# Patient Record
Sex: MALE | Race: WHITE | NOT HISPANIC OR LATINO | Employment: OTHER | URBAN - NONMETROPOLITAN AREA
[De-identification: names, ages, dates, MRNs, and addresses within clinical notes are randomized per-mention and may not be internally consistent; named-entity substitution may affect disease eponyms.]

---

## 2017-01-04 ENCOUNTER — OFFICE VISIT - GICH (OUTPATIENT)
Dept: ONCOLOGY | Facility: OTHER | Age: 72
End: 2017-01-04

## 2017-01-04 ENCOUNTER — HISTORY (OUTPATIENT)
Dept: ONCOLOGY | Facility: OTHER | Age: 72
End: 2017-01-04

## 2017-01-04 DIAGNOSIS — C18.7 MALIGNANT NEOPLASM OF SIGMOID COLON (H): ICD-10-CM

## 2017-02-02 ENCOUNTER — HOSPITAL ENCOUNTER (OUTPATIENT)
Dept: INFUSION THERAPY | Facility: OTHER | Age: 72
End: 2017-02-02
Attending: INTERNAL MEDICINE

## 2017-04-06 ENCOUNTER — AMBULATORY - GICH (OUTPATIENT)
Dept: LAB | Facility: OTHER | Age: 72
End: 2017-04-06

## 2017-04-06 ENCOUNTER — HOSPITAL ENCOUNTER (OUTPATIENT)
Dept: RADIOLOGY | Facility: OTHER | Age: 72
End: 2017-04-06
Attending: INTERNAL MEDICINE

## 2017-04-06 DIAGNOSIS — C18.7 MALIGNANT NEOPLASM OF SIGMOID COLON (H): ICD-10-CM

## 2017-04-06 LAB
A/G RATIO - HISTORICAL: 2 (ref 1–2)
ABSOLUTE BASOPHILS - HISTORICAL: 0.1 THOU/CU MM
ABSOLUTE EOSINOPHILS - HISTORICAL: 0.1 THOU/CU MM
ABSOLUTE LYMPHOCYTES - HISTORICAL: 1.4 THOU/CU MM (ref 0.9–2.9)
ABSOLUTE MONOCYTES - HISTORICAL: 0.8 THOU/CU MM
ABSOLUTE NEUTROPHILS - HISTORICAL: 4.7 THOU/CU MM (ref 1.7–7)
ALBUMIN SERPL-MCNC: 4 G/DL (ref 3.5–5.7)
ALP SERPL-CCNC: 94 IU/L (ref 34–104)
ALT (SGPT) - HISTORICAL: 25 IU/L (ref 7–52)
ANION GAP - HISTORICAL: 9 (ref 5–18)
AST SERPL-CCNC: 27 IU/L (ref 13–39)
BASOPHILS # BLD AUTO: 1 %
BILIRUB SERPL-MCNC: 0.5 MG/DL (ref 0.3–1)
BUN SERPL-MCNC: 15 MG/DL (ref 7–25)
BUN/CREAT RATIO - HISTORICAL: 14
CALCIUM SERPL-MCNC: 9.3 MG/DL (ref 8.6–10.3)
CEA - HISTORICAL: <3 NG/ML
CHLORIDE SERPLBLD-SCNC: 102 MMOL/L (ref 98–107)
CO2 SERPL-SCNC: 26 MMOL/L (ref 21–31)
CREAT SERPL-MCNC: 1.1 MG/DL (ref 0.7–1.3)
EOSINOPHIL NFR BLD AUTO: 1.6 %
ERYTHROCYTE [DISTWIDTH] IN BLOOD BY AUTOMATED COUNT: 12.6 % (ref 11.5–15.5)
GFR IF NOT AFRICAN AMERICAN - HISTORICAL: >60 ML/MIN/1.73M2
GLOBULIN - HISTORICAL: 2 G/DL (ref 2–3.7)
GLUCOSE SERPL-MCNC: 180 MG/DL (ref 70–105)
HCT VFR BLD AUTO: 39.8 % (ref 37–53)
HEMOGLOBIN: 13.2 G/DL (ref 13.5–17.5)
LDH SERPL-CCNC: 171 IU/L (ref 140–271)
LYMPHOCYTES NFR BLD AUTO: 19.6 % (ref 20–44)
MCH RBC QN AUTO: 31.8 PG (ref 26–34)
MCHC RBC AUTO-ENTMCNC: 33.3 G/DL (ref 32–36)
MCV RBC AUTO: 95 FL (ref 80–100)
MONOCYTES NFR BLD AUTO: 11.1 %
NEUTROPHILS NFR BLD AUTO: 66.8 % (ref 42–72)
PLATELET # BLD AUTO: 229 THOU/CU MM (ref 140–440)
PMV BLD: 7 FL (ref 6.5–11)
POTASSIUM SERPL-SCNC: 4.3 MMOL/L (ref 3.5–5.1)
PROT SERPL-MCNC: 6 G/DL (ref 6.4–8.9)
RED BLOOD COUNT - HISTORICAL: 4.17 MIL/CU MM (ref 4.3–5.9)
SODIUM SERPL-SCNC: 137 MMOL/L (ref 133–143)
WHITE BLOOD COUNT - HISTORICAL: 7.1 THOU/CU MM (ref 4.5–11)

## 2017-04-13 ENCOUNTER — OFFICE VISIT - GICH (OUTPATIENT)
Dept: ONCOLOGY | Facility: OTHER | Age: 72
End: 2017-04-13

## 2017-04-13 ENCOUNTER — COMMUNICATION - GICH (OUTPATIENT)
Dept: SURGERY | Facility: OTHER | Age: 72
End: 2017-04-13

## 2017-04-13 ENCOUNTER — HISTORY (OUTPATIENT)
Dept: ONCOLOGY | Facility: OTHER | Age: 72
End: 2017-04-13

## 2017-04-13 DIAGNOSIS — Z85.038 PERSONAL HISTORY OF OTHER MALIGNANT NEOPLASM OF LARGE INTESTINE (CODE): ICD-10-CM

## 2017-04-13 DIAGNOSIS — C18.7 MALIGNANT NEOPLASM OF SIGMOID COLON (H): ICD-10-CM

## 2017-05-02 ENCOUNTER — COMMUNICATION - GICH (OUTPATIENT)
Dept: INTERNAL MEDICINE | Facility: OTHER | Age: 72
End: 2017-05-02

## 2017-05-02 DIAGNOSIS — M10.9 GOUT: ICD-10-CM

## 2017-05-02 DIAGNOSIS — E78.5 HYPERLIPIDEMIA: ICD-10-CM

## 2017-05-02 DIAGNOSIS — I25.10 ATHEROSCLEROTIC HEART DISEASE OF NATIVE CORONARY ARTERY WITHOUT ANGINA PECTORIS: ICD-10-CM

## 2017-05-03 ENCOUNTER — COMMUNICATION - GICH (OUTPATIENT)
Dept: INTERNAL MEDICINE | Facility: OTHER | Age: 72
End: 2017-05-03

## 2017-05-03 DIAGNOSIS — E66.9 OBESITY: ICD-10-CM

## 2017-05-03 DIAGNOSIS — M10.9 GOUT: ICD-10-CM

## 2017-05-03 DIAGNOSIS — E11.9 TYPE 2 DIABETES MELLITUS WITHOUT COMPLICATIONS (H): ICD-10-CM

## 2017-05-03 DIAGNOSIS — D64.9 ANEMIA: ICD-10-CM

## 2017-05-15 ENCOUNTER — HOSPITAL ENCOUNTER (OUTPATIENT)
Dept: SURGERY | Facility: OTHER | Age: 72
Discharge: HOME | End: 2017-05-15
Attending: SURGERY | Admitting: SURGERY

## 2017-05-15 ENCOUNTER — SURGERY (OUTPATIENT)
Dept: SURGERY | Facility: OTHER | Age: 72
End: 2017-05-15

## 2017-05-15 ENCOUNTER — HISTORY (OUTPATIENT)
Dept: SURGERY | Facility: OTHER | Age: 72
End: 2017-05-15

## 2017-05-16 ENCOUNTER — COMMUNICATION - GICH (OUTPATIENT)
Dept: SURGERY | Facility: OTHER | Age: 72
End: 2017-05-16

## 2017-05-26 ENCOUNTER — OFFICE VISIT - GICH (OUTPATIENT)
Dept: INTERNAL MEDICINE | Facility: OTHER | Age: 72
End: 2017-05-26

## 2017-05-26 ENCOUNTER — HISTORY (OUTPATIENT)
Dept: INTERNAL MEDICINE | Facility: OTHER | Age: 72
End: 2017-05-26

## 2017-05-26 DIAGNOSIS — R31.29 OTHER MICROSCOPIC HEMATURIA: ICD-10-CM

## 2017-05-26 DIAGNOSIS — I25.10 ATHEROSCLEROTIC HEART DISEASE OF NATIVE CORONARY ARTERY WITHOUT ANGINA PECTORIS: ICD-10-CM

## 2017-05-26 DIAGNOSIS — K21.00 GASTRO-ESOPHAGEAL REFLUX DISEASE WITH ESOPHAGITIS: ICD-10-CM

## 2017-05-26 DIAGNOSIS — K29.60 OTHER GASTRITIS WITHOUT BLEEDING: ICD-10-CM

## 2017-05-26 DIAGNOSIS — E11.9 TYPE 2 DIABETES MELLITUS WITHOUT COMPLICATIONS (H): ICD-10-CM

## 2017-05-26 DIAGNOSIS — D50.9 IRON DEFICIENCY ANEMIA: ICD-10-CM

## 2017-05-26 DIAGNOSIS — E66.9 OBESITY: ICD-10-CM

## 2017-05-26 DIAGNOSIS — M10.9 GOUT: ICD-10-CM

## 2017-05-26 DIAGNOSIS — E78.2 MIXED HYPERLIPIDEMIA: ICD-10-CM

## 2017-05-26 LAB
ALB RAND URINE - HISTORICAL: <5 MG/L
BILIRUB UR QL: NEGATIVE
CLARITY, URINE: CLEAR CLARITY
COLOR UR: YELLOW COLOR
CREATININE, URINE - HISTORICAL: 1.19 G/L
ESTIMATED AVERAGE GLUCOSE: 171 MG/DL
GLUCOSE URINE: NEGATIVE MG/DL
HEMOGLOBIN A1C MONITORING (POCT) - HISTORICAL: 7.6 % (ref 4–6.2)
KETONES UR QL: NEGATIVE MG/DL
LEUKOCYTE ESTERASE URINE: NEGATIVE
MICROALBUMIN, RAND UR - HISTORICAL: NORMAL MG/G CREAT
NITRITE UR QL STRIP: NEGATIVE
OCCULT BLOOD,URINE - HISTORICAL: NEGATIVE
PH UR: 5.5 [PH]
PROTEIN QUALITATIVE,URINE - HISTORICAL: NEGATIVE MG/DL
PROTEIN QUALITATIVE,URINE - HISTORICAL: NEGATIVE MG/DL
SP GR UR STRIP: >=1.03
UROBILINOGEN,QUALITATIVE - HISTORICAL: NORMAL EU/DL

## 2017-05-26 ASSESSMENT — PATIENT HEALTH QUESTIONNAIRE - PHQ9: SUM OF ALL RESPONSES TO PHQ QUESTIONS 1-9: 0

## 2017-08-01 ENCOUNTER — AMBULATORY - GICH (OUTPATIENT)
Dept: LAB | Facility: OTHER | Age: 72
End: 2017-08-01

## 2017-08-01 ENCOUNTER — HOSPITAL ENCOUNTER (OUTPATIENT)
Dept: RADIOLOGY | Facility: OTHER | Age: 72
End: 2017-08-01
Attending: INTERNAL MEDICINE

## 2017-08-01 DIAGNOSIS — C18.7 MALIGNANT NEOPLASM OF SIGMOID COLON (H): ICD-10-CM

## 2017-08-01 LAB
A/G RATIO - HISTORICAL: 1.5 (ref 1–2)
ABSOLUTE BASOPHILS - HISTORICAL: 0 THOU/CU MM
ABSOLUTE EOSINOPHILS - HISTORICAL: 0.1 THOU/CU MM
ABSOLUTE IMMATURE GRANULOCYTES(METAS,MYELOS,PROS) - HISTORICAL: 0.1 THOU/CU MM
ABSOLUTE LYMPHOCYTES - HISTORICAL: 1.6 THOU/CU MM (ref 0.9–2.9)
ABSOLUTE MONOCYTES - HISTORICAL: 0.8 THOU/CU MM
ABSOLUTE NEUTROPHILS - HISTORICAL: 5.3 THOU/CU MM (ref 1.7–7)
ALBUMIN SERPL-MCNC: 4.3 G/DL (ref 3.5–5.7)
ALP SERPL-CCNC: 91 IU/L (ref 34–104)
ALT (SGPT) - HISTORICAL: 35 IU/L (ref 7–52)
ANION GAP - HISTORICAL: 8 (ref 5–18)
AST SERPL-CCNC: 38 IU/L (ref 13–39)
BASOPHILS # BLD AUTO: 0.1 %
BILIRUB SERPL-MCNC: 0.6 MG/DL (ref 0.3–1)
BUN SERPL-MCNC: 20 MG/DL (ref 7–25)
BUN/CREAT RATIO - HISTORICAL: 20
CALCIUM SERPL-MCNC: 9.6 MG/DL (ref 8.6–10.3)
CEA - HISTORICAL: <3 NG/ML
CHLORIDE SERPLBLD-SCNC: 106 MMOL/L (ref 98–107)
CO2 SERPL-SCNC: 22 MMOL/L (ref 21–31)
CREAT SERPL-MCNC: 1.02 MG/DL (ref 0.7–1.3)
EOSINOPHIL NFR BLD AUTO: 1.5 %
ERYTHROCYTE [DISTWIDTH] IN BLOOD BY AUTOMATED COUNT: 14.6 % (ref 11.5–15.5)
GFR IF NOT AFRICAN AMERICAN - HISTORICAL: >60 ML/MIN/1.73M2
GLOBULIN - HISTORICAL: 2.8 G/DL (ref 2–3.7)
GLUCOSE SERPL-MCNC: 145 MG/DL (ref 70–105)
HCT VFR BLD AUTO: 39.8 % (ref 37–53)
HEMOGLOBIN: 13.4 G/DL (ref 13.5–17.5)
IMMATURE GRANULOCYTES(METAS,MYELOS,PROS) - HISTORICAL: 0.8 %
LDH SERPL-CCNC: 151 IU/L (ref 140–271)
LYMPHOCYTES NFR BLD AUTO: 19.8 % (ref 20–44)
MCH RBC QN AUTO: 30.9 PG (ref 26–34)
MCHC RBC AUTO-ENTMCNC: 33.7 G/DL (ref 32–36)
MCV RBC AUTO: 92 FL (ref 80–100)
MONOCYTES NFR BLD AUTO: 10.7 %
NEUTROPHILS NFR BLD AUTO: 67.1 % (ref 42–72)
PLATELET # BLD AUTO: 221 THOU/CU MM (ref 140–440)
PMV BLD: 9.5 FL (ref 6.5–11)
POTASSIUM SERPL-SCNC: 4.4 MMOL/L (ref 3.5–5.1)
PROT SERPL-MCNC: 7.1 G/DL (ref 6.4–8.9)
RED BLOOD COUNT - HISTORICAL: 4.33 MIL/CU MM (ref 4.3–5.9)
SODIUM SERPL-SCNC: 136 MMOL/L (ref 133–143)
WHITE BLOOD COUNT - HISTORICAL: 7.8 THOU/CU MM (ref 4.5–11)

## 2017-08-17 ENCOUNTER — HISTORY (OUTPATIENT)
Dept: ONCOLOGY | Facility: OTHER | Age: 72
End: 2017-08-17

## 2017-08-17 ENCOUNTER — OFFICE VISIT - GICH (OUTPATIENT)
Dept: ONCOLOGY | Facility: OTHER | Age: 72
End: 2017-08-17

## 2017-08-17 DIAGNOSIS — C18.7 MALIGNANT NEOPLASM OF SIGMOID COLON (H): ICD-10-CM

## 2017-12-18 ENCOUNTER — HOSPITAL ENCOUNTER (OUTPATIENT)
Dept: RADIOLOGY | Facility: OTHER | Age: 72
End: 2017-12-18
Attending: INTERNAL MEDICINE

## 2017-12-18 ENCOUNTER — AMBULATORY - GICH (OUTPATIENT)
Dept: LAB | Facility: OTHER | Age: 72
End: 2017-12-18

## 2017-12-18 DIAGNOSIS — E66.9 OBESITY: ICD-10-CM

## 2017-12-18 DIAGNOSIS — C18.7 MALIGNANT NEOPLASM OF SIGMOID COLON (H): ICD-10-CM

## 2017-12-18 DIAGNOSIS — E11.69 TYPE 2 DIABETES MELLITUS WITH OTHER SPECIFIED COMPLICATION (H): ICD-10-CM

## 2017-12-18 DIAGNOSIS — E78.2 MIXED HYPERLIPIDEMIA: ICD-10-CM

## 2017-12-18 LAB
A/G RATIO - HISTORICAL: 1.4 (ref 1–2)
ABSOLUTE BASOPHILS - HISTORICAL: 0 THOU/CU MM
ABSOLUTE EOSINOPHILS - HISTORICAL: 0.1 THOU/CU MM
ABSOLUTE IMMATURE GRANULOCYTES(METAS,MYELOS,PROS) - HISTORICAL: 0 THOU/CU MM
ABSOLUTE LYMPHOCYTES - HISTORICAL: 1.2 THOU/CU MM (ref 0.9–2.9)
ABSOLUTE MONOCYTES - HISTORICAL: 1 THOU/CU MM
ABSOLUTE NEUTROPHILS - HISTORICAL: 4.1 THOU/CU MM (ref 1.7–7)
ALBUMIN SERPL-MCNC: 4.2 G/DL (ref 3.5–5.7)
ALP SERPL-CCNC: 100 IU/L (ref 34–104)
ALT (SGPT) - HISTORICAL: 61 IU/L (ref 7–52)
ANION GAP - HISTORICAL: 12 (ref 5–18)
AST SERPL-CCNC: 73 IU/L (ref 13–39)
BASOPHILS # BLD AUTO: 0.3 %
BILIRUB SERPL-MCNC: 0.5 MG/DL (ref 0.3–1)
BUN SERPL-MCNC: 16 MG/DL (ref 7–25)
BUN/CREAT RATIO - HISTORICAL: 15
CALCIUM SERPL-MCNC: 9.5 MG/DL (ref 8.6–10.3)
CEA - HISTORICAL: <3 NG/ML
CHLORIDE SERPLBLD-SCNC: 100 MMOL/L (ref 98–107)
CHOL/HDL RATIO - HISTORICAL: 6
CHOLESTEROL TOTAL: 192 MG/DL
CO2 SERPL-SCNC: 25 MMOL/L (ref 21–31)
CREAT SERPL-MCNC: 1.05 MG/DL (ref 0.7–1.3)
EOSINOPHIL NFR BLD AUTO: 2 %
ERYTHROCYTE [DISTWIDTH] IN BLOOD BY AUTOMATED COUNT: 14.6 % (ref 11.5–15.5)
ESTIMATED AVERAGE GLUCOSE: 200 MG/DL
GFR IF NOT AFRICAN AMERICAN - HISTORICAL: >60 ML/MIN/1.73M2
GLOBULIN - HISTORICAL: 2.9 G/DL (ref 2–3.7)
GLUCOSE SERPL-MCNC: 249 MG/DL (ref 70–105)
HCT VFR BLD AUTO: 40.3 % (ref 37–53)
HDLC SERPL-MCNC: 32 MG/DL (ref 23–92)
HEMOGLOBIN A1C MONITORING (POCT) - HISTORICAL: 8.6 % (ref 4–6.2)
HEMOGLOBIN: 13.3 G/DL (ref 13.5–17.5)
IMMATURE GRANULOCYTES(METAS,MYELOS,PROS) - HISTORICAL: 0.2 %
LDH SERPL-CCNC: 177 IU/L (ref 140–271)
LDLC SERPL CALC-MCNC: 106 MG/DL
LYMPHOCYTES NFR BLD AUTO: 17.9 % (ref 20–44)
MCH RBC QN AUTO: 30.2 PG (ref 26–34)
MCHC RBC AUTO-ENTMCNC: 33 G/DL (ref 32–36)
MCV RBC AUTO: 92 FL (ref 80–100)
MONOCYTES NFR BLD AUTO: 15.9 %
NEUTROPHILS NFR BLD AUTO: 63.7 % (ref 42–72)
NON-HDL CHOLESTEROL - HISTORICAL: 160 MG/DL
PLATELET # BLD AUTO: 231 THOU/CU MM (ref 140–440)
PMV BLD: 10.1 FL (ref 6.5–11)
POTASSIUM SERPL-SCNC: 4.9 MMOL/L (ref 3.5–5.1)
PROT SERPL-MCNC: 7.1 G/DL (ref 6.4–8.9)
PROVIDER ORDERDED STATUS - HISTORICAL: ABNORMAL
RED BLOOD COUNT - HISTORICAL: 4.4 MIL/CU MM (ref 4.3–5.9)
SODIUM SERPL-SCNC: 137 MMOL/L (ref 133–143)
TRIGL SERPL-MCNC: 271 MG/DL
WHITE BLOOD COUNT - HISTORICAL: 6.4 THOU/CU MM (ref 4.5–11)

## 2017-12-26 ENCOUNTER — HISTORY (OUTPATIENT)
Dept: ONCOLOGY | Facility: OTHER | Age: 72
End: 2017-12-26

## 2017-12-26 ENCOUNTER — OFFICE VISIT - GICH (OUTPATIENT)
Dept: ONCOLOGY | Facility: OTHER | Age: 72
End: 2017-12-26

## 2017-12-26 DIAGNOSIS — C18.7 MALIGNANT NEOPLASM OF SIGMOID COLON (H): ICD-10-CM

## 2017-12-27 NOTE — PROGRESS NOTES
Patient Information     Patient Name MRN Sex Andrez Tinajero [id number] Male 1945      Progress Notes by Evangelina Sinclair at 2017  8:24 AM     Author:  Evangelina Sinclair Service:  (none) Author Type:  Other Clinical Staff     Filed:  2017  8:24 AM Date of Service:  2017  8:24 AM Status:  Signed     :  Evangelina Sinclair (Other Clinical Staff)            1.  Has the patient had a previous reaction to IV contrast? No    2.  Does the patient have kidney disease? No    3.  Is the patient on dialysis? No    If YES to any of these questions, exam will be reviewed with a Radiologist before administering contrast.

## 2017-12-27 NOTE — PROGRESS NOTES
Patient Information     Patient Name MRN Sex Andrez Tinajero [id number] Male 1945      Progress Notes by Evangelina Sinclair at 2017  8:24 AM     Author:  Evangelina Sinclair Service:  (none) Author Type:  Other Clinical Staff     Filed:  2017  8:24 AM Date of Service:  2017  8:24 AM Status:  Signed     :  Evangelina Sinclair (Other Clinical Staff)            IV Contrast- Discharge Instructions After Your CT Scan      The IV contrast you received today will be filtered from your bloodstream by your kidneys during the next 24 hours and pass from the body in urine.  You will not be aware of this process and your urine will not change in color.  To help this process you should drink at least 4 additional glasses of water or juice today.  This reduces stress on your kidneys.    Most contrast reactions are immediate.  Should you develop symptoms of concern after discharge, contact the department at the number below.  After hours you should contact your personal physician.  If you develop breathing distress or wheezing, call 911.  ]

## 2017-12-27 NOTE — PROGRESS NOTES
Patient Information     Patient Name MRN Andrez Soto [id number] Male 1945      Progress Notes signed by Emmy Stark MD at 2017 12:36 PM      Author:  Emmy Stark MD Service:  (none) Author Type:  Physician     Filed:  2017 12:36 PM Encounter Date:  2017 Status:  Signed     :  Emmy Stark MD (Physician)            -  DATE OF SERVICE:  2017    HEMATOLOGY/ONCOLOGY CLINIC NOTE    Mr. Olivier returns for followup of colon cancer. We had seen the patient in consultation on 2016. At that time, he was a 70-year-old white male with a history of coronary artery disease, type 2 diabetes mellitus, hyperlipidemia, and hypertension who presented to the emergency room on 2016, with complaints of chest pain radiating down his arms, which was primarily worse with exertion. In the emergency department, he was found to have a hemoglobin of 7.1, and he subsequently was admitted. CBC revealed microcytic indices with an MCV of 62. He was noted to be iron deficient. He was transfused 2 units of packed red cells and was ruled out for MI.     Subsequently, he was seen by Dr. Solano, who performed colonoscopy and was noted to have a mass in the sigmoid colon that was consistent with adenocarcinoma. He also had multiple adenomatous polyps.     The patient subsequently was admitted on May 11, 2016, for a sigmoid colectomy. This was performed on May 17, 2016. Pathology revealed in the sigmoid colon a mass consistent with moderately differentiated adenocarcinoma. The tumor size was 2 x 1 x 0.7 cm. The tumor invaded the muscularis propria, 2/8 lymph nodes were positive for metastatic carcinoma. Margins were negative for tumor. The grade of the tumor was ruled as moderately differentiated. The patient was staged pathologic stage T2N1b as part of the postop evaluation.     The patient had a CT abdomen and pelvis on May 12, 2016. This revealed that there were 2  nonspecific low-attenuation lesions in the liver. Metastasis could not be excluded. There was no lymphadenopathy or additional findings to suggest metastases. The patient had a preop CEA, which was less than 3.     When we saw the patient, we wanted to adequately stage the patient by obtaining a PET scan to rule out metastatic disease. PET scan was performed on Lluvia 15, 2016, and was essentially negative for metastatic disease. Lesions seen on prior PET in the liver were not hypermetabolic. We felt that the patient had stage III disease and he would be a candidate for adjuvant chemotherapy.     When we saw the patient on June 28, 2016, the plan was to start FOLFOX x12 cycles. The patient was started, and received a total of 10 cycles.     When we saw him again on November 17, 2016, at that time he did note some cold-induced neuropathic symptoms and a cold sensation when he drinks cold liquids, but otherwise is doing relatively well. We elected to restage him after 12 cycles. He completed 12 cycles of FOLFOX. His last chemotherapy was administered on December 7, 2016. During chemotherapy, he said he became severely ill and developed numbness in his hands and significant cold-induced neuropathy, specifically with swallowing cold liquids. He also did note that he had lost a sense of taste. Since then, his symptoms have been slowly resolving. He still had numbness in his hands and loss of sense of taste. He did have a PET scan done after 12 cycles of chemotherapy, and this came back essentially negative for metastatic disease.     When we saw the patient, the plan was to prescribe vitamin B6 to help with his neuropathy. He said his neurology did improve.     He did have staging studies on April 6, including CT abdomen and pelvis, which was essentially negative. CT of the chest was negative. The patient also underwent a colonoscopy in May 2017, which revealed a tubular adenoma at the hepatic flexure of the colon.      Otherwise, the patient is here now for followup. He says he is doing well. He offers no complaints of shortness of breath, change in bowel habits, bright red blood per rectum, abdominal pain, fevers, night sweats, or weight loss. He had repeat staging studies again, including CT chest, abdomen, and pelvis, which revealed no change from previous scans. There was no evidence of recurrent carcinoma. There were some small pulmonary nodules that were present, but have been stable since June 2016 consistent with a nonmalignant process,     The patient otherwise is again doing well. His neuropathy has significantly improved. He still has some pain when he walks, but does not want to take any medications for this. He continues with vitamin B6.     PHYSICAL EXAMINATION  GENERAL: He is an obese, elderly white male, in no acute distress.   VITAL SIGNS: Blood pressure 112/60. Pulse 76.  Temperature 97.4.   HEENT: Atraumatic, normocephalic. Oropharynx is nonerythematous.   NECK: Supple.   LUNGS: Clear to auscultation and percussion.   HEART: Regular rhythm. S1, S2 normal.   ABDOMEN: Soft. Normoactive bowel sounds. No masses, nontender.   LYMPHATICS: No cervical, supraclavicular, axillary, or inguinal nodes.   EXTREMITIES: No edema.   NEUROLOGIC: Nonfocal.     LABORATORY DATA  Laboratories reveal a CEA of less than 3. CBC is within normal limits.  LDH is 151. BUN 20, creatinine 1.02. LFTs are normal.     IMPRESSION  Stage mildly differentiated adenocarcinoma of the sigmoid colon with 2/8 lymph nodes positive for metastatic disease consistent with pathologic [id number] disease. PET scan was negative for metastatic disease. The patient was started on adjuvant FOLFOX chemotherapy and completed 12 cycles. Course complicated daily by peripheral neuropathy, grade 1, which is improving. PET scan indicates no evidence of metastatic disease. CEA was normal. Recent scans indicated no evidence of metastatic disease including CT chest,  abdomen, and pelvis. Colonoscopy done in May 2017 revealed a tubular adenoma. The patient will be due for a colonoscopy in 3 years.     The plan is to continue surveillance. We will see the patient in 4 months, obtain CBC, CMP, LDH, CEA, and CT chest, abdomen, and pelvis.     40 minutes were spent with the patient, and greater than half the time was spent on counseling and coordination.       MD ALBERTO HURT/kisha   D:  2017 12:25:03  T:  2017 14:18:38  Voice Job ID:  [id number]  Text Job ID:  [id number]  cc:MEGAN LEOS MD, PRIMARY PHYSICIAN  JERE COLE MD         [facility] [place], [place]:  LIEN MCKEON  MR#:  [id number]  :  1945  DATE:  2017  LOCATION:  [facility]  ROOM:    TYPE:  [facility] NOTEPage 1 of 1

## 2017-12-28 NOTE — PROGRESS NOTES
Patient Information     Patient Name MRN Sex Andrez Tinajero [id number] Male 1945      Progress Notes by Evangelina Sinclair at 2017  8:24 AM     Author:  Evangelina Sinclair Service:  (none) Author Type:  Other Clinical Staff     Filed:  2017  8:24 AM Date of Service:  2017  8:24 AM Status:  Signed     :  Evangelina Sinclair (Other Clinical Staff)            Falls Risk Criteria:    Age 65 and older or under age 4        Sensory deficits    Poor vision    Use of ambulatory aides    Impaired judgment    Unable to walk independently    Meets High Risk criteria for falls:  Yes             1.  Do you have dizziness or vertigo?    no                    2.  Do you need help standing or walking?   no                 3.  Have you fallen within the last 6 months?    no           4.  Has the patient been fasting?      yes       If any risks are marked Yes, the following interventions are utilized:    Do not leave patient unattended     Assist patient in the dressing room and bathroom    Have ambulatory aides available throughout procedure    Involve patient s family if available

## 2017-12-30 NOTE — NURSING NOTE
Patient Information     Patient Name MRN Andrez Soto [id number] Male 1945      Nursing Note by Amaris Ray at 2017  9:00 AM     Author:  Amaris Ray Service:  (none) Author Type:  (none)     Filed:  2017 10:07 AM Encounter Date:  2017 Status:  Signed     :  Amaris Ray            Patient presents for a follow up of colon cancer.  No current complaints or side effects.   Amaris Ray CMA (AAMA).....................2017  9:14 AM

## 2017-12-30 NOTE — NURSING NOTE
Patient Information     Patient Name MRN Sex Andrez Tinajero [id number] Male 1945      Nursing Note by Neeta Eddy RN at 2017  9:00 AM     Author:  Neeta Eddy RN Service:  (none) Author Type:  NURS- Registered Nurse     Filed:  2017  9:46 AM Encounter Date:  2017 Status:  Signed     :  Neeta Eddy RN (NURS- Registered Nurse)            Orders entered in 4 months for patient to have CBC, CMP, LDH, CEA and CT of chest with contrast, and CT of abdomen and pelvis entered and to Dr. ROSA Stark for approval.    Neeta Eddy RN ....................  2017   9:44 AM

## 2018-01-02 NOTE — NURSING NOTE
Patient Information     Patient Name MRN Sex Andrez Tinajero [id number] Male 1945      Nursing Note by Angela Quick at 2017 10:00 AM     Author:  Angela Quick Service:  (none) Author Type:  NURS- Registered Nurse     Filed:  2017 10:47 AM Encounter Date:  2017 Status:  Signed     :  Angela Quick (NURS- Registered Nurse)            Labs and CT scans ordered per written order sheet and sent to provider for co-sign. Angela Quick RN 2017  10:47 AM

## 2018-01-02 NOTE — PROGRESS NOTES
Patient Information     Patient Name MRN Andrez Soto [id number] Male 1945      Progress Notes by Emmy Stark MD at 2017 10:00 AM     Author:  Emmy Stark MD Service:  (none) Author Type:  Physician     Filed:  2017  6:24 PM Encounter Date:  2017 Status:  Signed     :  Emmy Stark MD (Physician)            This note has been dictated. The encounter number is [phone].

## 2018-01-02 NOTE — PROGRESS NOTES
Patient Information     Patient Name MRN Andrez Soto [id number] Male 1945      Progress Notes signed by Emmy Stark MD at 2017 11:59 AM      Author:  Emmy Stark MD Service:  (none) Author Type:  Physician     Filed:  2017 11:59 AM Encounter Date:  2017 Status:  Signed     :  Emmy Stark MD (Physician)            -  DATE OF SERVICE:  2017    HEMATOLOGY/ONCOLOGY CLINIC NOTE    Mr. Olivier returns for followup of colon cancer. We had seen the patient in consultation on 2016. At the time he was a 70-year-old white male with a history of coronary artery disease, type 2 diabetes mellitus, hyperlipidemia and hypertension, and presented to the emergency room on 2016, with complaints of chest pain radiating down his arms which was primarily worse with exertion. In the emergency department he was found to have a hemoglobin of 7.1. He subsequently was admitted. CBC revealed microcytic indices with MCV of 62. He was noted to be iron deficient. He was transfused 2 units of packed red cells and was ruled out for MI. Subsequently he was seen by Dr. Solano who performed colonoscopy and was noted to have a mass in the sigmoid colon that was consistent with adenocarcinoma. He also had multiple adenomatous polyps. The patient subsequently was admitted on May 11, 2016, for sigmoid colectomy. This was performed on May 17, 2016. Pathology revealed in the sigmoid colon a mass consistent with moderately differentiated adenocarcinoma. The tumor size was 2 x 1 x 0.7 cm. The tumor invaded the muscularis propria, 2 out of 8 lymph nodes were positive for metastatic carcinoma. Margins were negative for tumor. The grade of the tumor was ruled as moderately differentiated. The patient was staged pathologic stage T2N1b. As part of the postop evaluation, the patient had a CT of the abdomen and pelvis on May 12, 2016. This revealed that there were 2 nonspecific  low-attenuation lesions in the liver. Metastasis could not be excluded. There was no lymphadenopathy or additional findings to suggest metastases. The patient had a preop CEA, which was less than 3. When we saw the patient, we wanted to adequately stage the patient by obtaining a PET scan to rule out metastatic disease. PET scan was performed on Lluvia 15, 2016. It was essentially negative for metastatic disease. Lesions seen on prior PET in the liver were not hypermetabolic. We felt that the patient had stage II disease and would be a candidate for adjuvant chemotherapy. When we saw him on June 23, 2016, the plan was to start FOLFOX x12 cycles. The patient was started, and received a total of 10 cycles. When we saw him last on November 17, 2016. At that time he did note some cold-induced neuropathic symptoms, and a cold sensation when he drinks cold liquids, but otherwise he was doing relatively well. We elected to restage him after 12 cycles. He completed 12 cycles of FOLFOX. His last chemotherapy was administered on December 7, 2016. During that chemotherapy, he said he became severely ill developed numbness in his hands and significant cold-induced neuropathy, especially with swallowing cold liquids. He also did note that he had lost the sense of taste. Since then, his symptoms have been slowly resolving. He still has numbness in his hands and loss of sense of taste. He did have a PET scan done after 12 cycles of chemotherapy and this came back essentially negative for metastatic disease.  The patient otherwise is doing well, except for his neuropathy and loss of taste. He offers no other complaints of abdominal pain, chest pain, fevers, night sweats, weight loss.     PHYSICAL EXAMINATION  GENERAL: He is an elderly white male, in no acute distress.   VITAL SIGNS:  Blood pressure 134/82, pulse 64, respirations 16, temperature 96.3.   HEENT: Atraumatic, normocephalic. Oropharynx nonerythematous.   NECK: Supple.    LUNGS: Clear to auscultation and percussion.   HEART: Regular rhythm. S1, S2 normal.   ABDOMEN: Soft. Normoactive bowel sounds. No masses. Nontender.   LYMPHATICS: No cervical, supraclavicular, axillary, or inguinal nodes.   EXTREMITIES: No edema.   NEUROLOGIC: Nonfocal.     LABORATORY DATA  Laboratories reveal: CEA less than 3.  Glucose 130, BUN 10, creatinine 0.86. LFTs are normal. Alkaline phosphatase was slightly elevated at 109.  CBC: White count 5.1, hemoglobin and hematocrit 12.0 and 36.2, platelet count 130.     IMPRESSION  Stage III moderately differentiated adenocarcinoma of the sigmoid colon with 2 out of 8 lymph nodes positive for metastatic disease, consistent with pathologic [id number] disease. PET scan was negative for metastatic disease. The patient was started on adjuvant FOLFOX chemotherapy. He completed 12 cycles. Course was complicated by peripheral neuropathy, grade 1, which is improving.  PET scan indicates no evidence of disease. CEA is normal. The patient will need a colonoscopy in May. Otherwise, the plan is to prescribe vitamin B6, 50 mg p.o. daily, to help with his peripheral neuropathy. Otherwise, we will see the patient back in 3 months. Obtain CBC, CMP, LDH, CEA, and repeat CT of the abdomen and pelvis.     40 minutes was spent with the patient, greater than half of the time was spent on counseling and coordination of care.        MD ALBERTO HURT/magda   D:  2017 12:57:00  T:  2017 10:41:35  cp 2017   Voice Job ID:  [id number]  Text Job ID:  [id number]  cc:MD MEGAN MUJICA MD, PRIMARY PHYSICIAN         [facility] [place], [place]:  LIEN MCKEON  MR#:  [id number]  :  1945  DATE:  2017  LOCATION:  [facility]  ROOM:    TYPE:  [facility] NOTEPage 1 of 1

## 2018-01-03 NOTE — PROGRESS NOTES
Patient Information     Patient Name MRN Andrez Soto [id number] Male 1945      Progress Notes by Sherin Moreland RN at 2017  8:54 AM     Author:  Sherin Moreland RN Service:  (none) Author Type:  NURS- Registered Nurse     Filed:  2017  8:56 AM Date of Service:  2017  8:54 AM Status:  Signed     :  Sherin Moreland RN (NURS- Registered Nurse)            Patient arrived ambulatory for port flush.  Port accessed with brisk blood return, then flushed with normal saline and heparin then de accessed. Patient discharged ambulatory. Verbalized he is hoping to have port removed. Sherin Moreland RN ....................  2017   8:56 AM

## 2018-01-04 NOTE — PROGRESS NOTES
Patient Information     Patient Name MRN Andrez Soto [id number] Male 1945      Progress Notes by Evangelina Sinclair at 2017  9:26 AM     Author:  Evangelina Sinclair Service:  (none) Author Type:  Other Clinical Staff     Filed:  2017  9:26 AM Date of Service:  2017  9:26 AM Status:  Signed     :  Evangelina Sinclair (Other Clinical Staff)            IV Contrast- Discharge Instructions After Your CT Scan      The IV contrast you received today will be filtered from your bloodstream by your kidneys during the next 24 hours and pass from the body in urine.  You will not be aware of this process and your urine will not change in color.  To help this process you should drink at least 4 additional glasses of water or juice today.  This reduces stress on your kidneys.    Most contrast reactions are immediate.  Should you develop symptoms of concern after discharge, contact the department at the number below.  After hours you should contact your personal physician.  If you develop breathing distress or wheezing, call 911.

## 2018-01-04 NOTE — PROGRESS NOTES
Patient Information     Patient Name MRN Andrez Soto [id number] Male 1945      Progress Notes signed by Emmy Stark MD at 2017  1:57 PM      Author:  Emmy Stark MD Service:  (none) Author Type:  Physician     Filed:  2017  1:57 PM Encounter Date:  2017 Status:  Signed     :  Emmy Stark MD (Physician)            -  DATE OF SERVICE:  2017    HEMATOLOGY / ONCOLOGY CONSULTATION     Mr. Olivier returns for followup of colon cancer. We had seen the patient in consultation on 2016. At that time he was a 70-year-old white male with a history of coronary artery disease, type 2 diabetes mellitus, hyperlipidemia and hypertension, who presented to the emergency room on 2016, with complaints of chest pain radiating down his arms which was primarily worse with exertion. In the emergency department he was found to have a hemoglobin of 7.1. He subsequently was admitted. CBC revealed microcytic indices with MCV of 62. He was noted to be iron deficient. He was transfused 2 units of packed red cells and was ruled out for MI. Subsequently he was seen by Dr. Solano who performed colonoscopy and was noted to have a mass in the sigmoid colon that was consistent with adenocarcinoma. He also had multiple adenomatous polyps. The patient subsequently was admitted on May 11, 2016 for sigmoid colectomy. This was performed on May 17, 2016. Pathology revealed in the sigmoid colon a mass consistent with moderately differentiated adenocarcinoma. The tumor size was 2 x 1 x 0.7 cm. The tumor invaded the muscularis propria, 2 out of 8 lymph nodes were positive for metastatic carcinoma. Margins were negative for tumor. The grade of the tumor was ruled as moderately differentiated. The patient was staged pathologic stage T2N1b. As part of the postop evaluation, the patient had a CT of the abdomen and pelvis on May 12, 2016. This revealed that there were 2  nonspecific low-attenuation lesions in the liver. Metastasis could not be excluded. There was no lymphadenopathy or additional findings to suggest metastases. The patient had a preop CEA, which was less than 3. When we saw the patient, we wanted to adequately stage the patient by obtaining a PET scan to rule out metastatic disease. PET scan was performed on Lluvia 15, 2016. It was essentially negative for metastatic disease. Lesions seen on prior PET in the liver were not hypermetabolic. We felt that the patient had stage II disease and would be a candidate for adjuvant chemotherapy. When we saw him on June 23, 2016, the plan was to start FOLFOX x12 cycles. The patient was started, and received a total of 10 cycles. When we saw him again on November 17, 2016, at that time he did note some cold-induced neuropathic symptoms, and a cold sensation when he drinks cold liquids, but otherwise he was doing relatively well. We elected to restage him after 12 cycles. He completed 12 cycles of FOLFOX. His last chemotherapy was administered on December 7, 2016. During chemotherapy, he said he became severely ill and developed numbness in his hands and significant cold-induced neuropathy, especially with swallowing cold liquids. He also did note that he had lost the sense of taste. Since then, his symptoms have been slowly resolving. He still had numbness in his hands and lost the sense of taste. He did have a PET scan done after 12 cycles of chemotherapy and this came back essentially negative for metastatic disease.  When I saw the patient last, the plan was to prescribe vitamin B6 to help with his neuropathy.  He said his neuropathy is somewhat improving, he just has a cold sensation when he stands on his feet, but otherwise it resolves itself when he sits down.  Otherwise he denies any shortness of breath, change in bowel habits, abdominal pain, fevers, night sweats or weight loss.    He did have staging studies including a CT  of the abdomen and pelvis that was done on April 6th which revealed the lesion in the posterior right hepatic lobe was nonspecific, but stable. There were no new or enlarging liver lesions or lymphadenopathy or evidence of worsening metastatic disease.   CT of the chest was also negative. The patient is scheduled to have a colonoscopy in May with Dr. Solano.      Otherwise he is doing relatively well. No shortness of breath, chest pain. He does state that occasionally he feels dizzy when he stands up. He has not seen Dr. Roberts recently. He is still being managed for his diabetes and continues on Glucophage.     PHYSICAL EXAMINATION  GENERAL: He is an elderly white male, in no acute distress.   VITAL SIGNS:  Blood pressure 102/66, pulse 31, temperature 97.9.   HEENT: Atraumatic, normocephalic. Oropharynx nonerythematous.   NECK: Supple.   LUNGS: Clear to auscultation and percussion.   HEART: Regular rhythm. S1, S2 normal.   ABDOMEN: Soft. Normoactive bowel sounds. No masses. Nontender.   LYMPHATICS: No cervical, supraclavicular, axillary, or inguinal nodes.   EXTREMITIES: No edema.   NEUROLOGIC: Nonfocal.     LABORATORY DATA  Laboratories reveal: CEA less than 3.  LFTs are normal.  Glucose 180.  BUN 15, creatinine 1.1. CBC is within normal limits.  .    IMPRESSION  1. Stage III moderately differentiated adenocarcinoma of the sigmoid colon with 2 out of 8 lymph nodes positive for metastatic disease, consistent with pathologic H1C3uS5 disease. PET scan was negative for metastatic disease. The patient was started on adjuvant FOLFOX chemotherapy. He completed 12 cycles. Course was complicated by peripheral neuropathy, grade 1, which is improving.  PET scan indicates no evidence of metastatic disease. CEA was normal. The patient's scans indicate stable liver lesion.  No evidence of metastatic disease.  The plan is to proceed with a colonoscopy in May with Dr. Solano.     2. Peripheral neuropathy, stable to improved.   I have offered the patient gabapentin, the patient refuses.    3. Probable orthostatic hypotension.  The patient has not seen his primary provider for diabetes management. I suspect this is likely due to autonomic dysfunction secondary to diabetes and possibly oxaliplatin.      Otherwise we will see the patient in 4 months.  Obtain CBC, CMP, LDH, CEA, and repeat CT of the abdomen and pelvis.     40 minutes was spent with the patient, greater than half of the time was spent on counseling and coordination of care.            MD ALBERTO HURT/leti   D:  2017 11:44:52  T:  2017 10:08:50  Voice Job ID:  [id number]  Text Job ID:  [id number]  cc:MEGAN LEOS MD, PRIMARY PHYSICIAN  JERE COLE MD         [facility] [place], [place]:  LIEN MCKEON  MR#:  [id number]  :  1945  DATE:  2017  LOCATION:  [facility]  ROOM:    TYPE:  [facility] NOTEPage 1 of 1

## 2018-01-04 NOTE — TELEPHONE ENCOUNTER
Patient Information     Patient Name MRN Andrez Soto [id number] Male 1945      Telephone Encounter by Chari Watkins RN at 5/3/2017  9:47 AM     Author:  Chari Watkins RN Service:  (none) Author Type:  NURS- Registered Nurse     Filed:  5/3/2017  9:53 AM Encounter Date:  5/3/2017 Status:  Signed     :  Chari Watkins RN (NURS- Registered Nurse)            Biguanides  Office visit in the past 12 months or per provider note.  Last visit with MEGAN LEOS was on: 2016 in [facility]  Next visit with MEGAN LEOS is on: No future appointment listed with this provider  Lab test requirements:  HgbA1c annually or per provider note.  HEMOGLOBIN A1C MONITORING (POCT)    Date Value   2016 6.9 % (H)   2013 9.4 % NGSP (H)   Max refill for 12 months from last office visit or per provider note.  If taking for polycystic ovary disease, may refill for 12 months.  Due for exam.  Limited refill per protocol and letter mailed.  Chari Watkins RN ........   5/3/2017    9:47 AM

## 2018-01-04 NOTE — TELEPHONE ENCOUNTER
Patient Information     Patient Name MRN Andrez Soto [id number] Male 1945      Telephone Encounter by Tanvi Pedro at 2017  2:27 PM     Author:  Tanvi Pedro Service:  (none) Author Type:  (none)     Filed:  2017  2:34 PM Encounter Date:  2017 Status:  Signed     :  Tanvi Pedro            Screening Questions for the Scheduling of Screening Colonoscopies   (If Colonoscopy is diagnostic, Provider should review the chart before scheduling.)  Are you younger than 50 or older than 80?  NO  Do you take aspirin or fish oil?  ASPRIN  YES ASPRIN  (if yes, tell patient to stop 1 week prior to Colonoscopy)  Do you take warfarin (Coumadin), clopidogrel (Plavix), apixaban (Eliquis), dabigatram (Pradaxa), rivaroxaban (Xarelto) or any blood thinner? NO  Do you use oxygen at home?  NO  Do you have kidney disease? NO  Are you on dialysis? NO  Have you had a stroke or heart attack in the last year? NO  Have you had a stent in your heart or any blood vessel in the last year? NO  Have you had a transplant of any organ? NO  Have you had a colonoscopy or upper endoscopy (EGD) before? YES  - COLONOSCOPY          When?  2016  GI   Date of scheduled Colonoscopy. 05/15/2017  Provider DOUGLAS   [facility]      Screening colonoscopy ,  History of colon cancer.   Last colonoscopy 2016.

## 2018-01-04 NOTE — PROGRESS NOTES
Patient Information     Patient Name MRN Sex Andrez Tinajero [id number] Male 1945      Progress Notes by Evangelina Sinclair at 2017  9:26 AM     Author:  Evangelina Sinclair Service:  (none) Author Type:  Other Clinical Staff     Filed:  2017  9:27 AM Date of Service:  2017  9:26 AM Status:  Signed     :  Evangelina Sinclair (Other Clinical Staff)            1.  Is patient currently taking metformin? Yes     If NO: Technologist will give the patient normal post CT instructions.     If YES: Technologist will obtain GFR from Lab.    2.  Is GFR is greater than 60? Yes     If YES: Technologist will give the patient normal post CT instructions.     If NO: Technologist will give the patient METFORMIN post CT instructions.

## 2018-01-04 NOTE — PROGRESS NOTES
Patient Information     Patient Name MRN Andrez Soto [id number] Male 1945      Progress Notes by Emmy Stark MD at 2017 11:00 AM     Author:  Emmy Stark MD Service:  (none) Author Type:  Physician     Filed:  2017 12:33 PM Encounter Date:  2017 Status:  Signed     :  Emmy Stark MD (Physician)            This note has been dictated. The encounter number is [phone].

## 2018-01-04 NOTE — TELEPHONE ENCOUNTER
Patient Information     Patient Name MRN Andrez Soto [id number] Male 1945      Telephone Encounter by Rigo Mustafa RN at 5/3/2017  9:16 AM     Author:  Rigo Mustafa RN Service:  (none) Author Type:  NURS- Registered Nurse     Filed:  5/3/2017  9:18 AM Encounter Date:  5/3/2017 Status:  Signed     :  Rigo Mustafa RN (NURS- Registered Nurse)            GOUT    Office visit in the past 12 months or per provider note.    Last visit with MEGAN LEOS was on: 2016 in [facility]  Next visit with MEGAN LEOS is on: No future appointment listed with this provider  Next visit with Internal Medicine is on: No future appointment listed in this department    Max refill for 12 months from last office visit or per provider note.  Prescription refilled per RN Medication Refill Policy.................... RIGO MUSTAFA RN ....................  5/3/2017   9:17 AM

## 2018-01-04 NOTE — TELEPHONE ENCOUNTER
Patient Information     Patient Name MRN Andrez Soto [id number] Male 1945      Telephone Encounter by Rigo Mustafa RN at 5/3/2017  3:36 PM     Author:  Rigo Mustafa RN Service:  (none) Author Type:  NURS- Registered Nurse     Filed:  5/3/2017  3:37 PM Encounter Date:  5/3/2017 Status:  Signed     :  Rgio Mustafa RN (NURS- Registered Nurse)            Office visit in the past 12 months or per provider note.    Last visit with MEGAN LEOS was on: 2016 in [facility]  Next visit with MEGAN LEOS is on: No future appointment listed with this provider  Next visit with Internal Medicine is on: No future appointment listed in this department    Lab test requirements:  Annual hemoglobin.  HEMOGLOBIN                (g/dL)    Date Value   2017 13.2 (L)       Max refill for 12 months from last office visit or per provider note.  Prescription refilled per RN Medication Refill Policy.................... RIGO MUSTAFA RN ....................  5/3/2017   3:36 PM

## 2018-01-04 NOTE — NURSING NOTE
Patient Information     Patient Name MRN Andrez Soto [id number] Male 1945      Nursing Note by Mathew Portillo at 2017 11:00 AM     Author:  Mathew Portillo Service:  (none) Author Type:  (none)     Filed:  2017 11:09 AM Encounter Date:  2017 Status:  Signed     :  Mathew Portillo            Patient presents today for a follow up with lab & CT scan results.He states that he is doing good except for the pain,numbness and cold feeling in his feet and fingers.  Mathew Portillo LPN ....................  2017   11:00 AM

## 2018-01-04 NOTE — PROGRESS NOTES
Patient Information     Patient Name MRN Sex Andrez Tinajero [id number] Male 1945      Progress Notes by Evangelina Sinclair at 2017  9:26 AM     Author:  Evangelina Sinclair Service:  (none) Author Type:  Other Clinical Staff     Filed:  2017  9:26 AM Date of Service:  2017  9:26 AM Status:  Signed     :  Evangelina Sinclair (Other Clinical Staff)            1.  Has the patient had a previous reaction to IV contrast? No    2.  Does the patient have kidney disease? No    3.  Is the patient on dialysis? No    If YES to any of these questions, exam will be reviewed with a Radiologist before administering contrast.

## 2018-01-04 NOTE — TELEPHONE ENCOUNTER
Patient Information     Patient Name MRN Andrez Soto [id number] Male 1945      Telephone Encounter by Rigo Mustafa RN at 5/3/2017  9:18 AM     Author:  Rigo Mustafa RN Service:  (none) Author Type:  NURS- Registered Nurse     Filed:  5/3/2017  9:21 AM Encounter Date:  5/3/2017 Status:  Signed     :  Rigo Mustafa RN (NURS- Registered Nurse)            Proton Pump Inhibitors    Office visit in the past 12 months or per provider note.    Last visit with MEGAN LEOS was on: 2016 in [facility]  Next visit with MEGAN LEOS is on: No future appointment listed with this provider  Next visit with Internal Medicine is on: No future appointment listed in this department    Max refill for 12 months from last office visit or per provider note.  Prescription refilled per RN Medication Refill Policy.................... RIGO MUSTAFA RN ....................  5/3/2017   9:19 AM

## 2018-01-04 NOTE — PROGRESS NOTES
Patient Information     Patient Name MRN Sex Andrez Tinajero [id number] Male 1945      Progress Notes by Evangelina Sinclair at 2017  9:27 AM     Author:  Evangelina Sinclair Service:  (none) Author Type:  Other Clinical Staff     Filed:  2017  9:27 AM Date of Service:  2017  9:27 AM Status:  Signed     :  Evangelina Sinclair (Other Clinical Staff)            Falls Risk Criteria:    Age 65 and older or under age 4        Sensory deficits    Poor vision    Use of ambulatory aides    Impaired judgment    Unable to walk independently    Meets High Risk criteria for falls:  Yes             1.  Do you have dizziness or vertigo?    no                    2.  Do you need help standing or walking?   no                 3.  Have you fallen within the last 6 months?    no           4.  Has the patient been fasting?      yes       If any risks are marked Yes, the following interventions are utilized:    Do not leave patient unattended     Assist patient in the dressing room and bathroom    Have ambulatory aides available throughout procedure    Involve patient s family if available

## 2018-01-04 NOTE — TELEPHONE ENCOUNTER
Patient Information     Patient Name MRN Andrez Soto [id number] Male 1945      Telephone Encounter by Nellie Whiteside RN at 2017  3:53 PM     Author:  Nellie Whiteside RN Service:  (none) Author Type:  NURS- Registered Nurse     Filed:  2017  3:55 PM Encounter Date:  2017 Status:  Signed     :  Nellie Whiteside RN (NURS- Registered Nurse)            Unable to locate in chart where it may have been stopped. I called pharmacy and they confirmed patient called in for a refill and it was not an auto fill. He has filled #90 tablets on  and again on 2/3/17. I did sent him a letter today that he is due for FU. I tried contacting him by phone with no answer. NELLIE WHITESIDE RN ....................  2017   3:54 PM

## 2018-01-04 NOTE — TELEPHONE ENCOUNTER
Patient Information     Patient Name MRN Andrez Soto [id number] Male 1945      Telephone Encounter by Baljeet Solano MD at 2017  2:10 PM     Author:  Baljeet Solano MD Service:  (none) Author Type:  Physician     Filed:  2017  2:11 PM Encounter Date:  2017 Status:  Signed     :  Baljeet Solano MD (Physician)            Schedule colonoscopy for h/o colon cancer.  I don't think it's diagnostic as there are no complaints.

## 2018-01-04 NOTE — TELEPHONE ENCOUNTER
Patient Information     Patient Name MRN Andrez Soto [id number] Male 1945      Telephone Encounter by Taniv Pedro at 2017  1:57 PM     Author:  Tanvi Pedro Service:  (none) Author Type:  (none)     Filed:  2017  2:00 PM Encounter Date:  2017 Status:  Signed     :  Tanvi Pedro            Patient referred by Dr. Stark for a Diagnostic colonoscopy . Not sure if this is diagnostic because it is a 1 year follow up for colon cancer.  Please advise . Thanks

## 2018-01-04 NOTE — TELEPHONE ENCOUNTER
Patient Information     Patient Name MRN Andrez Soto [id number] Male 1945      Telephone Encounter by Nellie Whiteside RN at 2017  3:15 PM     Author:  Nellie Whiteside RN Service:  (none) Author Type:  NURS- Registered Nurse     Filed:  2017  3:22 PM Encounter Date:  2017 Status:  Signed     :  Nellie Whiteside RN (NURS- Registered Nurse)            This is a Refill request from: [facility]  Name of Medication: clopidogrel (PLAVIX) 75 mg tablet  TAKE 1 TABLET BY MOUTH DAILY  Quantity requested: 90  Last fill date: unsure  Due for refill: unsure stated was not longer taking 16  Last visit with MEGAN ROBERTS was on: 2016 in [facility]  PCP:  Megan Roberts MD  Controlled Substance Agreement:     Diagnosis r/t this medication request: unsure    Medication was discontinued on 16 when patient stated he was no longer taking this. Unsure if patient should be on be on this. Due for annual exam this month will refill zylopirm and zocor and send patient a reminder letter     Unable to complete prescription refill per RN Medication Refill Policy.................... NELLIE WHITESIDE RN ....................  2017   3:15 PM      GOUT    Office visit in the past 12 months or per provider note.    Last visit with MEGAN ROBERTS was on: 2016 in [facility]  Next visit with MEGAN ROBERTS is on: No future appointment listed with this provider  Next visit with Internal Medicine is on: No future appointment listed in this department    Max refill for 12 months from last office visit or per provider note.    Statins    Office visit in the past 12 months.    Last visit with MEGAN ROBERTS was on: 2016 in [facility]  Next visit with MEGAN ROBERTS is on: No future appointment listed with this provider  Next visit with Internal Medicine is on: No future appointment listed in this department    Lab testing requirements:  Lipids annually.   Repeat lipids 6-8 weeks after dosage or drug change.    Last Lipids:  Chol: 163    2016  T    2016  HDL:   35    2016  LDL:  93    2016  LDL DIRECT:  No results found in past 5 years    .    Concommitant use of fibrates and statins-If it is an addition to the medication list, review note and/or discuss with provider.  If already on medication list, refill.    Max refills 12 months from last office visit.      Due for exam.  Limited refill per protocol and letter mailed.  TOY LOMBARDO RN ........   2017    3:20 PM

## 2018-01-04 NOTE — NURSING NOTE
Patient Information     Patient Name MRN Sex Andrez Tinajero [id number] Male 1945      Nursing Note by Angela Quick at 2017 11:00 AM     Author:  Angela Quick Service:  (none) Author Type:  NURS- Registered Nurse     Filed:  2017 11:31 AM Encounter Date:  2017 Status:  Signed     :  nAgela Quick (NURS- Registered Nurse)            Labs, CT scans, and 1 year colonoscopy with Dr. Solano ordered per written order sheet and sent to provider for co-sign. Angela Quick RN 2017  11:29 AM

## 2018-01-05 NOTE — PROGRESS NOTES
Patient Information     Patient Name MRN Sex Andrez Tinajero [id number] Male 1945      Progress Notes by Bk Roberts MD at 2017  9:00 AM     Author:  Bk Roberts MD Service:  (none) Author Type:  Physician     Filed:  2017 12:43 PM Encounter Date:  2017 Status:  Signed     :  Bk Roberts MD (Physician)            Nursing Notes:   Serena Will  2017  9:17 AM  Signed  Patient presents to the clinic for medication management, last eye exam was a couple of years ago.    Serena Will LPN        2017 9:00 AM    Andrez Olivier presents to clinic today for:   Chief Complaint    Patient presents with      Medication Management     HPI: Mr. Olivier is a 71 y.o. male who presents today for evaluation of above.     (E11.9,  E66.9) Diabetes mellitus type 2 in obese (HC)  (primary encounter diagnosis)  (E78.2) Mixed hyperlipidemia  (I25.10) Coronary artery disease involving native coronary artery of native heart without angina pectoris - Hx BRYON to Mid RCA - 2013 and Hx of MI in  (LAD with Severe lesions)  (D50.9) Iron deficiency anemia, unspecified  (K21.0) Reflux esophagitis  (K29.60) Gastritis, erosive  (R31.29) Microscopic hematuria  (M10.9) GOUT     Diabetes, currently controlled.  Hemoglobin A1c today has gone up to 7.6%.  Increase metformin from 500 mg twice a day up to 1000 mg twice a day.    Hyperlipidemia, currently taking simvastatin 40 mg daily.  Has been tolerating well.  Last Lipids:  Chol: 2016 163   173  HDL: 2016 35   LDL: 2016 93    Heartburn -- is doing much better. Taking protonix. Needs refills.  History of significant gastritis and esophagitis.  States his heartburn symptoms are much improved.    Coronary artery disease with history of stent.  Stent was placed 4 years ago.  Patient stopped his Plavix prior to his colonoscopy and has not yet restarted it.  Advised that since it has been 4 years, okay to change  over to just Plavix and stop aspirin.  Hx of Chest pain, denies exertional chest pain at this time.  Needs refills of his nitroglycerin tablets.  Has not used any of them in the past year.    history of uncontrolled diabetes, now controlled.      Anemia, history of.  Complete current prescription oral iron tablets and then discontinue.      Gout, has been stable.  Needs medication refills.    Mr. Roy Body mass index is 37.82 kg/(m^2). This is out of the normal range for a 71 y.o. Normal range for ages 18+ is between 18.5 and 24.9. To lose weight we reviewed risks and benefits of appropriate options such as diet, exercise, and medications. Patient's strategy will be  self-directed nutrition plan and self-directed exercise program   BP Readings from Last 1 Encounters:05/26/17 : 104/66  Mr. Roy blood pressure is within the normal range for adults. Per JNC-8 guidelines normal adult blood pressure is < 120/80, pre-hypertensive is between 120/80 and 139/89, and hypertension is 140/90 or greater.    Functional Capacity: > 4 METS.   Reports that he can climb a flight of stairs without any chest pain/heaviness or shortness of breath.   Patient reports no current symptoms of fevers, chills, nausea/vomiting.   No cough. No shortness of breath.   No change in bowel/bladder habits. No melena, hematochezia. No Hematuria.   No rashes. No palpitations.  No orthopnea/paroxysmal nocturnal dyspnea   No vision or hearing issues.   No significant mood issues   No bruising.     LONNIE:  No flowsheet data found.    PHQ9:  PHQ Depression Screening 4/13/2017 5/26/2017   Date of PHQ exam (doc flow) 4/13/2017 5/26/2017   1. Lack of interest/pleasure 0 - Not at all 0 - Not at all   2. Feeling down/depressed 0 - Not at all 0 - Not at all   PHQ-2 TOTAL SCORE 0 0   3. Trouble sleeping - 0 - Not at all   4. Decreased energy - 0 - Not at all   5. Appetite change - 0 - Not at all   6. Feelings of failure - 0 - Not at all   7. Trouble  concentrating - 0 - Not at all   8. Activity level - 0 - Not at all   9. Hurting yourself - 0 - Not at all   PHQ-9 TOTAL SCORE - 0   PHQ-9 Severity Level - none   Functional Impairment - not difficult at all        I have personally reviewed the past medical history, past surgical history, medications, allergies, family and social history as listed below, on 5/26/2017.    Patient Active Problem List       Diagnosis  Date Noted     Iron deficiency anemia, unspecified  05/26/2017     Reflux esophagitis  05/10/2016     Gastritis, erosive  05/06/2016     H/O adenomatous polyp of colon  05/06/2016     Cancer of sigmoid colon pT2N1b  05/06/2016     Nocturia 2-3 x nightly  05/19/2015     Microscopic hematuria  05/19/2015     History of tobacco abuse  05/19/2015     Diabetes mellitus type 2 in obese (HC)  03/14/2014     CAD  02/01/2013 2/1/2013 BRYON to,mid RCA (angina and abnormal myoview)        HYPERLIPIDEMIA  05/12/2010     GOUT  05/12/2010     OBESITY       Coronary artery disease involving native coronary artery of native heart without angina pectoris - Hx BRYON to Mid RCA - 2/1/2013 and Hx of MI in 1990 (LAD with Severe lesions)            -MI 1990-LAD with severe lesions in 2 small branches        -2/1/2013 BRYON to mid RCA (angina and abnormal myoview)        Past Medical History:     Diagnosis  Date     CAD 2/1/2013    BRYON to,mid RCA (angina and abnormal myoview), ANW      Cancer of sigmoid colon (HC) 5/6/2016     Diabetes mellitus type 2 in obese (HC)      Diverticulosis of sigmoid colon 5/6/2016     Gastritis, erosive 5/6/2016     Gout      H/O adenomatous polyp of colon 5/6/2016     History of tobacco abuse      Hyperlipidemia LDL goal <100      MI (myocardial infarction) (HC) 1989    MI at age 44, s/p angioplasty, [facility]; MI at age 49, s/p stent placement at [facility]      Reflux esophagitis 5/10/2016     Past Surgical History:      Procedure  Laterality Date     APPENDECTOMY      at age of 12       CARDIAC  CATHETERIZATION  2/1/2013    BRYON to,mid RCA (angina and abnormal myoview)       COLECTOMY  5/17/16    Colectomy, Sigmoid       COLONOSCOPY DIAGNOSTIC  5/6/16    F/U 2017; Dr Solano       COLONOSCOPY DIAGNOSTIC  05/15/2017    F/U 2020       CORONARY STENT PLACEMENT  1989    Stenting coronary artery, presumably LAD        ESOPHAGOGASTRODUODENOSCOPY  5/6/16    EGD; Dr Solano       FLEXIBLE SIGMOIDOSCOPY  2000     HX POWER PORT Left 6/28/16    Left subclavian Power Port       Jaw reconstruction       Current Outpatient Prescriptions       Medication  Sig Dispense Refill     allopurinol (ZYLOPRIM) 100 mg tablet Take 1 tablet by mouth 2 times daily. 180 tablet 3     blood sugar diagnostic (ACCU-CHEK SMARTVIEW TEST STRIP) strip Dispense test strips covered by the patient insurance. E11.65 NIDDM type II, uncontrolled - Test 2 times/day 100 Each 6     clopidogrel (PLAVIX) 75 mg tablet Take 1 tablet by mouth once daily. 90 tablet 3     CYANOCOBALAMIN, VITAMIN B-12, (VITAMIN B-12 ORAL) Take 1 tablet by mouth once daily.       glipiZIDE (GLUCOTROL) 10 mg tablet Take 1 tablet by mouth 2 times daily before meals. - for diabetes 180 tablet 3     lancets Quintin Microlet Lancets. E11.65 NIDDM type II, uncontrolled - Test 2 times/day. 100 Each 6     lidocaine-prilocaine (EMLA) 2.5-2.5 % cream Apply to port site one hour prior to access 30 g 5     lisinopril (PRINIVIL; ZESTRIL) 2.5 mg tablet Take 1 tablet by mouth once daily. 90 tablet 3     metFORMIN (GLUCOPHAGE) 1,000 mg tablet Take 1 tablet by mouth 2 times daily with meals. -- Dose Increase 5/26/2017 (cancel Rx for 500 mg tablet) 180 tablet 3     metoprolol tartrate (LOPRESSOR) 50 mg tablet Take 1 tablet by mouth 2 times daily. 180 tablet 3     nitroglycerin (NITROSTAT) 0.4 mg sublingual tablet Place 1 tablet under the tongue every 5 minutes if needed for Chest Pain. 1 Bottle 1     pantoprazole (PROTONIX) 40 mg delayed-release tablet Take 1 tablet by mouth once daily before a meal. 90  "tablet 3     simvastatin (ZOCOR) 40 mg tablet Take 1 tablet by mouth at bedtime. - for cholesterol 90 tablet 3     Allergies      Allergen   Reactions     Aspirin  Other - Describe In Comment Field     ---- Has been 4 years since stent - as of 2017 - Hold Aspirin while on Plavix for now      Invokana [Canagliflozin]  Dizziness     Morphine  Other - Describe In Comment Field     Pt had a bad experience at age 49 and would like to avoid.      Family History       Problem   Relation Age of Onset     Other  Father      PE       Other  Mother      Old Age       Diabetes  Sister      ? Sisters-DM       Arthritis  Brother      ? Brothers- Gout       Family Status     Relation  Status     Father  at age 68    pulmonary embolism      Mother  at age 84    natural causes      Brother Alive    4, one  of alcohol at age 57      Sister Alive    4,      Sister      Brother      Social History     Social History        Marital status:       Spouse name: N/A     Number of children:  2     Years of education:  N/A     Social History Main Topics        Smoking status:  Former Smoker     Packs/day: 1.00     Types: Cigarettes     Quit date: 1985     Smokeless tobacco:  Never Used     Alcohol use  No     Drug use:  No     Sexual activity:  Not on file     Other Topics  Concern     Not on file      Social History Narrative     Retired from [employer]. Retired .    with 2 adult children. Primary caregiver for his wife who has MS.         Pertinent ROS was performed and was negative as noted in HPI above.     EXAM:   Vitals:     17 0901   BP: 104/66   Pulse: 72   Temp: 97.7  F (36.5  C)   TempSrc: Tympanic   Weight: 114.5 kg (252 lb 6 oz)   Height: 1.74 m (5' 8.5\")     BP Readings from Last 3 Encounters:    17 104/66   05/15/17 138/77   17 102/66     Wt Readings from Last 3 Encounters:    17 114.5 kg (252 lb 6 oz)   17 113.8 kg (250 lb 12.8 oz)   17 110.1 kg (242 " "lb 11.2 oz)     Estimated body mass index is 37.82 kg/(m^2) as calculated from the following:    Height as of this encounter: 1.74 m (5' 8.5\").    Weight as of this encounter: 114.5 kg (252 lb 6 oz).     EXAM:  Constitutional: Pleasant, alert, appropriate appearance for age. No acute distress  ENT: Normocephalic, Atraumatic, Thyroid without nodules or tenderness   Nose/Mouth: Oral pharynx without erythema or exudates, Nose is patent bilaterally, no rhinorrhea and Dental hygeine adequate   Eyes:  Extraocular muscles intact, Sclera non-icteric, Conjunctiva without erythema  Lymphatic Exam: Non-palpable nodes in neck, clavicular regions  Pulmonary: Lungs are clear to auscultation bilaterally, without wheezes or crackles  Cardiovascular Exam: regular rate and rhythm, brisk carotid upstroke without bruits, peripheral pulses very brisk, trace pedal edema present, no murmur, click, rub or gallop appreciated  Gastrointestinal Exam: Soft, non-tender, non-distended, positive bowel sounds  Integument: No abnormal rashes, sores, or ulcerations noted  Neurologic Exam: CN 3-12 grossly intact   Musculoskeletal Exam: Moves upper and lower extremities symmetrically, No focal weakness  Gait and station appear grossly normal  Psychiatric Exam: Awake and Alert, Affect and mood appropriate  Speech is fluent, Thought process is normal    INVESTIGATIONS:  Results for orders placed or performed in visit on 05/26/17      HEMOGLOBIN A1C MONITORING (POCT)      Result  Value Ref Range    HEMOGLOBIN A1C MONITORING (POCT) 7.6 (H) 4.0 - 6.2 %    ESTIMATED AVERAGE GLUCOSE  171 mg/dL   URINALYSIS W REFLEX MICROSCOPIC IF POSITIVE      Result  Value Ref Range    COLOR                     Yellow Yellow Color    CLARITY                   Clear Clear Clarity    SPECIFIC GRAVITY,URINE    >=1.030 (A) 1.010, 1.015, 1.020, 1.025                    PH,URINE                  5.5 6.0, 7.0, 8.0, 5.5, 6.5, 7.5, 8.5                    UROBILINOGEN,QUALITATIVE  " Normal Normal EU/dl    PROTEIN, URINE Negative Negative mg/dL    GLUCOSE, URINE Negative Negative mg/dL    KETONES,URINE             Negative Negative mg/dL    BILIRUBIN,URINE           Negative Negative                    OCCULT BLOOD,URINE        Negative Negative                    NITRITE                   Negative Negative                    LEUKOCYTE ESTERASE        Negative Negative                       ASSESSMENT AND PLAN:  Andrez was seen today for medication management.    Diagnoses and all orders for this visit:    Diabetes mellitus type 2 in obese (HC)  -     CBC W PLT NO DIFF; Standing  -     COMP METABOLIC PANEL; Standing  -     HEMOGLOBIN A1C MONITORING (POCT); Standing  -     glipiZIDE (GLUCOTROL) 10 mg tablet; Take 1 tablet by mouth 2 times daily before meals. - for diabetes  -     lisinopril (PRINIVIL; ZESTRIL) 2.5 mg tablet; Take 1 tablet by mouth once daily.  -     Discontinue: metFORMIN (GLUCOPHAGE) 500 mg tablet; Take 1 tablet by mouth 2 times daily with meals.  -     metoprolol tartrate (LOPRESSOR) 50 mg tablet; Take 1 tablet by mouth 2 times daily.  -     Cancel: CBC W PLT NO DIFF  -     Cancel: COMP METABOLIC PANEL  -     HEMOGLOBIN A1C MONITORING (POCT)  -     metFORMIN (GLUCOPHAGE) 1,000 mg tablet; Take 1 tablet by mouth 2 times daily with meals. -- Dose Increase 5/26/2017 (cancel Rx for 500 mg tablet)    Mixed hyperlipidemia  -     LIPID PANEL; Standing  -     simvastatin (ZOCOR) 40 mg tablet; Take 1 tablet by mouth at bedtime. - for cholesterol  -     Cancel: LIPID PANEL    Coronary artery disease involving native coronary artery of native heart without angina pectoris - Hx BRYON to Mid RCA - 2/1/2013 and Hx of MI in 1990 (LAD with Severe lesions)  -     clopidogrel (PLAVIX) 75 mg tablet; Take 1 tablet by mouth once daily.  -     nitroglycerin (NITROSTAT) 0.4 mg sublingual tablet; Place 1 tablet under the tongue every 5 minutes if needed for Chest Pain.    Iron deficiency anemia,  unspecified  -     CBC W PLT NO DIFF; Standing  -     Cancel: CBC W PLT NO DIFF    Reflux esophagitis  -     pantoprazole (PROTONIX) 40 mg delayed-release tablet; Take 1 tablet by mouth once daily before a meal.    Gastritis, erosive  -     pantoprazole (PROTONIX) 40 mg delayed-release tablet; Take 1 tablet by mouth once daily before a meal.    Microscopic hematuria  -     URINALYSIS W REFLEX MICROSCOPIC IF POSITIVE; Future  -     MICROALBUMIN RANDOM URINE; Future  -     URINALYSIS W REFLEX MICROSCOPIC IF POSITIVE  -     MICROALBUMIN RANDOM URINE    GOUT  -     allopurinol (ZYLOPRIM) 100 mg tablet; Take 1 tablet by mouth 2 times daily.    lab results and schedule of future lab studies reviewed with patient, reviewed diet, exercise and weight control, recommended sodium restriction, cardiovascular risk and specific lipid/LDL goals reviewed, specific diabetic recommendations low cholesterol diet, weight control and daily exercise discussed, home glucose monitoring emphasized, foot care discussed and Podiatry visits discussed, annual eye examinations at Ophthalmology discussed, glycohemoglobin and other lab monitoring discussed and long term diabetic complications discussed, use of Plavix to prevent MI and TIA's discussed    -- Expected clinical course discussed   -- Medications and their side effects discussed    The ASCVD Risk score (Piggott DC Jr, et al., 2013) failed to calculate for the following reasons:    The patient has a prior MCI or stroke diagnosis    Valdy is also recommended to eat a heart-healthy diet, do regular aerobic exercises, maintain a desirable body weight, and avoid tobacco products. These recommendations are from the American Heart Association (AHA) which stresses the importance of lifestyle changes to lower cardiovascular disease risk.     Return in about 3 months (around 8/26/2017).    Patient Instructions     Stop Aspirin -- Has been 4 years since stent - as of 5/2017 - Hold Aspirin while on  Plavix for now.    Okay to stop Vitamin B-6.       Continue iron tablets, okay to use every other day until they are gone.    -- Don't refill them anymore.    Continue Vitamin D and B12.     Restart Plavix -- once daily.     Metformin, continue 1 tablet twice daily for now.     -- If hemoglobin A1c is elevated, we will need to increase this.    Immunization History     Administered  Date(s) Administered     Influenza Virus, Unspecified 10/18/1999     Influenza, IIV3 (Age >=3 years) 10/01/2012, 11/04/2013, 11/17/2015     Pneumococcal Poly,23-Valent (Pneumovax) 02/01/2000, 01/29/2013     Td (Age >=7 Years) 02/01/2000          Pneumococcal PCV 13 shot due anytime.   Tetanus shot is due anytime.    -- can get these from the pharmacy.         Pneumococcal Pneumonia vaccines (PCV 13 and PCV 23)     Pneumococcal Conjugate 13 - Valent Vaccine (One time only).     Pneumococcal 23 - Valent Vaccine -- Two doses (One before age 65 and One After)    -- repeat every 5 years in certain patient populations.     PCV 13 should be given prior to PCV 23 -- THEN -- In eight weeks or more, PCV 23 can be given.   If the patient has already received PCV 23, they should not receive PCV 13 for one year.      Return for Diabetes labs and clinic follow-up appointment every 3 to 4 months.  --- (Go for about 91 to 100 days)    Schedule lab only appointment --- A few days AFTER: 08/24/17     Schedule clinic appointment with Dr. Roberts -- Same day as labs, or 1-2 days later.     Insurance companies are now grading you and I on your blood sugar control -- Goal is to get your A1c down to 7.9% or lower and NO Smoking!    -- Medicare and most insurance companies, will only cover Hemoglobin A1c labs to be rechecked every 91+ days.      HEMOGLOBIN A1C MONITORING (POCT)    Date Value   05/11/2016 6.9 % (H)   01/29/2013 9.4 % NGSP (H)        Next follow-up appointment with Dr. Roberts should be scheduled:  -- Approximately a few days AFTER:  08/24/17      Bk Roberts MD

## 2018-01-05 NOTE — OR POSTOP
Patient Information     Patient Name MRN Sex Andrez Tinajero [id number] Male 1945      OR PostOp by Jack Hastings RN at 5/15/2017 10:39 AM     Author:  Jack Hastings RN Service:  (none) Author Type:  NURS- Registered Nurse     Filed:  5/15/2017 10:41 AM Date of Service:  5/15/2017 10:39 AM Status:  Signed     :  Jack Hastings RN (NURS- Registered Nurse)            Discharge Note    Data:  Andrez Olivier has been discharged home at 1038 via ambulatory accompanied by Registered Nurse.      Action:  Written discharge/follow-up instructions were provided to patient. Prescriptions : None.  Belongings sent with patient. Medications from home sent with patient/family: Not Applicable  Equipment none .     Response:  Patient verbalized understanding of discharge instructions, reason for discharge, and necessary follow-up appointments.   Steady on feet at discharge

## 2018-01-05 NOTE — OR SURGEON
Patient Information     Patient Name MRN Sex Andrez Tinajero [id number] Male 1945      OR Surgeon by Baljeet Solano MD at 5/15/2017  9:46 AM     Author:  Baljeet Solano MD Service:  (none) Author Type:  Physician     Filed:  5/15/2017  9:48 AM Date of Service:  5/15/2017  9:46 AM Status:  Signed     :  Baljeet Solano MD (Physician)            PROCEDURE NOTE    DATE OF SERVICE: 5/15/2017    SURGEON: Baljeet Solano MD    PRE-OP DIAGNOSIS:  History of Polyps and History of Colon Cancer    POST-OP DIAGNOSIS:  Same and Polyp at HF    PROCEDURE: Colonoscopy with hot biopsy  ANESTHESIA:  CAT Iglesias CRNA    INDICATION FOR THE PROCEDURE: The patient is a 71 y.o. male with h/o polyps and colon cancer one year ago . The patient has no complaints  . After explaining the risks to include bleeding, perforation, potential inability to reach the cecum, the patient wished to proceed.    PROCEDURE:After adequate sedation, the patient was in the left lateral decubitus position.  Rectal exam was performed.  There was normal tone and no palpable masses  .  The colonoscope was introduced into the rectum and advanced to the cecum with mild difficulty.  The patient's prep was Poor.  The terminal cecum was reached.  The cecum, ascending, transverse, and descending   colon was with small polyp at HF that was hot biopsied and destroyed .  The scope was retroflexed in the rectum.  The rectum was unremarkable  .  The scope was straightened and removed.  The patient tolerated the procedure well.     ESTIMATED BLOOD LOSS: none    COMPLICATIONS:  None    TISSUE REMOVED:  yes    RECOMMEND:  follow up pending pathology or 3 years due to h/o cancer      Baljeet Solano MD FACS

## 2018-01-05 NOTE — H&P
Patient Information     Patient Name MRN Andrez Soto [id number] Male 1945      H&P by Baljeet Solano MD at 2017 11:00 AM     Author:  Baljeet Solano MD Service:  (none) Author Type:  Physician     Filed:  5/10/2017  7:49 AM Encounter Date:  2017 Status:  Signed     :  Baljeet Solano MD (Physician)            This note has been dictated. The encounter number is [id number].      -  DATE OF SERVICE:  2017    HEMATOLOGY / ONCOLOGY CONSULTATION     Mr. Olivier returns for followup of colon cancer. We had seen the patient in consultation on 2016. At that time he was a 70-year-old white male with a history of coronary artery disease, type 2 diabetes mellitus, hyperlipidemia and hypertension, who presented to the emergency room on 2016, with complaints of chest pain radiating down his arms which was primarily worse with exertion. In the emergency department he was found to have a hemoglobin of 7.1. He subsequently was admitted. CBC revealed microcytic indices with MCV of 62. He was noted to be iron deficient. He was transfused 2 units of packed red cells and was ruled out for MI. Subsequently he was seen by Dr. Solano who performed colonoscopy and was noted to have a mass in the sigmoid colon that was consistent with adenocarcinoma. He also had multiple adenomatous polyps. The patient subsequently was admitted on May 11, 2016 for sigmoid colectomy. This was performed on May 17, 2016. Pathology revealed in the sigmoid colon a mass consistent with moderately differentiated adenocarcinoma. The tumor size was 2 x 1 x 0.7 cm. The tumor invaded the muscularis propria, 2 out of 8 lymph nodes were positive for metastatic carcinoma. Margins were negative for tumor. The grade of the tumor was ruled as moderately differentiated. The patient was staged pathologic stage T2N1b. As part of the postop evaluation, the patient had a CT of the abdomen and pelvis on May 12, 2016. This  revealed that there were 2 nonspecific low-attenuation lesions in the liver. Metastasis could not be excluded. There was no lymphadenopathy or additional findings to suggest metastases. The patient had a preop CEA, which was less than 3. When we saw the patient, we wanted to adequately stage the patient by obtaining a PET scan to rule out metastatic disease. PET scan was performed on Lluvia 15, 2016. It was essentially negative for metastatic disease. Lesions seen on prior PET in the liver were not hypermetabolic. We felt that the patient had stage II disease and would be a candidate for adjuvant chemotherapy. When we saw him on June 23, 2016, the plan was to start FOLFOX x12 cycles. The patient was started, and received a total of 10 cycles. When we saw him again on November 17, 2016, at that time he did note some cold-induced neuropathic symptoms, and a cold sensation when he drinks cold liquids, but otherwise he was doing relatively well. We elected to restage him after 12 cycles. He completed 12 cycles of FOLFOX. His last chemotherapy was administered on December 7, 2016. During chemotherapy, he said he became severely ill and developed numbness in his hands and significant cold-induced neuropathy, especially with swallowing cold liquids. He also did note that he had lost the sense of taste. Since then, his symptoms have been slowly resolving. He still had numbness in his hands and lost the sense of taste. He did have a PET scan done after 12 cycles of chemotherapy and this came back essentially negative for metastatic disease.  When I saw the patient last, the plan was to prescribe vitamin B6 to help with his neuropathy.  He said his neuropathy is somewhat improving, he just has a cold sensation when he stands on his feet, but otherwise it resolves itself when he sits down.  Otherwise he denies any shortness of breath, change in bowel habits, abdominal pain, fevers, night sweats or weight loss.    He did have  staging studies including a CT of the abdomen and pelvis that was done on April 6th which revealed the lesion in the posterior right hepatic lobe was nonspecific, but stable. There were no new or enlarging liver lesions or lymphadenopathy or evidence of worsening metastatic disease.   CT of the chest was also negative. The patient is scheduled to have a colonoscopy in May with Dr. Solano.      Otherwise he is doing relatively well. No shortness of breath, chest pain. He does state that occasionally he feels dizzy when he stands up. He has not seen Dr. Roberts recently. He is still being managed for his diabetes and continues on Glucophage.     PHYSICAL EXAMINATION  GENERAL: He is an elderly white male, in no acute distress.   VITAL SIGNS:  Blood pressure 102/66, pulse 31, temperature 97.9.   HEENT: Atraumatic, normocephalic. Oropharynx nonerythematous.   NECK: Supple.   LUNGS: Clear to auscultation and percussion.   HEART: Regular rhythm. S1, S2 normal.   ABDOMEN: Soft. Normoactive bowel sounds. No masses. Nontender.   LYMPHATICS: No cervical, supraclavicular, axillary, or inguinal nodes.   EXTREMITIES: No edema.   NEUROLOGIC: Nonfocal.     LABORATORY DATA  Laboratories reveal: CEA less than 3.  LFTs are normal.  Glucose 180.  BUN 15, creatinine 1.1. CBC is within normal limits.  .    IMPRESSION  1. Stage III moderately differentiated adenocarcinoma of the sigmoid colon with 2 out of 8 lymph nodes positive for metastatic disease, consistent with pathologic [id number] disease. PET scan was negative for metastatic disease. The patient was started on adjuvant FOLFOX chemotherapy. He completed 12 cycles. Course was complicated by peripheral neuropathy, grade 1, which is improving.  PET scan indicates no evidence of metastatic disease. CEA was normal. The patient's scans indicate stable liver lesion.  No evidence of metastatic disease.  The plan is to proceed with a colonoscopy in May with Dr. Solano.     2. Peripheral  neuropathy, stable to improved.  I have offered the patient gabapentin, the patient refuses.    3. Probable orthostatic hypotension.  The patient has not seen his primary provider for diabetes management. I suspect this is likely due to autonomic dysfunction secondary to diabetes and possibly oxaliplatin.      Otherwise we will see the patient in 4 months.  Obtain CBC, CMP, LDH, CEA, and repeat CT of the abdomen and pelvis.     40 minutes was spent with the patient, greater than half of the time was spent on counseling and coordination of care.            MD ALBERTO HURT/leti   D:  2017 11:44:52  T:  2017 10:08:50  Voice Job ID:  [id number]  Text Job ID:  [id number]  cc:MEGAN LEOS MD, PRIMARY PHYSICIAN  JERE COLE MD         [facility] [place], [place]:  LIEN MCKEON  MR#:  [id number]  :  1945  DATE:  2017  LOCATION:  [facility]  ROOM:    TYPE:  [facility] NOTEPage 1 of 1

## 2018-01-05 NOTE — PROGRESS NOTES
Patient Information     Patient Name MRAndrez Lentz [id number] Male 1945      Progress Notes by Baljeet Solano MD at 5/10/2017  7:46 AM     Author:  Baljeet Solano MD Service:  (none) Author Type:  Physician     Filed:  5/10/2017  7:47 AM Date of Service:  5/10/2017  7:46 AM Status:  Signed     :  Baljeet Solano MD (Physician)            Screening Questions for the Scheduling of Screening Colonoscopies   (If Colonoscopy is diagnostic, Provider should review the chart before scheduling.)  Are you younger than 50 or older than 80?  NO  Do you take aspirin or fish oil?  ASPRIN  YES ASPRIN  (if yes, tell patient to stop 1 week prior to Colonoscopy)  Do you take warfarin (Coumadin), clopidogrel (Plavix), apixaban (Eliquis), dabigatram (Pradaxa), rivaroxaban (Xarelto) or any blood thinner? NO  Do you use oxygen at home?  NO  Do you have kidney disease? NO  Are you on dialysis? NO  Have you had a stroke or heart attack in the last year? NO  Have you had a stent in your heart or any blood vessel in the last year? NO  Have you had a transplant of any organ? NO  Have you had a colonoscopy or upper endoscopy (EGD) before? YES  - COLONOSCOPY          When?  2016  [facility]   Date of scheduled Colonoscopy. 05/15/2017  Provider DOUGLAS   [facility]       Screening colonoscopy ,  History of colon cancer.   Last colonoscopy 2016DATE OF SERVICE:  2017     HEMATOLOGY / ONCOLOGY CONSULTATION      Mr. Olivier returns for followup of colon cancer. We had seen the patient in consultation on 2016. At that time he was a 70-year-old white male with a history of coronary artery disease, type 2 diabetes mellitus, hyperlipidemia and hypertension, who presented to the emergency room on 2016, with complaints of chest pain radiating down his arms which was primarily worse with exertion. In the emergency department he was found to have a hemoglobin of 7.1. He subsequently was admitted.  CBC revealed microcytic indices with MCV of 62. He was noted to be iron deficient. He was transfused 2 units of packed red cells and was ruled out for MI. Subsequently he was seen by Dr. Solano who performed colonoscopy and was noted to have a mass in the sigmoid colon that was consistent with adenocarcinoma. He also had multiple adenomatous polyps. The patient subsequently was admitted on May 11, 2016 for sigmoid colectomy. This was performed on May 17, 2016. Pathology revealed in the sigmoid colon a mass consistent with moderately differentiated adenocarcinoma. The tumor size was 2 x 1 x 0.7 cm. The tumor invaded the muscularis propria, 2 out of 8 lymph nodes were positive for metastatic carcinoma. Margins were negative for tumor. The grade of the tumor was ruled as moderately differentiated. The patient was staged pathologic stage T2N1b. As part of the postop evaluation, the patient had a CT of the abdomen and pelvis on May 12, 2016. This revealed that there were 2 nonspecific low-attenuation lesions in the liver. Metastasis could not be excluded. There was no lymphadenopathy or additional findings to suggest metastases. The patient had a preop CEA, which was less than 3. When we saw the patient, we wanted to adequately stage the patient by obtaining a PET scan to rule out metastatic disease. PET scan was performed on Lluvia 15, 2016. It was essentially negative for metastatic disease. Lesions seen on prior PET in the liver were not hypermetabolic. We felt that the patient had stage II disease and would be a candidate for adjuvant chemotherapy. When we saw him on June 23, 2016, the plan was to start FOLFOX x12 cycles. The patient was started, and received a total of 10 cycles. When we saw him again on November 17, 2016, at that time he did note some cold-induced neuropathic symptoms, and a cold sensation when he drinks cold liquids, but otherwise he was doing relatively well. We elected to restage him after 12  cycles. He completed 12 cycles of FOLFOX. His last chemotherapy was administered on December 7, 2016. During chemotherapy, he said he became severely ill and developed numbness in his hands and significant cold-induced neuropathy, especially with swallowing cold liquids. He also did note that he had lost the sense of taste. Since then, his symptoms have been slowly resolving. He still had numbness in his hands and lost the sense of taste. He did have a PET scan done after 12 cycles of chemotherapy and this came back essentially negative for metastatic disease.  When I saw the patient last, the plan was to prescribe vitamin B6 to help with his neuropathy.  He said his neuropathy is somewhat improving, he just has a cold sensation when he stands on his feet, but otherwise it resolves itself when he sits down.  Otherwise he denies any shortness of breath, change in bowel habits, abdominal pain, fevers, night sweats or weight loss.     He did have staging studies including a CT of the abdomen and pelvis that was done on April 6th which revealed the lesion in the posterior right hepatic lobe was nonspecific, but stable. There were no new or enlarging liver lesions or lymphadenopathy or evidence of worsening metastatic disease.   CT of the chest was also negative. The patient is scheduled to have a colonoscopy in May with Dr. Solano.       Otherwise he is doing relatively well. No shortness of breath, chest pain. He does state that occasionally he feels dizzy when he stands up. He has not seen Dr. Roberts recently. He is still being managed for his diabetes and continues on Glucophage.      PHYSICAL EXAMINATION  GENERAL: He is an elderly white male, in no acute distress.   VITAL SIGNS:  Blood pressure 102/66, pulse 31, temperature 97.9.   HEENT: Atraumatic, normocephalic. Oropharynx nonerythematous.   NECK: Supple.   LUNGS: Clear to auscultation and percussion.   HEART: Regular rhythm. S1, S2 normal.   ABDOMEN: Soft.  Normoactive bowel sounds. No masses. Nontender.   LYMPHATICS: No cervical, supraclavicular, axillary, or inguinal nodes.   EXTREMITIES: No edema.   NEUROLOGIC: Nonfocal.      LABORATORY DATA  Laboratories reveal: CEA less than 3.  LFTs are normal.  Glucose 180.  BUN 15, creatinine 1.1. CBC is within normal limits.  .     IMPRESSION  1. Stage III moderately differentiated adenocarcinoma of the sigmoid colon with 2 out of 8 lymph nodes positive for metastatic disease, consistent with pathologic [id number] disease. PET scan was negative for metastatic disease. The patient was started on adjuvant FOLFOX chemotherapy. He completed 12 cycles. Course was complicated by peripheral neuropathy, grade 1, which is improving.  PET scan indicates no evidence of metastatic disease. CEA was normal. The patient's scans indicate stable liver lesion.  No evidence of metastatic disease.  The plan is to proceed with a colonoscopy in May with Dr. Solano.      2. Peripheral neuropathy, stable to improved.  I have offered the patient gabapentin, the patient refuses.     3. Probable orthostatic hypotension.  The patient has not seen his primary provider for diabetes management. I suspect this is likely due to autonomic dysfunction secondary to diabetes and possibly oxaliplatin.       Otherwise we will see the patient in 4 months.  Obtain CBC, CMP, LDH, CEA, and repeat CT of the abdomen and pelvis.      40 minutes was spent with the patient, greater than half of the time was spent on counseling and coordination of care.                IVONNE SEYMOUR MD

## 2018-01-05 NOTE — PATIENT INSTRUCTIONS
Patient Information     Patient Name MRN Andrez Soto [id number] Male 1945      Patient Instructions by Bk Roberts MD at 2017  9:00 AM     Author:  Bk Roberts MD  Service:  (none) Author Type:  Physician     Filed:  2017  9:30 AM  Encounter Date:  2017 Status:  Addendum     :  Bk Roberts MD (Physician)        Related Notes: Original Note by Bk Roberts MD (Physician) filed at 2017  9:29 AM            Stop Aspirin -- Has been 4 years since stent - as of 2017 - Hold Aspirin while on Plavix for now.    Okay to stop Vitamin B-6.       Continue iron tablets, okay to use every other day until they are gone.    -- Don't refill them anymore.    Continue Vitamin D and B12.     Restart Plavix -- once daily.     Metformin, continue 1 tablet twice daily for now.     -- If hemoglobin A1c is elevated, we will need to increase this.    Immunization History     Administered  Date(s) Administered     Influenza Virus, Unspecified 10/18/1999     Influenza, IIV3 (Age >=3 years) 10/01/2012, 2013, 2015     Pneumococcal Poly,23-Valent (Pneumovax) 2000, 2013     Td (Age >=7 Years) 2000          Pneumococcal PCV 13 shot due anytime.   Tetanus shot is due anytime.    -- can get these from the pharmacy.         Pneumococcal Pneumonia vaccines (PCV 13 and PCV 23)     Pneumococcal Conjugate 13 - Valent Vaccine (One time only).     Pneumococcal 23 - Valent Vaccine -- Two doses (One before age 65 and One After)    -- repeat every 5 years in certain patient populations.     PCV 13 should be given prior to PCV 23 -- THEN -- In eight weeks or more, PCV 23 can be given.   If the patient has already received PCV 23, they should not receive PCV 13 for one year.      Return for Diabetes labs and clinic follow-up appointment every 3 to 4 months.  --- (Go for about 91 to 100 days)    Schedule lab only appointment --- A few days AFTER: 17      Schedule clinic appointment with Dr. Roberts -- Same day as labs, or 1-2 days later.     Insurance companies are now grading you and I on your blood sugar control -- Goal is to get your A1c down to 7.9% or lower and NO Smoking!    -- Medicare and most insurance companies, will only cover Hemoglobin A1c labs to be rechecked every 91+ days.      HEMOGLOBIN A1C MONITORING (POCT)    Date Value   05/11/2016 6.9 % (H)   01/29/2013 9.4 % NGSP (H)        Next follow-up appointment with Dr. Roberts should be scheduled:  -- Approximately a few days AFTER: 08/24/17

## 2018-01-05 NOTE — NURSING NOTE
Patient Information     Patient Name MRN Andrez Soto [id number] Male 1945      Nursing Note by Serena Will at 2017  9:00 AM     Author:  Serena Will Service:  (none) Author Type:  (none)     Filed:  2017  9:17 AM Encounter Date:  2017 Status:  Signed     :  Serena Will            Patient presents to the clinic for medication management, last eye exam was a couple of years ago.    Serena Will LPN        2017 9:00 AM

## 2018-01-05 NOTE — H&P
Patient Information     Patient Name MRN Andrez Soto [id number] Male 1945      H&P by Baljeet Solano MD at 5/15/2017  8:42 AM     Author:  Baljeet Solano MD Service:  (none) Author Type:  Physician     Filed:  5/15/2017  8:43 AM Date of Service:  5/15/2017  8:42 AM Status:  Signed     :  Baljeet Solano MD (Physician)            History and Physical    CHIEF COMPLAINT / REASON FOR PROCEDURE:  H/o colon cancer    PERTINENT HISTORY   Patient is a 71 y.o. male who presents today for colonoscopy for h/o colon cancer.   Sigmoid colectomy last year.  Patient has no complaints.    Past Medical History:     Diagnosis  Date     CAD 2013    BRYON to,mid RCA (angina and abnormal myoview), ANW      Cancer of sigmoid colon (HC) 2016     Diabetes mellitus type 2 in obese (HC)      Diverticulosis of sigmoid colon 2016     Gastritis, erosive 2016     Gout      H/O adenomatous polyp of colon 2016     History of tobacco abuse      Hyperlipidemia LDL goal <100      MI (myocardial infarction) (HC)     MI at age 44, s/p angioplasty, [facility]; MI at age 49, s/p stent placement at [facility]      Reflux esophagitis 5/10/2016     Past Surgical History:      Procedure  Laterality Date     APPENDECTOMY      at age of 12       CARDIAC CATHETERIZATION  2013    BRYON to,mid RCA (angina and abnormal myoview)       COLECTOMY  16    Colectomy, Sigmoid       COLONOSCOPY DIAGNOSTIC  16    F/U ; Dr Solano       CORONARY STENT PLACEMENT      Stenting coronary artery, presumably LAD        ESOPHAGOGASTRODUODENOSCOPY  16    EGD; Dr Solano       FLEXIBLE SIGMOIDOSCOPY       HX POWER PORT Left 16    Left subclavian Power Port       Jaw reconstruction         Bleeding tendencies:  No    ALLERGIES/SENSITIVITIES:   Allergies      Allergen   Reactions     Invokana [Canagliflozin]  Dizziness     Morphine  Other - Describe In Comment Field     Pt had a bad experience at age 49 and would like to avoid.          CURRENT MEDICATIONS:    No current facility-administered medications on file prior to encounter.      Current Outpatient Prescriptions on File Prior to Encounter       Medication  Sig Dispense Refill     blood sugar diagnostic (ACCU-CHEK SMARTVIEW TEST STRIP) strip Dispense test strips covered by the patient insurance. E11.65 NIDDM type II, uncontrolled - Test 2 times/day 100 Each 6     CYANOCOBALAMIN, VITAMIN B-12, (VITAMIN B-12 ORAL) Take 1 tablet by mouth once daily.       glipiZIDE (GLUCOTROL) 10 mg tablet TAKE 1 TABLET BY MOUTH TWIC E A DAY BEFORE MEALS 180 tablet 3     lancets [employer] Microlet Lancets. E11.65 NIDDM type II, uncontrolled - Test 2 times/day. 100 Each 6     lidocaine-prilocaine (EMLA) 2.5-2.5 % cream Apply to port site one hour prior to access 30 g 5     lisinopril (PRINIVIL; ZESTRIL) 2.5 mg tablet TAKE 1 TABLET BY MOUTH ONCE DAILY 90 tablet 3     metoprolol tartrate (LOPRESSOR) 50 mg tablet TAKE 1 TABLET BY MOUTH TWICE A  tablet 3     nitroglycerin (NITROSTAT) 0.4 mg sublingual tablet Place 1 tablet under the tongue every 5 minutes if needed for Chest Pain. 30 tablet 3       Physical Exam:   /78  Pulse 83  Temp 97.2  F (36.2  C)  Resp 18  SpO2 94%   EXAM:  Chest/Respiratory Exam: Normal.  Cardiovascular Exam: Normal.        PLAN:  Colonoscopy, Patient understands risks of bleeding, perforation, potential inability to reach cecum, aspiration and wishes to proceed.  MAC needed for age.

## 2018-01-05 NOTE — H&P
Patient Information     Patient Name MRN Andrez Soto [id number] Male 1945      H&P by Baljeet Solano MD at 2017 11:00 AM     Author:  Baljeet Solano MD Service:  (none) Author Type:  Physician     Filed:  5/10/2017  7:50 AM Encounter Date:  2017 Status:  Signed     :  Baljeet Solano MD (Physician)            This note has been dictated. The encounter number is [id number].      -  DATE OF SERVICE:  2017    HEMATOLOGY / ONCOLOGY CONSULTATION     Mr. Olivier returns for followup of colon cancer. We had seen the patient in consultation on 2016. At that time he was a 70-year-old white male with a history of coronary artery disease, type 2 diabetes mellitus, hyperlipidemia and hypertension, who presented to the emergency room on 2016, with complaints of chest pain radiating down his arms which was primarily worse with exertion. In the emergency department he was found to have a hemoglobin of 7.1. He subsequently was admitted. CBC revealed microcytic indices with MCV of 62. He was noted to be iron deficient. He was transfused 2 units of packed red cells and was ruled out for MI. Subsequently he was seen by Dr. Solano who performed colonoscopy and was noted to have a mass in the sigmoid colon that was consistent with adenocarcinoma. He also had multiple adenomatous polyps. The patient subsequently was admitted on May 11, 2016 for sigmoid colectomy. This was performed on May 17, 2016. Pathology revealed in the sigmoid colon a mass consistent with moderately differentiated adenocarcinoma. The tumor size was 2 x 1 x 0.7 cm. The tumor invaded the muscularis propria, 2 out of 8 lymph nodes were positive for metastatic carcinoma. Margins were negative for tumor. The grade of the tumor was ruled as moderately differentiated. The patient was staged pathologic stage T2N1b. As part of the postop evaluation, the patient had a CT of the abdomen and pelvis on May 12, 2016. This  revealed that there were 2 nonspecific low-attenuation lesions in the liver. Metastasis could not be excluded. There was no lymphadenopathy or additional findings to suggest metastases. The patient had a preop CEA, which was less than 3. When we saw the patient, we wanted to adequately stage the patient by obtaining a PET scan to rule out metastatic disease. PET scan was performed on Lluvia 15, 2016. It was essentially negative for metastatic disease. Lesions seen on prior PET in the liver were not hypermetabolic. We felt that the patient had stage II disease and would be a candidate for adjuvant chemotherapy. When we saw him on June 23, 2016, the plan was to start FOLFOX x12 cycles. The patient was started, and received a total of 10 cycles. When we saw him again on November 17, 2016, at that time he did note some cold-induced neuropathic symptoms, and a cold sensation when he drinks cold liquids, but otherwise he was doing relatively well. We elected to restage him after 12 cycles. He completed 12 cycles of FOLFOX. His last chemotherapy was administered on December 7, 2016. During chemotherapy, he said he became severely ill and developed numbness in his hands and significant cold-induced neuropathy, especially with swallowing cold liquids. He also did note that he had lost the sense of taste. Since then, his symptoms have been slowly resolving. He still had numbness in his hands and lost the sense of taste. He did have a PET scan done after 12 cycles of chemotherapy and this came back essentially negative for metastatic disease.  When I saw the patient last, the plan was to prescribe vitamin B6 to help with his neuropathy.  He said his neuropathy is somewhat improving, he just has a cold sensation when he stands on his feet, but otherwise it resolves itself when he sits down.  Otherwise he denies any shortness of breath, change in bowel habits, abdominal pain, fevers, night sweats or weight loss.    He did have  staging studies including a CT of the abdomen and pelvis that was done on April 6th which revealed the lesion in the posterior right hepatic lobe was nonspecific, but stable. There were no new or enlarging liver lesions or lymphadenopathy or evidence of worsening metastatic disease.   CT of the chest was also negative. The patient is scheduled to have a colonoscopy in May with Dr. Solano.      Otherwise he is doing relatively well. No shortness of breath, chest pain. He does state that occasionally he feels dizzy when he stands up. He has not seen Dr. Roberts recently. He is still being managed for his diabetes and continues on Glucophage.     PHYSICAL EXAMINATION  GENERAL: He is an elderly white male, in no acute distress.   VITAL SIGNS:  Blood pressure 102/66, pulse 31, temperature 97.9.   HEENT: Atraumatic, normocephalic. Oropharynx nonerythematous.   NECK: Supple.   LUNGS: Clear to auscultation and percussion.   HEART: Regular rhythm. S1, S2 normal.   ABDOMEN: Soft. Normoactive bowel sounds. No masses. Nontender.   LYMPHATICS: No cervical, supraclavicular, axillary, or inguinal nodes.   EXTREMITIES: No edema.   NEUROLOGIC: Nonfocal.     LABORATORY DATA  Laboratories reveal: CEA less than 3.  LFTs are normal.  Glucose 180.  BUN 15, creatinine 1.1. CBC is within normal limits.  .    IMPRESSION  1. Stage III moderately differentiated adenocarcinoma of the sigmoid colon with 2 out of 8 lymph nodes positive for metastatic disease, consistent with pathologic [id number] disease. PET scan was negative for metastatic disease. The patient was started on adjuvant FOLFOX chemotherapy. He completed 12 cycles. Course was complicated by peripheral neuropathy, grade 1, which is improving.  PET scan indicates no evidence of metastatic disease. CEA was normal. The patient's scans indicate stable liver lesion.  No evidence of metastatic disease.  The plan is to proceed with a colonoscopy in May with Dr. Solano.     2. Peripheral  neuropathy, stable to improved.  I have offered the patient gabapentin, the patient refuses.    3. Probable orthostatic hypotension.  The patient has not seen his primary provider for diabetes management. I suspect this is likely due to autonomic dysfunction secondary to diabetes and possibly oxaliplatin.      Otherwise we will see the patient in 4 months.  Obtain CBC, CMP, LDH, CEA, and repeat CT of the abdomen and pelvis.     40 minutes was spent with the patient, greater than half of the time was spent on counseling and coordination of care.            MD ALBERTO HURT/leti   D:  2017 11:44:52  T:  2017 10:08:50  Voice Job ID:  [id number]  Text Job ID:  [id number]  cc:MEGAN LEOS MD, PRIMARY PHYSICIAN  JERE COLE MD         [facility] [place], [place]:  LIEN MCKEON  MR#:  [id number]  :  1945  DATE:  2017  LOCATION:  [facility]  ROOM:    TYPE:  [facility] NOTEPage 1 of 1

## 2018-01-19 PROBLEM — E66.9 OBESITY: Status: ACTIVE | Noted: 2018-01-19

## 2018-01-19 PROBLEM — D50.9 IRON DEFICIENCY ANEMIA: Status: ACTIVE | Noted: 2017-05-26

## 2018-01-19 PROBLEM — I25.10 CORONARY ARTERY DISEASE INVOLVING NATIVE CORONARY ARTERY OF NATIVE HEART WITHOUT ANGINA PECTORIS: Status: ACTIVE | Noted: 2018-01-19

## 2018-01-19 RX ORDER — METOPROLOL TARTRATE 50 MG
TABLET ORAL
COMMUNITY
Start: 2017-05-26 | End: 2018-05-08

## 2018-01-19 RX ORDER — LISINOPRIL 2.5 MG/1
TABLET ORAL
COMMUNITY
Start: 2017-05-26 | End: 2018-05-08

## 2018-01-19 RX ORDER — CYANOCOBALAMIN (VITAMIN B-12) 1000 MCG
TABLET, EXTENDED RELEASE ORAL
COMMUNITY
End: 2021-08-12

## 2018-01-19 RX ORDER — CLOPIDOGREL BISULFATE 75 MG/1
TABLET ORAL
COMMUNITY
Start: 2017-05-26 | End: 2018-05-08

## 2018-01-19 RX ORDER — SIMVASTATIN 40 MG
TABLET ORAL
COMMUNITY
Start: 2017-05-26 | End: 2018-05-08

## 2018-01-19 RX ORDER — PANTOPRAZOLE SODIUM 40 MG/1
TABLET, DELAYED RELEASE ORAL
COMMUNITY
Start: 2017-05-26 | End: 2018-05-08

## 2018-01-19 RX ORDER — NITROGLYCERIN 0.4 MG/1
TABLET SUBLINGUAL
COMMUNITY
Start: 2017-05-26 | End: 2022-09-16

## 2018-01-19 RX ORDER — ALLOPURINOL 100 MG/1
TABLET ORAL
COMMUNITY
Start: 2017-05-26 | End: 2018-08-17

## 2018-01-19 RX ORDER — GLIPIZIDE 10 MG/1
TABLET ORAL
COMMUNITY
Start: 2017-05-26 | End: 2018-05-08

## 2018-01-22 ENCOUNTER — AMBULATORY - GICH (OUTPATIENT)
Dept: LAB | Facility: OTHER | Age: 73
End: 2018-01-22

## 2018-01-22 DIAGNOSIS — C18.7 MALIGNANT NEOPLASM OF SIGMOID COLON (H): ICD-10-CM

## 2018-01-22 LAB
A/G RATIO - HISTORICAL: 1.9 (ref 1–2)
ALBUMIN SERPL-MCNC: 4.2 G/DL (ref 3.5–5.7)
ALP SERPL-CCNC: 85 IU/L (ref 34–104)
ALT (SGPT) - HISTORICAL: 49 IU/L (ref 7–52)
ANION GAP - HISTORICAL: 13 (ref 5–18)
AST SERPL-CCNC: 51 IU/L (ref 13–39)
BILIRUB SERPL-MCNC: 0.5 MG/DL (ref 0.3–1)
BUN SERPL-MCNC: 15 MG/DL (ref 7–25)
BUN/CREAT RATIO - HISTORICAL: 16
CALCIUM SERPL-MCNC: 9.1 MG/DL (ref 8.6–10.3)
CHLORIDE SERPLBLD-SCNC: 101 MMOL/L (ref 98–107)
CO2 SERPL-SCNC: 24 MMOL/L (ref 21–31)
CREAT SERPL-MCNC: 0.92 MG/DL (ref 0.7–1.3)
GFR IF NOT AFRICAN AMERICAN - HISTORICAL: >60 ML/MIN/1.73M2
GLOBULIN - HISTORICAL: 2.2 G/DL (ref 2–3.7)
GLUCOSE SERPL-MCNC: 205 MG/DL (ref 70–105)
POTASSIUM SERPL-SCNC: 4.3 MMOL/L (ref 3.5–5.1)
PROT SERPL-MCNC: 6.4 G/DL (ref 6.4–8.9)
SODIUM SERPL-SCNC: 138 MMOL/L (ref 133–143)

## 2018-01-27 VITALS
TEMPERATURE: 97.4 F | BODY MASS INDEX: 37.53 KG/M2 | HEIGHT: 69 IN | SYSTOLIC BLOOD PRESSURE: 112 MMHG | OXYGEN SATURATION: 94 % | WEIGHT: 253.4 LBS | HEART RATE: 76 BPM | DIASTOLIC BLOOD PRESSURE: 60 MMHG

## 2018-01-27 VITALS
SYSTOLIC BLOOD PRESSURE: 104 MMHG | TEMPERATURE: 97.7 F | DIASTOLIC BLOOD PRESSURE: 66 MMHG | HEART RATE: 72 BPM | BODY MASS INDEX: 37.38 KG/M2 | HEIGHT: 69 IN | WEIGHT: 252.38 LBS

## 2018-01-27 VITALS
HEIGHT: 69 IN | TEMPERATURE: 97.9 F | SYSTOLIC BLOOD PRESSURE: 102 MMHG | DIASTOLIC BLOOD PRESSURE: 66 MMHG | BODY MASS INDEX: 37.15 KG/M2 | WEIGHT: 250.8 LBS | HEART RATE: 81 BPM

## 2018-01-27 VITALS
HEIGHT: 69 IN | WEIGHT: 242.7 LBS | DIASTOLIC BLOOD PRESSURE: 82 MMHG | RESPIRATION RATE: 16 BRPM | HEART RATE: 64 BPM | BODY MASS INDEX: 35.95 KG/M2 | SYSTOLIC BLOOD PRESSURE: 124 MMHG | TEMPERATURE: 96.3 F

## 2018-01-31 ASSESSMENT — PATIENT HEALTH QUESTIONNAIRE - PHQ9: SUM OF ALL RESPONSES TO PHQ QUESTIONS 1-9: 0

## 2018-02-09 VITALS
HEIGHT: 69 IN | HEART RATE: 78 BPM | TEMPERATURE: 97.3 F | BODY MASS INDEX: 37.47 KG/M2 | WEIGHT: 253 LBS | DIASTOLIC BLOOD PRESSURE: 60 MMHG | SYSTOLIC BLOOD PRESSURE: 106 MMHG

## 2018-02-12 NOTE — PROGRESS NOTES
Patient Information     Patient Name MRN Sex Andrez Tinajero [id number] Male 1945      Progress Notes by Evangelina Sinclair at 2017  7:53 AM     Author:  Evangelina Sinclair Service:  (none) Author Type:  Other Clinical Staff     Filed:  2017  7:53 AM Date of Service:  2017  7:53 AM Status:  Signed     :  Evangelina Sinclair (Other Clinical Staff)            1.  Is patient currently taking metformin? Yes     If NO: Technologist will give the patient normal post CT instructions.     If YES: Technologist will obtain GFR from Lab.    2.  Is GFR is greater than 60? Yes     If YES: Technologist will give the patient normal post CT instructions.     If NO: Technologist will give the patient METFORMIN post CT instructions.

## 2018-02-12 NOTE — NURSING NOTE
Patient Information     Patient Name MRN Andrez Soto [id number] Male 1945      Nursing Note by Angela Quick at 2017  9:00 AM     Author:  Anglea Quick Service:  (none) Author Type:  NURS- Registered Nurse     Filed:  2017  9:16 AM Encounter Date:  2017 Status:  Signed     :  Angela Quick (NURS- Registered Nurse)            Pt here for follow up colon cancer. Angela Quick RN..............2017 9:12 AM

## 2018-02-12 NOTE — PROGRESS NOTES
Patient Information     Patient Name MRN Sex Andrez Tinajero [id number] Male 1945      Progress Notes by Evangelina Sinclair at 2017  7:52 AM     Author:  Evangelina Sinclair Service:  (none) Author Type:  Other Clinical Staff     Filed:  2017  7:52 AM Date of Service:  2017  7:52 AM Status:  Signed     :  Evangelina Sinclair (Other Clinical Staff)            IV Contrast- Discharge Instructions After Your CT Scan      The IV contrast you received today will be filtered from your bloodstream by your kidneys during the next 24 hours and pass from the body in urine.  You will not be aware of this process and your urine will not change in color.  To help this process you should drink at least 4 additional glasses of water or juice today.  This reduces stress on your kidneys.    Most contrast reactions are immediate.  Should you develop symptoms of concern after discharge, contact the department at the number below.  After hours you should contact your personal physician.  If you develop breathing distress or wheezing, call 911.

## 2018-02-12 NOTE — PROGRESS NOTES
Patient Information     Patient Name MRN Sex Andrez Tinajero [id number] Male 1945      Progress Notes by Evangelina Sinclair at 2017  7:52 AM     Author:  Evangelina Sinclair Service:  (none) Author Type:  Other Clinical Staff     Filed:  2017  7:52 AM Date of Service:  2017  7:52 AM Status:  Signed     :  Evangelina Sinclair (Other Clinical Staff)            1.  Has the patient had a previous reaction to IV contrast? No    2.  Does the patient have kidney disease? No    3.  Is the patient on dialysis? No    If YES to any of these questions, exam will be reviewed with a Radiologist before administering contrast.

## 2018-02-12 NOTE — NURSING NOTE
Patient Information     Patient Name MRN Sex Andrez Tinajero [id number] Male 1945      Nursing Note by Angela Quick at 2017  9:00 AM     Author:  Angela Quick Service:  (none) Author Type:  NURS- Registered Nurse     Filed:  2017 11:46 AM Encounter Date:  2017 Status:  Signed     :  Angela Quick (NURS- Registered Nurse)            Patient will come in 1 months for re-check of liver function only. Follow up lab and OV in 4 months. Labs ordered per provider and sent to be co-signed by provider. Angela Quick RN 2017  11:44 AM

## 2018-02-12 NOTE — PROGRESS NOTES
Patient Information     Patient Name MRN Sex Andrez Tinajero [id number] Male 1945      Progress Notes by Radha Adamson NP at 2017  9:00 AM     Author:  Radha Adamson NP Service:  (none) Author Type:  PHYS- Nurse Practitioner     Filed:  2018  8:58 AM Encounter Date:  2017 Status:  Signed     :  Radha Adamson NP (PHYS- Nurse Practitioner)            SUBJECTIVE:    Andrez Olivier is a 72 y.o. male who presents for follow up of colon cancer    HPI  Patient presents to the clinic today for followup of colon cancer. Patient was seen in consultation on 2016. Patient presented to the emergency room on 2016, with complaints of chest pain radiating down his arms, which was primarily worse with exertion. In the emergency department, he was found to have a hemoglobin of 7.1, and he subsequently was admitted. CBC revealed microcytic indices with an MCV of 62. He was noted to be iron deficient. He was transfused 2 units of packed red cells and was ruled out for MI.   He was seen by Dr. Solano, who performed colonoscopy and was noted to have a mass in the sigmoid colon that was consistent with adenocarcinoma. He also had multiple adenomatous polyps.   The patient was admitted on May 11, 2016, for a sigmoid colectomy. This was performed on May 17, 2016. Pathology revealed in the sigmoid colon a mass consistent with moderately differentiated adenocarcinoma. The tumor size was 2 x 1 x 0.7 cm. The tumor invaded the muscularis propria, 2/8 lymph nodes were positive for metastatic carcinoma. Margins were negative for tumor. The grade of the tumor was ruled as moderately differentiated. The patient was staged pathologic stage T2N1b as part of the postop evaluation.   The patient had a CT abdomen and pelvis on May 12, 2016. This revealed that there were 2 nonspecific low-attenuation lesions in the liver. Metastasis could not be excluded. There was no  lymphadenopathy or additional findings to suggest metastases. The patient had a preop CEA, which was less than 3.   PET scan was performed on Lluvia 15, 2016, and was essentially negative for metastatic disease. Lesions seen on prior PET in the liver were not hypermetabolic. We felt that the patient had stage III disease and he would be a candidate for adjuvant chemotherapy.      When patient was seen on June 28, 2016, the plan was to start FOLFOX x12 cycles. The patient was started, and received a total of 10 cycles. When he was seen on November 17, 2016,  he did note some cold-induced neuropathic symptoms. We elected to restage him after 12 cycles. He completed 12 cycles of FOLFOX. His last chemotherapy was administered on December 7, 2016.  He did have a PET scan done after 12 cycles of chemotherapy, and this came back essentially negative for metastatic disease.   He had staging studies on April 6, 2017 including CT abdomen and pelvis, which was essentially negative. CT of the chest was negative. The patient also underwent a colonoscopy in May 2017, which revealed a tubular adenoma at the hepatic flexure of the colon. Scans from August 2017 were stable with no evidence of disease. Patient had scans done on December 18, 2017 which showed no significant change and no evidence or recurrent carcinoma.      The patient is doing well. He continues to have some neuropathy. He does complain of persistent diarrhea which has been present since his surgery. He offers no other complaints    REVIEW OF SYSTEMS:  ROS   Constitutional: denies fever, chills, weight loss  HEENT: denies vision or hearing changes  Respiratory: denies cough or shortness of breath  Cardiovascular: denies chest pain, swelling of the legs  Gastrointestinal: reports ongoing diarrhea which has been present since surgery. Denies abdominal pain  Genitourinary: denies dysuria or hematuria  Musculoskeletal: denies any new bone or joint pain  Neurologic: denies  "headaches. Does have persistent neuropathy in feet        OBJECTIVE:  /60 (Cuff Site: Right Arm, Position: Sitting, Cuff Size: Adult Large)  Pulse 78  Temp 97.3  F (36.3  C) (Tympanic)  Ht 1.74 m (5' 8.5\")  Wt 114.8 kg (253 lb)  BMI 37.9 kg/m2    EXAM:   Physical Exam  GENERAL: 72 year old white male, in no acute distress.   HEENT: Atraumatic, normocephalic. Oropharynx is nonerythematous.   NECK: Supple. Without adenopathy  LUNGS: Clear to auscultation bilaterally. Normal respiratory effort  HEART: Regular rate and rhythm. S1, S2 normal.   ABDOMEN: Soft. Normoactive bowel sounds. No masses, nontender.   LYMPHATICS: No cervical, supraclavicular, axillary, or inguinal nodes.   EXTREMITIES: No edema. Peripheral pulses palpable  NEUROLOGIC: Nonfocal.      LABORATORY DATA  Laboratories reveal a CEA of less than 3. WBC 6.4. Hemoglobin and hematocrit 13.3 and 40.3. Platelets 231,000.  LDH is 177. AST 73 and ALT 61    ASSESSMENT/PLAN:    ICD-10-CM    1. Cancer of sigmoid colon pT2N1b C18.7 COMP METABOLIC PANEL      CBC AND DIFFERENTIAL      CEA      COMP METABOLIC PANEL      LD,TOTAL        Plan: Stage III moderately differentiated adenocarcinoma of the sigmoid colon with 2 out of 8 lymph nodes positive for metastatic disease, consistent with pathologic G5T7aH3 disease.PET scan was negative for metastatic disease. The patient was started on adjuvant FOLFOX chemotherapy and completed 12 cycles. Course complicated daily by peripheral neuropathy, grade 1, which is improving. PET scan indicates no evidence of metastatic disease. CEA was normal. Recent scans indicated no evidence of metastatic disease including CT chest, abdomen, and pelvis. Colonoscopy done in May 2017 revealed a tubular adenoma. The patient will be due for a colonoscopy in 3 years.   The plan is to continue surveillance. We will see the patient in 4 months, obtain CBC, CMP, LDH, CEA, and CT chest, abdomen, and pelvis. Patient will return in 1 month " for repeat lab to monitor slightly elevated LFT's. Will call patient with those results.      30 minutes were spent with the patient with  greater than half the time was spent on counseling patient regarding disease process, interpretation of lab and imaging results,  and coordination.

## 2018-02-12 NOTE — PROGRESS NOTES
Patient Information     Patient Name MRN Sex Andrez Tinajero [id number] Male 1945      Progress Notes by Evangelina Sinclair at 2017  7:53 AM     Author:  Evangelina Sinclair Service:  (none) Author Type:  Other Clinical Staff     Filed:  2017  7:53 AM Date of Service:  2017  7:53 AM Status:  Signed     :  Evangelina Sinclair (Other Clinical Staff)            Falls Risk Criteria:    Age 65 and older or under age 4        Sensory deficits    Poor vision    Use of ambulatory aides    Impaired judgment    Unable to walk independently    Meets High Risk criteria for falls:  Yes             1.  Do you have dizziness or vertigo?    no                    2.  Do you need help standing or walking?   no                 3.  Have you fallen within the last 6 months?    no           4.  Has the patient been fasting?      yes       If any risks are marked Yes, the following interventions are utilized:    Do not leave patient unattended     Assist patient in the dressing room and bathroom    Have ambulatory aides available throughout procedure    Involve patient s family if available

## 2018-02-19 DIAGNOSIS — E11.9 DIABETES MELLITUS, TYPE 2 (H): ICD-10-CM

## 2018-02-19 DIAGNOSIS — E11.9 DIABETES MELLITUS (H): Primary | ICD-10-CM

## 2018-02-19 DIAGNOSIS — D50.9 IRON DEFICIENCY ANEMIA: ICD-10-CM

## 2018-02-19 DIAGNOSIS — E78.2 MIXED HYPERLIPIDEMIA: ICD-10-CM

## 2018-04-06 DIAGNOSIS — C18.7 CANCER OF SIGMOID COLON (H): Primary | ICD-10-CM

## 2018-04-06 NOTE — PROGRESS NOTES
Lab and CT scans ordered per verbal read back order from provider and sent to provider to co-sign.  Angela Quick RN...........4/6/2018 3:44 PM

## 2018-04-10 ENCOUNTER — HOSPITAL ENCOUNTER (OUTPATIENT)
Dept: INFUSION THERAPY | Facility: OTHER | Age: 73
Discharge: HOME | End: 2018-04-10
Attending: NURSE PRACTITIONER | Admitting: NURSE PRACTITIONER
Payer: MEDICARE

## 2018-04-10 DIAGNOSIS — C18.7 CANCER OF SIGMOID COLON (H): ICD-10-CM

## 2018-04-10 LAB
ALBUMIN SERPL-MCNC: 4.2 G/DL (ref 3.5–5.7)
ALP SERPL-CCNC: 89 U/L (ref 34–104)
ALT SERPL W P-5'-P-CCNC: 54 U/L (ref 7–52)
ANION GAP SERPL CALCULATED.3IONS-SCNC: 11 MMOL/L (ref 3–14)
AST SERPL W P-5'-P-CCNC: 67 U/L (ref 13–39)
BASOPHILS # BLD AUTO: 0 10E9/L (ref 0–0.2)
BASOPHILS NFR BLD AUTO: 0.3 %
BILIRUB SERPL-MCNC: 0.6 MG/DL (ref 0.3–1)
BUN SERPL-MCNC: 14 MG/DL (ref 7–25)
CALCIUM SERPL-MCNC: 9.5 MG/DL (ref 8.6–10.3)
CEA SERPL-MCNC: <3 NG/ML
CHLORIDE SERPL-SCNC: 101 MMOL/L (ref 98–107)
CO2 SERPL-SCNC: 25 MMOL/L (ref 21–31)
CREAT SERPL-MCNC: 0.85 MG/DL (ref 0.7–1.3)
DIFFERENTIAL METHOD BLD: ABNORMAL
EOSINOPHIL # BLD AUTO: 0.1 10E9/L (ref 0–0.7)
EOSINOPHIL NFR BLD AUTO: 1.8 %
ERYTHROCYTE [DISTWIDTH] IN BLOOD BY AUTOMATED COUNT: 15.1 % (ref 10–15)
GFR SERPL CREATININE-BSD FRML MDRD: 89 ML/MIN/1.7M2
GLUCOSE SERPL-MCNC: 252 MG/DL (ref 70–105)
HCT VFR BLD AUTO: 39.5 % (ref 40–53)
HGB BLD-MCNC: 12.7 G/DL (ref 13.3–17.7)
IMM GRANULOCYTES # BLD: 0 10E9/L (ref 0–0.4)
IMM GRANULOCYTES NFR BLD: 0.4 %
LDH SERPL L TO P-CCNC: 162 U/L (ref 140–271)
LYMPHOCYTES # BLD AUTO: 1.5 10E9/L (ref 0.8–5.3)
LYMPHOCYTES NFR BLD AUTO: 21.1 %
MCH RBC QN AUTO: 29.3 PG (ref 26.5–33)
MCHC RBC AUTO-ENTMCNC: 32.2 G/DL (ref 31.5–36.5)
MCV RBC AUTO: 91 FL (ref 78–100)
MONOCYTES # BLD AUTO: 0.9 10E9/L (ref 0–1.3)
MONOCYTES NFR BLD AUTO: 12.2 %
NEUTROPHILS # BLD AUTO: 4.6 10E9/L (ref 1.6–8.3)
NEUTROPHILS NFR BLD AUTO: 64.2 %
PLATELET # BLD AUTO: 218 10E9/L (ref 150–450)
POTASSIUM SERPL-SCNC: 4.4 MMOL/L (ref 3.5–5.1)
PROT SERPL-MCNC: 7 G/DL (ref 6.4–8.9)
RBC # BLD AUTO: 4.33 10E12/L (ref 4.4–5.9)
SODIUM SERPL-SCNC: 137 MMOL/L (ref 134–144)
WBC # BLD AUTO: 7.2 10E9/L (ref 4–11)

## 2018-04-10 PROCEDURE — 82378 CARCINOEMBRYONIC ANTIGEN: CPT | Performed by: NURSE PRACTITIONER

## 2018-04-10 PROCEDURE — 85025 COMPLETE CBC W/AUTO DIFF WBC: CPT | Performed by: NURSE PRACTITIONER

## 2018-04-10 PROCEDURE — 80053 COMPREHEN METABOLIC PANEL: CPT | Performed by: NURSE PRACTITIONER

## 2018-04-10 PROCEDURE — 83615 LACTATE (LD) (LDH) ENZYME: CPT | Performed by: NURSE PRACTITIONER

## 2018-04-10 PROCEDURE — 36591 DRAW BLOOD OFF VENOUS DEVICE: CPT

## 2018-04-10 PROCEDURE — 25000128 H RX IP 250 OP 636: Performed by: NURSE PRACTITIONER

## 2018-04-10 RX ORDER — HEPARIN SODIUM (PORCINE) LOCK FLUSH IV SOLN 100 UNIT/ML 100 UNIT/ML
5 SOLUTION INTRAVENOUS
Status: DISCONTINUED | OUTPATIENT
Start: 2018-04-10 | End: 2018-04-11 | Stop reason: HOSPADM

## 2018-04-10 RX ADMIN — Medication 5 ML: at 08:21

## 2018-04-10 NOTE — PROGRESS NOTES
Infusion Nursing Note:  Andrez Olivier presents today for Port flush/Lab draw.    Patient seen by provider today: No   present during visit today: Not Applicable.    Note: N/A.    Intravenous Access:  Labs drawn without difficulty.  Implanted Port.    Post Infusion Assessment:  Blood return noted pre and post lab draw.  Site patent and intact, free from redness, edema or discomfort.  No evidence of extravasations.  Access discontinued per protocol.    Discharge Plan:   Departure Mode: Ambulatory.    Brenda J. Goodell, RN

## 2018-04-17 ENCOUNTER — HOSPITAL ENCOUNTER (OUTPATIENT)
Dept: GENERAL RADIOLOGY | Facility: OTHER | Age: 73
End: 2018-04-17
Attending: NURSE PRACTITIONER
Payer: MEDICARE

## 2018-04-17 ENCOUNTER — HOSPITAL ENCOUNTER (OUTPATIENT)
Dept: CT IMAGING | Facility: OTHER | Age: 73
End: 2018-04-17
Attending: NURSE PRACTITIONER
Payer: MEDICARE

## 2018-04-17 ENCOUNTER — HOSPITAL ENCOUNTER (OUTPATIENT)
Dept: CT IMAGING | Facility: OTHER | Age: 73
Discharge: HOME | End: 2018-04-17
Attending: NURSE PRACTITIONER | Admitting: NURSE PRACTITIONER
Payer: MEDICARE

## 2018-04-17 DIAGNOSIS — C18.7 CANCER OF SIGMOID COLON (H): ICD-10-CM

## 2018-04-17 PROCEDURE — 74177 CT ABD & PELVIS W/CONTRAST: CPT

## 2018-04-17 PROCEDURE — 71260 CT THORAX DX C+: CPT

## 2018-04-17 PROCEDURE — 25000125 ZZHC RX 250: Performed by: NURSE PRACTITIONER

## 2018-04-17 PROCEDURE — 25000128 H RX IP 250 OP 636: Performed by: NURSE PRACTITIONER

## 2018-04-17 RX ORDER — HEPARIN SODIUM (PORCINE) LOCK FLUSH IV SOLN 100 UNIT/ML 100 UNIT/ML
500 SOLUTION INTRAVENOUS
Status: COMPLETED | OUTPATIENT
Start: 2018-04-17 | End: 2018-04-17

## 2018-04-17 RX ADMIN — IOHEXOL 100 ML: 350 INJECTION, SOLUTION INTRAVENOUS at 10:33

## 2018-04-17 RX ADMIN — Medication 500 UNITS: at 10:40

## 2018-04-17 NOTE — PROGRESS NOTES
1.  Is patient currently taking metformin? yes     If NO: Technologist will give the patient normal post CT instructions.     If YES: Technologist will obtain GFR from Lab.    2.  Is GFR is greater than 60? yes     If YES: Technologist will give the patient normal post CT instructions.     If NO: Technologist will give the patient METFORMIN post CT instructions.

## 2018-04-19 ENCOUNTER — ONCOLOGY VISIT (OUTPATIENT)
Dept: ONCOLOGY | Facility: OTHER | Age: 73
End: 2018-04-19
Attending: NURSE PRACTITIONER
Payer: MEDICARE

## 2018-04-19 VITALS
SYSTOLIC BLOOD PRESSURE: 124 MMHG | WEIGHT: 255 LBS | DIASTOLIC BLOOD PRESSURE: 68 MMHG | HEIGHT: 69 IN | HEART RATE: 80 BPM | BODY MASS INDEX: 37.77 KG/M2 | TEMPERATURE: 96.4 F

## 2018-04-19 DIAGNOSIS — C18.7 CANCER OF SIGMOID COLON (H): Primary | ICD-10-CM

## 2018-04-19 DIAGNOSIS — D50.9 IRON DEFICIENCY ANEMIA, UNSPECIFIED IRON DEFICIENCY ANEMIA TYPE: ICD-10-CM

## 2018-04-19 LAB
BASOPHILS # BLD AUTO: 0 10E9/L (ref 0–0.2)
BASOPHILS NFR BLD AUTO: 0.3 %
DIFFERENTIAL METHOD BLD: ABNORMAL
EOSINOPHIL # BLD AUTO: 0.6 10E9/L (ref 0–0.7)
EOSINOPHIL NFR BLD AUTO: 9.6 %
ERYTHROCYTE [DISTWIDTH] IN BLOOD BY AUTOMATED COUNT: 14.7 % (ref 10–15)
FERRITIN SERPL-MCNC: 145 NG/ML (ref 23.9–336.2)
FOLATE SERPL-MCNC: 9.1 NG/ML
HCT VFR BLD AUTO: 38.4 % (ref 40–53)
HGB BLD-MCNC: 12.7 G/DL (ref 13.3–17.7)
IMM GRANULOCYTES # BLD: 0 10E9/L (ref 0–0.4)
IMM GRANULOCYTES NFR BLD: 0.5 %
IRON SATN MFR SERPL: 22 % (ref 20–55)
IRON SERPL-MCNC: 83 UG/DL (ref 50–212)
LYMPHOCYTES # BLD AUTO: 1.2 10E9/L (ref 0.8–5.3)
LYMPHOCYTES NFR BLD AUTO: 18.9 %
MCH RBC QN AUTO: 29.8 PG (ref 26.5–33)
MCHC RBC AUTO-ENTMCNC: 33.1 G/DL (ref 31.5–36.5)
MCV RBC AUTO: 90 FL (ref 78–100)
MONOCYTES # BLD AUTO: 0.8 10E9/L (ref 0–1.3)
MONOCYTES NFR BLD AUTO: 11.9 %
NEUTROPHILS # BLD AUTO: 3.8 10E9/L (ref 1.6–8.3)
NEUTROPHILS NFR BLD AUTO: 58.8 %
PLATELET # BLD AUTO: 207 10E9/L (ref 150–450)
RBC # BLD AUTO: 4.26 10E12/L (ref 4.4–5.9)
TIBC SERPL-MCNC: 372.4 UG/DL (ref 245–400)
UIBC (UNSATURATED): 289.4 MG/DL
VIT B12 SERPL-MCNC: 318 PG/ML (ref 180–914)
WBC # BLD AUTO: 6.5 10E9/L (ref 4–11)

## 2018-04-19 PROCEDURE — 82728 ASSAY OF FERRITIN: CPT | Performed by: NURSE PRACTITIONER

## 2018-04-19 PROCEDURE — 82746 ASSAY OF FOLIC ACID SERUM: CPT | Performed by: NURSE PRACTITIONER

## 2018-04-19 PROCEDURE — 83540 ASSAY OF IRON: CPT | Performed by: NURSE PRACTITIONER

## 2018-04-19 PROCEDURE — 85025 COMPLETE CBC W/AUTO DIFF WBC: CPT | Performed by: NURSE PRACTITIONER

## 2018-04-19 PROCEDURE — 82607 VITAMIN B-12: CPT | Performed by: NURSE PRACTITIONER

## 2018-04-19 PROCEDURE — G0463 HOSPITAL OUTPT CLINIC VISIT: HCPCS | Performed by: NURSE PRACTITIONER

## 2018-04-19 PROCEDURE — 2894A VOIDCORRECT: CPT | Performed by: NURSE PRACTITIONER

## 2018-04-19 PROCEDURE — 36415 COLL VENOUS BLD VENIPUNCTURE: CPT | Performed by: NURSE PRACTITIONER

## 2018-04-19 PROCEDURE — 99214 OFFICE O/P EST MOD 30 MIN: CPT | Performed by: NURSE PRACTITIONER

## 2018-04-19 PROCEDURE — 83550 IRON BINDING TEST: CPT | Performed by: NURSE PRACTITIONER

## 2018-04-19 ASSESSMENT — PAIN SCALES - GENERAL: PAINLEVEL: NO PAIN (0)

## 2018-04-19 NOTE — MR AVS SNAPSHOT
"              After Visit Summary   4/19/2018    Andrez Olivier    MRN: [id number]           Patient Information     Date Of Birth          1945        Visit Information        Provider Department      4/19/2018 8:15 AM Radha Adamson APRN CNP [facility]        Today's Diagnoses     Cancer of sigmoid colon (H)    -  1    Iron deficiency anemia, unspecified iron deficiency anemia type           Follow-ups after your visit        Your next 10 appointments already scheduled     Aug 20, 2018  8:15 AM CDT   Return Visit with SARKIS Benton CNP   [facility] ([facility])    [address]  [place] [address] [phone]              Who to contact     If you have questions or need follow up information about today's clinic visit or your schedule please contact [facility] directly at [phone].  Normal or non-critical lab and imaging results will be communicated to you by Troverhart, letter or phone within 4 business days after the clinic has received the results. If you do not hear from us within 7 days, please contact the clinic through Troverhart or phone. If you have a critical or abnormal lab result, we will notify you by phone as soon as possible.  Submit refill requests through [employer] or call your pharmacy and they will forward the refill request to us. Please allow 3 business days for your refill to be completed.          Additional Information About Your Visit        Troverhart Information     [employer] lets you send messages to your doctor, view your test results, renew your prescriptions, schedule appointments and more. To sign up, go to [email or web address] . Click on \"Log in\" on the left side of the screen, which will take you to the Welcome page. Then click on \"Sign up Now\" on the right side of the page.     You will be asked to enter the access code listed below, as well as some personal " "information. Please follow the directions to create your username and password.     Your access code is: [email or web address]  Expires: 2018  3:10 PM     Your access code will  in 90 days. If you need help or a new code, please call your [place] clinic or [phone].        Care EveryWhere ID     This is your Care EveryWhere ID. This could be used by other organizations to access your [place] medical records  [id number]        Your Vitals Were     Pulse Temperature Height BMI (Body Mass Index)          80 96.4  F (35.8  C) (Tympanic) 1.74 m (5' 8.5\") 38.2 kg/m2         Blood Pressure from Last 3 Encounters:   18 124/68   17 106/60   17 112/60    Weight from Last 3 Encounters:   18 115.7 kg (255 lb)   17 114.8 kg (253 lb)   17 114.9 kg (253 lb 6.4 oz)              We Performed the Following     CBC with platelets differential     Ferritin     Folate     Iron Binding Panel GH     Vitamin B12        Primary Care Provider Office Phone # Fax #    Bk Roberts [phone] [address] [facility] [address]        Equal Access to Services     ENRRIQUE BAÑUELOS AH: Hadii rainer jefferyo Soelsa, waaxda luqadaha, qaybta kaalmada adeegyachen, rachael mazariegos . So [facility] [phone].    ATENCIÓN: Si habla español, tiene a camejo disposición servicios gratuitos de asistencia lingüística. Llame al [phone].    We comply with applicable federal civil rights laws and [place] laws. We do not discriminate on the basis of race, color, national origin, age, disability, sex, sexual orientation, or gender identity.            Thank you!     Thank you for choosing [facility]  for your care. Our goal is always to provide you with excellent care. Hearing back from our patients is one way we can continue to improve our services. Please take a few minutes to complete the written survey that you may receive in the mail after " your visit with us. Thank you!             Your Updated Medication List - Protect others around you: Learn how to safely use, store and throw away your medicines at www.disposemymeds.org.          This list is accurate as of 4/19/18 11:59 PM.  Always use your most recent med list.                   Brand Name Dispense Instructions for use Diagnosis    allopurinol 100 MG tablet    ZYLOPRIM     Take 1 tablet by mouth 2 times daily.        clopidogrel 75 MG tablet    PLAVIX     Take 1 tablet by mouth once daily.        glipiZIDE 10 MG tablet    GLUCOTROL     Take 1 tablet by mouth 2 times daily before meals. - for diabetes        lisinopril 2.5 MG tablet    PRINIVIL/Zestril     Take 1 tablet by mouth once daily.        metFORMIN 1000 MG tablet    GLUCOPHAGE     Take 1 tablet by mouth 2 times daily with meals. -- Dose Increase 5/26/2017 (cancel Rx for 500 mg tablet)        metoprolol tartrate 50 MG tablet    LOPRESSOR     Take 1 tablet by mouth 2 times daily.        nitroGLYcerin 0.4 MG sublingual tablet    NITROSTAT     Place 1 tablet under the tongue every 5 minutes if needed for Chest Pain.        pantoprazole 40 MG EC tablet    PROTONIX     Take 1 tablet by mouth once daily before a meal.        simvastatin 40 MG tablet    ZOCOR     Take 1 tablet by mouth at bedtime. - for cholesterol        Vitamin B-12 CR 1000 MCG Tbcr      Take 1 tablet by mouth once daily.

## 2018-04-19 NOTE — NURSING NOTE
Lab and ct scans ordered per verbal read back order from provider and sent to provider to co-sign.  Angela Quick RN...........4/19/2018 9:05 AM

## 2018-04-23 NOTE — PROGRESS NOTES
Oncology Follow-up Visit:  April 19, 2018  Diagnosis:Colon cancer    History Of Present Illness:  Patient presents to the clinic today for followup of colon cancer. Patient was seen in consultation on June 1, 2016. Patient presented to the emergency room on April 25, 2016, with complaints of chest pain radiating down his arms, which was primarily worse with exertion. In the emergency department, he was found to have a hemoglobin of 7.1, and he subsequently was admitted. CBC revealed microcytic indices with an MCV of 62. He was noted to be iron deficient. He was transfused 2 units of packed red cells and was ruled out for MI.   He was seen by Dr. Solano, who performed colonoscopy and was noted to have a mass in the sigmoid colon that was consistent with adenocarcinoma. He also had multiple adenomatous polyps.   The patient was admitted on May 11, 2016, for a sigmoid colectomy. This was performed on May 17, 2016. Pathology revealed in the sigmoid colon a mass consistent with moderately differentiated adenocarcinoma. The tumor size was 2 x 1 x 0.7 cm. The tumor invaded the muscularis propria, 2/8 lymph nodes were positive for metastatic carcinoma. Margins were negative for tumor. The grade of the tumor was ruled as moderately differentiated. The patient was staged pathologic stage T2N1b as part of the postop evaluation.   The patient had a CT abdomen and pelvis on May 12, 2016. This revealed that there were 2 nonspecific low-attenuation lesions in the liver. Metastasis could not be excluded. There was no lymphadenopathy or additional findings to suggest metastases. The patient had a preop CEA, which was less than 3.   PET scan was performed on Lluvia 15, 2016, and was essentially negative for metastatic disease. Lesions seen on prior PET in the liver were not hypermetabolic. We felt that the patient had stage III disease and he would be a candidate for adjuvant chemotherapy.       When patient was seen on June 28, 2016,  the plan was to start FOLFOX x12 cycles. The patient was started, and received a total of 10 cycles. When he was seen on November 17, 2016,  he did note some cold-induced neuropathic symptoms. We elected to restage him after 12 cycles. He completed 12 cycles of FOLFOX. His last chemotherapy was administered on December 7, 2016.  He did have a PET scan done after 12 cycles of chemotherapy, and this came back essentially negative for metastatic disease.   He had staging studies on April 6, 2017 including CT abdomen and pelvis, which was essentially negative. CT of the chest was negative. The patient also underwent a colonoscopy in May 2017, which revealed a tubular adenoma at the hepatic flexure of the colon. Scans from August 2017 were stable with no evidence of disease. Patient had scans done on December 18, 2017 which showed no significant change and no evidence or recurrent carcinoma.       Patient states he is feeling well. He continues to have some occasional diarrhea which he has had since surgery. He also continues to have numbness and tingling in his fingers and feet since surgery, he does states that this has improved some. His last colonoscopy was in May 2017, he will need his next colonoscopy 2020.  CT chest abdomen and pelvis was done on 4/17/18 and showed no evidence of new or worsening metastatic disease.    Review Of Systems:  Review Of Systems  Eyes/Ears/Nose/Throat: denies any vision or hearing changes, denies dysphagia  Respiratory: No shortness of breath, dyspnea on exertion, cough, or hemoptysis  Cardiovascular: denies chest pain, palpitations, swelling of th legs  Gastrointestinal: denies abdominal pain, constipation. He does report occasional diarrhea which has been present since his surgery  Genitourinary: denies dysuria, hematuria  Musculoskeletal: denies any new bone pain or muscle weakness  Neurologic: reports ongoing numbness and tingling of fingers and feet since chemotherapy. Denies  "headaches, dizziness  Hematologic/Lymphatic/Immunologic: denies fevers, chills. Does report occasional night sweats which he relates to using \"too many covers\"      Nursing Notes:   Angela Quick RN  4/19/2018  8:26 AM  Signed  Patient here for follow up. Angela Quick RN...........4/19/2018 8:25 AM      Angela Quick RN  4/19/2018  9:05 AM  Signed  Lab and ct scans ordered per verbal read back order from provider and sent to provider to co-sign.  Angela Quick RN...........4/19/2018 9:05 AM      Past medical, social, surgical, and family histories reviewed.    Allergies:  Allergies as of 04/19/2018 - Zhang as Reviewed 04/19/2018   Allergen Reaction Noted     Aspirin  05/26/2017     Canagliflozin  05/18/2016     Morphine  05/18/2016       Current Medications:  Current Outpatient Prescriptions   Medication Sig Dispense Refill     allopurinol (ZYLOPRIM) 100 MG tablet Take 1 tablet by mouth 2 times daily.       clopidogrel (PLAVIX) 75 MG tablet Take 1 tablet by mouth once daily.       Cyanocobalamin (VITAMIN B-12 CR) 1000 MCG TBCR Take 1 tablet by mouth once daily.       glipiZIDE (GLUCOTROL) 10 MG tablet Take 1 tablet by mouth 2 times daily before meals. - for diabetes       lisinopril (PRINIVIL/ZESTRIL) 2.5 MG tablet Take 1 tablet by mouth once daily.       metFORMIN (GLUCOPHAGE) 1000 MG tablet Take 1 tablet by mouth 2 times daily with meals. -- Dose Increase 5/26/2017 (cancel Rx for 500 mg tablet)       metoprolol tartrate (LOPRESSOR) 50 MG tablet Take 1 tablet by mouth 2 times daily.       nitroGLYcerin (NITROSTAT) 0.4 MG sublingual tablet Place 1 tablet under the tongue every 5 minutes if needed for Chest Pain.       pantoprazole (PROTONIX) 40 MG EC tablet Take 1 tablet by mouth once daily before a meal.       simvastatin (ZOCOR) 40 MG tablet Take 1 tablet by mouth at bedtime. - for cholesterol          Physical Exam:  /68 (BP Location: Right arm, Patient Position: Sitting, Cuff Size: Adult Large)  " "Pulse 80  Temp 96.4  F (35.8  C) (Tympanic)  Ht 1.74 m (5' 8.5\")  Wt 115.7 kg (255 lb)  BMI 38.2 kg/m2    GENERAL APPEARANCE: 72 year old male,  alert and no distress     HENT: Mouth without ulcers or lesions     NECK: no adenopathy, no asymmetry or masses     LYMPHATICS: No cervical, supraclavicular, axillary  lymphadenopathy     RESP: lungs clear to auscultation. Normal respiratory effort     CARDIOVASCULAR: regular rates and rhythm, normal S1 S2, no S3 or S4 and no murmur.     ABDOMEN:  soft, nontender, no HSM or masses and bowel sounds normal     MUSCULOSKELETAL: extremities normal- no gross deformities noted.  No edema b/l LE.     SKIN: no suspicious lesions or rashes     PSYCHIATRIC: mentation appears normal and affect normal    LABORATORY DATA:  WBC 6.5. Hemoglobin and hematocrit 12.7 and 38.4. Iron studies are within normal limits. CEA <3. LFT's slightly elevated    Laboratory/Imaging Studies  No visits with results within 2 Week(s) from this visit.  Latest known visit with results is:    Ambulatory - [facility] on 01/22/2018   Component Date Value Ref Range Status     Alkaline Phosphatase 01/22/2018 85  34 - 104 IU/L Final     A/G Ratio - Historical 01/22/2018 1.9  1.0 - 2.0 Final     GFR Estimate If Black 01/22/2018 >60  >60 ml/min/1.73m2 Final     Sodium 01/22/2018 138  133 - 143 mmol/L Final     Potassium 01/22/2018 4.3  3.5 - 5.1 mmol/L Final     Bilirubin Total 01/22/2018 0.5  0.3 - 1.0 mg/dL Final     Calcium 01/22/2018 9.1  8.6 - 10.3 mg/dL Final     Carbon Dioxide 01/22/2018 24  21 - 31 mmol/L Final     AST (SGOT) - Historical 01/22/2018 51* 13 - 39 IU/L Final     GFR If Not  - Hist* 01/22/2018 >60  >60 ml/min/1.73m2 Final     BUN/Creatinine Ratio - Historical 01/22/2018 16   Final     Anion Gap - Historical 01/22/2018 13  5 - 18 Final     Urea Nitrogen 01/22/2018 15  7 - 25 mg/dL Final     Glucose 01/22/2018 205* 70 - 105 mg/dL Final     ALT (SGPT) - Historical 01/22/2018 49  7 - 52 " IU/L Final     Globulin - Historical 01/22/2018 2.2  2.0 - 3.7 g/dL Final     Albumin 01/22/2018 4.2  3.5 - 5.7 g/dL Final     Creatinine 01/22/2018 0.92  0.70 - 1.30 mg/dL Final     Chloride 01/22/2018 101  98 - 107 mmol/L Final     Protein Total 01/22/2018 6.4  6.4 - 8.9 g/dL Final        ASSESSMENT/PLAN:  Stage 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 The patient was started on adjuvant FOLFOX chemotherapy and completed 12 cycles. Course complicated daily by peripheral neuropathy, grade 1, which is improving. PET scan indicates no evidence of metastatic disease. CEA was normal. Recent CT of chest abdomen and pelvis from 4/2018 show no evidence of disease.  Colonoscopy done in May 2017 revealed a tubular adenoma. The patient will be due for a colonoscopy in 3 years.   The plan is to continue surveillance. We will see the patient in 4 months, obtain CBC, CMP, LDH, CEA, and CT chest, abdomen, and pelvis.       Thirty minutes spent with patient with greater than 50% of that time spent counseling patient regarding disease process, interpretation of lab and CT results, discussing close surveillance and coordination of care

## 2018-05-08 DIAGNOSIS — M10.9 GOUT: ICD-10-CM

## 2018-05-08 DIAGNOSIS — K21.00 REFLUX ESOPHAGITIS: ICD-10-CM

## 2018-05-08 DIAGNOSIS — I25.10 CORONARY ARTERY DISEASE INVOLVING NATIVE CORONARY ARTERY OF NATIVE HEART WITHOUT ANGINA PECTORIS: ICD-10-CM

## 2018-05-08 DIAGNOSIS — E78.2 MIXED HYPERLIPIDEMIA: ICD-10-CM

## 2018-05-08 DIAGNOSIS — E11.69 DIABETES MELLITUS TYPE 2 IN OBESE: Primary | ICD-10-CM

## 2018-05-08 DIAGNOSIS — E66.9 DIABETES MELLITUS TYPE 2 IN OBESE: Primary | ICD-10-CM

## 2018-05-08 NOTE — LETTER
May 11, 2018        Andrez JESENIA Charline  [address] [facility] [address]          Dear Mr. Olivier,    Your pharmacy has requested a refill of multiple medications which have been filled with a 90-day supply.     According to our records, you are due for annual medication management and labs with Dr. Bk Roberts. Your health is important to us. Please contact our scheduling line at [phone] to set up this appointment at your earliest convenience.     Thank you for choosing [facility] for your health care needs.     Sincerely,        The Refill Nurses  [facility]

## 2018-05-11 RX ORDER — METOPROLOL TARTRATE 50 MG
TABLET ORAL
Qty: 180 TABLET | Refills: 0 | Status: SHIPPED | OUTPATIENT
Start: 2018-05-11 | End: 2018-08-17

## 2018-05-11 RX ORDER — CLOPIDOGREL BISULFATE 75 MG/1
TABLET ORAL
Qty: 90 TABLET | Refills: 0 | Status: SHIPPED | OUTPATIENT
Start: 2018-05-11 | End: 2018-08-17

## 2018-05-11 RX ORDER — LISINOPRIL 2.5 MG/1
TABLET ORAL
Qty: 90 TABLET | Refills: 0 | Status: SHIPPED | OUTPATIENT
Start: 2018-05-11 | End: 2018-08-17

## 2018-05-11 RX ORDER — PANTOPRAZOLE SODIUM 40 MG/1
TABLET, DELAYED RELEASE ORAL
Qty: 90 TABLET | Refills: 0 | Status: SHIPPED | OUTPATIENT
Start: 2018-05-11 | End: 2018-08-20

## 2018-05-11 RX ORDER — GLIPIZIDE 10 MG/1
TABLET ORAL
Qty: 180 TABLET | Refills: 0 | Status: SHIPPED | OUTPATIENT
Start: 2018-05-11 | End: 2018-08-17

## 2018-05-11 RX ORDER — SIMVASTATIN 40 MG
TABLET ORAL
Qty: 90 TABLET | Refills: 0 | Status: SHIPPED | OUTPATIENT
Start: 2018-05-11 | End: 2018-08-17

## 2018-05-11 NOTE — TELEPHONE ENCOUNTER
Prescription approved per [facility] Refill Protocol.  Patient had A1C check on 12-18-17. HGB is improved with adenocarcinoma affecting, followed by oncology. Letter sent to remind that annual medication management is due. LOV was 5-26-17. 90-day refill given. Neeta Eddy RN on 5/11/2018 at 3:01 PM

## 2018-07-23 NOTE — PROGRESS NOTES
Patient Information     Patient Name  Andrez Olivier MRN  [id number] Sex  Male   1945      Letter by Bk Roberts MD at      Author:  Bk Roberts MD Service:  (none) Author Type:  (none)    Filed:   Encounter Date:  5/3/2017 Status:  (Other)           Andrez Olivier  [address] [address] [address]          May 3, 2017    Dear Mr. Olivier:    A 90 day refill of MetFORMIN (GLUCOPHAGE) 500 mg tablet has been called into your pharmacy.    Additional refills require a medication review office visit with Bk Roberts MD.   Please call the clinic at [phone] to schedule your appointment.    If you should require additional refills before your scheduled appointment, please contact your pharmacy and we will refill your medication until that date.      Thank you,    The Refill Nurse  [facility]

## 2018-07-24 NOTE — PROGRESS NOTES
Patient Information     Patient Name  Andrez Olivier MRN  [id number] Sex  Male   1945      Letter by Bk Roberts MD at      Author:  Bk Roberts MD Service:  (none) Author Type:  (none)    Filed:   Encounter Date:  2017 Status:  (Other)           Andrez Olivier  [address] [facility] [address]          2017    Dear Mr. Olivier:    Following are the tests completed during your last clinic visit.  The results of these tests are included below.      Diabetes/hemoglobin A1c level is now uncontrolled.      --> We likely need to adjust your diabetic medications.  Please schedule a clinic follow-up appointment and we can discuss options.      To help with weight loss and improve blood sugar control....    -- Try to avoid Carbohydrates as much as possible -- breads, pasta, baked goods, cakes, oatmeal, cold cereal, potatoes.   These are turned to sugar in one metabolic conversion, cause insulin secretion and increased fat deposition / weight gain.      -- Eat more lean meats, proteins, eggs, nuts.       Results for orders placed or performed in visit on 17      HEMOGLOBIN A1C MONITORING (POCT)      Result  Value Ref Range    HEMOGLOBIN A1C MONITORING (POCT) 8.6 (H) 4.0 - 6.2 %    ESTIMATED AVERAGE GLUCOSE  200 mg/dL   LIPID PANEL      Result  Value Ref Range    CHOLESTEROL,TOTAL 192 <200 mg/dL    TRIGLYCERIDES 271 (H) <150 mg/dL    HDL CHOLESTEROL 32 23 - 92 mg/dL    NON-HDL CHOLESTEROL 160 (H) <145 mg/dl    CHOL/HDL RATIO            6.00 (H) <4.50                    LDL CHOLESTEROL 106 (H) <100 mg/dL    PROVIDER ORDERED STATUS RANDOM    COMP METABOLIC PANEL      Result  Value Ref Range    SODIUM 137 133 - 143 mmol/L    POTASSIUM 4.9 3.5 - 5.1 mmol/L    CHLORIDE 100 98 - 107 mmol/L    CO2,TOTAL 25 21 - 31 mmol/L    ANION GAP 12 5 - 18                    GLUCOSE 249 (H) 70 - 105 mg/dL    CALCIUM 9.5 8.6 - 10.3 mg/dL    BUN 16 7 - 25 mg/dL    CREATININE 1.05 0.70 -  1.30 mg/dL    BUN/CREAT RATIO           15                    GFR if African American >60 >60 ml/min/1.73m2    GFR if not African American >60 >60 ml/min/1.73m2    ALBUMIN 4.2 3.5 - 5.7 g/dL    PROTEIN,TOTAL 7.1 6.4 - 8.9 g/dL    GLOBULIN                  2.9 2.0 - 3.7 g/dL    A/G RATIO 1.4 1.0 - 2.0                    BILIRUBIN,TOTAL 0.5 0.3 - 1.0 mg/dL    ALK PHOSPHATASE 100 34 - 104 IU/L    ALT (SGPT) 61 (H) 7 - 52 IU/L    AST (SGOT) 73 (H) 13 - 39 IU/L   CEA      Result  Value Ref Range    CEA GIH <3.0 <3.0 ng/mL   LD,TOTAL      Result  Value Ref Range    LD,TOTAL 177 140 - 271 IU/L   CBC WITH AUTO DIFFERENTIAL      Result  Value Ref Range    WHITE BLOOD COUNT         6.4 4.5 - 11.0 thou/cu mm    RED BLOOD COUNT           4.40 4.30 - 5.90 mil/cu mm    HEMOGLOBIN                13.3 (L) 13.5 - 17.5 g/dL    HEMATOCRIT                40.3 37.0 - 53.0 %    MCV                       92 80 - 100 fL    MCH                       30.2 26.0 - 34.0 pg    MCHC                      33.0 32.0 - 36.0 g/dL    RDW                       14.6 11.5 - 15.5 %    PLATELET COUNT            231 140 - 440 thou/cu mm    MPV                       10.1 6.5 - 11.0 fL    NEUTROPHILS               63.7 42.0 - 72.0 %    LYMPHOCYTES               17.9 (L) 20.0 - 44.0 %    MONOCYTES                 15.9 (H) <12.0 %    EOSINOPHILS               2.0 <8.0 %    BASOPHILS                 0.3 <3.0 %    IMMATURE GRANULOCYTES(METAS,MYELOS,PROS) 0.2 %    ABSOLUTE NEUTROPHILS      4.1 1.7 - 7.0 thou/cu mm    ABSOLUTE LYMPHOCYTES      1.2 0.9 - 2.9 thou/cu mm    ABSOLUTE MONOCYTES        1.0 (H) <0.9 thou/cu mm    ABSOLUTE EOSINOPHILS      0.1 <0.5 thou/cu mm    ABSOLUTE BASOPHILS        0.0 <0.3 thou/cu mm    ABSOLUTE IMMATURE GRANULOCYTES(METAS,MYELOS,PROS) 0.0 <=0.3 thou/cu mm         If you have any further questions or problems contact my office at  [phone]   --- or send [employer] message --- otherwise schedule an appointment.    Clinic :  [phone]  Appointment line: [phone]     Thank you,    Bk Roberts MD    Internal Medicine  [facility]     Reviewed and electronically signed by provider.

## 2018-07-24 NOTE — PROGRESS NOTES
Patient Information     Patient Name  Andrez Olivier MRN  [id number] Sex  Male   1945      Letter by Bk Roberts MD at      Author:  Bk Roberts MD Service:  (none) Author Type:  (none)    Filed:   Encounter Date:  2017 Status:  (Other)           Andrez Olivier  [address] [facility] [address]          May 2, 2017    Dear Mr. Olivier:    A refill of allopurinol (ZYLOPRIM) 100 mg tablet and      simvastatin (ZOCOR) 40 mg tablet has been called into your pharmacy.    Additional refills require an office visit with Bk Roberts MD for annual medication management.   Please call the clinic at [phone] to schedule your appointment.    If you should require additional refills before your scheduled appointment, please contact your pharmacy and we will refill your medication until that date.      Thank you,    The Refill Nurse  [facility]

## 2018-07-24 NOTE — PROGRESS NOTES
Patient Information     Patient Name  Andrez Olivier MRN  [id number] Sex  Male   1945      Letter by Bk Roberts MD at      Author:  Bk Roberts MD Service:  (none) Author Type:  (none)    Filed:   Encounter Date:  2017 Status:  (Other)           Andrez Olivier  [address] [facility] [address]          May 26, 2017    Dear Mr. Olivier:    Following are the tests completed during your last clinic visit.  The results of these tests are included below.      Urinalysis is normal.    Hemoglobin A1c has gone up and is getting close to being uncontrolled.  Increase metformin up to 1000 mg twice daily or to a 500 mg tablets twice daily until they are gone.    -- New prescription sent to pharmacy.    Schedule -- lab only appointment in 91+ days with Diabetes clinic appointment with Dr. Roberts 1-2 days later.    Results for orders placed or performed in visit on 17      HEMOGLOBIN A1C MONITORING (POCT)      Result  Value Ref Range    HEMOGLOBIN A1C MONITORING (POCT) 7.6 (H) 4.0 - 6.2 %    ESTIMATED AVERAGE GLUCOSE  171 mg/dL   URINALYSIS W REFLEX MICROSCOPIC IF POSITIVE      Result  Value Ref Range    COLOR                     Yellow Yellow Color    CLARITY                   Clear Clear Clarity    SPECIFIC GRAVITY,URINE    >=1.030 (A) 1.010, 1.015, 1.020, 1.025                    PH,URINE                  5.5 6.0, 7.0, 8.0, 5.5, 6.5, 7.5, 8.5                    UROBILINOGEN,QUALITATIVE  Normal Normal EU/dl    PROTEIN, URINE Negative Negative mg/dL    GLUCOSE, URINE Negative Negative mg/dL    KETONES,URINE             Negative Negative mg/dL    BILIRUBIN,URINE           Negative Negative                    OCCULT BLOOD,URINE        Negative Negative                    NITRITE                   Negative Negative                    LEUKOCYTE ESTERASE        Negative Negative                         If you have any further questions or problems contact my office at  [phone]    --- or send [employer] message --- otherwise schedule an appointment.    Clinic : [phone]  Appointment line: [phone]     Thank you,    Bk Roberts MD    Internal Medicine  [facility]     Reviewed and electronically signed by provider.

## 2018-07-24 NOTE — PROGRESS NOTES
Patient Information     Patient Name  Andrez Olivier MRN  [id number] Sex  Male   1945      Letter by Baljeet Solano MD at      Author:  Baljeet Solano MD Service:  (none) Author Type:  (none)    Filed:   Encounter Date:  2017 Status:  (Other)           Andrez Olivier  [address] [facility] [address]          May 16, 2017    Dear Mr. Olivier:    This letter is in regards to your colonoscopy that was done by Baljeet Solano MD on 5/15/17.   The polyp(s) removed from your colon were TUBULAR ADENOMAS.  Adenomatous polyps are  pre-cancerous, yet BENIGN.  The polyp was removed. You have an increases risk for developing other polyps in the future.  For that reason, Dr. Baljeet Solano MD recommends a repeat colonoscopy in 3 years unless you have problems.      Dr. Baljeet Solano MD also recommends a high fiber diet. A fiber supplement such as Metamucil, FiberCon, or Citrucel is recommended. Generic forms of these supplements are fine. These are available over the counter.       If you have any questions regarding this report, please call the Surgery department at [phone].  A copy of this report will be placed in your electronic medical record for your primary care provider's review.    Sincerely,      Rola Julian LPN  General Surgery      Reviewed and electronically signed by provider.

## 2018-08-13 ENCOUNTER — HOSPITAL ENCOUNTER (OUTPATIENT)
Dept: CT IMAGING | Facility: OTHER | Age: 73
End: 2018-08-13
Attending: NURSE PRACTITIONER
Payer: MEDICARE

## 2018-08-13 ENCOUNTER — HOSPITAL ENCOUNTER (OUTPATIENT)
Dept: CT IMAGING | Facility: OTHER | Age: 73
Discharge: HOME | End: 2018-08-13
Attending: NURSE PRACTITIONER | Admitting: NURSE PRACTITIONER
Payer: MEDICARE

## 2018-08-13 DIAGNOSIS — D50.9 IRON DEFICIENCY ANEMIA, UNSPECIFIED IRON DEFICIENCY ANEMIA TYPE: ICD-10-CM

## 2018-08-13 DIAGNOSIS — C18.7 CANCER OF SIGMOID COLON (H): ICD-10-CM

## 2018-08-13 LAB
ALBUMIN SERPL-MCNC: 3.9 G/DL (ref 3.5–5.7)
ALP SERPL-CCNC: 100 U/L (ref 34–104)
ALT SERPL W P-5'-P-CCNC: 69 U/L (ref 7–52)
ANION GAP SERPL CALCULATED.3IONS-SCNC: 10 MMOL/L (ref 3–14)
AST SERPL W P-5'-P-CCNC: 90 U/L (ref 13–39)
BASOPHILS # BLD AUTO: 0 10E9/L (ref 0–0.2)
BASOPHILS NFR BLD AUTO: 0.4 %
BILIRUB SERPL-MCNC: 0.6 MG/DL (ref 0.3–1)
BUN SERPL-MCNC: 18 MG/DL (ref 7–25)
CALCIUM SERPL-MCNC: 9.6 MG/DL (ref 8.6–10.3)
CEA SERPL-MCNC: <3 NG/ML
CHLORIDE SERPL-SCNC: 99 MMOL/L (ref 98–107)
CO2 SERPL-SCNC: 25 MMOL/L (ref 21–31)
CREAT SERPL-MCNC: 0.95 MG/DL (ref 0.7–1.3)
DIFFERENTIAL METHOD BLD: NORMAL
EOSINOPHIL # BLD AUTO: 0.1 10E9/L (ref 0–0.7)
EOSINOPHIL NFR BLD AUTO: 1.6 %
ERYTHROCYTE [DISTWIDTH] IN BLOOD BY AUTOMATED COUNT: 14.6 % (ref 10–15)
FERRITIN SERPL-MCNC: 161 NG/ML (ref 23.9–336.2)
FOLATE SERPL-MCNC: 10.9 NG/ML
GFR SERPL CREATININE-BSD FRML MDRD: 78 ML/MIN/1.7M2
GLUCOSE SERPL-MCNC: 314 MG/DL (ref 70–105)
HCT VFR BLD AUTO: 41.5 % (ref 40–53)
HGB BLD-MCNC: 13.5 G/DL (ref 13.3–17.7)
IMM GRANULOCYTES # BLD: 0 10E9/L (ref 0–0.4)
IMM GRANULOCYTES NFR BLD: 0.4 %
IRON SATN MFR SERPL: 21 % (ref 20–55)
IRON SERPL-MCNC: 89 UG/DL (ref 50–212)
LDH SERPL L TO P-CCNC: 176 U/L (ref 140–271)
LYMPHOCYTES # BLD AUTO: 1.4 10E9/L (ref 0.8–5.3)
LYMPHOCYTES NFR BLD AUTO: 19.9 %
MCH RBC QN AUTO: 29.7 PG (ref 26.5–33)
MCHC RBC AUTO-ENTMCNC: 32.5 G/DL (ref 31.5–36.5)
MCV RBC AUTO: 91 FL (ref 78–100)
MONOCYTES # BLD AUTO: 0.7 10E9/L (ref 0–1.3)
MONOCYTES NFR BLD AUTO: 10.6 %
NEUTROPHILS # BLD AUTO: 4.5 10E9/L (ref 1.6–8.3)
NEUTROPHILS NFR BLD AUTO: 67.1 %
PLATELET # BLD AUTO: 212 10E9/L (ref 150–450)
POTASSIUM SERPL-SCNC: 4.8 MMOL/L (ref 3.5–5.1)
PROT SERPL-MCNC: 7.2 G/DL (ref 6.4–8.9)
RBC # BLD AUTO: 4.54 10E12/L (ref 4.4–5.9)
SODIUM SERPL-SCNC: 134 MMOL/L (ref 134–144)
TIBC SERPL-MCNC: 425.6 UG/DL (ref 245–400)
UIBC (UNSATURATED): 336.6 MG/DL
VIT B12 SERPL-MCNC: 422 PG/ML (ref 180–914)
WBC # BLD AUTO: 6.8 10E9/L (ref 4–11)

## 2018-08-13 PROCEDURE — 80053 COMPREHEN METABOLIC PANEL: CPT | Performed by: NURSE PRACTITIONER

## 2018-08-13 PROCEDURE — 25000125 ZZHC RX 250: Performed by: NURSE PRACTITIONER

## 2018-08-13 PROCEDURE — 82746 ASSAY OF FOLIC ACID SERUM: CPT | Performed by: NURSE PRACTITIONER

## 2018-08-13 PROCEDURE — 83550 IRON BINDING TEST: CPT | Performed by: NURSE PRACTITIONER

## 2018-08-13 PROCEDURE — 74177 CT ABD & PELVIS W/CONTRAST: CPT

## 2018-08-13 PROCEDURE — 82728 ASSAY OF FERRITIN: CPT | Performed by: NURSE PRACTITIONER

## 2018-08-13 PROCEDURE — 36415 COLL VENOUS BLD VENIPUNCTURE: CPT | Performed by: NURSE PRACTITIONER

## 2018-08-13 PROCEDURE — 83540 ASSAY OF IRON: CPT | Performed by: NURSE PRACTITIONER

## 2018-08-13 PROCEDURE — 83615 LACTATE (LD) (LDH) ENZYME: CPT | Performed by: NURSE PRACTITIONER

## 2018-08-13 PROCEDURE — 82378 CARCINOEMBRYONIC ANTIGEN: CPT | Performed by: NURSE PRACTITIONER

## 2018-08-13 PROCEDURE — 82607 VITAMIN B-12: CPT | Performed by: NURSE PRACTITIONER

## 2018-08-13 PROCEDURE — 71260 CT THORAX DX C+: CPT

## 2018-08-13 PROCEDURE — 85025 COMPLETE CBC W/AUTO DIFF WBC: CPT | Performed by: NURSE PRACTITIONER

## 2018-08-13 PROCEDURE — 25000128 H RX IP 250 OP 636

## 2018-08-13 RX ADMIN — IOHEXOL 100 ML: 350 INJECTION, SOLUTION INTRAVENOUS at 09:22

## 2018-08-17 DIAGNOSIS — I25.10 CORONARY ARTERY DISEASE INVOLVING NATIVE CORONARY ARTERY OF NATIVE HEART WITHOUT ANGINA PECTORIS: ICD-10-CM

## 2018-08-17 DIAGNOSIS — E11.69 DIABETES MELLITUS TYPE 2 IN OBESE: ICD-10-CM

## 2018-08-17 DIAGNOSIS — E66.9 DIABETES MELLITUS TYPE 2 IN OBESE: ICD-10-CM

## 2018-08-17 DIAGNOSIS — E78.2 MIXED HYPERLIPIDEMIA: ICD-10-CM

## 2018-08-17 DIAGNOSIS — K21.00 REFLUX ESOPHAGITIS: ICD-10-CM

## 2018-08-17 DIAGNOSIS — M10.9 GOUT: Primary | ICD-10-CM

## 2018-08-20 ENCOUNTER — ONCOLOGY VISIT (OUTPATIENT)
Dept: ONCOLOGY | Facility: OTHER | Age: 73
End: 2018-08-20
Attending: NURSE PRACTITIONER
Payer: COMMERCIAL

## 2018-08-20 VITALS
TEMPERATURE: 98.1 F | WEIGHT: 247.6 LBS | HEIGHT: 69 IN | HEART RATE: 86 BPM | SYSTOLIC BLOOD PRESSURE: 123 MMHG | DIASTOLIC BLOOD PRESSURE: 80 MMHG | BODY MASS INDEX: 36.67 KG/M2

## 2018-08-20 DIAGNOSIS — C18.7 CANCER OF SIGMOID COLON (H): Primary | ICD-10-CM

## 2018-08-20 PROCEDURE — G0463 HOSPITAL OUTPT CLINIC VISIT: HCPCS

## 2018-08-20 PROCEDURE — 99214 OFFICE O/P EST MOD 30 MIN: CPT | Performed by: NURSE PRACTITIONER

## 2018-08-20 RX ORDER — ALLOPURINOL 100 MG/1
TABLET ORAL
Qty: 40 TABLET | Refills: 0 | Status: SHIPPED | OUTPATIENT
Start: 2018-08-20 | End: 2018-09-06

## 2018-08-20 RX ORDER — LISINOPRIL 2.5 MG/1
TABLET ORAL
Qty: 20 TABLET | Refills: 0 | Status: SHIPPED | OUTPATIENT
Start: 2018-08-20 | End: 2018-09-06

## 2018-08-20 RX ORDER — GLIPIZIDE 10 MG/1
TABLET ORAL
Qty: 40 TABLET | Refills: 0 | Status: SHIPPED | OUTPATIENT
Start: 2018-08-20 | End: 2018-09-06

## 2018-08-20 RX ORDER — SIMVASTATIN 40 MG
TABLET ORAL
Qty: 20 TABLET | Refills: 0 | Status: SHIPPED | OUTPATIENT
Start: 2018-08-20 | End: 2018-09-06

## 2018-08-20 RX ORDER — CLOPIDOGREL BISULFATE 75 MG/1
TABLET ORAL
Qty: 20 TABLET | Refills: 0 | Status: SHIPPED | OUTPATIENT
Start: 2018-08-20 | End: 2018-09-06

## 2018-08-20 RX ORDER — PANTOPRAZOLE SODIUM 40 MG/1
TABLET, DELAYED RELEASE ORAL
Qty: 20 TABLET | Refills: 0 | Status: SHIPPED | OUTPATIENT
Start: 2018-08-20 | End: 2018-09-06

## 2018-08-20 RX ORDER — METOPROLOL TARTRATE 50 MG
TABLET ORAL
Qty: 40 TABLET | Refills: 0 | Status: SHIPPED | OUTPATIENT
Start: 2018-08-20 | End: 2018-11-16

## 2018-08-20 ASSESSMENT — PAIN SCALES - GENERAL: PAINLEVEL: NO PAIN (0)

## 2018-08-20 NOTE — MR AVS SNAPSHOT
After Visit Summary   8/20/2018    Andrez Olivier    MRN: [id number]           Patient Information     Date Of Birth          1945        Visit Information        Provider Department      8/20/2018 8:15 AM Radha Adamson APRN CNP [facility]        Today's Diagnoses     Cancer of sigmoid colon (H)    -  1       Follow-ups after your visit        Your next 10 appointments already scheduled     Sep 06, 2018 10:20 AM CDT   Office Visit with Bk Roberts MD   [facility] ([facility])    [address]  [place] [address] [phone]           Bring a current list of meds and any records pertaining to this visit. For Physicals, please bring immunization records and any forms needing to be filled out. Please arrive 10 minutes early to complete paperwork.            Dec 18, 2018  9:00 AM CST   Return Visit with Emmy Stark MD   [facility] ([facility])    [address]  [place] [address]   [phone]              Future tests that were ordered for you today     Open Future Orders        Priority Expected Expires Ordered    CT Chest Abdomen Pelvis w/o & w Contrast Routine 12/10/2018 12/31/2018 8/20/2018    Comprehensive metabolic panel Routine 12/10/2018 12/31/2018 8/20/2018    CBC with platelets differential Routine 12/10/2018 12/31/2018 8/20/2018    Lactate Dehydrogenase Routine 12/10/2018 12/31/2018 8/20/2018    Carcinoembryonic Agn GH Routine 12/10/2018 12/31/2018 8/20/2018    Ferritin Routine 12/10/2018 12/31/2018 8/20/2018    Vitamin B12 Routine 12/10/2018 12/31/2018 8/20/2018    Iron Binding Panel GH Routine 12/10/2018 12/31/2018 8/20/2018            Who to contact     If you have questions or need follow up information about today's clinic visit or your schedule please contact [facility] directly at [phone].  Normal  "or non-critical lab and imaging results will be communicated to you by MyChart, letter or phone within 4 business days after the clinic has received the results. If you do not hear from us within 7 days, please contact the clinic through MyChart or phone. If you have a critical or abnormal lab result, we will notify you by phone as soon as possible.  Submit refill requests through [employer] or call your pharmacy and they will forward the refill request to us. Please allow 3 business days for your refill to be completed.          Additional Information About Your Visit        Care EveryWhere ID     This is your Care EveryWhere ID. This could be used by other organizations to access your [place] medical records  [id number]        Your Vitals Were     Pulse Temperature Height BMI (Body Mass Index)          86 98.1  F (36.7  C) (Oral) 1.74 m (5' 8.5\") 37.1 kg/m2         Blood Pressure from Last 3 Encounters:   08/20/18 123/80   04/19/18 124/68   12/26/17 106/60    Weight from Last 3 Encounters:   08/20/18 112.3 kg (247 lb 9.6 oz)   04/19/18 115.7 kg (255 lb)   12/26/17 114.8 kg (253 lb)               Primary Care Provider Office Phone # Fax #    Bk Roberts [phone] [address] [facility] [address]        Equal Access to Services     [facility] AH: Hadii rainer mccoy hadraffyo Soelsa, waaxda luqadaha, qaybta kaalmada patsy, rachael mazariegos . So [facility] [phone].    ATENCIÓN: Si habla español, tiene a cameoj disposición servicios gratuitos de asistencia lingüística. Llame al [phone].    We comply with applicable federal civil rights laws and [place] laws. We do not discriminate on the basis of race, color, national origin, age, disability, sex, sexual orientation, or gender identity.            Thank you!     Thank you for choosing [facility] \[facility] [place] [facility]\""  for your care. Our goal is always to provide you with excellent care. Hearing back from " our patients is one way we can continue to improve our services. Please take a few minutes to complete the written survey that you may receive in the mail after your visit with us. Thank you!             Your Updated Medication List - Protect others around you: Learn how to safely use, store and throw away your medicines at www.disposemymeds.org.          This list is accurate as of 8/20/18  1:48 PM.  Always use your most recent med list.                   Brand Name Dispense Instructions for use Diagnosis    allopurinol 100 MG tablet    ZYLOPRIM     Take 1 tablet by mouth 2 times daily.        clopidogrel 75 MG tablet    PLAVIX    90 tablet    TAKE 1 TABLET BY MOUTH DAILY    Coronary artery disease involving native coronary artery of native heart without angina pectoris       glipiZIDE 10 MG tablet    GLUCOTROL    180 tablet    TAKE 1 TABLET BY MOUTH TWICE A DAY WITH MEALS FOR DIABETES.    Diabetes mellitus type 2 in obese (H)       lisinopril 2.5 MG tablet    PRINIVIL/Zestril    90 tablet    TAKE 1 TABLET BY MOUTH ONCE DAILY    Coronary artery disease involving native coronary artery of native heart without angina pectoris       metFORMIN 1000 MG tablet    GLUCOPHAGE     Take 1 tablet by mouth 2 times daily with meals. -- Dose Increase 5/26/2017 (cancel Rx for 500 mg tablet)        metoprolol tartrate 50 MG tablet    LOPRESSOR    180 tablet    TAKE 1 TABLET BY MOUTH TWICE A DAY    Coronary artery disease involving native coronary artery of native heart without angina pectoris       nitroGLYcerin 0.4 MG sublingual tablet    NITROSTAT     Place 1 tablet under the tongue every 5 minutes if needed for Chest Pain.        pantoprazole 40 MG EC tablet    PROTONIX    90 tablet    TAKE 1 TABLET BY MOUTH ONCE DAILY BEFORE A MEAL.    Reflux esophagitis       simvastatin 40 MG tablet    ZOCOR    90 tablet    TAKE 1 TABLET BY MOUTH AT BEDTIME FOR CHOLESTEROL.    Mixed hyperlipidemia       Vitamin B-12 CR 1000 MCG Tbcr       Take 1 tablet by mouth once daily.

## 2018-08-20 NOTE — TELEPHONE ENCOUNTER
Patient is overdue for annual medication management and labs. Has upcoming annual scheduled on 9-6-18. 20 days sent. Neeta Eddy RN on 8/20/2018 at 2:10 PM

## 2018-08-20 NOTE — PROGRESS NOTES
Oncology Follow-up Visit:  August 20, 2018  Diagnosis:Colon cancer    History Of Present Illness:  Patient presents to the clinic today for followup of colon cancer. Patient was seen in consultation on June 1, 2016. Patient presented to the emergency room on April 25, 2016, with complaints of chest pain radiating down his arms, which was primarily worse with exertion. In the emergency department, he was found to have a hemoglobin of 7.1, and he subsequently was admitted. CBC revealed microcytic indices with an MCV of 62. He was noted to be iron deficient. He was transfused 2 units of packed red cells and was ruled out for MI.   He was seen by Dr. Solano, who performed colonoscopy and was noted to have a mass in the sigmoid colon that was consistent with adenocarcinoma. He also had multiple adenomatous polyps.   The patient was admitted on May 11, 2016, for a sigmoid colectomy. This was performed on May 17, 2016. Pathology revealed in the sigmoid colon a mass consistent with moderately differentiated adenocarcinoma. The tumor size was 2 x 1 x 0.7 cm. The tumor invaded the muscularis propria, 2/8 lymph nodes were positive for metastatic carcinoma. Margins were negative for tumor. The grade of the tumor was ruled as moderately differentiated. The patient was staged pathologic stage T2N1b as part of the postop evaluation.   The patient had a CT abdomen and pelvis on May 12, 2016. This revealed that there were 2 nonspecific low-attenuation lesions in the liver. Metastasis could not be excluded. There was no lymphadenopathy or additional findings to suggest metastases. The patient had a preop CEA, which was less than 3.   PET scan was performed on Lluvia 15, 2016, and was essentially negative for metastatic disease. Lesions seen on prior PET in the liver were not hypermetabolic. We felt that the patient had stage III disease and he would be a candidate for adjuvant chemotherapy.       When patient was seen on June 28, 2016,  the plan was to start FOLFOX x12 cycles. The patient was started, and received a total of 10 cycles. When he was seen on November 17, 2016,  he did note some cold-induced neuropathic symptoms. We elected to restage him after 12 cycles. He completed 12 cycles of FOLFOX. His last chemotherapy was administered on December 7, 2016.  He did have a PET scan done after 12 cycles of chemotherapy, and this came back essentially negative for metastatic disease.   He had staging studies on April 6, 2017 including CT abdomen and pelvis, which was essentially negative. CT of the chest was negative. The patient also underwent a colonoscopy in May 2017, which revealed a tubular adenoma at the hepatic flexure of the colon. Scans from August 2017 were stable with no evidence of disease. Patient had scans done on December 18, 2017 which showed no significant change and no evidence or recurrent carcinoma.  His last colonoscopy was in May 2017, he will need his next colonoscopy 2020.  CT chest abdomen and pelvis was done on 8/13/18 and showed a stable liver lesion, small stable lung nodules and no evidence of new or worsening metastatic disease. Tumor marker remains normal. Patient is feeling well. He states he is beginning to feel some upper respiratory symptoms starting with nasal congestion and occasional cough. He denies fevers, chills. He continues to have some neuropathy in his feet. Patient I otherwise feeling well and has no new complaints        Review Of Systems:  Review Of Systems  Eyes/Ears/Nose/Throat: denies new vision or hearing changes  Respiratory: Reports some nasal congestion, occasional cough. No shortness of breath, dyspnea on exertion or hemoptysis  Cardiovascular: denies chest pain, palpitaitons  Gastrointestinal: denies abdominal pain, diarrhea, constipation  Genitourinary: denies dysuria or hematuria  Musculoskeletal: denies new bone pain or muscle weakness  Neurologic: reports ongoing neuropathy in feel.  "Denies headaches  Hematologic/Lymphatic/Immunologic: denies fevers, chills, night sweats      Nursing Notes:   Kylah Child RN  8/20/2018  8:25 AM  Signed  Patient is here for follow up for colon cancer.  Kylah Child RN on 8/20/2018 at 8:19 AM      Past medical, social, surgical, and family histories reviewed.    Allergies:  Allergies as of 08/20/2018 - Zhang as Reviewed 08/20/2018   Allergen Reaction Noted     Aspirin  05/26/2017     Canagliflozin  05/18/2016     Morphine  05/18/2016       Current Medications:  Current Outpatient Prescriptions   Medication Sig Dispense Refill     allopurinol (ZYLOPRIM) 100 MG tablet Take 1 tablet by mouth 2 times daily.       clopidogrel (PLAVIX) 75 MG tablet TAKE 1 TABLET BY MOUTH DAILY 90 tablet 0     Cyanocobalamin (VITAMIN B-12 CR) 1000 MCG TBCR Take 1 tablet by mouth once daily.       glipiZIDE (GLUCOTROL) 10 MG tablet TAKE 1 TABLET BY MOUTH TWICE A DAY WITH MEALS FOR DIABETES. 180 tablet 0     lisinopril (PRINIVIL/ZESTRIL) 2.5 MG tablet TAKE 1 TABLET BY MOUTH ONCE DAILY 90 tablet 0     metFORMIN (GLUCOPHAGE) 1000 MG tablet Take 1 tablet by mouth 2 times daily with meals. -- Dose Increase 5/26/2017 (cancel Rx for 500 mg tablet)       metoprolol tartrate (LOPRESSOR) 50 MG tablet TAKE 1 TABLET BY MOUTH TWICE A  tablet 0     nitroGLYcerin (NITROSTAT) 0.4 MG sublingual tablet Place 1 tablet under the tongue every 5 minutes if needed for Chest Pain.       pantoprazole (PROTONIX) 40 MG EC tablet TAKE 1 TABLET BY MOUTH ONCE DAILY BEFORE A MEAL. 90 tablet 0     simvastatin (ZOCOR) 40 MG tablet TAKE 1 TABLET BY MOUTH AT BEDTIME FOR CHOLESTEROL. 90 tablet 0        Physical Exam:  /80  Pulse 86  Temp 98.1  F (36.7  C) (Oral)  Ht 1.74 m (5' 8.5\")  Wt 112.3 kg (247 lb 9.6 oz)  BMI 37.1 kg/m2    GENERAL APPEARANCE: 72 year old male, alert and no distress     HENT: Mouth without ulcers or lesions     NECK: no adenopathy, no asymmetry or masses     " LYMPHATICS: No cervical, supraclavicular, axillary lymphadenopathy     RESP: lungs clear to auscultation - no rales, rhonchi or wheezes     CARDIOVASCULAR: regular rates and rhythm, normal S1 S2,  no murmur.     ABDOMEN:  soft, nontender, no HSM or masses and bowel sounds normal     MUSCULOSKELETAL: extremities normal- no gross deformities noted,  No edema b/l LE.     SKIN: no suspicious lesions or rashes     PSYCHIATRIC: mentation appears normal and affect normal    LABORATORY DATA  WBC 6.8. Hemoglobin and hematocrit 13.5 and 41.5. Platelets 212,000. Iron 89. TIBC 425.6. Ferritin 161. B12 422.  . CEA <3.0    Laboratory/Imaging Studies  Orders Only on 08/13/2018   Component Date Value Ref Range Status     WBC 08/13/2018 6.8  4.0 - 11.0 10e9/L Final     RBC Count 08/13/2018 4.54  4.4 - 5.9 10e12/L Final     Hemoglobin 08/13/2018 13.5  13.3 - 17.7 g/dL Final     Hematocrit 08/13/2018 41.5  40.0 - 53.0 % Final     MCV 08/13/2018 91  78 - 100 fl Final     MCH 08/13/2018 29.7  26.5 - 33.0 pg Final     MCHC 08/13/2018 32.5  31.5 - 36.5 g/dL Final     RDW 08/13/2018 14.6  10.0 - 15.0 % Final     Platelet Count 08/13/2018 212  150 - 450 10e9/L Final     Diff Method 08/13/2018 Automated Method   Final     % Neutrophils 08/13/2018 67.1  % Final     % Lymphocytes 08/13/2018 19.9  % Final     % Monocytes 08/13/2018 10.6  % Final     % Eosinophils 08/13/2018 1.6  % Final     % Basophils 08/13/2018 0.4  % Final     % Immature Granulocytes 08/13/2018 0.4  % Final     Absolute Neutrophil 08/13/2018 4.5  1.6 - 8.3 10e9/L Final     Absolute Lymphocytes 08/13/2018 1.4  0.8 - 5.3 10e9/L Final     Absolute Monocytes 08/13/2018 0.7  0.0 - 1.3 10e9/L Final     Absolute Eosinophils 08/13/2018 0.1  0.0 - 0.7 10e9/L Final     Absolute Basophils 08/13/2018 0.0  0.0 - 0.2 10e9/L Final     Abs Immature Granulocytes 08/13/2018 0.0  0 - 0.4 10e9/L Final     Sodium 08/13/2018 134  134 - 144 mmol/L Final     Potassium 08/13/2018 4.8  3.5 -  5.1 mmol/L Final     Chloride 08/13/2018 99  98 - 107 mmol/L Final     Carbon Dioxide 08/13/2018 25  21 - 31 mmol/L Final     Anion Gap 08/13/2018 10  3 - 14 mmol/L Final     Glucose 08/13/2018 314* 70 - 105 mg/dL Final     Urea Nitrogen 08/13/2018 18  7 - 25 mg/dL Final     Creatinine 08/13/2018 0.95  0.70 - 1.30 mg/dL Final     GFR Estimate 08/13/2018 78  >60 mL/min/1.7m2 Final     GFR Estimate If Black 08/13/2018 >90  >60 mL/min/1.7m2 Final     Calcium 08/13/2018 9.6  8.6 - 10.3 mg/dL Final     Bilirubin Total 08/13/2018 0.6  0.3 - 1.0 mg/dL Final     Albumin 08/13/2018 3.9  3.5 - 5.7 g/dL Final     Protein Total 08/13/2018 7.2  6.4 - 8.9 g/dL Final     Alkaline Phosphatase 08/13/2018 100  34 - 104 U/L Final     ALT 08/13/2018 69* 7 - 52 U/L Final     AST 08/13/2018 90* 13 - 39 U/L Final     Iron 08/13/2018 89  50 - 212 ug/dL Final     UIBC (Unsaturated) 08/13/2018 336.60  mg/dL Final     Iron Binding Capacity 08/13/2018 425.60* 245.00 - 400.00 ug/dL Final     Iron Saturation 08/13/2018 21  20 - 55 % Final     Ferritin 08/13/2018 161  23.9 - 336.2 ng/mL Final     Folate 08/13/2018 10.9  >5.21 ng/mL Final     Vitamin B12 08/13/2018 422  180 - 914 pg/mL Final     Lactate Dehydrogenase 08/13/2018 176  140 - 271 U/L Final     Carcinoembryonic Agn 08/13/2018 <3.0  <3.0 ng/mL Final        ASSESSMENT/PLAN:  Stage III moderately differentiated adenocarcinoma of the sigmoid colon with 2 out of 8 lymph nodes positive for metastatic disease, consistent with pathologic [id number] disease.PET scan was negative for metastatic disease. The patient was started on adjuvant FOLFOX chemotherapy and completed 12 cycles. Course complicated daily by peripheral neuropathy, grade 1, which is improving. PET scan indicates no evidence of metastatic disease. CEA was normal. Recent CT of chest abdomen and pelvis from 8/2018 show a stable liver lesion and stable small lung nodules with no evidence of disease.  Colonoscopy done in May 2017  revealed a tubular adenoma. The patient will be due for a colonoscopy in 2020. Patient continues to have neuropathy of the feet. We discussed trying vitamin B6 for this.  The plan is to continue surveillance. We will see the patient in 4 months, obtain CBC, CMP, LDH, CEA, ferritin, B12,  iron studies and CT chest, abdomen, and pelvis.     Thirty minute spent with patient with greater than 50% of that time spent counseling patient regarding disease process, interpretation of lab and imaging and coordination of care

## 2018-09-06 ENCOUNTER — OFFICE VISIT (OUTPATIENT)
Dept: INTERNAL MEDICINE | Facility: OTHER | Age: 73
End: 2018-09-06
Attending: INTERNAL MEDICINE
Payer: MEDICARE

## 2018-09-06 VITALS
BODY MASS INDEX: 36.45 KG/M2 | WEIGHT: 246.13 LBS | HEART RATE: 72 BPM | DIASTOLIC BLOOD PRESSURE: 68 MMHG | HEIGHT: 69 IN | RESPIRATION RATE: 18 BRPM | SYSTOLIC BLOOD PRESSURE: 116 MMHG | TEMPERATURE: 96.7 F

## 2018-09-06 DIAGNOSIS — R12 HEARTBURN: ICD-10-CM

## 2018-09-06 DIAGNOSIS — E53.8 VITAMIN B12 DEFICIENCY: ICD-10-CM

## 2018-09-06 DIAGNOSIS — I25.10 CORONARY ARTERY DISEASE INVOLVING NATIVE CORONARY ARTERY OF NATIVE HEART WITHOUT ANGINA PECTORIS: ICD-10-CM

## 2018-09-06 DIAGNOSIS — E78.2 MIXED HYPERLIPIDEMIA: ICD-10-CM

## 2018-09-06 DIAGNOSIS — K21.00 REFLUX ESOPHAGITIS: ICD-10-CM

## 2018-09-06 DIAGNOSIS — Z87.39 HISTORY OF GOUT: ICD-10-CM

## 2018-09-06 LAB — HBA1C MFR BLD: 11.1 % (ref 4–6)

## 2018-09-06 PROCEDURE — 96372 THER/PROPH/DIAG INJ SC/IM: CPT | Performed by: INTERNAL MEDICINE

## 2018-09-06 PROCEDURE — 36415 COLL VENOUS BLD VENIPUNCTURE: CPT | Performed by: INTERNAL MEDICINE

## 2018-09-06 PROCEDURE — G0463 HOSPITAL OUTPT CLINIC VISIT: HCPCS | Mod: 25

## 2018-09-06 PROCEDURE — 99214 OFFICE O/P EST MOD 30 MIN: CPT | Performed by: INTERNAL MEDICINE

## 2018-09-06 PROCEDURE — 83036 HEMOGLOBIN GLYCOSYLATED A1C: CPT | Performed by: INTERNAL MEDICINE

## 2018-09-06 PROCEDURE — G0463 HOSPITAL OUTPT CLINIC VISIT: HCPCS

## 2018-09-06 PROCEDURE — 25000128 H RX IP 250 OP 636: Performed by: INTERNAL MEDICINE

## 2018-09-06 RX ORDER — ALLOPURINOL 100 MG/1
100 TABLET ORAL 2 TIMES DAILY
Qty: 180 TABLET | Refills: 3 | Status: SHIPPED | OUTPATIENT
Start: 2018-09-06 | End: 2019-10-08

## 2018-09-06 RX ORDER — CYANOCOBALAMIN 1000 UG/ML
1000 INJECTION, SOLUTION INTRAMUSCULAR; SUBCUTANEOUS ONCE
Status: COMPLETED | OUTPATIENT
Start: 2018-09-06 | End: 2018-09-06

## 2018-09-06 RX ORDER — LISINOPRIL 2.5 MG/1
TABLET ORAL
Qty: 90 TABLET | Refills: 3 | Status: SHIPPED | OUTPATIENT
Start: 2018-09-06 | End: 2019-09-30

## 2018-09-06 RX ORDER — PANTOPRAZOLE SODIUM 40 MG/1
TABLET, DELAYED RELEASE ORAL
Qty: 90 TABLET | Refills: 3 | Status: SHIPPED | OUTPATIENT
Start: 2018-09-06 | End: 2019-12-23

## 2018-09-06 RX ORDER — CLOPIDOGREL BISULFATE 75 MG/1
TABLET ORAL
Qty: 90 TABLET | Refills: 3 | Status: SHIPPED | OUTPATIENT
Start: 2018-09-06 | End: 2019-09-30

## 2018-09-06 RX ORDER — SIMVASTATIN 40 MG
TABLET ORAL
Qty: 90 TABLET | Refills: 3 | Status: SHIPPED | OUTPATIENT
Start: 2018-09-06 | End: 2019-09-30

## 2018-09-06 RX ORDER — GLIPIZIDE 10 MG/1
TABLET ORAL
Qty: 180 TABLET | Refills: 3 | Status: SHIPPED | OUTPATIENT
Start: 2018-09-06 | End: 2018-09-06

## 2018-09-06 RX ORDER — GLIPIZIDE 10 MG/1
TABLET ORAL
Qty: 360 TABLET | Refills: 3 | Status: SHIPPED | OUTPATIENT
Start: 2018-09-06 | End: 2019-09-30

## 2018-09-06 RX ADMIN — CYANOCOBALAMIN 1000 MCG: 1000 INJECTION, SOLUTION INTRAMUSCULAR at 11:16

## 2018-09-06 ASSESSMENT — ENCOUNTER SYMPTOMS
FATIGUE: 0
WHEEZING: 0
POLYDIPSIA: 1
DIZZINESS: 0
ABDOMINAL PAIN: 0
HEMATURIA: 0
NAUSEA: 0
DIARRHEA: 0
CHILLS: 0
DYSURIA: 0
PALPITATIONS: 0
EYE PAIN: 0
MYALGIAS: 0
FEVER: 0
ARTHRALGIAS: 0
LIGHT-HEADEDNESS: 0
VOMITING: 0
COUGH: 0
BRUISES/BLEEDS EASILY: 0
SHORTNESS OF BREATH: 0
CONFUSION: 0
AGITATION: 0

## 2018-09-06 ASSESSMENT — PAIN SCALES - GENERAL: PAINLEVEL: NO PAIN (0)

## 2018-09-06 ASSESSMENT — PATIENT HEALTH QUESTIONNAIRE - PHQ9: 5. POOR APPETITE OR OVEREATING: NOT AT ALL

## 2018-09-06 ASSESSMENT — ANXIETY QUESTIONNAIRES
6. BECOMING EASILY ANNOYED OR IRRITABLE: NOT AT ALL
3. WORRYING TOO MUCH ABOUT DIFFERENT THINGS: NOT AT ALL
5. BEING SO RESTLESS THAT IT IS HARD TO SIT STILL: NOT AT ALL
7. FEELING AFRAID AS IF SOMETHING AWFUL MIGHT HAPPEN: NOT AT ALL
2. NOT BEING ABLE TO STOP OR CONTROL WORRYING: NOT AT ALL
GAD7 TOTAL SCORE: 0
IF YOU CHECKED OFF ANY PROBLEMS ON THIS QUESTIONNAIRE, HOW DIFFICULT HAVE THESE PROBLEMS MADE IT FOR YOU TO DO YOUR WORK, TAKE CARE OF THINGS AT HOME, OR GET ALONG WITH OTHER PEOPLE: NOT DIFFICULT AT ALL
1. FEELING NERVOUS, ANXIOUS, OR ON EDGE: NOT AT ALL

## 2018-09-06 NOTE — LETTER
Andrez Oilvier  [address] [facility] [address]    9/6/2018      Dear Andrez Olivier,    The result of your recent tests are included below:    Diabetes lab is very high.  We need to get this down closer to 7%.    Increase glipizide up to 10 or 20 mg twice daily with food.      If you have a snack in the evening, we may need you to take an extra half tablet at that time as well.  Adjust dose as needed to get blood sugars under better control.      To help with weight loss and improve blood sugar control....    -- Try to avoid Carbohydrates as much as possible -- breads, pasta, baked goods, cakes, oatmeal, cold cereal, potatoes.   These are turned to sugar in one metabolic conversion, cause insulin secretion and increased fat deposition / weight gain.      -- Eat more lean meats, proteins, eggs, nuts.     Please be sure to schedule repeat labs in 91+ days --with clinic diabetic follow-up appointment 1 or 2 days later with Dr. Roberts.      Results for orders placed or performed in visit on 09/06/18   Hemoglobin A1c   Result Value Ref Range    Hemoglobin A1C 11.1 (H) 4.0 - 6.0 %       If you have any further questions or problems, please contact my office at [phone] and schedule an appointment.    Clinic : [phone]  Appointment line: [phone]     Thank you,    Bk Roberts MD    Internal Medicine  [facility] [place] [facility]     Reviewed and electronically signed by provider.

## 2018-09-06 NOTE — PATIENT INSTRUCTIONS
Get your A1c rechecked every 3-4 months.     Labs today.     Medications refilled.     -- Try a Super-B-Complex with B12 -- every other day -- to help energy / mood /neuropathy and balance.   -- Consider Taking 2,500 mg B12 dissolving tablet under the tongue every other day, alternating with B-complex.     Return in approximately 1 year, or sooner as needed for follow-up with Dr. Roberts.    Clinic : [phone]  Appointment line: [phone]

## 2018-09-06 NOTE — MR AVS SNAPSHOT
After Visit Summary   9/6/2018    Andrez Olivier    MRN: [id number]           Patient Information     Date Of Birth          1945        Visit Information        Provider Department      9/6/2018 10:20 AM Bk Roberts MD [facility]        Today's Diagnoses     Diabetes mellitus type 2 in obese (H)    -  1    History of gout        Coronary artery disease involving native coronary artery of native heart without angina pectoris        Heartburn        Vitamin B12 deficiency        Reflux esophagitis        Mixed hyperlipidemia          Care Instructions    Get your A1c rechecked every 3-4 months.     Labs today.     Medications refilled.     -- Try a Super-B-Complex with B12 -- every other day -- to help energy / mood /neuropathy and balance.   -- Consider Taking 2,500 mg B12 dissolving tablet under the tongue every other day, alternating with B-complex.     Return in approximately 1 year, or sooner as needed for follow-up with Dr. Roberts.    Clinic : [phone]  Appointment line: [phone]            Follow-ups after your visit        Your next 10 appointments already scheduled     Dec 10, 2018  7:30 AM CST   (Arrive by 7:15 AM)   CT CHEST ABDOMEN PELVIS W/O & W CONTRAST with CT1,  IMAGING NURSE, GHCTPREP   [facility] ([facility])    [address]  [facility] [address] [phone]           Please bring any scans or X-rays taken at other hospitals, if similar tests were done. Also bring a list of your medicines, including vitamins, minerals and over-the-counter drugs. It is safest to leave personal items at home.  Be sure to tell your doctor:   If you have any allergies.   If there s any chance you are pregnant.   If you are breastfeeding.  How to prepare:   Do not eat or drink for 2 hours before your exam. If you need to take medicine, you may take it with small sips of water. (We may ask you to  take liquid medicine as well.)   Please wear loose clothing, such as a sweat suit or jogging clothes. Avoid snaps, zippers and other metal. We may ask you to undress and put on a hospital gown.  Please arrive 30 minutes early for your CT. Once in the department you might be asked to drink water 15-20 minutes prior to your exam.  If indicated you may be asked to drink an oral contrast in advance of your CT.  If this is the case, the imaging team will let you know or be in contact with you prior to your appointment  Patients over 70 or patients with diabetes or kidney problems:   If you haven t had a blood test (creatinine test) within the last 30 days, the Cardiologist/Radiologist may require you to get this test prior to your exam.  If you have diabetes:   Continue to take your metformin medication on the day of your exam  If you have any questions, please call the Imaging Department where you will have your exam.            Dec 10, 2018  7:40 AM CST   LAB with  LAB    ()    [address] [facility] [address]   [phone]           Please do not eat 10-12 hours before your appointment if you are coming in fasting for labs on lipids, cholesterol, or glucose (sugar). This does not apply to pregnant women. Water, hot tea and black coffee (with nothing added) are okay. Do not drink other fluids, diet soda or chew gum.            Dec 18, 2018  9:00 AM CST   Return Visit with Emmy Stark MD    ()    [address]  [place] [address]   [phone]              Future tests that were ordered for you today     Open Standing Orders        Priority Remaining Interval Expires Ordered    Hemoglobin A1c Routine 4/4 Every 3 Months 9/6/2019 9/6/2018          Open Future Orders        Priority Expected Expires Ordered    Hemoglobin A1c Routine  9/6/2019 9/6/2018        "     Who to contact     If you have questions or need follow up information about today's clinic visit or your schedule please contact [facility] directly at [phone].  Normal or non-critical lab and imaging results will be communicated to you by MyChart, letter or phone within 4 business days after the clinic has received the results. If you do not hear from us within 7 days, please contact the clinic through MyChart or phone. If you have a critical or abnormal lab result, we will notify you by phone as soon as possible.  Submit refill requests through [email or web address] or call your pharmacy and they will forward the refill request to us. Please allow 3 business days for your refill to be completed.          Additional Information About Your Visit        Care EveryWhere ID     This is your Care EveryWhere ID. This could be used by other organizations to access your [place] medical records  [id number]        Your Vitals Were     Pulse Temperature Respirations Height BMI (Body Mass Index)       72 96.7  F (35.9  C) (Tympanic) 18 5' 8.5\" (1.74 m) 36.88 kg/m2        Blood Pressure from Last 3 Encounters:   09/06/18 116/68   08/20/18 123/80   04/19/18 124/68    Weight from Last 3 Encounters:   09/06/18 246 lb 2 oz (111.6 kg)   08/20/18 247 lb 9.6 oz (112.3 kg)   04/19/18 255 lb (115.7 kg)                 Today's Medication Changes          These changes are accurate as of 9/6/18 10:52 AM.  If you have any questions, ask your nurse or doctor.               These medicines have changed or have updated prescriptions.        Dose/Directions    allopurinol 100 MG tablet   Commonly known as:  ZYLOPRIM   This may have changed:  See the new instructions.   Used for:  History of gout   Changed by:  Bk Roberts MD        Dose:  100 mg   Take 1 tablet (100 mg) by mouth 2 times daily   Quantity:  180 tablet   Refills:  3       metFORMIN 1000 MG tablet   Commonly known as:  GLUCOPHAGE   This may have changed:  " See the new instructions.   Used for:  Diabetes mellitus type 2 in obese (H)   Changed by:  Bk Roberts MD        Take 1 tablet by mouth 2 times daily with meals   Quantity:  180 tablet   Refills:  3            Where to get your medicines      These medications were sent to [id number] - [place], [place] - [address]  [address], [facility] [address]     Phone:  [phone]     allopurinol 100 MG tablet    clopidogrel 75 MG tablet    glipiZIDE 10 MG tablet    lisinopril 2.5 MG tablet    metFORMIN 1000 MG tablet    pantoprazole 40 MG EC tablet    simvastatin 40 MG tablet                Primary Care Provider Office Phone # Fax #    Bk Roberts [phone] [address]  [facility] [address]        Equal Access to Services     [facility]: Hadii rainer mccoy hadasho Soelsa, waaxda luqadaha, qaybta kaalmada adecarrol, rachael mazariegos . So [facility] [phone].    ATENCIÓN: Si habla español, tiene a camejo disposición servicios gratuitos de asistencia lingüística. [facility] [phone].    We comply with applicable federal civil rights laws and [place] laws. We do not discriminate on the basis of race, color, national origin, age, disability, sex, sexual orientation, or gender identity.            Thank you!     Thank you for choosing [facility]  for your care. Our goal is always to provide you with excellent care. Hearing back from our patients is one way we can continue to improve our services. Please take a few minutes to complete the written survey that you may receive in the mail after your visit with us. Thank you!             Your Updated Medication List - Protect others around you: Learn how to safely use, store and throw away your medicines at www.disposemymeds.org.          This list is accurate as of 9/6/18 10:52 AM.  Always use your most recent med list.                   Brand Name Dispense  Instructions for use Diagnosis    allopurinol 100 MG tablet    ZYLOPRIM    180 tablet    Take 1 tablet (100 mg) by mouth 2 times daily    History of gout       clopidogrel 75 MG tablet    PLAVIX    90 tablet    TAKE 1 TABLET (75MG) BY MOUTH DAILY    Coronary artery disease involving native coronary artery of native heart without angina pectoris       glipiZIDE 10 MG tablet    GLUCOTROL    180 tablet    TAKE 1 TABLET BY MOUTH TWICE A DAY WITH MEALS FOR DIABETES.    Diabetes mellitus type 2 in obese (H)       lisinopril 2.5 MG tablet    PRINIVIL/Zestril    90 tablet    TAKE 1 TABLET (2.5MG) BY MOUTH ONCE DAILY    Coronary artery disease involving native coronary artery of native heart without angina pectoris       metFORMIN 1000 MG tablet    GLUCOPHAGE    180 tablet    Take 1 tablet by mouth 2 times daily with meals    Diabetes mellitus type 2 in obese (H)       metoprolol tartrate 50 MG tablet    LOPRESSOR    40 tablet    TAKE 1 TABLET (50MG) BY MOUTH TWICE A DAY    Coronary artery disease involving native coronary artery of native heart without angina pectoris       nitroGLYcerin 0.4 MG sublingual tablet    NITROSTAT     Place 1 tablet under the tongue every 5 minutes if needed for Chest Pain.        pantoprazole 40 MG EC tablet    PROTONIX    90 tablet    TAKE 1 TABLET BY MOUTH ONCE DAILY BEFORE A MEAL.    Reflux esophagitis       simvastatin 40 MG tablet    ZOCOR    90 tablet    TAKE 1 TABLET BY MOUTH AT BEDTIME FOR CHOLESTEROL.    Mixed hyperlipidemia       Vitamin B-12 CR 1000 MCG Tbcr      Take 1 tablet by mouth once daily.

## 2018-09-06 NOTE — PROGRESS NOTES
Nursing Notes:   Neeta Alfonso LPN  9/6/2018 10:28 AM  Addendum  Patient presents to clinic for follow up with Medication Management with refills.    Previous A1C is not at goal of <8  No results found for: A1C  Urine microalbumin:creatine: NA  Foot exam 12/26/17  Eye exam will be on 09/13/18    Tobacco User no  Patient is not on a daily aspirin  Patient is on a Statin.  Blood pressure today of:     BP Readings from Last 1 Encounters:   09/06/18 116/68      is at the goal of <139/89 for diabetics.    Neeta Alfonso LPN............9/6/2018 10:17 AM    Nursing note reviewed with patient.  Accurracy and completeness verified.   Mr. Olivier is a 72 year old male who:  Patient presents with:  Medication Follow-up: medication management with refills    HPI     ICD-10-CM    1. Uncontrolled type 2 diabetes mellitus with other specified complication, without long-term current use of insulin (H) E11.69 metFORMIN (GLUCOPHAGE) 1000 MG tablet    E11.65 Hemoglobin A1c     Hemoglobin A1c     Hemoglobin A1c     glipiZIDE (GLUCOTROL) 10 MG tablet     DISCONTINUED: glipiZIDE (GLUCOTROL) 10 MG tablet   2. History of gout Z87.39 allopurinol (ZYLOPRIM) 100 MG tablet   3. Coronary artery disease involving native coronary artery of native heart without angina pectoris I25.10 clopidogrel (PLAVIX) 75 MG tablet     lisinopril (PRINIVIL/ZESTRIL) 2.5 MG tablet   4. Heartburn R12    5. Vitamin B12 deficiency E53.8 cyanocobalamin (VITAMIN B12) injection 1,000 mcg   6. Reflux esophagitis K21.0 pantoprazole (PROTONIX) 40 MG EC tablet   7. Mixed hyperlipidemia E78.2 simvastatin (ZOCOR) 40 MG tablet     Type 2 diabetes, was controlled with glipizide and metformin.  He has been going through cancer treatments.  Dates that he has been eating a lot of fresh fruit lately.  He has been having some oliguria and polydipsia.  Hemoglobin A1c today came back at 11.1.  We discussed dietary changes that can help, in addition to increasing his glipizide from  10 mg twice daily up to 20 mg twice daily.  He will make some dietary changes and monitor blood sugars more closely.  Advised let us know if we need to make some further changes in the meantime.  Recheck labs in 3 months.    History of gout, has been stable.  Continue allopurinol.  Needs refills.    Coronary artery disease, has been stable.  Continue Plavix.  No exertional chest pain or heaviness. Lisinopril is being tolerated.    Heartburn, controlled at this time.  Using Protonix.  Was having reflux esophagitis previously.    Vitamin B-12 deficiency, taking low-dose B12 oral replacement.  Trial B12 shot today.  Still having some fatigue and malaise at times.    Mixed hyperlipidemia, doing well with simvastatin.  Tolerating medication.  No side effects reported.    Functional Capacity: > 4 METS.   Reports that he can climb a flight of stairs without any chest pain/heaviness or shortness of breath.   No orthopnea/paroxysmal nocturnal dyspnea  Review of Systems   Constitutional: Negative for chills, fatigue and fever.   HENT: Negative for congestion and hearing loss.    Eyes: Negative for pain and visual disturbance.   Respiratory: Negative for cough, shortness of breath and wheezing.    Cardiovascular: Negative for chest pain and palpitations.   Gastrointestinal: Negative for abdominal pain, diarrhea, nausea and vomiting.   Endocrine: Positive for polydipsia and polyuria. Negative for cold intolerance and heat intolerance.   Genitourinary: Negative for dysuria and hematuria.   Musculoskeletal: Negative for arthralgias and myalgias.   Skin: Negative for pallor.   Allergic/Immunologic: Negative for immunocompromised state.   Neurological: Negative for dizziness and light-headedness.   Hematological: Does not bruise/bleed easily.   Psychiatric/Behavioral: Negative for agitation and confusion.      LONNIE:   LONNIE-7 SCORE 9/6/2018   Total Score 0     PHQ9:  PHQ-9 SCORE 6/23/2016 11/17/2016 5/26/2017   Total Score 0 2 0        I have personally reviewed the past medical history, past surgical history, medications, allergies, family and social history as listed below, on 9/6/2018.    Allergies   Allergen Reactions     Aspirin      Other reaction(s): Other - Describe In Comment Field  ---- Has been 4 years since stent - as of 5/2017 - Hold Aspirin while on Plavix for now     Canagliflozin      Other reaction(s): Dizziness     Morphine      Other reaction(s): Other - Describe In Comment Field  Pt had a bad experience at age 49 and would like to avoid.       Current Outpatient Prescriptions   Medication Sig Dispense Refill     allopurinol (ZYLOPRIM) 100 MG tablet Take 1 tablet (100 mg) by mouth 2 times daily 180 tablet 3     clopidogrel (PLAVIX) 75 MG tablet TAKE 1 TABLET (75MG) BY MOUTH DAILY 90 tablet 3     Cyanocobalamin (VITAMIN B-12 CR) 1000 MCG TBCR Take 1 tablet by mouth once daily.       glipiZIDE (GLUCOTROL) 10 MG tablet TAKE 1-2 TABLET BY MOUTH TWICE A DAY WITH MEALS FOR DIABETES - adjust dose as needed for high glucose - dose increase 9/6/2018 360 tablet 3     lisinopril (PRINIVIL/ZESTRIL) 2.5 MG tablet TAKE 1 TABLET (2.5MG) BY MOUTH ONCE DAILY 90 tablet 3     metFORMIN (GLUCOPHAGE) 1000 MG tablet Take 1 tablet by mouth 2 times daily with meals 180 tablet 3     metoprolol tartrate (LOPRESSOR) 50 MG tablet TAKE 1 TABLET (50MG) BY MOUTH TWICE A DAY 40 tablet 0     nitroGLYcerin (NITROSTAT) 0.4 MG sublingual tablet Place 1 tablet under the tongue every 5 minutes if needed for Chest Pain.       pantoprazole (PROTONIX) 40 MG EC tablet TAKE 1 TABLET BY MOUTH ONCE DAILY BEFORE A MEAL. 90 tablet 3     simvastatin (ZOCOR) 40 MG tablet TAKE 1 TABLET BY MOUTH AT BEDTIME FOR CHOLESTEROL. 90 tablet 3     [DISCONTINUED] clopidogrel (PLAVIX) 75 MG tablet TAKE 1 TABLET (75MG) BY MOUTH DAILY 20 tablet 0     [DISCONTINUED] glipiZIDE (GLUCOTROL) 10 MG tablet TAKE 1 TABLET BY MOUTH TWICE A DAY WITH MEALS FOR DIABETES. 180 tablet 3      [DISCONTINUED] glipiZIDE (GLUCOTROL) 10 MG tablet TAKE 1 TABLET BY MOUTH TWICE A DAY WITH MEALS FOR DIABETES. 40 tablet 0     [DISCONTINUED] lisinopril (PRINIVIL/ZESTRIL) 2.5 MG tablet TAKE 1 TABLET (2.5MG) BY MOUTH ONCE DAILY 20 tablet 0     [DISCONTINUED] metFORMIN (GLUCOPHAGE) 1000 MG tablet Take 1 tablet by mouth 2 times daily with meals. -- Dose Increase 5/26/2017 (cancel Rx for 500 mg tablet)       [DISCONTINUED] simvastatin (ZOCOR) 40 MG tablet TAKE 1 TABLET BY MOUTH AT BEDTIME FOR CHOLESTEROL. 20 tablet 0        Patient Active Problem List    Diagnosis Date Noted     Coronary artery disease involving native coronary artery of native heart without angina pectoris 01/19/2018     Priority: Medium     Overview:        -MI 1990-LAD with severe lesions in 2 small branches        -2/1/2013 BRYON to mid RCA (angina and abnormal myoview)       Obesity 01/19/2018     Priority: Medium     Iron deficiency anemia 05/26/2017     Priority: Medium     Heartburn 05/10/2016     Priority: Medium     Cancer of sigmoid colon (H) 05/06/2016     Priority: Medium     Gastritis, erosive 05/06/2016     Priority: Medium     H/O adenomatous polyp of colon 05/06/2016     Priority: Medium     History of tobacco abuse 05/19/2015     Priority: Medium     Microscopic hematuria 05/19/2015     Priority: Medium     Nocturia 05/19/2015     Priority: Medium     Uncontrolled type 2 diabetes mellitus with other specified complication, without long-term current use of insulin (H) 03/14/2014     Priority: Medium     History of gout 05/12/2010     Priority: Medium     Mixed hyperlipidemia 05/12/2010     Priority: Medium     Past Medical History:   Diagnosis Date     Acute myocardial infarction     1989,MI at age 44, s/p angioplasty, ANW; MI at age 49, s/p stent placement at [facility]     Atherosclerotic heart disease of native coronary artery without angina pectoris     2/1/2013,BRYON to,mid RCA (angina and abnormal myoview), ANW     Diverticulosis of large  intestine without perforation or abscess without bleeding     5/6/2016     Gastro-esophageal reflux disease with esophagitis     5/10/2016     Gout     No Comments Provided     History of colonic polyps     5/6/2016     Hyperlipidemia     No Comments Provided     Malignant neoplasm of sigmoid colon (H)     5/6/2016     Obesity     No Comments Provided     Other gastritis without bleeding     5/6/2016     Personal history of nicotine dependence     No Comments Provided     Past Surgical History:   Procedure Laterality Date     APPENDECTOMY OPEN      at age of 12     COLON SURGERY      5/17/16,Colectomy, Sigmoid     COLONOSCOPY      5/6/16,F/U 2017; Dr Solano     COLONOSCOPY      05/15/2017,F/U 2020     ESOPHAGOSCOPY, GASTROSCOPY, DUODENOSCOPY (EGD), COMBINED      5/6/16,EGD; Dr Solano     HEART CATH, ANGIOPLASTY      1989,Stenting coronary artery, presumably LAD     OTHER SURGICAL HISTORY      736094,OTHER     OTHER SURGICAL HISTORY      2000,208942,FLEXIBLE SIGMOIDOSCOPY     OTHER SURGICAL HISTORY      2/1/2013,[id number],CARDIAC CATHETERIZATION,BRYON to,mid RCA (angina and abnormal myoview)     OTHER SURGICAL HISTORY      6/28/16,724659,OTHER,Left,Left subclavian Power Port     Social History     Social History     Marital status:      Spouse name: N/A     Number of children: N/A     Years of education: N/A     Social History Main Topics     Smoking status: Former Smoker     Packs/day: 1.00     Types: Cigarettes     Quit date: 1/1/1985     Smokeless tobacco: Never Used     Alcohol use No     Drug use: None      Comment: Drug use: No     Sexual activity: Not Asked     Other Topics Concern     None     Social History Narrative    Retired from [employer]. Retired 2001.    with 2 adult children. Primary caregiver for his wife who has MS.     Family History   Problem Relation Age of Onset     Other - See Comments Father      PE     Other - See Comments Mother      Old Age     Diabetes Sister      Diabetes,? Sisters-DM  "    Arthritis Brother      Arthritis,? Brothers- Gout       EXAM:   Vitals:    09/06/18 1020   BP: 116/68   BP Location: Right arm   Patient Position: Sitting   Cuff Size: Adult Large   Pulse: 72   Resp: 18   Temp: 96.7  F (35.9  C)   TempSrc: Tympanic   Weight: 246 lb 2 oz (111.6 kg)   Height: 5' 8.5\" (1.74 m)       Current Pain Score: No Pain (0)     BP Readings from Last 3 Encounters:   09/06/18 116/68   08/20/18 123/80   04/19/18 124/68    Wt Readings from Last 3 Encounters:   09/06/18 246 lb 2 oz (111.6 kg)   08/20/18 247 lb 9.6 oz (112.3 kg)   04/19/18 255 lb (115.7 kg)      Estimated body mass index is 36.88 kg/(m^2) as calculated from the following:    Height as of this encounter: 5' 8.5\" (1.74 m).    Weight as of this encounter: 246 lb 2 oz (111.6 kg).     Physical Exam   Constitutional: He appears well-developed and well-nourished. No distress.   HENT:   Head: Normocephalic and atraumatic.   Eyes: Conjunctivae are normal. No scleral icterus.   Neck: No thyromegaly present.   Cardiovascular: Normal rate and regular rhythm.    No carotid Bruits   Pulmonary/Chest: Effort normal. No respiratory distress. He has no wheezes.   Abdominal: Soft. There is no tenderness.   Musculoskeletal: He exhibits no tenderness or deformity.   Lymphadenopathy:     He has no cervical adenopathy.   Neurological: He is alert. No cranial nerve deficit.   Skin: Skin is warm and dry.   Psychiatric: He has a normal mood and affect.        INVESTIGATIONS:  Results for orders placed or performed in visit on 09/06/18   Hemoglobin A1c   Result Value Ref Range    Hemoglobin A1C 11.1 (H) 4.0 - 6.0 %       ASSESSMENT AND PLAN:  Problem List Items Addressed This Visit        Digestive    Heartburn    Relevant Medications    pantoprazole (PROTONIX) 40 MG EC tablet       Endocrine    Uncontrolled type 2 diabetes mellitus with other specified complication, without long-term current use of insulin (H) - Primary    Relevant Medications    metFORMIN " (GLUCOPHAGE) 1000 MG tablet    glipiZIDE (GLUCOTROL) 10 MG tablet    Other Relevant Orders    Hemoglobin A1c    Hemoglobin A1c (Completed)    Mixed hyperlipidemia    Relevant Medications    simvastatin (ZOCOR) 40 MG tablet       Circulatory    Coronary artery disease involving native coronary artery of native heart without angina pectoris    Relevant Medications    clopidogrel (PLAVIX) 75 MG tablet    lisinopril (PRINIVIL/ZESTRIL) 2.5 MG tablet       Other    History of gout    Relevant Medications    allopurinol (ZYLOPRIM) 100 MG tablet      Other Visit Diagnoses     Vitamin B12 deficiency        Relevant Medications    cyanocobalamin (VITAMIN B12) injection 1,000 mcg (Completed)    Reflux esophagitis        Relevant Medications    pantoprazole (PROTONIX) 40 MG EC tablet        reviewed diet, exercise and weight control, recommended sodium restriction, cardiovascular risk and specific lipid/LDL goals reviewed, use of aspirin to prevent MI and TIA's discussed  -- Expected clinical course discussed    -- Medications and their side effects discussed    The 10-year ASCVD risk score (Opal SHELBY Jr, et al., 2013) is: 40.5%    Values used to calculate the score:      Age: 72 years      Sex: Male      Is Non- : No      Diabetic: Yes      Tobacco smoker: No      Systolic Blood Pressure: 116 mmHg      Is BP treated: Yes      HDL Cholesterol: 32 mg/dL      Total Cholesterol: 192 mg/dL    Patient Instructions   Get your A1c rechecked every 3-4 months.     Labs today.     Medications refilled.     -- Try a Super-B-Complex with B12 -- every other day -- to help energy / mood /neuropathy and balance.   -- Consider Taking 2,500 mg B12 dissolving tablet under the tongue every other day, alternating with B-complex.     Return in approximately 1 year, or sooner as needed for follow-up with Dr. Roberts.    Clinic : [phone]  Appointment line: [phone]      Bk Roberts MD  Internal  Medicine  [facility]

## 2018-09-07 ASSESSMENT — ANXIETY QUESTIONNAIRES: GAD7 TOTAL SCORE: 0

## 2018-11-16 DIAGNOSIS — I25.10 CORONARY ARTERY DISEASE INVOLVING NATIVE CORONARY ARTERY OF NATIVE HEART WITHOUT ANGINA PECTORIS: ICD-10-CM

## 2018-11-20 RX ORDER — METOPROLOL TARTRATE 50 MG
TABLET ORAL
Qty: 180 TABLET | Refills: 3 | Status: SHIPPED | OUTPATIENT
Start: 2018-11-20 | End: 2019-12-23

## 2018-11-20 NOTE — TELEPHONE ENCOUNTER
Routing refill request to provider for review/approval because:  metoprolol tartrate (LOPRESSOR) 50 mg tablet    Indications: Diabetes mellitus type 2 in obese (HC) Take 1 tablet by mouth 2 times daily. 180 tablet   3 05/26/2017     LOV: 9/6/18  Will route to Dr. Roberts for review and consideration of refills due to diagnosis associated.    Marina Joy RN on 11/20/2018 at 7:50 AM

## 2018-12-10 ENCOUNTER — HOSPITAL ENCOUNTER (OUTPATIENT)
Dept: CT IMAGING | Facility: OTHER | Age: 73
Discharge: HOME | End: 2018-12-10
Attending: NURSE PRACTITIONER | Admitting: NURSE PRACTITIONER
Payer: MEDICARE

## 2018-12-10 ENCOUNTER — HOSPITAL ENCOUNTER (OUTPATIENT)
Dept: CT IMAGING | Facility: OTHER | Age: 73
End: 2018-12-10
Attending: NURSE PRACTITIONER
Payer: MEDICARE

## 2018-12-10 DIAGNOSIS — C18.7 CANCER OF SIGMOID COLON (H): ICD-10-CM

## 2018-12-10 LAB
ALBUMIN SERPL-MCNC: 4 G/DL (ref 3.5–5.7)
ALP SERPL-CCNC: 98 U/L (ref 34–104)
ALT SERPL W P-5'-P-CCNC: 55 U/L (ref 7–52)
ANION GAP SERPL CALCULATED.3IONS-SCNC: 11 MMOL/L (ref 3–14)
AST SERPL W P-5'-P-CCNC: 70 U/L (ref 13–39)
BASOPHILS # BLD AUTO: 0 10E9/L (ref 0–0.2)
BASOPHILS NFR BLD AUTO: 0 %
BILIRUB SERPL-MCNC: 0.6 MG/DL (ref 0.3–1)
BUN SERPL-MCNC: 18 MG/DL (ref 7–25)
CALCIUM SERPL-MCNC: 9.6 MG/DL (ref 8.6–10.3)
CEA SERPL-MCNC: <3 NG/ML
CHLORIDE SERPL-SCNC: 98 MMOL/L (ref 98–107)
CO2 SERPL-SCNC: 25 MMOL/L (ref 21–31)
CREAT SERPL-MCNC: 0.96 MG/DL (ref 0.7–1.3)
DIFFERENTIAL METHOD BLD: NORMAL
EOSINOPHIL # BLD AUTO: 0 10E9/L (ref 0–0.7)
EOSINOPHIL NFR BLD AUTO: 0 %
ERYTHROCYTE [DISTWIDTH] IN BLOOD BY AUTOMATED COUNT: 14.6 % (ref 10–15)
FERRITIN SERPL-MCNC: 131 NG/ML (ref 23.9–336.2)
GFR SERPL CREATININE-BSD FRML MDRD: 77 ML/MIN/1.7M2
GLUCOSE SERPL-MCNC: 327 MG/DL (ref 70–105)
HCT VFR BLD AUTO: 42.1 % (ref 40–53)
HGB BLD-MCNC: 13.9 G/DL (ref 13.3–17.7)
IRON SATN MFR SERPL: 18 % (ref 20–55)
IRON SERPL-MCNC: 76 UG/DL (ref 50–212)
LDH SERPL L TO P-CCNC: 172 U/L (ref 140–271)
LYMPHOCYTES # BLD AUTO: 2 10E9/L (ref 0.8–5.3)
LYMPHOCYTES NFR BLD AUTO: 27 %
MCH RBC QN AUTO: 30.2 PG (ref 26.5–33)
MCHC RBC AUTO-ENTMCNC: 33 G/DL (ref 31.5–36.5)
MCV RBC AUTO: 91 FL (ref 78–100)
MONOCYTES # BLD AUTO: 0.7 10E9/L (ref 0–1.3)
MONOCYTES NFR BLD AUTO: 9 %
NEUTROPHILS # BLD AUTO: 4.6 10E9/L (ref 1.6–8.3)
NEUTROPHILS NFR BLD AUTO: 62 %
NEUTS BAND # BLD AUTO: 0.1 10E9/L (ref 0–0.6)
NEUTS BAND NFR BLD MANUAL: 2 %
PLATELET # BLD AUTO: 218 10E9/L (ref 150–450)
POTASSIUM SERPL-SCNC: 4.4 MMOL/L (ref 3.5–5.1)
PROT SERPL-MCNC: 7.3 G/DL (ref 6.4–8.9)
RBC # BLD AUTO: 4.61 10E12/L (ref 4.4–5.9)
SODIUM SERPL-SCNC: 134 MMOL/L (ref 134–144)
TIBC SERPL-MCNC: 429.8 UG/DL (ref 245–400)
UIBC (UNSATURATED): 353.8 MG/DL
VIT B12 SERPL-MCNC: 366 PG/ML (ref 180–914)
WBC # BLD AUTO: 7.4 10E9/L (ref 4–11)

## 2018-12-10 PROCEDURE — 82378 CARCINOEMBRYONIC ANTIGEN: CPT | Performed by: NURSE PRACTITIONER

## 2018-12-10 PROCEDURE — 74177 CT ABD & PELVIS W/CONTRAST: CPT

## 2018-12-10 PROCEDURE — 80053 COMPREHEN METABOLIC PANEL: CPT | Performed by: NURSE PRACTITIONER

## 2018-12-10 PROCEDURE — 85025 COMPLETE CBC W/AUTO DIFF WBC: CPT | Performed by: NURSE PRACTITIONER

## 2018-12-10 PROCEDURE — 25500064 ZZH RX 255 OP 636: Performed by: NURSE PRACTITIONER

## 2018-12-10 PROCEDURE — 82607 VITAMIN B-12: CPT | Performed by: NURSE PRACTITIONER

## 2018-12-10 PROCEDURE — 25000128 H RX IP 250 OP 636: Performed by: NURSE PRACTITIONER

## 2018-12-10 PROCEDURE — 82728 ASSAY OF FERRITIN: CPT | Performed by: NURSE PRACTITIONER

## 2018-12-10 PROCEDURE — 25000128 H RX IP 250 OP 636

## 2018-12-10 PROCEDURE — 83540 ASSAY OF IRON: CPT | Performed by: NURSE PRACTITIONER

## 2018-12-10 PROCEDURE — 71260 CT THORAX DX C+: CPT

## 2018-12-10 PROCEDURE — 83615 LACTATE (LD) (LDH) ENZYME: CPT | Performed by: NURSE PRACTITIONER

## 2018-12-10 PROCEDURE — 83550 IRON BINDING TEST: CPT | Performed by: NURSE PRACTITIONER

## 2018-12-10 PROCEDURE — 36415 COLL VENOUS BLD VENIPUNCTURE: CPT | Performed by: NURSE PRACTITIONER

## 2018-12-10 RX ADMIN — Medication 500 UNITS: at 09:27

## 2018-12-10 RX ADMIN — IOHEXOL 100 ML: 350 INJECTION, SOLUTION INTRAVENOUS at 09:20

## 2018-12-18 ENCOUNTER — ONCOLOGY VISIT (OUTPATIENT)
Dept: ONCOLOGY | Facility: OTHER | Age: 73
End: 2018-12-18
Attending: INTERNAL MEDICINE
Payer: COMMERCIAL

## 2018-12-18 VITALS
SYSTOLIC BLOOD PRESSURE: 120 MMHG | HEIGHT: 69 IN | WEIGHT: 244.4 LBS | HEART RATE: 89 BPM | DIASTOLIC BLOOD PRESSURE: 74 MMHG | OXYGEN SATURATION: 95 % | TEMPERATURE: 97.8 F | BODY MASS INDEX: 36.2 KG/M2

## 2018-12-18 DIAGNOSIS — C18.7 CANCER OF SIGMOID COLON (H): Primary | ICD-10-CM

## 2018-12-18 PROCEDURE — 99215 OFFICE O/P EST HI 40 MIN: CPT | Performed by: INTERNAL MEDICINE

## 2018-12-18 PROCEDURE — G0463 HOSPITAL OUTPT CLINIC VISIT: HCPCS

## 2018-12-18 ASSESSMENT — MIFFLIN-ST. JEOR: SCORE: 1836.09

## 2018-12-18 ASSESSMENT — PAIN SCALES - GENERAL: PAINLEVEL: NO PAIN (0)

## 2018-12-18 NOTE — NURSING NOTE
"Chief Complaint   Patient presents with     RECHECK     Sigmoid colon cancer   No current concerns or complaints.     Initial /74   Pulse 89   Temp 97.8  F (36.6  C) (Oral)   Ht 1.74 m (5' 8.5\")   Wt 110.9 kg (244 lb 6.4 oz)   SpO2 95%   BMI 36.62 kg/m   Estimated body mass index is 36.62 kg/m  as calculated from the following:    Height as of this encounter: 1.74 m (5' 8.5\").    Weight as of this encounter: 110.9 kg (244 lb 6.4 oz).  Medication Reconciliation: complete    Amaris Ray CMA (AAMA)................ 12/18/2018 9:12 AM   "

## 2018-12-20 NOTE — PROGRESS NOTES
Visit Date:   12/18/2018      HISTORY OF PRESENT ILLNESS:  Mr. Olivier returns for followup of colon cancer.  We had seen the patient in consultation on 06/01/2016.  At that time, he was a 70-year-old white male with history of coronary artery disease, type 2 diabetes mellitus, hyperlipidemia and hypertension who presented to the emergency room on 04/25/2016 with complaints of chest pain radiating down his arms.  At that time he was found to have hemoglobin of 7.1, subsequently was admitted.  CBC revealed microcytic indices with an MCV of 62.  He was noted to be iron deficient and he was transfused 2 units of packed red cells and ruled out for MI.  Subsequently, he was seen by Dr. Gates who performed colonoscopy and was noted to have a mass in the sigmoid colon that was consistent with adenocarcinoma.  He also had multiple adenomatous polyps.  The patient subsequently was admitted on 05/11/2016 for sigmoid colectomy.  This was performed 05/17/2016.  Pathology of the sigmoid colon reveals a mass consistent with mildly differentiated adenocarcinoma.  Tumor size was 2 x 1 x 0.7 cm.  Tumor invaded the muscularis propria.  Two out of 8 lymph nodes were positive for metastatic carcinoma.  Margins were negative for tumor and the grade of the tumor was ruled as moderately differentiated.  The patient was staged pathologic stage T2 N1b as part of the postoperative evaluation.  The patient had a CT of the abdomen and pelvis on 05/12/2016.  This revealed there were 2 nonspecific low attenuation lesions in the liver.  Metastases could not be excluded.  There was no other lymphadenopathy or additional findings suggestive of metastases.  His preop CEA was less than 3.  When we saw the patient, we wanted to rule out metastatic disease.  A PET scan was performed on 06/15/2016 and was essentially negative for metastatic disease.  The lesion seen on prior PET in the liver were not hypermetabolic.  We felt the patient has stage III  disease and he would be a candidate for adjuvant chemotherapy.  When we saw the patient 6/28/2016, the plan was to start FOLFOX x12 cycles.  The patient was started and received a total of 10 cycles and on 11/17/2016 he did note some cold-induced neuropathic symptoms and a cold sensation when he drinks cold liquids, but otherwise was doing relatively.  We elected to restage him after he completed 12 cycles of FOLFOX.  His last chemotherapy was administered on 12/07/2016.  During chemotherapy he said he became severely ill.  He developed numbness in his hands and significant cold-induced neuropathy as well, specifically with swallowing cold liquids.  He also did note that he lost his sense of taste.  Since then, his symptoms have been slowly resolving.  He still had numbness in his hands after 12 cycles of chemotherapy.  He did have a PET scan that was essentially negative for metastatic disease.  We started the patient on vitamin B6 to help his neuropathy and his symptoms did improve.  Subsequently, has had serial CTs chest, abdomen and pelvis which had been essentially negative.  He did have a colonoscopy in 05/2017, which revealed tubular adenoma in the hepatic flexure of the colon and is scheduled to have another colonoscopy next year.  Otherwise, he is asymptomatic.  He denies any fevers, night sweats, weight loss, change in bowel habits, bright red blood per rectum, or any headaches.  He did have a CT chest, abdomen and pelvis done on 12/10 which revealed no significant change in appearance of chest.  There was a relatively stable nodular density in the medial right upper lobe.  CT of the abdomen and pelvis revealed no significant change in appearance of the abdomen and pelvis, relatively stable liver lesions, small enhancing lesion in the spleen.  The patient otherwise, as stated above, is doing well.  No complaints of neuropathy.      PHYSICAL EXAMINATION:   GENERAL:  He is an obese, elderly white male in no  acute distress.   VITAL SIGNS:  Reveal blood pressure 127/82, pulse 89, temperature 97.8.   HEENT:  Atraumatic, normocephalic.  Oropharynx nonerythematous.   NECK:  Supple.   LUNGS:  Clear to auscultation and percussion.   HEART:  Regular rate and rhythm, S1, S2 normal.  No murmurs, rubs or gallops.   ABDOMEN:  Obese, soft, normoactive bowel sounds.  No masses, nontender.   LYMPHATICS:  No cervical, supraclavicular, axillary, or inguinal nodes.   EXTREMITIES:  No edema.   NEUROLOGIC:  Nonfocal.      LABORATORY DATA:  Reveal glucose 327.  Sodium 134, potassium 4.4, BUN is 18, creatinine 0.96, ALT 55, AST 70.  CBC within normal limits.  CEA is less than 3.  Ferritin 131.      IMPRESSION:     1.  Stage III mildly differentiated adenocarcinoma of the sigmoid colon with  nodes positive for metastatic disease consistent with pathologic T2 N1b M0 colon cancer.  PET scan was negative for metastatic disease.  The patient was started on adjuvant FOLFOX chemotherapy, completed 12 cycles.  Course complicated by peripheral neuropathy grade 1, which improved.  PET scan again no evidence of metastatic disease.  CEA was normal.  Recent scans have been negative for metastatic disease.  Colonoscopy in 2017 was negative except for tubular adenoma   2.  Hyperglycemia.  The patient is diabetic.  He is planning to set up an appointment with his PA in a position to manage his diabetes.  Otherwise, we will see the patient in 4 months.  Obtain CBC, CMP, LDH, CEA, CT chest, abdomen and pelvis.      Forty minutes was spent with the patient, greater than half the time spent in counseling and coordination of care.         IVONNE SEYMOUR MD             D: 2018   T: 2018   MT: MAGDA      Name:     LIEN MCKEON   MRN:      [id number]        Account:      [id number]   :      1945           Visit Date:   2018      Document: [id number]       cc: Bk Solano MD

## 2019-04-09 ENCOUNTER — HOSPITAL ENCOUNTER (OUTPATIENT)
Dept: CT IMAGING | Facility: OTHER | Age: 74
End: 2019-04-09
Attending: INTERNAL MEDICINE
Payer: MEDICARE

## 2019-04-09 ENCOUNTER — HOSPITAL ENCOUNTER (OUTPATIENT)
Dept: CT IMAGING | Facility: OTHER | Age: 74
Discharge: HOME | End: 2019-04-09
Attending: INTERNAL MEDICINE | Admitting: INTERNAL MEDICINE
Payer: MEDICARE

## 2019-04-09 DIAGNOSIS — C18.7 CANCER OF SIGMOID COLON (H): ICD-10-CM

## 2019-04-09 DIAGNOSIS — E11.8 DM TYPE 2, CONTROLLED, WITH COMPLICATION (H): ICD-10-CM

## 2019-04-09 LAB
ALBUMIN SERPL-MCNC: 4.2 G/DL (ref 3.5–5.7)
ALP SERPL-CCNC: 93 U/L (ref 34–104)
ALT SERPL W P-5'-P-CCNC: 71 U/L (ref 7–52)
ANION GAP SERPL CALCULATED.3IONS-SCNC: 13 MMOL/L (ref 3–14)
AST SERPL W P-5'-P-CCNC: 91 U/L (ref 13–39)
BASOPHILS # BLD AUTO: 0 10E9/L (ref 0–0.2)
BASOPHILS NFR BLD AUTO: 0.4 %
BILIRUB SERPL-MCNC: 0.8 MG/DL (ref 0.3–1)
BUN SERPL-MCNC: 16 MG/DL (ref 7–25)
CALCIUM SERPL-MCNC: 9.6 MG/DL (ref 8.6–10.3)
CEA SERPL-MCNC: <3 NG/ML
CHLORIDE SERPL-SCNC: 100 MMOL/L (ref 98–107)
CO2 SERPL-SCNC: 23 MMOL/L (ref 21–31)
CREAT SERPL-MCNC: 0.89 MG/DL (ref 0.7–1.3)
DIFFERENTIAL METHOD BLD: NORMAL
EOSINOPHIL # BLD AUTO: 0.1 10E9/L (ref 0–0.7)
EOSINOPHIL NFR BLD AUTO: 1.8 %
ERYTHROCYTE [DISTWIDTH] IN BLOOD BY AUTOMATED COUNT: 14.5 % (ref 10–15)
FERRITIN SERPL-MCNC: 257 NG/ML (ref 23.9–336.2)
FOLATE SERPL-MCNC: 12.1 NG/ML
GFR SERPL CREATININE-BSD FRML MDRD: 84 ML/MIN/{1.73_M2}
GLUCOSE SERPL-MCNC: 258 MG/DL (ref 70–105)
HBA1C MFR BLD: 10.5 % (ref 4–6)
HCT VFR BLD AUTO: 42.4 % (ref 40–53)
HGB BLD-MCNC: 13.9 G/DL (ref 13.3–17.7)
IMM GRANULOCYTES # BLD: 0 10E9/L (ref 0–0.4)
IMM GRANULOCYTES NFR BLD: 0.3 %
IRON SATN MFR SERPL: 20 % (ref 20–55)
IRON SERPL-MCNC: 87 UG/DL (ref 50–212)
LDH SERPL L TO P-CCNC: 183 U/L (ref 140–271)
LYMPHOCYTES # BLD AUTO: 1.3 10E9/L (ref 0.8–5.3)
LYMPHOCYTES NFR BLD AUTO: 20.1 %
MCH RBC QN AUTO: 29.9 PG (ref 26.5–33)
MCHC RBC AUTO-ENTMCNC: 32.8 G/DL (ref 31.5–36.5)
MCV RBC AUTO: 91 FL (ref 78–100)
MONOCYTES # BLD AUTO: 0.7 10E9/L (ref 0–1.3)
MONOCYTES NFR BLD AUTO: 10 %
NEUTROPHILS # BLD AUTO: 4.5 10E9/L (ref 1.6–8.3)
NEUTROPHILS NFR BLD AUTO: 67.4 %
PLATELET # BLD AUTO: 215 10E9/L (ref 150–450)
POTASSIUM SERPL-SCNC: 4.2 MMOL/L (ref 3.5–5.1)
PROT SERPL-MCNC: 7.1 G/DL (ref 6.4–8.9)
RBC # BLD AUTO: 4.65 10E12/L (ref 4.4–5.9)
SODIUM SERPL-SCNC: 136 MMOL/L (ref 134–144)
TIBC SERPL-MCNC: 427 UG/DL (ref 245–400)
UIBC (UNSATURATED): 340 MG/DL
VIT B12 SERPL-MCNC: 453 PG/ML (ref 180–914)
WBC # BLD AUTO: 6.7 10E9/L (ref 4–11)

## 2019-04-09 PROCEDURE — 82607 VITAMIN B-12: CPT | Performed by: INTERNAL MEDICINE

## 2019-04-09 PROCEDURE — 83615 LACTATE (LD) (LDH) ENZYME: CPT | Performed by: INTERNAL MEDICINE

## 2019-04-09 PROCEDURE — 85025 COMPLETE CBC W/AUTO DIFF WBC: CPT | Performed by: INTERNAL MEDICINE

## 2019-04-09 PROCEDURE — 82378 CARCINOEMBRYONIC ANTIGEN: CPT | Performed by: INTERNAL MEDICINE

## 2019-04-09 PROCEDURE — 74177 CT ABD & PELVIS W/CONTRAST: CPT

## 2019-04-09 PROCEDURE — 83550 IRON BINDING TEST: CPT | Performed by: INTERNAL MEDICINE

## 2019-04-09 PROCEDURE — 82746 ASSAY OF FOLIC ACID SERUM: CPT | Performed by: INTERNAL MEDICINE

## 2019-04-09 PROCEDURE — 82728 ASSAY OF FERRITIN: CPT | Performed by: INTERNAL MEDICINE

## 2019-04-09 PROCEDURE — 83036 HEMOGLOBIN GLYCOSYLATED A1C: CPT | Performed by: INTERNAL MEDICINE

## 2019-04-09 PROCEDURE — 83540 ASSAY OF IRON: CPT | Performed by: INTERNAL MEDICINE

## 2019-04-09 PROCEDURE — 80053 COMPREHEN METABOLIC PANEL: CPT | Performed by: INTERNAL MEDICINE

## 2019-04-09 PROCEDURE — 25500064 ZZH RX 255 OP 636: Performed by: INTERNAL MEDICINE

## 2019-04-09 PROCEDURE — 25000128 H RX IP 250 OP 636

## 2019-04-09 PROCEDURE — 25000128 H RX IP 250 OP 636: Performed by: INTERNAL MEDICINE

## 2019-04-09 PROCEDURE — 71260 CT THORAX DX C+: CPT

## 2019-04-09 RX ORDER — HEPARIN SODIUM (PORCINE) LOCK FLUSH IV SOLN 100 UNIT/ML 100 UNIT/ML
500 SOLUTION INTRAVENOUS
Status: COMPLETED | OUTPATIENT
Start: 2019-04-09 | End: 2019-04-09

## 2019-04-09 RX ADMIN — Medication 500 UNITS: at 11:08

## 2019-04-09 RX ADMIN — IOHEXOL 100 ML: 350 INJECTION, SOLUTION INTRAVENOUS at 11:01

## 2019-04-22 ENCOUNTER — ONCOLOGY VISIT (OUTPATIENT)
Dept: ONCOLOGY | Facility: OTHER | Age: 74
End: 2019-04-22
Attending: NURSE PRACTITIONER
Payer: COMMERCIAL

## 2019-04-22 VITALS
SYSTOLIC BLOOD PRESSURE: 134 MMHG | RESPIRATION RATE: 16 BRPM | BODY MASS INDEX: 36.29 KG/M2 | HEART RATE: 92 BPM | WEIGHT: 245 LBS | HEIGHT: 69 IN | DIASTOLIC BLOOD PRESSURE: 64 MMHG | TEMPERATURE: 96.6 F

## 2019-04-22 DIAGNOSIS — C18.7 CANCER OF SIGMOID COLON (H): Primary | ICD-10-CM

## 2019-04-22 PROCEDURE — 99213 OFFICE O/P EST LOW 20 MIN: CPT | Performed by: NURSE PRACTITIONER

## 2019-04-22 PROCEDURE — G0463 HOSPITAL OUTPT CLINIC VISIT: HCPCS

## 2019-04-22 ASSESSMENT — MIFFLIN-ST. JEOR: SCORE: 1838.81

## 2019-04-22 ASSESSMENT — PAIN SCALES - GENERAL: PAINLEVEL: NO PAIN (0)

## 2019-04-22 NOTE — PROGRESS NOTES
Oncology Follow-up Visit:  April 22, 2019  Diagnosis:Colon cancer    History Of Present Illness:  Patient presents to the clinic today for followup of colon cancer. Patient was seen in consultation on June 1, 2016. Patient presented to the emergency room on April 25, 2016, with complaints of chest pain radiating down his arms, which was primarily worse with exertion. In the emergency department, he was found to have a hemoglobin of 7.1, and he subsequently was admitted. CBC revealed microcytic indices with an MCV of 62. He was noted to be iron deficient. He was transfused 2 units of packed red cells and was ruled out for MI.   He was seen by Dr. Solano, who performed colonoscopy and was noted to have a mass in the sigmoid colon that was consistent with adenocarcinoma. He also had multiple adenomatous polyps.   The patient was admitted on May 11, 2016, for a sigmoid colectomy. This was performed on May 17, 2016. Pathology revealed in the sigmoid colon a mass consistent with moderately differentiated adenocarcinoma. The tumor size was 2 x 1 x 0.7 cm. The tumor invaded the muscularis propria, 2/8 lymph nodes were positive for metastatic carcinoma. Margins were negative for tumor. The grade of the tumor was ruled as moderately differentiated. The patient was staged pathologic stage T2N1b as part of the postop evaluation.   The patient had a CT abdomen and pelvis on May 12, 2016. This revealed that there were 2 nonspecific low-attenuation lesions in the liver. Metastasis could not be excluded. There was no lymphadenopathy or additional findings to suggest metastases. The patient had a preop CEA, which was less than 3.   PET scan was performed on Lluvia 15, 2016, and was essentially negative for metastatic disease. Lesions seen on prior PET in the liver were not hypermetabolic. We felt that the patient had stage III disease and he would be a candidate for adjuvant chemotherapy.   When patient was seen on June 28, 2016, the  plan was to start FOLFOX x12 cycles.  When he was seen on November 17, 2016,  he did note some cold-induced neuropathic symptoms. We elected to restage him after 12 cycles. He completed 12 cycles of FOLFOX. His last chemotherapy was administered on December 7, 2016.  He did have a PET scan done after 12 cycles of chemotherapy, and this came back essentially negative for metastatic disease.   He had staging studies on April 6, 2017 including CT abdomen and pelvis, which was essentially negative. CT of the chest was negative. The patient also underwent a colonoscopy in May 2017, which revealed a tubular adenoma at the hepatic flexure of the colon. Next colonoscopy will be due in 2020.  CT chest abdomen and pelvis was done on 8/13/18 and showed a stable liver lesion, small stable lung nodules and no evidence of new or worsening metastatic disease. Most recent CT scans from 4/9/19 are stable. Tumor marker remains normal. Patient has some ongoing neuropathy in his fingers and feet. He also reports some dizziness which is positional and has been ongoing for about 3 months. Patient states the dizziness presents when he turns in certain positions but is gone when standing up straight. He continues on his oral iron and iron studies and ferritin are normal. Patient is otherwise feeling well and has no new concerns at this time.        Review Of Systems:  Review Of Systems  Eyes/Ears/Nose/Throat: denies new vision or hearing changes  Respiratory: No shortness of breath, dyspnea on exertion, cough, or hemoptysis  Cardiovascular: denies chest pain or palpitations  Gastrointestinal: denies abdominal pain, diarrhea or constipation  Genitourinary: denies dysuria or hematuria  Musculoskeletal: denies new bone pain or muscle weakness  Neurologic: reports ongoing neuropathy of fingers and feet, no headaches  Hematologic/Lymphatic/Immunologic: denies fevers, chills, night sweats      Nursing Notes:   Anglea Quick, RN  4/22/2019   "8:27 AM  Signed  Patient present for follow up of colon cancer.  Angela Quick RN...........4/22/2019 8:26 AM  Chief Complaint   Patient presents with     RECHECK     colon cancer       Initial /64   Pulse 92   Temp 96.6  F (35.9  C)   Resp 16   Ht 1.74 m (5' 8.5\")   Wt 111.1 kg (245 lb)   BMI 36.71 kg/m    Estimated body mass index is 36.71 kg/m  as calculated from the following:    Height as of this encounter: 1.74 m (5' 8.5\").    Weight as of this encounter: 111.1 kg (245 lb).  Medication Reconciliation: complete    Angela Quick RN       Past medical, social, surgical, and family histories reviewed.    Allergies:  Allergies as of 04/22/2019 - Reviewed 04/22/2019   Allergen Reaction Noted     Aspirin  05/26/2017     Canagliflozin  05/18/2016     Morphine  05/18/2016       Current Medications:  Current Outpatient Medications   Medication Sig Dispense Refill     allopurinol (ZYLOPRIM) 100 MG tablet Take 1 tablet (100 mg) by mouth 2 times daily 180 tablet 3     clopidogrel (PLAVIX) 75 MG tablet TAKE 1 TABLET (75MG) BY MOUTH DAILY 90 tablet 3     Cyanocobalamin (VITAMIN B-12 CR) 1000 MCG TBCR Take 1 tablet by mouth once daily.       glipiZIDE (GLUCOTROL) 10 MG tablet TAKE 1-2 TABLET BY MOUTH TWICE A DAY WITH MEALS FOR DIABETES - adjust dose as needed for high glucose - dose increase 9/6/2018 360 tablet 3     lisinopril (PRINIVIL/ZESTRIL) 2.5 MG tablet TAKE 1 TABLET (2.5MG) BY MOUTH ONCE DAILY 90 tablet 3     metFORMIN (GLUCOPHAGE) 1000 MG tablet Take 1 tablet by mouth 2 times daily with meals 180 tablet 3     metoprolol tartrate (LOPRESSOR) 50 MG tablet TAKE 1 TABLET TWICE DAILY 180 tablet 3     nitroGLYcerin (NITROSTAT) 0.4 MG sublingual tablet Place 1 tablet under the tongue every 5 minutes if needed for Chest Pain.       pantoprazole (PROTONIX) 40 MG EC tablet TAKE 1 TABLET BY MOUTH ONCE DAILY BEFORE A MEAL. 90 tablet 3     simvastatin (ZOCOR) 40 MG tablet TAKE 1 TABLET BY MOUTH AT BEDTIME FOR " "CHOLESTEROL. 90 tablet 3        Physical Exam:  /64   Pulse 92   Temp 96.6  F (35.9  C)   Resp 16   Ht 1.74 m (5' 8.5\")   Wt 111.1 kg (245 lb)   BMI 36.71 kg/m      GENERAL APPEARANCE: 73 year old male, alert and no distress    NECK: no adenopathy, no asymmetry or masses     LYMPHATICS: No cervical, supraclavicular, axillary lymphadenopathy     RESP: lungs clear to auscultation - no rales, rhonchi or wheezes     CARDIOVASCULAR: regular rates and rhythm, normal S1 S2     ABDOMEN:  soft, nontender, no HSM or masses and bowel sounds normal     MUSCULOSKELETAL: extremities normal- no gross deformities noted, No edema b/l LE.     SKIN: no suspicious lesions or rashes     PSYCHIATRIC: mentation appears normal and affect normal    Laboratory/Imaging Studies  Orders Only on 04/09/2019   Component Date Value Ref Range Status     Ferritin 04/09/2019 257  23.9 - 336.2 ng/mL Final     Iron 04/09/2019 87  50 - 212 ug/dL Final     UIBC (Unsaturated) 04/09/2019 340.00  mg/dL Final     Iron Binding Capacity 04/09/2019 427.00* 245.00 - 400.00 ug/dL Final     Iron Saturation 04/09/2019 20  20 - 55 % Final     Folate 04/09/2019 12.1  >5.21 ng/mL Final     Vitamin B12 04/09/2019 453  180 - 914 pg/mL Final     Lactate Dehydrogenase 04/09/2019 183  140 - 271 U/L Final     Sodium 04/09/2019 136  134 - 144 mmol/L Final     Potassium 04/09/2019 4.2  3.5 - 5.1 mmol/L Final     Chloride 04/09/2019 100  98 - 107 mmol/L Final     Carbon Dioxide 04/09/2019 23  21 - 31 mmol/L Final     Anion Gap 04/09/2019 13  3 - 14 mmol/L Final     Glucose 04/09/2019 258* 70 - 105 mg/dL Final     Urea Nitrogen 04/09/2019 16  7 - 25 mg/dL Final     Creatinine 04/09/2019 0.89  0.70 - 1.30 mg/dL Final     GFR Estimate 04/09/2019 84  >60 mL/min/[1.73_m2] Final     GFR Estimate If Black 04/09/2019 >90  >60 mL/min/[1.73_m2] Final     Calcium 04/09/2019 9.6  8.6 - 10.3 mg/dL Final     Bilirubin Total 04/09/2019 0.8  0.3 - 1.0 mg/dL Final     Albumin " 04/09/2019 4.2  3.5 - 5.7 g/dL Final     Protein Total 04/09/2019 7.1  6.4 - 8.9 g/dL Final     Alkaline Phosphatase 04/09/2019 93  34 - 104 U/L Final     ALT 04/09/2019 71* 7 - 52 U/L Final     AST 04/09/2019 91* 13 - 39 U/L Final     WBC 04/09/2019 6.7  4.0 - 11.0 10e9/L Final     RBC Count 04/09/2019 4.65  4.4 - 5.9 10e12/L Final     Hemoglobin 04/09/2019 13.9  13.3 - 17.7 g/dL Final     Hematocrit 04/09/2019 42.4  40.0 - 53.0 % Final     MCV 04/09/2019 91  78 - 100 fl Final     MCH 04/09/2019 29.9  26.5 - 33.0 pg Final     MCHC 04/09/2019 32.8  31.5 - 36.5 g/dL Final     RDW 04/09/2019 14.5  10.0 - 15.0 % Final     Platelet Count 04/09/2019 215  150 - 450 10e9/L Final     Diff Method 04/09/2019 Automated Method   Final     % Neutrophils 04/09/2019 67.4  % Final     % Lymphocytes 04/09/2019 20.1  % Final     % Monocytes 04/09/2019 10.0  % Final     % Eosinophils 04/09/2019 1.8  % Final     % Basophils 04/09/2019 0.4  % Final     % Immature Granulocytes 04/09/2019 0.3  % Final     Absolute Neutrophil 04/09/2019 4.5  1.6 - 8.3 10e9/L Final     Absolute Lymphocytes 04/09/2019 1.3  0.8 - 5.3 10e9/L Final     Absolute Monocytes 04/09/2019 0.7  0.0 - 1.3 10e9/L Final     Absolute Eosinophils 04/09/2019 0.1  0.0 - 0.7 10e9/L Final     Absolute Basophils 04/09/2019 0.0  0.0 - 0.2 10e9/L Final     Abs Immature Granulocytes 04/09/2019 0.0  0 - 0.4 10e9/L Final     Carcinoembryonic Agn 04/09/2019 <3.0  <3.0 ng/mL Final     Hemoglobin A1C 04/09/2019 10.5* 4.0 - 6.0 % Final        ASSESSMENT/PLAN:  1.Stage III moderately differentiated adenocarcinoma of the sigmoid colon with 2 out of 8 lymph nodes positive for metastatic disease, consistent with pathologic [id number] disease.PET scan was negative for metastatic disease. The patient was started on adjuvant FOLFOX chemotherapy and completed 12 cycles. Course complicated daily by peripheral neuropathy, grade 1, which is improving. PET scan indicates no evidence of metastatic  disease. CEA was normal. Recent CT of chest abdomen and pelvis from 8/2018 show a stable liver lesion and stable small lung nodules with no evidence of disease.  Colonoscopy done in May 2017 revealed a tubular adenoma. The patient will be due for a colonoscopy in 2020. Most recent scans from 4/10/19 are stable.  Patient continues to have neuropathy of the feet, stable. The plan is to continue surveillance. We will see the patient in 4 months with a CBC, CMP, LDH, CEA, ferritin, B12,  iron studies and CT chest, abdomen, and pelvis  2. Dizziness. Patient reports ongoing dizziness for about the past 3 months. Patient states dizziness is related to certain positions, especially when laying in bed and turning his head. Discussed that this could likely be a positional vertigo. He denies headaches, denies balance issues. Patient will be making an appointment to see his primary provider for evaluation.     Twenty five minutes spent with patient with greater than 50% of that time spent counseling patient regarding disease process, interpretation of labs and CT, discussing causes of dizziness, discussing ongoing surveillance and coordination of care

## 2019-04-22 NOTE — NURSING NOTE
"Patient present for follow up of colon cancer.  Angela Quick RN...........4/22/2019 8:26 AM  Chief Complaint   Patient presents with     RECHECK     colon cancer       Initial /64   Pulse 92   Temp 96.6  F (35.9  C)   Resp 16   Ht 1.74 m (5' 8.5\")   Wt 111.1 kg (245 lb)   BMI 36.71 kg/m   Estimated body mass index is 36.71 kg/m  as calculated from the following:    Height as of this encounter: 1.74 m (5' 8.5\").    Weight as of this encounter: 111.1 kg (245 lb).  Medication Reconciliation: complete    Angela Quick RN     "

## 2019-09-30 DIAGNOSIS — I25.10 CORONARY ARTERY DISEASE INVOLVING NATIVE CORONARY ARTERY OF NATIVE HEART WITHOUT ANGINA PECTORIS: ICD-10-CM

## 2019-09-30 DIAGNOSIS — E78.2 MIXED HYPERLIPIDEMIA: ICD-10-CM

## 2019-09-30 DIAGNOSIS — E66.9 DIABETES MELLITUS TYPE 2 IN OBESE: Primary | ICD-10-CM

## 2019-09-30 DIAGNOSIS — E11.69 DIABETES MELLITUS TYPE 2 IN OBESE: Primary | ICD-10-CM

## 2019-10-03 RX ORDER — GLIPIZIDE 10 MG/1
TABLET ORAL
Qty: 360 TABLET | Refills: 3 | Status: SHIPPED | OUTPATIENT
Start: 2019-10-03 | End: 2020-10-20

## 2019-10-03 RX ORDER — CLOPIDOGREL BISULFATE 75 MG/1
TABLET ORAL
Qty: 90 TABLET | Refills: 3 | Status: SHIPPED | OUTPATIENT
Start: 2019-10-03 | End: 2020-11-06

## 2019-10-03 RX ORDER — SIMVASTATIN 40 MG
TABLET ORAL
Qty: 90 TABLET | Refills: 3 | Status: SHIPPED | OUTPATIENT
Start: 2019-10-03 | End: 2020-09-25

## 2019-10-03 RX ORDER — LISINOPRIL 2.5 MG/1
TABLET ORAL
Qty: 90 TABLET | Refills: 3 | Status: SHIPPED | OUTPATIENT
Start: 2019-10-03 | End: 2020-11-06

## 2019-10-03 NOTE — TELEPHONE ENCOUNTER
Routing refill request to provider for review/approval because:   Patient has documented LDL within the past 12 mos.    Patient has had a Microalbumin in the past 15 mos.    Patient has documented A1c within the specified period of time.    Recent (6 mo) or future (30 days) visit within the authorizing provider's specialty     LOV: 9/6/18    Marina Joy RN on 10/3/2019 at 9:27 AM

## 2019-10-07 ENCOUNTER — HOSPITAL ENCOUNTER (OUTPATIENT)
Dept: CT IMAGING | Facility: OTHER | Age: 74
End: 2019-10-07
Attending: NURSE PRACTITIONER
Payer: MEDICARE

## 2019-10-07 ENCOUNTER — HOSPITAL ENCOUNTER (OUTPATIENT)
Dept: CT IMAGING | Facility: OTHER | Age: 74
Discharge: HOME | End: 2019-10-07
Attending: NURSE PRACTITIONER | Admitting: NURSE PRACTITIONER
Payer: MEDICARE

## 2019-10-07 DIAGNOSIS — C18.7 CANCER OF SIGMOID COLON (H): ICD-10-CM

## 2019-10-07 LAB
ALBUMIN SERPL-MCNC: 4.3 G/DL (ref 3.5–5.7)
ALP SERPL-CCNC: 112 U/L (ref 34–104)
ALT SERPL W P-5'-P-CCNC: 56 U/L (ref 7–52)
ANION GAP SERPL CALCULATED.3IONS-SCNC: 11 MMOL/L (ref 3–14)
AST SERPL W P-5'-P-CCNC: 64 U/L (ref 13–39)
BASOPHILS # BLD AUTO: 0 10E9/L (ref 0–0.2)
BASOPHILS NFR BLD AUTO: 0.3 %
BILIRUB SERPL-MCNC: 0.6 MG/DL (ref 0.3–1)
BUN SERPL-MCNC: 16 MG/DL (ref 7–25)
CALCIUM SERPL-MCNC: 9.3 MG/DL (ref 8.6–10.3)
CEA SERPL-MCNC: <3 NG/ML
CHLORIDE SERPL-SCNC: 100 MMOL/L (ref 98–107)
CO2 SERPL-SCNC: 24 MMOL/L (ref 21–31)
CREAT SERPL-MCNC: 0.98 MG/DL (ref 0.7–1.3)
DIFFERENTIAL METHOD BLD: NORMAL
EOSINOPHIL # BLD AUTO: 0.1 10E9/L (ref 0–0.7)
EOSINOPHIL NFR BLD AUTO: 1.8 %
ERYTHROCYTE [DISTWIDTH] IN BLOOD BY AUTOMATED COUNT: 14.2 % (ref 10–15)
FERRITIN SERPL-MCNC: 184 NG/ML (ref 23.9–336.2)
GFR SERPL CREATININE-BSD FRML MDRD: 75 ML/MIN/{1.73_M2}
GLUCOSE SERPL-MCNC: 316 MG/DL (ref 70–105)
HCT VFR BLD AUTO: 41.3 % (ref 40–53)
HGB BLD-MCNC: 13.6 G/DL (ref 13.3–17.7)
IMM GRANULOCYTES # BLD: 0 10E9/L (ref 0–0.4)
IMM GRANULOCYTES NFR BLD: 0.5 %
IRON SATN MFR SERPL: 23 % (ref 20–55)
IRON SERPL-MCNC: 96 UG/DL (ref 50–212)
LDH SERPL L TO P-CCNC: 176 U/L (ref 140–271)
LYMPHOCYTES # BLD AUTO: 1.4 10E9/L (ref 0.8–5.3)
LYMPHOCYTES NFR BLD AUTO: 23.1 %
MCH RBC QN AUTO: 29.9 PG (ref 26.5–33)
MCHC RBC AUTO-ENTMCNC: 32.9 G/DL (ref 31.5–36.5)
MCV RBC AUTO: 91 FL (ref 78–100)
MONOCYTES # BLD AUTO: 0.7 10E9/L (ref 0–1.3)
MONOCYTES NFR BLD AUTO: 11.1 %
NEUTROPHILS # BLD AUTO: 3.9 10E9/L (ref 1.6–8.3)
NEUTROPHILS NFR BLD AUTO: 63.2 %
PLATELET # BLD AUTO: 214 10E9/L (ref 150–450)
POTASSIUM SERPL-SCNC: 4.1 MMOL/L (ref 3.5–5.1)
PROT SERPL-MCNC: 7.2 G/DL (ref 6.4–8.9)
RBC # BLD AUTO: 4.55 10E12/L (ref 4.4–5.9)
SODIUM SERPL-SCNC: 135 MMOL/L (ref 134–144)
TIBC SERPL-MCNC: 413 UG/DL (ref 245–400)
UIBC (UNSATURATED): 317 MG/DL
VIT B12 SERPL-MCNC: 256 PG/ML (ref 180–914)
WBC # BLD AUTO: 6.1 10E9/L (ref 4–11)

## 2019-10-07 PROCEDURE — 85025 COMPLETE CBC W/AUTO DIFF WBC: CPT | Mod: ZL | Performed by: NURSE PRACTITIONER

## 2019-10-07 PROCEDURE — 83550 IRON BINDING TEST: CPT | Mod: ZL | Performed by: NURSE PRACTITIONER

## 2019-10-07 PROCEDURE — 36415 COLL VENOUS BLD VENIPUNCTURE: CPT | Mod: ZL | Performed by: NURSE PRACTITIONER

## 2019-10-07 PROCEDURE — 82378 CARCINOEMBRYONIC ANTIGEN: CPT | Mod: ZL | Performed by: NURSE PRACTITIONER

## 2019-10-07 PROCEDURE — 83540 ASSAY OF IRON: CPT | Mod: ZL | Performed by: NURSE PRACTITIONER

## 2019-10-07 PROCEDURE — 80053 COMPREHEN METABOLIC PANEL: CPT | Mod: ZL | Performed by: NURSE PRACTITIONER

## 2019-10-07 PROCEDURE — 71260 CT THORAX DX C+: CPT

## 2019-10-07 PROCEDURE — 25000128 H RX IP 250 OP 636: Performed by: NURSE PRACTITIONER

## 2019-10-07 PROCEDURE — 25500064 ZZH RX 255 OP 636: Performed by: NURSE PRACTITIONER

## 2019-10-07 PROCEDURE — 82607 VITAMIN B-12: CPT | Mod: ZL | Performed by: NURSE PRACTITIONER

## 2019-10-07 PROCEDURE — 74177 CT ABD & PELVIS W/CONTRAST: CPT

## 2019-10-07 PROCEDURE — 82728 ASSAY OF FERRITIN: CPT | Mod: ZL | Performed by: NURSE PRACTITIONER

## 2019-10-07 PROCEDURE — 83615 LACTATE (LD) (LDH) ENZYME: CPT | Mod: ZL | Performed by: NURSE PRACTITIONER

## 2019-10-07 RX ADMIN — IOHEXOL 149 ML: 350 INJECTION, SOLUTION INTRAVENOUS at 09:27

## 2019-10-07 RX ADMIN — Medication 500 UNITS: at 09:35

## 2019-10-07 NOTE — PROGRESS NOTES
IV Contrast- Discharge Instructions After Your CT Scan      The IV contrast you received today will be filtered from your bloodstream by your kidneys during the next 24 hours and pass from the body in urine.  You will not be aware of this process and your urine will not change in color.  To help this process you should drink at least 4 additional glasses of water or juice today.  This reduces stress on your kidneys.    Most contrast reactions are immediate.  Should you develop symptoms of concern after discharge, contact the department at the number below.  After hours you should contact your personal physician.  If you develop breathing distress or wheezing, call 911.      1.  Has the patient had a previous reaction to IV contrast? n    2.  Does the patient have kidney disease? n    3.  Is the patient on dialysis? n    If YES to any of these questions, exam will be reviewed with a Radiologist before administering contrast.  1.  Is patient currently taking metformin? y     If NO: Technologist will give the patient normal post CT instructions.     If YES: Technologist will obtain GFR from Lab.    2.  Is GFR is greater than 60? y     If YES: Technologist will give the patient normal post CT instructions.     If NO: Technologist will give the patient METFORMIN post CT instructions.

## 2019-10-08 DIAGNOSIS — Z87.39 HISTORY OF GOUT: ICD-10-CM

## 2019-10-08 NOTE — LETTER
October 10, 2019      Andrez Olivier  [id number] [facility] [id number]        Dear Andrez,     A refill of Allopurinol 100 mg tablet has been requested by your pharmacy.  We noticed that it has been greater than 12 months since your last comprehensive visit and labs with Dr Roberts.  A limited 90 day supply has been sent to your pharmacy at this time.    Additional refills require a medication management appointment.  Your health is very important to us.  Please call the clinic at [phone] to schedule your appointment.    Thank you,    The Refill Nurse  [facility]               Sincerely,        Bk Roberts MD

## 2019-10-10 RX ORDER — ALLOPURINOL 100 MG/1
100 TABLET ORAL 2 TIMES DAILY
Qty: 180 TABLET | Refills: 0 | Status: SHIPPED | OUTPATIENT
Start: 2019-10-10 | End: 2020-02-07

## 2019-10-10 NOTE — TELEPHONE ENCOUNTER
"[facility] sent Rx request for the following:      Allopurinol 100 mg tablet      Last Prescription Date:   9/6/18  Last Fill Qty/Refills:         180, R-3    Last Office Visit:              9/6/18   Future Office visit:           None with PCP noted, oncology on 10/22/19    Routing refill request to provider for review/approval because:    Requested Prescriptions   Pending Prescriptions Disp Refills     allopurinol (ZYLOPRIM) 100 MG tablet [Pharmacy Med Name: ALLOPURINOL 100MG TABLET] 180 tablet 3     Sig: TAKE 1 TABLET (100 MG) BY MOUTH 2 TIMES DAILY       Gout Agents Protocol Failed - 10/8/2019  6:16 PM        Failed - Has Uric Acid on file in past 12 months and value is less than 6     Recent Labs   Lab Test 05/19/15  1125   URIC 5.3     If level is 6mg/dL or greater, ok to refill one time and refer to provider.         Failed - Recent (12 mo) or future (30 days) visit within the authorizing provider's specialty     Patient has had an office visit with the authorizing provider or a provider within the authorizing providers department within the previous 12 mos or has a future within next 30 days. See \"Patient Info\" tab in inbasket, or \"Choose Columns\" in Meds & Orders section of the refill encounter.       Pt overdue for annual visit and labs.  Patricia refill sent to pharmacy and reminder letter sent to pt    Neeta Wang RN............................ 10/10/2019 1:49 PM                "

## 2019-10-22 ENCOUNTER — HOSPITAL ENCOUNTER (OUTPATIENT)
Dept: GENERAL RADIOLOGY | Facility: OTHER | Age: 74
Discharge: HOME | End: 2019-10-22
Attending: NURSE PRACTITIONER | Admitting: NURSE PRACTITIONER
Payer: MEDICARE

## 2019-10-22 ENCOUNTER — ONCOLOGY VISIT (OUTPATIENT)
Dept: ONCOLOGY | Facility: OTHER | Age: 74
End: 2019-10-22
Attending: NURSE PRACTITIONER
Payer: MEDICARE

## 2019-10-22 VITALS
HEIGHT: 69 IN | WEIGHT: 241.6 LBS | TEMPERATURE: 96.5 F | BODY MASS INDEX: 35.78 KG/M2 | RESPIRATION RATE: 16 BRPM | DIASTOLIC BLOOD PRESSURE: 76 MMHG | SYSTOLIC BLOOD PRESSURE: 120 MMHG | HEART RATE: 96 BPM

## 2019-10-22 DIAGNOSIS — R05.9 COUGH: ICD-10-CM

## 2019-10-22 DIAGNOSIS — R06.2 INSPIRATORY WHEEZE ON EXAMINATION: ICD-10-CM

## 2019-10-22 DIAGNOSIS — D50.9 IRON DEFICIENCY ANEMIA, UNSPECIFIED IRON DEFICIENCY ANEMIA TYPE: ICD-10-CM

## 2019-10-22 DIAGNOSIS — C18.7 CANCER OF SIGMOID COLON (H): Primary | ICD-10-CM

## 2019-10-22 PROCEDURE — 99213 OFFICE O/P EST LOW 20 MIN: CPT | Performed by: NURSE PRACTITIONER

## 2019-10-22 PROCEDURE — G0463 HOSPITAL OUTPT CLINIC VISIT: HCPCS | Mod: 25

## 2019-10-22 PROCEDURE — 71046 X-RAY EXAM CHEST 2 VIEWS: CPT

## 2019-10-22 ASSESSMENT — MIFFLIN-ST. JEOR: SCORE: 1823.39

## 2019-10-22 ASSESSMENT — PAIN SCALES - GENERAL: PAINLEVEL: NO PAIN (0)

## 2019-10-22 NOTE — NURSING NOTE
"Chief Complaint   Patient presents with     RECHECK     Colon Cancer       Initial /76 (BP Location: Right arm, Patient Position: Sitting, Cuff Size: Adult Regular)   Pulse 96   Temp 96.5  F (35.8  C) (Tympanic)   Resp 16   Ht 1.74 m (5' 8.5\")   Wt 109.6 kg (241 lb 9.6 oz)   BMI 36.20 kg/m   Estimated body mass index is 36.2 kg/m  as calculated from the following:    Height as of this encounter: 1.74 m (5' 8.5\").    Weight as of this encounter: 109.6 kg (241 lb 9.6 oz).  Medication Reconciliation: complete  Dariela Miles LPN  "

## 2019-10-22 NOTE — PROGRESS NOTES
cxr  Oncology Follow-up Visit:  October 22, 2019  Diagnosis:Colon cancer    History Of Present Illness:  Patient presents to the clinic today for followup of colon cancer. Patient was seen in consultation on June 1, 2016. Patient presented to the emergency room on April 25, 2016, with complaints of chest pain radiating down his arms, which was primarily worse with exertion. In the emergency department, he was found to have a hemoglobin of 7.1, and he subsequently was admitted. CBC revealed microcytic indices with an MCV of 62. He was noted to be iron deficient. He was transfused 2 units of packed red cells and was ruled out for MI.   He was seen by Dr. Solano, who performed colonoscopy and was noted to have a mass in the sigmoid colon that was consistent with adenocarcinoma. He also had multiple adenomatous polyps.   The patient was admitted on May 11, 2016, for a sigmoid colectomy. This was performed on May 17, 2016. Pathology revealed in the sigmoid colon a mass consistent with moderately differentiated adenocarcinoma. The tumor size was 2 x 1 x 0.7 cm. The tumor invaded the muscularis propria, 2/8 lymph nodes were positive for metastatic carcinoma. Margins were negative for tumor. The grade of the tumor was ruled as moderately differentiated. The patient was staged pathologic stage T2N1b as part of the postop evaluation.   The patient had a CT abdomen and pelvis on May 12, 2016. This revealed that there were 2 nonspecific low-attenuation lesions in the liver. Metastasis could not be excluded. There was no lymphadenopathy or additional findings to suggest metastases. The patient had a preop CEA, which was less than 3. PET scan was performed on Lluvia 15, 2016, and was essentially negative for metastatic disease. Lesions seen on prior PET in the liver were not hypermetabolic. We felt that the patient had stage III disease and he would be a candidate for adjuvant chemotherapy.   When patient was seen on June 28, 2016,  the plan was to start FOLFOX x12 cycles.  When he was seen on November 17, 2016,  he did note some cold-induced neuropathic symptoms. He completed 12 cycles of FOLFOX. His last chemotherapy was administered on December 7, 2016.  He did have a PET scan done after 12 cycles of chemotherapy, and this came back essentially negative for metastatic disease.   He had staging studies on April 6, 2017 including CT abdomen and pelvis, which was essentially negative. CT of the chest was negative. The patient also underwent a colonoscopy in May 2017, which revealed a tubular adenoma at the hepatic flexure of the colon. Next colonoscopy will be due in 2020.  CT chest abdomen and pelvis was done on 8/13/18 and showed a stable liver lesion, small stable lung nodules and no evidence of new or worsening metastatic disease. Most recent CT scans from 10/7/19 are stable. Tumor marker remains normal. Iron studies are stable. Ferritin is normal.  Patient has some ongoing neuropathy in his fingers and feet, this is stable. Patient also reports some dizziness when changing positions or when getting up from laying down. He states he saw his primary provider for this and will continue to monitor. He has an occasional cough, denies any shortness of breath or chest pain. He is otherwise feeling well.     Review Of Systems:  Review Of Systems  Eyes/Ears/Nose/Throat: denies new vision or hearing changes  Respiratory: Occasional cough. No shortness of breath, dyspnea on exertion or hemoptysis  Cardiovascular: denies chest pain or palpitaitos  Gastrointestinal: denies abdominal pain, no bowel changes  Genitourinary: denies dysuria or hematuria  Musculoskeletal: denies new bone pain or muscle weakness  Neurologic: reports ongoing dizziness with changing positions, denies headaches  Hematologic/Lymphatic/Immunologic: denies fevers, chills, night sweats      Nursing Notes:   Dariela Miles LPN  10/22/2019  8:23 AM  Signed  Chief Complaint  "  Patient presents with     RECHECK     Colon Cancer       Initial /76 (BP Location: Right arm, Patient Position: Sitting, Cuff Size: Adult Regular)   Pulse 96   Temp 96.5  F (35.8  C) (Tympanic)   Resp 16   Ht 1.74 m (5' 8.5\")   Wt 109.6 kg (241 lb 9.6 oz)   BMI 36.20 kg/m    Estimated body mass index is 36.2 kg/m  as calculated from the following:    Height as of this encounter: 1.74 m (5' 8.5\").    Weight as of this encounter: 109.6 kg (241 lb 9.6 oz).  Medication Reconciliation: complete  Dariela Miles LPN    Past medical, social, surgical, and family histories reviewed.    Allergies:  Allergies as of 10/22/2019 - Reviewed 10/22/2019   Allergen Reaction Noted     Aspirin  05/26/2017     Canagliflozin  05/18/2016     Morphine  05/18/2016       Current Medications:  Current Outpatient Medications   Medication Sig Dispense Refill     allopurinol (ZYLOPRIM) 100 MG tablet TAKE 1 TABLET (100 MG) BY MOUTH 2 TIMES DAILY 180 tablet 0     clopidogrel (PLAVIX) 75 MG tablet TAKE 1 TABLET (75MG) BY MOUTH DAILY 90 tablet 3     Cyanocobalamin (VITAMIN B-12 CR) 1000 MCG TBCR Take 1 tablet by mouth once daily.       glipiZIDE (GLUCOTROL) 10 MG tablet TAKE 1-2 TABLET BY MOUTH TWICE A DAY WITH MEALS FOR DIABETES - ADJUST DOSE AS NEEDED FOR HIGH GLUCOSE - DOSE INCREASE 9/6/2018 /6/2018 360 tablet 3     lisinopril (PRINIVIL/ZESTRIL) 2.5 MG tablet TAKE 1 TABLET (2.5MG) BY MOUTH ONCE DAILY 90 tablet 3     metFORMIN (GLUCOPHAGE) 1000 MG tablet TAKE 1 TABLET BY MOUTH TWICE DAILY WITH MEALS 180 tablet 3     metoprolol tartrate (LOPRESSOR) 50 MG tablet TAKE 1 TABLET TWICE DAILY 180 tablet 3     nitroGLYcerin (NITROSTAT) 0.4 MG sublingual tablet Place 1 tablet under the tongue every 5 minutes if needed for Chest Pain.       pantoprazole (PROTONIX) 40 MG EC tablet TAKE 1 TABLET BY MOUTH ONCE DAILY BEFORE A MEAL. 90 tablet 3     simvastatin (ZOCOR) 40 MG tablet TAKE 1 TABLET BY MOUTH AT BEDTIME FOR CHOLESTEROL. 90 tablet 3 " "       Physical Exam:  /76 (BP Location: Right arm, Patient Position: Sitting, Cuff Size: Adult Regular)   Pulse 96   Temp 96.5  F (35.8  C) (Tympanic)   Resp 16   Ht 1.74 m (5' 8.5\")   Wt 109.6 kg (241 lb 9.6 oz)   BMI 36.20 kg/m      GENERAL APPEARANCE: 73 year old male, alert and no distress     NECK: no adenopathy, no asymmetry or masses     LYMPHATICS: No cervical, supraclavicular, axillary lymphadenopathy     RESP: few inspiratory wheezes left lung, normal respiratory effort     CARDIOVASCULAR: regular rates and rhythm, normal S1 S2     ABDOMEN:  soft, nontender, no HSM or masses and bowel sounds normal     MUSCULOSKELETAL: extremities normal- no gross deformities noted,  No edema b/l LE.     SKIN: no suspicious lesions or rashes     PSYCHIATRIC: mentation appears normal and affect normal    Laboratory/Imaging Studies  No visits with results within 2 Week(s) from this visit.   Latest known visit with results is:   Orders Only on 10/07/2019   Component Date Value Ref Range Status     Carcinoembryonic Agn 10/07/2019 <3.0  <3.0 ng/mL Final     Lactate Dehydrogenase 10/07/2019 176  140 - 271 U/L Final     Sodium 10/07/2019 135  134 - 144 mmol/L Final     Potassium 10/07/2019 4.1  3.5 - 5.1 mmol/L Final     Chloride 10/07/2019 100  98 - 107 mmol/L Final     Carbon Dioxide 10/07/2019 24  21 - 31 mmol/L Final     Anion Gap 10/07/2019 11  3 - 14 mmol/L Final     Glucose 10/07/2019 316* 70 - 105 mg/dL Final     Urea Nitrogen 10/07/2019 16  7 - 25 mg/dL Final     Creatinine 10/07/2019 0.98  0.70 - 1.30 mg/dL Final     GFR Estimate 10/07/2019 75  >60 mL/min/[1.73_m2] Final     GFR Estimate If Black 10/07/2019 >90  >60 mL/min/[1.73_m2] Final     Calcium 10/07/2019 9.3  8.6 - 10.3 mg/dL Final     Bilirubin Total 10/07/2019 0.6  0.3 - 1.0 mg/dL Final     Albumin 10/07/2019 4.3  3.5 - 5.7 g/dL Final     Protein Total 10/07/2019 7.2  6.4 - 8.9 g/dL Final     Alkaline Phosphatase 10/07/2019 112* 34 - 104 U/L Final "     ALT 10/07/2019 56* 7 - 52 U/L Final     AST 10/07/2019 64* 13 - 39 U/L Final     WBC 10/07/2019 6.1  4.0 - 11.0 10e9/L Final     RBC Count 10/07/2019 4.55  4.4 - 5.9 10e12/L Final     Hemoglobin 10/07/2019 13.6  13.3 - 17.7 g/dL Final     Hematocrit 10/07/2019 41.3  40.0 - 53.0 % Final     MCV 10/07/2019 91  78 - 100 fl Final     MCH 10/07/2019 29.9  26.5 - 33.0 pg Final     MCHC 10/07/2019 32.9  31.5 - 36.5 g/dL Final     RDW 10/07/2019 14.2  10.0 - 15.0 % Final     Platelet Count 10/07/2019 214  150 - 450 10e9/L Final     Diff Method 10/07/2019 Automated Method   Final     % Neutrophils 10/07/2019 63.2  % Final     % Lymphocytes 10/07/2019 23.1  % Final     % Monocytes 10/07/2019 11.1  % Final     % Eosinophils 10/07/2019 1.8  % Final     % Basophils 10/07/2019 0.3  % Final     % Immature Granulocytes 10/07/2019 0.5  % Final     Absolute Neutrophil 10/07/2019 3.9  1.6 - 8.3 10e9/L Final     Absolute Lymphocytes 10/07/2019 1.4  0.8 - 5.3 10e9/L Final     Absolute Monocytes 10/07/2019 0.7  0.0 - 1.3 10e9/L Final     Absolute Eosinophils 10/07/2019 0.1  0.0 - 0.7 10e9/L Final     Absolute Basophils 10/07/2019 0.0  0.0 - 0.2 10e9/L Final     Abs Immature Granulocytes 10/07/2019 0.0  0 - 0.4 10e9/L Final     Vitamin B12 10/07/2019 256  180 - 914 pg/mL Final     Ferritin 10/07/2019 184  23.9 - 336.2 ng/mL Final     Iron 10/07/2019 96  50 - 212 ug/dL Final     UIBC (Unsaturated) 10/07/2019 317.00  mg/dL Final     Iron Binding Capacity 10/07/2019 413.00* 245.00 - 400.00 ug/dL Final     Iron Saturation 10/07/2019 23  20 - 55 % Final        ASSESSMENT/PLAN:  Stage III moderately differentiated adenocarcinoma of the sigmoid colon with 2 out of 8 lymph nodes positive for metastatic disease, consistent with pathologic [id number] disease.PET scan was negative for metastatic disease. The patient was started on adjuvant FOLFOX chemotherapy and completed 12 cycles. Course complicated daily by peripheral neuropathy, grade 1, which  is improving. PET scan indicates no evidence of metastatic disease. CEA was normal. Recent CT of chest abdomen and pelvis from 8/2018 show a stable liver lesion and stable small lung nodules with no evidence of disease.  Colonoscopy done in May 2017 revealed a tubular adenoma. The patient will be due for a colonoscopy in 2020. Most recent scans from 10/7/19 are stable. Tumor marker is normal. Liver functions remains slightly elevated, we will continue to monitor. Will have patient get a cxr today for cough, wheezing on exam, will call patient with results. Otherwise we will see the patient in 6 months with a CBC, CMP, LDH, CEA, ferritin, B12,  iron studies and CT chest, abdomen, and pelvis. Patient was encouraged to follow up with his primary provider for ongoing dizziness.    Twenty minutes spent with patient with greater than 50% of that time spent counseling patient regarding disease process, interpretation of labs and imaging, discussing plan for surveillance and coordination of care

## 2019-12-06 ENCOUNTER — TELEPHONE (OUTPATIENT)
Dept: INTERNAL MEDICINE | Facility: OTHER | Age: 74
End: 2019-12-06

## 2019-12-20 DIAGNOSIS — I25.10 CORONARY ARTERY DISEASE INVOLVING NATIVE CORONARY ARTERY OF NATIVE HEART WITHOUT ANGINA PECTORIS: ICD-10-CM

## 2019-12-20 DIAGNOSIS — K21.00 REFLUX ESOPHAGITIS: ICD-10-CM

## 2019-12-20 NOTE — LETTER
December 23, 2019      Andrez Olivier  [address] [facility] [address]          Dear Andrez,     A refill request was received from your pharmacy for Metoprolol and Protonix.    Additional refills require and office visit with Dr. Roberts for annual review .    Please call [phone] to schedule appointment.      Sincerely,      Refill Nurse

## 2019-12-23 RX ORDER — PANTOPRAZOLE SODIUM 40 MG/1
TABLET, DELAYED RELEASE ORAL
Qty: 90 TABLET | Refills: 3 | Status: SHIPPED | OUTPATIENT
Start: 2019-12-23 | End: 2020-11-06

## 2019-12-23 RX ORDER — METOPROLOL TARTRATE 50 MG
TABLET ORAL
Qty: 180 TABLET | Refills: 3 | Status: SHIPPED | OUTPATIENT
Start: 2019-12-23 | End: 2020-11-06

## 2019-12-23 NOTE — TELEPHONE ENCOUNTER
Routing refill request to provider for review/approval because:  Patient needs to be seen because it has been more than 1 year since last office visit.    LOV: 9/6/18  Letter sent  Marina Joy RN on 12/23/2019 at 10:10 AM

## 2020-02-06 DIAGNOSIS — Z87.39 HISTORY OF GOUT: ICD-10-CM

## 2020-02-07 RX ORDER — ALLOPURINOL 100 MG/1
100 TABLET ORAL 2 TIMES DAILY
Qty: 180 TABLET | Refills: 3 | Status: SHIPPED | OUTPATIENT
Start: 2020-02-07 | End: 2020-11-06

## 2020-02-07 NOTE — TELEPHONE ENCOUNTER
Routing refill request to provider for review/approval because:  Patircia given x1 and patient did not follow up, please advise  Patient needs to be seen because it has been more than 1 year since last office visit.     Has Uric Acid on file in past 12 months and value is less than 6    Recent (12 mo) or future (30 days) visit within the authorizing provider's specialty     LOV 9/6/2018    Called and informed patient he is due for annual review and labs.  Patient states he has a wife that he has to take care of so will call to schedule appointment to come in when it gets warmer outside .    Marina Joy RN on 2/7/2020 at 10:30 AM

## 2020-04-23 ENCOUNTER — HOSPITAL ENCOUNTER (OUTPATIENT)
Dept: CT IMAGING | Facility: OTHER | Age: 75
End: 2020-04-23
Attending: NURSE PRACTITIONER
Payer: MEDICARE

## 2020-04-23 DIAGNOSIS — C18.7 CANCER OF SIGMOID COLON (H): ICD-10-CM

## 2020-04-23 DIAGNOSIS — D50.9 IRON DEFICIENCY ANEMIA, UNSPECIFIED IRON DEFICIENCY ANEMIA TYPE: ICD-10-CM

## 2020-04-23 LAB
ALBUMIN SERPL-MCNC: 4.3 G/DL (ref 3.5–5.7)
ALP SERPL-CCNC: 98 U/L (ref 34–104)
ALT SERPL W P-5'-P-CCNC: 31 U/L (ref 7–52)
ANION GAP SERPL CALCULATED.3IONS-SCNC: 10 MMOL/L (ref 3–14)
AST SERPL W P-5'-P-CCNC: 30 U/L (ref 13–39)
BASOPHILS # BLD AUTO: 0 10E9/L (ref 0–0.2)
BASOPHILS NFR BLD AUTO: 0.4 %
BILIRUB SERPL-MCNC: 0.6 MG/DL (ref 0.3–1)
BUN SERPL-MCNC: 18 MG/DL (ref 7–25)
CALCIUM SERPL-MCNC: 9.5 MG/DL (ref 8.6–10.3)
CEA SERPL-MCNC: <3 NG/ML
CHLORIDE SERPL-SCNC: 101 MMOL/L (ref 98–107)
CO2 SERPL-SCNC: 24 MMOL/L (ref 21–31)
CREAT SERPL-MCNC: 0.97 MG/DL (ref 0.7–1.3)
DIFFERENTIAL METHOD BLD: NORMAL
EOSINOPHIL # BLD AUTO: 0.1 10E9/L (ref 0–0.7)
EOSINOPHIL NFR BLD AUTO: 2 %
ERYTHROCYTE [DISTWIDTH] IN BLOOD BY AUTOMATED COUNT: 14.6 % (ref 10–15)
FERRITIN SERPL-MCNC: 132 NG/ML (ref 23.9–336.2)
GFR SERPL CREATININE-BSD FRML MDRD: 76 ML/MIN/{1.73_M2}
GLUCOSE SERPL-MCNC: 316 MG/DL (ref 70–105)
HCT VFR BLD AUTO: 41.8 % (ref 40–53)
HGB BLD-MCNC: 13.6 G/DL (ref 13.3–17.7)
IMM GRANULOCYTES # BLD: 0 10E9/L (ref 0–0.4)
IMM GRANULOCYTES NFR BLD: 0.4 %
IRON SATN MFR SERPL: 24 % (ref 20–55)
IRON SERPL-MCNC: 87 UG/DL (ref 50–212)
LDH SERPL L TO P-CCNC: 140 U/L (ref 140–271)
LYMPHOCYTES # BLD AUTO: 1.5 10E9/L (ref 0.8–5.3)
LYMPHOCYTES NFR BLD AUTO: 20.6 %
MCH RBC QN AUTO: 29.8 PG (ref 26.5–33)
MCHC RBC AUTO-ENTMCNC: 32.5 G/DL (ref 31.5–36.5)
MCV RBC AUTO: 92 FL (ref 78–100)
MONOCYTES # BLD AUTO: 0.8 10E9/L (ref 0–1.3)
MONOCYTES NFR BLD AUTO: 11.7 %
NEUTROPHILS # BLD AUTO: 4.6 10E9/L (ref 1.6–8.3)
NEUTROPHILS NFR BLD AUTO: 64.9 %
PLATELET # BLD AUTO: 229 10E9/L (ref 150–450)
POTASSIUM SERPL-SCNC: 4.5 MMOL/L (ref 3.5–5.1)
PROT SERPL-MCNC: 6.9 G/DL (ref 6.4–8.9)
RBC # BLD AUTO: 4.56 10E12/L (ref 4.4–5.9)
SODIUM SERPL-SCNC: 135 MMOL/L (ref 134–144)
TIBC SERPL-MCNC: 366.8 UG/DL (ref 245–400)
UIBC (UNSATURATED): 279.8 MG/DL
VIT B12 SERPL-MCNC: 343 PG/ML (ref 180–914)
WBC # BLD AUTO: 7.1 10E9/L (ref 4–11)

## 2020-04-23 PROCEDURE — 25500064 ZZH RX 255 OP 636: Performed by: NURSE PRACTITIONER

## 2020-04-23 PROCEDURE — 83615 LACTATE (LD) (LDH) ENZYME: CPT | Mod: ZL | Performed by: NURSE PRACTITIONER

## 2020-04-23 PROCEDURE — 71260 CT THORAX DX C+: CPT

## 2020-04-23 PROCEDURE — 82728 ASSAY OF FERRITIN: CPT | Mod: ZL | Performed by: NURSE PRACTITIONER

## 2020-04-23 PROCEDURE — 82607 VITAMIN B-12: CPT | Mod: ZL | Performed by: NURSE PRACTITIONER

## 2020-04-23 PROCEDURE — 74177 CT ABD & PELVIS W/CONTRAST: CPT

## 2020-04-23 PROCEDURE — 80053 COMPREHEN METABOLIC PANEL: CPT | Mod: ZL | Performed by: NURSE PRACTITIONER

## 2020-04-23 PROCEDURE — 85025 COMPLETE CBC W/AUTO DIFF WBC: CPT | Mod: ZL | Performed by: NURSE PRACTITIONER

## 2020-04-23 PROCEDURE — 82378 CARCINOEMBRYONIC ANTIGEN: CPT | Mod: ZL | Performed by: NURSE PRACTITIONER

## 2020-04-23 PROCEDURE — 25000128 H RX IP 250 OP 636: Performed by: NURSE PRACTITIONER

## 2020-04-23 PROCEDURE — 83550 IRON BINDING TEST: CPT | Mod: ZL | Performed by: NURSE PRACTITIONER

## 2020-04-23 PROCEDURE — 83540 ASSAY OF IRON: CPT | Mod: ZL | Performed by: NURSE PRACTITIONER

## 2020-04-23 PROCEDURE — 36415 COLL VENOUS BLD VENIPUNCTURE: CPT | Mod: ZL | Performed by: NURSE PRACTITIONER

## 2020-04-23 RX ORDER — HEPARIN SODIUM (PORCINE) LOCK FLUSH IV SOLN 100 UNIT/ML 100 UNIT/ML
500 SOLUTION INTRAVENOUS
Status: COMPLETED | OUTPATIENT
Start: 2020-04-23 | End: 2020-04-23

## 2020-04-23 RX ADMIN — IOHEXOL 100 ML: 350 INJECTION, SOLUTION INTRAVENOUS at 09:30

## 2020-04-23 RX ADMIN — Medication 500 UNITS: at 09:41

## 2020-05-01 ENCOUNTER — ONCOLOGY VISIT (OUTPATIENT)
Dept: ONCOLOGY | Facility: OTHER | Age: 75
End: 2020-05-01
Attending: INTERNAL MEDICINE
Payer: COMMERCIAL

## 2020-05-01 VITALS
OXYGEN SATURATION: 94 % | HEART RATE: 81 BPM | WEIGHT: 241 LBS | DIASTOLIC BLOOD PRESSURE: 68 MMHG | BODY MASS INDEX: 35.7 KG/M2 | SYSTOLIC BLOOD PRESSURE: 110 MMHG | TEMPERATURE: 97.9 F | HEIGHT: 69 IN | RESPIRATION RATE: 20 BRPM

## 2020-05-01 DIAGNOSIS — C18.7 CANCER OF SIGMOID COLON (H): Primary | ICD-10-CM

## 2020-05-01 PROCEDURE — 99215 OFFICE O/P EST HI 40 MIN: CPT | Performed by: INTERNAL MEDICINE

## 2020-05-01 PROCEDURE — G0463 HOSPITAL OUTPT CLINIC VISIT: HCPCS

## 2020-05-01 ASSESSMENT — PAIN SCALES - GENERAL: PAINLEVEL: NO PAIN (0)

## 2020-05-01 ASSESSMENT — MIFFLIN-ST. JEOR: SCORE: 1815.61

## 2020-05-01 NOTE — PROGRESS NOTES
Visit Date:   05/01/2020      HEMATOLOGY/ONCOLOGY CLINIC NOTE      HISTORY OF PRESENT ILLNESS:  Mr. Olivier returns for followup of colon cancer.  We had seen the patient in consultation on 06/01/2016.  At that time, he was a 70-year-old white male with history of coronary artery disease, type 2 diabetes mellitus, hyperlipidemia, and hypertension who presented to the Emergency Room on 04/25/2016 with complaints of chest pain radiating down his arms.  At that time, he was found to have a hemoglobin of 7.1 and subsequently was admitted.  CBC revealed microcytic indices with an MCV of 62.  He was noted to be iron deficient, and he was transfused 2 units of packed red cells and ruled out for MI.  Subsequently, was seen by Dr. Solano who performed a colonoscopy, and he was noted to have a mass in the sigmoid colon that was consistent with adenocarcinoma.  He also had multiple adenomatous polyps.  The patient subsequently was admitted on 05/11/2016 for sigmoid colectomy.  This was performed on 05/17/2016.  Pathology of sigmoid colon revealed a mass consistent with moderately differentiated adenocarcinoma.  Tumor size was 2 x 1 x 0.7 cm.  Tumor invaded the muscularis propria.  Two out of 11 lymph nodes were positive for metastatic carcinoma.  Margins were negative for tumor.  The grade pf the tumor was ruled as moderately differentiated.  The patient was staged pathologic T2 N1b.  As part of the postoperative evaluation, the patient had a CT of the abdomen and pelvis on 05/12/2016, and this revealed there were 2 nonspecific low attenuation lesions in the liver.  Metastasis could not be excluded.  There was no other lymphadenopathy or additional findings suggestive of metastases.  Preop CEA level was less than 3.  When we saw the patient, we wanted to rule out metastatic disease.  A PET scan was performed on 06/15/2016 and was essentially negative for metastatic disease.  The lesion seen on prior PET in the liver was not  hypermetabolic.  We felt the patient had stage III disease, and he would be a candidate for adjuvant chemotherapy.  When we saw the patient on 06/28/2016, the plan was to start FOLFOX x 12 cycles.  The patient was started and received a total of 10 cycles and on 11/17/2016, he did note some cold-induced neuropathic symptoms with cough sensation when he drinks cold liquids but otherwise was doing relatively well.  We elected to restage him after 12 cycles of FOLFOX.  His last chemotherapy was given on 12/07/2016.  During his last chemotherapy, he became severely ill.  He developed numbness in his hands and significant cold-induced neuropathy, as well as loss of sense of taste.  He had a PET scan, which was essentially negative for metastatic disease.  We started the patient on vitamin B6 to help his neuropathy improve.  His symptoms did improve.  Subsequently, he has had serial CTs of the chest, abdomen and pelvis, which were negative.  He had a colonoscopy in 05/2017, which revealed tubular adenoma in the hepatic flexure of the colon.  The patient otherwise is due for a colonoscopy this year.  He is doing relatively well.  He lost his wife 2 months ago.  Otherwise, he offers no other complaints of shortness of breath, chest pain, abdominal pain, fevers, night sweats, weight loss.  He has not seen his primary care doctor in the recent past for his diabetes.      PHYSICAL EXAMINATION:   GENERAL:  He is an obese, elderly white male in no acute distress.   VITAL SIGNS:  Blood pressure 110/68, pulse 81, respirations 20, temperature 97.9.   HEENT:  Atraumatic, normocephalic.  Oropharynx nonerythematous.   NECK:  Supple.   LUNGS:  Clear to auscultation and percussion.   HEART:  Regular rhythm, S1 is normal.   ABDOMEN:  Obese, soft, normoactive bowel sounds.  No mass, nontender.   LYMPHATICS:  No cervical, supraclavicular, axillary or inguinal nodes.   EXTREMITIES:  With trace ankle edema.   NEUROLOGIC:  Nonfocal.       LABORATORY DATA:  Laboratories reveal CBC with white count 7.1, H and H 13.6 and 41.8, platelet count was 229.  BUN 18, creatinine 0.97, glucose 316.  LFTs are normal.  LDH is normal.  CEA is less than 3.  Imaging reveals CT chest, abdomen and pelvis that was essentially negative for metastatic disease performed on 2020.      IMPRESSION:     1.  Stage III moderately differentiated adenocarcinoma of the sigmoid colon with 2/11 lymph nodes positive for metastatic disease consistent with pathologic T2 N1b M0 colon cancer.  PET scan was negative for metastatic disease.  The patient was started on adjuvant FOLFOX chemotherapy and completed 12 cycles.  Course complicated by peripheral neuropathy, grade 1, which improved.  PET scan did not reveal any evidence of metastatic disease.  CEA was normal.  Recent scans have been negative for metastatic disease.  He is due to have a colonoscopy this year.   2.  Hyperglycemia.  The patient needs to follow up with his primary concerning diabetes control.  We will see the patient in 4 months after his colonoscopy.  Obtain CBC, CMP, LDH, CEA.      Forty minutes was spent with the patient, greater than half the time spent in counseling and coordination of care.         IVONNE SEYMOUR MD             D: 2020   T: 2020   MT: ALBERTO      Name:     LIEN MCKEON   MRN:      [id number]        Account:      [id number]   :      1945           Visit Date:   2020      Document: [id number]       cc: Bk Solano MD

## 2020-05-01 NOTE — NURSING NOTE
"Chief Complaint   Patient presents with     RECHECK     Colon cancer   No current concerns or complaints.     Initial /68   Pulse 81   Temp 97.9  F (36.6  C) (Oral)   Resp 20   Ht 1.74 m (5' 8.5\")   Wt 109.3 kg (241 lb)   SpO2 94%   BMI 36.11 kg/m   Estimated body mass index is 36.11 kg/m  as calculated from the following:    Height as of this encounter: 1.74 m (5' 8.5\").    Weight as of this encounter: 109.3 kg (241 lb).  Medication Reconciliation: complete    Amaris Ray CMA (AAMA)  "

## 2020-05-05 ENCOUNTER — TELEPHONE (OUTPATIENT)
Dept: SURGERY | Facility: OTHER | Age: 75
End: 2020-05-05

## 2020-05-05 DIAGNOSIS — C18.7 CANCER OF SIGMOID COLON (H): ICD-10-CM

## 2020-05-05 DIAGNOSIS — Z01.818 PREOP TESTING: Primary | ICD-10-CM

## 2020-05-06 RX ORDER — BISACODYL 5 MG/1
TABLET, DELAYED RELEASE ORAL
Qty: 2 TABLET | Refills: 0 | Status: ON HOLD | OUTPATIENT
Start: 2020-05-06 | End: 2020-06-01

## 2020-05-06 RX ORDER — POLYETHYLENE GLYCOL 3350, SODIUM CHLORIDE, SODIUM BICARBONATE, POTASSIUM CHLORIDE 420; 11.2; 5.72; 1.48 G/4L; G/4L; G/4L; G/4L
4000 POWDER, FOR SOLUTION ORAL ONCE
Qty: 4000 ML | Refills: 0 | Status: ON HOLD | OUTPATIENT
Start: 2020-05-06 | End: 2020-06-01

## 2020-05-06 NOTE — TELEPHONE ENCOUNTER
Screening Questions for the Scheduling of Screening Colonoscopies   (If Colonoscopy is diagnostic, Provider should review the chart before scheduling.)  Are you younger than 50 or older than 80?  yes  Do you take aspirin or fish oil?  no (if yes, tell patient to stop 1 week prior to Colonoscopy)  Do you take warfarin (Coumadin), clopidogrel (Plavix), apixaban (Eliquis), dabigatram (Pradaxa), rivaroxaban (Xarelto) or any blood thinner? Yes  Plavix  Do you use oxygen at home?  no  Do you have kidney disease? no  Are you on dialysis? no  Have you had a stroke or heart attack in the last year? no  Have you had a stent in your heart or any blood vessel in the last year? no  Have you had a transplant of any organ? no  Have you had a colonoscopy or upper endoscopy (EGD) before? yes         When?  3 years ago  Date of scheduled Colonoscopy. 06/01/2020  Provider Russ  [facility]    Please place covid orders    Carina Mccord on 5/6/2020 at 11:34 AM

## 2020-05-29 ENCOUNTER — ALLIED HEALTH/NURSE VISIT (OUTPATIENT)
Dept: FAMILY MEDICINE | Facility: OTHER | Age: 75
End: 2020-05-29
Attending: SURGERY
Payer: MEDICARE

## 2020-05-29 DIAGNOSIS — Z01.818 PREOP TESTING: ICD-10-CM

## 2020-05-29 PROCEDURE — 99207 ZZC NO CHARGE NURSE ONLY: CPT

## 2020-05-29 PROCEDURE — 87635 SARS-COV-2 COVID-19 AMP PRB: CPT | Mod: ZL | Performed by: SURGERY

## 2020-05-31 LAB
SARS-COV-2 RNA SPEC QL NAA+PROBE: NOT DETECTED
SPECIMEN SOURCE: NORMAL

## 2020-06-01 ENCOUNTER — ANESTHESIA EVENT (OUTPATIENT)
Dept: SURGERY | Facility: OTHER | Age: 75
End: 2020-06-01
Payer: MEDICARE

## 2020-06-01 ENCOUNTER — ANESTHESIA (OUTPATIENT)
Dept: SURGERY | Facility: OTHER | Age: 75
End: 2020-06-01
Payer: MEDICARE

## 2020-06-01 ENCOUNTER — HOSPITAL ENCOUNTER (OUTPATIENT)
Facility: OTHER | Age: 75
Discharge: HOME | End: 2020-06-01
Attending: SURGERY | Admitting: SURGERY
Payer: MEDICARE

## 2020-06-01 VITALS
HEART RATE: 72 BPM | SYSTOLIC BLOOD PRESSURE: 136 MMHG | DIASTOLIC BLOOD PRESSURE: 70 MMHG | RESPIRATION RATE: 16 BRPM | TEMPERATURE: 97.8 F | OXYGEN SATURATION: 98 %

## 2020-06-01 PROBLEM — K63.5 COLON POLYPS: Status: ACTIVE | Noted: 2020-06-01

## 2020-06-01 PROCEDURE — 45385 COLONOSCOPY W/LESION REMOVAL: CPT | Mod: PT | Performed by: SURGERY

## 2020-06-01 PROCEDURE — 45384 COLONOSCOPY W/LESION REMOVAL: CPT | Mod: PT,XU | Performed by: SURGERY

## 2020-06-01 PROCEDURE — 88305 TISSUE EXAM BY PATHOLOGIST: CPT

## 2020-06-01 PROCEDURE — 99100 ANES PT EXTEME AGE<1 YR&>70: CPT | Performed by: NURSE ANESTHETIST, CERTIFIED REGISTERED

## 2020-06-01 PROCEDURE — 25000132 ZZH RX MED GY IP 250 OP 250 PS 637: Mod: GY | Performed by: SURGERY

## 2020-06-01 PROCEDURE — 25000128 H RX IP 250 OP 636: Performed by: NURSE ANESTHETIST, CERTIFIED REGISTERED

## 2020-06-01 PROCEDURE — 25000125 ZZHC RX 250: Performed by: SURGERY

## 2020-06-01 PROCEDURE — 45384 COLONOSCOPY W/LESION REMOVAL: CPT | Mod: PT | Performed by: SURGERY

## 2020-06-01 PROCEDURE — 25800030 ZZH RX IP 258 OP 636: Performed by: SURGERY

## 2020-06-01 PROCEDURE — 45385 COLONOSCOPY W/LESION REMOVAL: CPT | Mod: PT

## 2020-06-01 PROCEDURE — 45385 COLONOSCOPY W/LESION REMOVAL: CPT | Performed by: NURSE ANESTHETIST, CERTIFIED REGISTERED

## 2020-06-01 PROCEDURE — 25000128 H RX IP 250 OP 636: Performed by: SURGERY

## 2020-06-01 PROCEDURE — 25000125 ZZHC RX 250: Performed by: NURSE ANESTHETIST, CERTIFIED REGISTERED

## 2020-06-01 RX ORDER — SODIUM CHLORIDE, SODIUM LACTATE, POTASSIUM CHLORIDE, CALCIUM CHLORIDE 600; 310; 30; 20 MG/100ML; MG/100ML; MG/100ML; MG/100ML
INJECTION, SOLUTION INTRAVENOUS CONTINUOUS
Status: DISCONTINUED | OUTPATIENT
Start: 2020-06-01 | End: 2020-06-01 | Stop reason: HOSPADM

## 2020-06-01 RX ORDER — LIDOCAINE 40 MG/G
CREAM TOPICAL
Status: DISCONTINUED | OUTPATIENT
Start: 2020-06-01 | End: 2020-06-01 | Stop reason: HOSPADM

## 2020-06-01 RX ORDER — SIMETHICONE 40MG/0.6ML
SUSPENSION, DROPS(FINAL DOSAGE FORM)(ML) ORAL PRN
Status: DISCONTINUED | OUTPATIENT
Start: 2020-06-01 | End: 2020-06-01 | Stop reason: HOSPADM

## 2020-06-01 RX ORDER — PROPOFOL 10 MG/ML
INJECTION, EMULSION INTRAVENOUS PRN
Status: DISCONTINUED | OUTPATIENT
Start: 2020-06-01 | End: 2020-06-01

## 2020-06-01 RX ORDER — ONDANSETRON 2 MG/ML
4 INJECTION INTRAMUSCULAR; INTRAVENOUS
Status: DISCONTINUED | OUTPATIENT
Start: 2020-06-01 | End: 2020-06-01 | Stop reason: HOSPADM

## 2020-06-01 RX ORDER — HEPARIN SODIUM (PORCINE) LOCK FLUSH IV SOLN 100 UNIT/ML 100 UNIT/ML
500 SOLUTION INTRAVENOUS
Status: COMPLETED | OUTPATIENT
Start: 2020-06-01 | End: 2020-06-01

## 2020-06-01 RX ORDER — NALOXONE HYDROCHLORIDE 0.4 MG/ML
.1-.4 INJECTION, SOLUTION INTRAMUSCULAR; INTRAVENOUS; SUBCUTANEOUS
Status: DISCONTINUED | OUTPATIENT
Start: 2020-06-01 | End: 2020-06-01 | Stop reason: HOSPADM

## 2020-06-01 RX ORDER — FLUMAZENIL 0.1 MG/ML
0.2 INJECTION, SOLUTION INTRAVENOUS
Status: DISCONTINUED | OUTPATIENT
Start: 2020-06-01 | End: 2020-06-01 | Stop reason: HOSPADM

## 2020-06-01 RX ORDER — PROPOFOL 10 MG/ML
INJECTION, EMULSION INTRAVENOUS CONTINUOUS PRN
Status: DISCONTINUED | OUTPATIENT
Start: 2020-06-01 | End: 2020-06-01

## 2020-06-01 RX ADMIN — PROPOFOL 120 MCG/KG/MIN: 10 INJECTION, EMULSION INTRAVENOUS at 09:31

## 2020-06-01 RX ADMIN — PROPOFOL 90 MG: 10 INJECTION, EMULSION INTRAVENOUS at 09:31

## 2020-06-01 RX ADMIN — SODIUM CHLORIDE, POTASSIUM CHLORIDE, SODIUM LACTATE AND CALCIUM CHLORIDE: 600; 310; 30; 20 INJECTION, SOLUTION INTRAVENOUS at 09:24

## 2020-06-01 RX ADMIN — Medication 500 UNITS: at 10:50

## 2020-06-01 RX ADMIN — PROPOFOL 40 MG: 10 INJECTION, EMULSION INTRAVENOUS at 09:33

## 2020-06-01 ASSESSMENT — LIFESTYLE VARIABLES: TOBACCO_USE: 1

## 2020-06-01 NOTE — DISCHARGE INSTRUCTIONS
[facility]  Adult Discharge Orders & Instructions    ________________________________________________________________          For 12 hours after surgery  1. Get plenty of rest.  A responsible adult must stay with you for at least 12 hours after you leave the hospital.   2. You may feel lightheaded.  IF so, sit for a few minutes before standing.  Have someone help you get up.   3. You may have a slight fever. Call the doctor if your fever is over 101 F (38.3 C) (taken under the tongue) or lasts longer than 24 hours.  4. You may have a dry mouth, a sore throat, muscle aches or trouble sleeping.  These should go away after 24 hours.  5. Do not make important or legal decisions.  6.   Do not drive or use heavy equipment.  If you have weakness or tingling, don't drive or use heavy equipment until this feeling goes away.    To contact a doctor, call   [phone]_______________________

## 2020-06-01 NOTE — PROGRESS NOTES
Pt tolerating water. Pt denies dizziness or pain. Port flushed with Heparin and then deaccessed, needle intact. AVS reviewed with pt, no questions. Pt then wished to ambulate out to daughter's waiting car.

## 2020-06-01 NOTE — ANESTHESIA PREPROCEDURE EVALUATION
Anesthesia Pre-Procedure Evaluation    Patient: Andrez Olivier   MRN: [id number] : 1945          Preoperative Diagnosis: Cancer of sigmoid colon (H) [C18.7]    Procedure(s):  COLONOSCOPY    Past Medical History:   Diagnosis Date     Acute myocardial infarction (H)     ,MI at age 44, s/p angioplasty, ANW; MI at age 49, s/p stent placement at [facility]     Atherosclerotic heart disease of native coronary artery without angina pectoris     2013,BRYON to,mid RCA (angina and abnormal myoview), ANW     Diverticulosis of large intestine without perforation or abscess without bleeding     2016     Gastro-esophageal reflux disease with esophagitis     5/10/2016     Gout     No Comments Provided     History of colonic polyps     2016     Hyperlipidemia     No Comments Provided     Malignant neoplasm of sigmoid colon (H)     2016     Obesity     No Comments Provided     Other gastritis without bleeding     2016     Personal history of nicotine dependence     No Comments Provided     Past Surgical History:   Procedure Laterality Date     APPENDECTOMY OPEN      at age of 12     COLON SURGERY      16,Colectomy, Sigmoid     COLONOSCOPY      16,F/U ; Dr Solano     COLONOSCOPY  05/15/2017    05/15/2017,F/U      ESOPHAGOSCOPY, GASTROSCOPY, DUODENOSCOPY (EGD), COMBINED      16,EGD; Dr Solano     HEART CATH, ANGIOPLASTY      ,Stenting coronary artery, presumably LAD     OTHER SURGICAL HISTORY      [id number],OTHER     OTHER SURGICAL HISTORY      ,FLEXIBLE SIGMOIDOSCOPY     OTHER SURGICAL HISTORY      2013,[id number],CARDIAC CATHETERIZATION,BRYON to,mid RCA (angina and abnormal myoview)     OTHER SURGICAL HISTORY      16,[id number],OTHER,Left,Left subclavian Power Port       Anesthesia Evaluation     .             ROS/MED HX    ENT/Pulmonary:     (+)tobacco use, , . .    Neurologic:     (+)other neuro Gout    Cardiovascular:     (+) Dyslipidemia, hypertension--CAD, -past MI,-. : . . .  ":. .       METS/Exercise Tolerance:  >4 METS   Hematologic:     (+) Anemia, -      Musculoskeletal:         GI/Hepatic:     (+) GERD bowel prep,       Renal/Genitourinary:  - ROS Renal section negative       Endo:     (+) type II DM Obesity, .      Psychiatric:  - neg psychiatric ROS       Infectious Disease:         Malignancy:   (+) Malignancy History of GI          Other:    - neg other ROS                      Physical Exam  Normal systems: cardiovascular, pulmonary and dental    Airway   Mallampati: II  TM distance: >3 FB  Neck ROM: full    Dental     Cardiovascular   Rhythm and rate: regular and normal      Pulmonary    breath sounds clear to auscultation            Lab Results   Component Value Date    WBC 7.1 04/23/2020    HGB 13.6 04/23/2020    HCT 41.8 04/23/2020     04/23/2020     04/23/2020    POTASSIUM 4.5 04/23/2020    CHLORIDE 101 04/23/2020    CO2 24 04/23/2020    BUN 18 04/23/2020    CR 0.97 04/23/2020     (H) 04/23/2020    FABIO 9.5 04/23/2020    MAG 2.0 04/26/2016    ALBUMIN 4.3 04/23/2020    PROTTOTAL 6.9 04/23/2020    ALT 31 04/23/2020    AST 30 04/23/2020    ALKPHOS 98 04/23/2020    BILITOTAL 0.6 04/23/2020    PTT 34 04/25/2016    INR 1.1 04/25/2016       Preop Vitals  BP Readings from Last 3 Encounters:   06/01/20 120/80   05/01/20 110/68   10/22/19 120/76    Pulse Readings from Last 3 Encounters:   05/01/20 81   10/22/19 96   04/22/19 92      Resp Readings from Last 3 Encounters:   06/01/20 16   05/01/20 20   10/22/19 16    SpO2 Readings from Last 3 Encounters:   06/01/20 95%   05/01/20 94%   12/18/18 95%      Temp Readings from Last 1 Encounters:   06/01/20 98.4  F (36.9  C)    Ht Readings from Last 1 Encounters:   05/01/20 1.74 m (5' 8.5\")      Wt Readings from Last 1 Encounters:   05/01/20 109.3 kg (241 lb)    Estimated body mass index is 36.11 kg/m  as calculated from the following:    Height as of 5/1/20: 1.74 m (5' 8.5\").    Weight as of 5/1/20: 109.3 kg (241 lb). "       Anesthesia Plan      History & Physical Review      ASA Status:  3 .    NPO Status:  > 8 hours    Plan for MAC Reason for MAC:  Chronic cardiopulmonary disease (G9) and Other - see comments (Age greater than 70)           Postoperative Care      Consents  Anesthetic plan, risks, benefits and alternatives discussed with:  Patient.  Use of blood products discussed: Yes.   Use of blood products discussed with Patient.  Consented to blood products.  .                 SARKIS Morgan CRNA

## 2020-06-01 NOTE — ANESTHESIA CARE TRANSFER NOTE
Patient: Andrez Olivier    Procedure(s):  COLONOSCOPY, WITH LESION EXCISION USING HOT BIOPSY DEVICE    Diagnosis: Cancer of sigmoid colon (H) [C18.7]  Diagnosis Additional Information: No value filed.    Anesthesia Type:   MAC     Note:  Airway :Nasal Cannula  Patient transferred to:Phase II  Handoff Report: Identifed the Patient, Identified the Reponsible Provider, Reviewed the pertinent medical history, Discussed the surgical course, Reviewed Intra-OP anesthesia mangement and issues during anesthesia, Set expectations for post-procedure period and Allowed opportunity for questions and acknowledgement of understanding      Vitals: (Last set prior to Anesthesia Care Transfer)    CRNA VITALS  6/1/2020 0943 - 6/1/2020 1016      6/1/2020             Resp Rate (set):  10                Electronically Signed By: SARKIS HATCH CRNA  June 1, 2020  10:16 AM

## 2020-06-01 NOTE — OP NOTE
PROCEDURE NOTE    DATE OF SERVICE: 6/1/2020    SURGEON: Balejet Solano MD    PRE-OP DIAGNOSIS:    History of Colon Cancer  History of Polyps        POST-OP DIAGNOSIS:  Same  Polyps at cecum, HF, mid TC, SF, 35 cm and 30 cm    PROCEDURE:     Colonoscopy with snare polypectomies and hot biopsies    ANESTHESIA:  CAT Navarro CRNA    INDICATION FOR THE PROCEDURE: The patient is a 74 year old male with h/o sigmoid colon cancer in 2016 . The patient has no other complaints  . After explaining the risks to include bleeding, perforation, potential inability toreach the cecum, the patient wished to proceed.    PROCEDURE:After adequate sedation, the patient was in the left lateral decubitus position.  Rectal exam was performed.  There was normal tone and no palpable masses .  The colonoscope was introduced into the rectum and advanced to the cecum with Mild difficulty.  The patient's prep was good.  The terminal cecum was reached.  The cecum, ascending, transverse, and descending colon was withy a flat over 1 cm poly in mid TC and flat 1 cm polyp at 30 cm that were completely removed by snare. Multiple diminutive to 0.5 cm polyps were hot biopsied and destroyed at cecum, HF, SF and 35 cm .  The scope was retroflexed in the rectum.  The rectum was unremarkable  .  The scope was straightened and removed.  The patient tolerated the procedure well.     ESTIMATED BLOOD LOSS: none    COMPLICATIONS:  None    TISSUE REMOVED:  Yes    RECOMMEND:      Follow-up pending pathology      Baljeet Solano MD FACS

## 2020-06-01 NOTE — ANESTHESIA POSTPROCEDURE EVALUATION
Patient: Andrez Olivier    Procedure(s):  COLONOSCOPY, WITH LESION EXCISION USING HOT BIOPSY DEVICE    Diagnosis:Cancer of sigmoid colon (H) [C18.7]  Diagnosis Additional Information: No value filed.    Anesthesia Type:  MAC    Note:  Anesthesia Post Evaluation    Patient location during evaluation: Phase 2 and Endoscopy Recovery  Patient participation: Able to fully participate in evaluation  Level of consciousness: awake and alert  Pain management: adequate  Airway patency: patent  Cardiovascular status: acceptable  Respiratory status: acceptable  Hydration status: acceptable  PONV: none     Anesthetic complications: None          Last vitals:  Vitals:    06/01/20 0835 06/01/20 1015 06/01/20 1018   BP: 120/80 97/67    Resp: 16     Temp: 98.4  F (36.9  C)  97.8  F (36.6  C)   SpO2: 95% 96%          Electronically Signed By: SARKIS Morgan CRNA  June 1, 2020  10:32 AM

## 2020-06-01 NOTE — H&P
History and Physical    CHIEF COMPLAINT / REASON FOR PROCEDURE:  H/o colon cancer    PERTINENT HISTORY   Patient is a 74 year old male who presents today for colonoscopy for h/o colon cancer.   Last colonoscopy 2017.  Patient has no complaints.    Past Medical History:   Diagnosis Date     Acute myocardial infarction (H)     1989,MI at age 44, s/p angioplasty, [facility]; MI at age 49, s/p stent placement at [facility]     Atherosclerotic heart disease of native coronary artery without angina pectoris     2/1/2013,BRYON to,mid RCA (angina and abnormal myoview), ANW     Diverticulosis of large intestine without perforation or abscess without bleeding     5/6/2016     Gastro-esophageal reflux disease with esophagitis     5/10/2016     Gout     No Comments Provided     History of colonic polyps     5/6/2016     Hyperlipidemia     No Comments Provided     Malignant neoplasm of sigmoid colon (H)     5/6/2016     Obesity     No Comments Provided     Other gastritis without bleeding     5/6/2016     Personal history of nicotine dependence     No Comments Provided     Past Surgical History:   Procedure Laterality Date     APPENDECTOMY OPEN      at age of 12     COLON SURGERY      5/17/16,Colectomy, Sigmoid     COLONOSCOPY      5/6/16,F/U 2017; Dr Solano     COLONOSCOPY  05/15/2017    05/15/2017,F/U 2020     ESOPHAGOSCOPY, GASTROSCOPY, DUODENOSCOPY (EGD), COMBINED      5/6/16,EGD; Dr Solano     HEART CATH, ANGIOPLASTY      1989,Stenting coronary artery, presumably LAD     OTHER SURGICAL HISTORY      [id number],OTHER     OTHER SURGICAL HISTORY      2000,[id number],FLEXIBLE SIGMOIDOSCOPY     OTHER SURGICAL HISTORY      2/1/2013,[id number],CARDIAC CATHETERIZATION,BRYON to,mid RCA (angina and abnormal myoview)     OTHER SURGICAL HISTORY      6/28/16,[id number],OTHER,Left,Left subclavian Power Port       Bleeding tendencies:  No    ALLERGIES/SENSITIVITIES:   Allergies   Allergen Reactions     Aspirin      Other reaction(s): Other - Describe In Comment Field  ---- Has  been 4 years since stent - as of 5/2017 - Hold Aspirin while on Plavix for now     Canagliflozin      Other reaction(s): Dizziness     Morphine      Other reaction(s): Other - Describe In Comment Field  Pt had a bad experience at age 49 and would like to avoid.        CURRENT MEDICATIONS:    Prior to Admission medications    Medication Sig Start Date End Date Taking? Authorizing Provider   allopurinol (ZYLOPRIM) 100 MG tablet TAKE 1 TABLET (100 MG) BY MOUTH 2 TIMES DAILY 2/7/20   Bk Roberts MD   bisacodyl (DULCOLAX) 5 MG EC tablet Take as directed by colonoscopy prep. 5/6/20   Baljeet Solano MD   clopidogrel (PLAVIX) 75 MG tablet TAKE 1 TABLET (75MG) BY MOUTH DAILY 10/3/19   Bk Roberts MD   Cyanocobalamin (VITAMIN B-12 CR) 1000 MCG TBCR Take 1 tablet by mouth once daily.    Reported, Patient   glipiZIDE (GLUCOTROL) 10 MG tablet TAKE 1-2 TABLET BY MOUTH TWICE A DAY WITH MEALS FOR DIABETES - ADJUST DOSE AS NEEDED FOR HIGH GLUCOSE - DOSE INCREASE 9/6/2018 /6/2018 10/3/19   Bk Roberts MD   lisinopril (PRINIVIL/ZESTRIL) 2.5 MG tablet TAKE 1 TABLET (2.5MG) BY MOUTH ONCE DAILY 10/3/19   Bk Roberts MD   metFORMIN (GLUCOPHAGE) 1000 MG tablet TAKE 1 TABLET BY MOUTH TWICE DAILY WITH MEALS 10/3/19   Bk Roberts MD   metoprolol tartrate (LOPRESSOR) 50 MG tablet TAKE 1 TABLET TWICE DAILY 12/23/19   Bk Roberts MD   nitroGLYcerin (NITROSTAT) 0.4 MG sublingual tablet Place 1 tablet under the tongue every 5 minutes if needed for Chest Pain. 5/26/17   Reported, Patient   pantoprazole (PROTONIX) 40 MG EC tablet TAKE 1 TABLET BY MOUTH ONCE DAILY BEFORE A MEAL. 12/23/19   Bk Roberts MD   polyethylene glycol-electrolytes (NULYTELY) 420 g solution Take 4,000 mLs by mouth once for 1 dose 5/6/20 5/6/20  Baljeet Solano MD   simvastatin (ZOCOR) 40 MG tablet TAKE 1 TABLET BY MOUTH AT BEDTIME FOR CHOLESTEROL. 10/3/19   Bk Roberts MD       Physical Exam:  /80   Temp 98.4  F (36.9  C)   Resp 16   SpO2  95%   EXAM:  Chest/Respiratory Exam: Normal - Clear to auscultation without rales, rhonchi, or wheezing.  Cardiovascular Exam: regular rate and rhythm        PLAN: COLONOSCOPY .  Patient understands risks of bleeding, perforation, potential inability to reach cecum, aspiration and wishes to proceed.   MAC needed for age and ASA III.

## 2020-06-08 PROBLEM — K63.5 COLON POLYPS: Status: RESOLVED | Noted: 2020-06-01 | Resolved: 2020-06-08

## 2020-09-16 DIAGNOSIS — C18.7 CANCER OF SIGMOID COLON (H): ICD-10-CM

## 2020-09-16 LAB
ALBUMIN SERPL-MCNC: 3.9 G/DL (ref 3.5–5.7)
ALP SERPL-CCNC: 81 U/L (ref 34–104)
ALT SERPL W P-5'-P-CCNC: 40 U/L (ref 7–52)
ANION GAP SERPL CALCULATED.3IONS-SCNC: 10 MMOL/L (ref 3–14)
AST SERPL W P-5'-P-CCNC: 54 U/L (ref 13–39)
BASOPHILS # BLD AUTO: 0 10E9/L (ref 0–0.2)
BASOPHILS NFR BLD AUTO: 0 %
BILIRUB SERPL-MCNC: 0.6 MG/DL (ref 0.3–1)
BUN SERPL-MCNC: 15 MG/DL (ref 7–25)
CALCIUM SERPL-MCNC: 9 MG/DL (ref 8.6–10.3)
CEA SERPL-MCNC: <3 NG/ML
CHLORIDE SERPL-SCNC: 100 MMOL/L (ref 98–107)
CO2 SERPL-SCNC: 26 MMOL/L (ref 21–31)
CREAT SERPL-MCNC: 0.99 MG/DL (ref 0.7–1.3)
DIFFERENTIAL METHOD BLD: ABNORMAL
EOSINOPHIL # BLD AUTO: 0 10E9/L (ref 0–0.7)
EOSINOPHIL NFR BLD AUTO: 0 %
ERYTHROCYTE [DISTWIDTH] IN BLOOD BY AUTOMATED COUNT: 14.1 % (ref 10–15)
GFR SERPL CREATININE-BSD FRML MDRD: 74 ML/MIN/{1.73_M2}
GLUCOSE SERPL-MCNC: 290 MG/DL (ref 70–105)
HCT VFR BLD AUTO: 40.4 % (ref 40–53)
HGB BLD-MCNC: 13 G/DL (ref 13.3–17.7)
LDH SERPL L TO P-CCNC: 160 U/L (ref 140–271)
LYMPHOCYTES # BLD AUTO: 1.4 10E9/L (ref 0.8–5.3)
LYMPHOCYTES NFR BLD AUTO: 22 %
MACROCYTES BLD QL SMEAR: PRESENT
MCH RBC QN AUTO: 29.4 PG (ref 26.5–33)
MCHC RBC AUTO-ENTMCNC: 32.2 G/DL (ref 31.5–36.5)
MCV RBC AUTO: 91 FL (ref 78–100)
MONOCYTES # BLD AUTO: 0.2 10E9/L (ref 0–1.3)
MONOCYTES NFR BLD AUTO: 3 %
NEUTROPHILS # BLD AUTO: 4.8 10E9/L (ref 1.6–8.3)
NEUTROPHILS NFR BLD AUTO: 75 %
PLATELET # BLD AUTO: 202 10E9/L (ref 150–450)
PLATELET # BLD EST: ABNORMAL 10*3/UL
POTASSIUM SERPL-SCNC: 4.4 MMOL/L (ref 3.5–5.1)
PROT SERPL-MCNC: 6.4 G/DL (ref 6.4–8.9)
RBC # BLD AUTO: 4.42 10E12/L (ref 4.4–5.9)
SODIUM SERPL-SCNC: 136 MMOL/L (ref 134–144)
TOXIC GRANULES BLD QL SMEAR: PRESENT
WBC # BLD AUTO: 6.4 10E9/L (ref 4–11)

## 2020-09-16 PROCEDURE — 82378 CARCINOEMBRYONIC ANTIGEN: CPT | Mod: ZL | Performed by: INTERNAL MEDICINE

## 2020-09-16 PROCEDURE — 36415 COLL VENOUS BLD VENIPUNCTURE: CPT | Mod: ZL | Performed by: INTERNAL MEDICINE

## 2020-09-16 PROCEDURE — 80053 COMPREHEN METABOLIC PANEL: CPT | Mod: ZL | Performed by: INTERNAL MEDICINE

## 2020-09-16 PROCEDURE — 83615 LACTATE (LD) (LDH) ENZYME: CPT | Mod: ZL | Performed by: INTERNAL MEDICINE

## 2020-09-16 PROCEDURE — 85025 COMPLETE CBC W/AUTO DIFF WBC: CPT | Mod: ZL | Performed by: INTERNAL MEDICINE

## 2020-09-23 ENCOUNTER — ONCOLOGY VISIT (OUTPATIENT)
Dept: ONCOLOGY | Facility: OTHER | Age: 75
End: 2020-09-23
Attending: INTERNAL MEDICINE
Payer: COMMERCIAL

## 2020-09-23 VITALS
SYSTOLIC BLOOD PRESSURE: 108 MMHG | BODY MASS INDEX: 35.1 KG/M2 | TEMPERATURE: 98.3 F | OXYGEN SATURATION: 96 % | RESPIRATION RATE: 20 BRPM | DIASTOLIC BLOOD PRESSURE: 66 MMHG | WEIGHT: 237 LBS | HEART RATE: 78 BPM | HEIGHT: 69 IN

## 2020-09-23 DIAGNOSIS — C18.7 CANCER OF SIGMOID COLON (H): Primary | ICD-10-CM

## 2020-09-23 DIAGNOSIS — E78.2 MIXED HYPERLIPIDEMIA: ICD-10-CM

## 2020-09-23 PROCEDURE — G0463 HOSPITAL OUTPT CLINIC VISIT: HCPCS

## 2020-09-23 PROCEDURE — 99215 OFFICE O/P EST HI 40 MIN: CPT | Performed by: INTERNAL MEDICINE

## 2020-09-23 ASSESSMENT — MIFFLIN-ST. JEOR: SCORE: 1797.52

## 2020-09-23 ASSESSMENT — PAIN SCALES - GENERAL: PAINLEVEL: NO PAIN (0)

## 2020-09-23 NOTE — PROGRESS NOTES
Visit Date:   09/23/2020      HEMATOLOGY/ONCOLOGY CLINIC NOTE      HISTORY OF PRESENT ILLNESS:  Mr. Olivier returns for followup of colon cancer.  We had seen the patient in consultation on 06/01/2016.  At that time, he was a 70-year-old white male with history of coronary artery disease, type 2 diabetes mellitus, hyperlipidemia and hypertension who presented to the Emergency Room on 04/25/2016 with complaints of chest pain radiating down his arms.  At that time, he was found to have hemoglobin of 7.1 and subsequently was admitted.  CBC revealed microcytic indices with MCV of 62.  It was noted to be iron deficient.  He was transfused 2 units of packed red cells and ruled out for MI.  Subsequently was seen by Dr. Solano who performed colonoscopy was noted to have a mass in sigmoid colon that was consistent with adenocarcinoma.  He also had multiple adenomatous polyps.  The patient subsequently was admitted on 05/11/2016 for sigmoid colectomy.  It was performed on 05/17/2016.  Pathology of sigmoid colon revealed a mass consistent with moderately differentiated adenocarcinoma.  Tumor size was 2 x 1 x 0.7 cm.  Tumor invaded the muscularis propria.  Two out of 11 lymph nodes were positive for metastatic carcinoma.  Margins were negative for tumor.  The tumor was moderately differentiated.  The patient was staged pathologic T2 N1b.  As part of the postoperative evaluation, the patient had a CT of the abdomen and pelvis on 05/12/2016, and this revealed there were 2 nonspecific low attenuation lesions in the liver, and metastasis could not be excluded.  There was no other lymphadenopathy or additional findings suggestive of metastases.  Preop CEA level was less than 3.  When we saw the patient, we wanted to rule out metastatic disease.  PET scan performed on 06/15/2016 was essentially negative for metastatic disease.  The lesion seen on prior PET in the liver was not hypermetabolic.  We feel the patient has stage III disease  and would be a candidate for adjuvant chemotherapy.  When we saw the patient 06/28/2016, the plan was to start FOLFOX x 12 cycles.  The patient was started and received a total of 10 cycles, and on 11/17/2016, he did note some cold-induced neuropathic symptoms with cough and cold sensation when he drinks cold liquids, but otherwise is doing relatively well.  We elected to restage him after 12 cycles of FOLFOX.  The last chemotherapy on 12/07/2016.  During his last dose of chemotherapy, he became severely ill.  He developed numbness in his hands and significant cold-induced neuropathy, as well as loss of sensation.  He had a PET scan, which was essentially negative for metastatic disease.  We started the patient on vitamin B6 to help his neuropathy.  His symptoms did improve.  He had a colonoscopy in 05/2017, which revealed tubular adenoma in the hepatic flexure of the colon.  The patient otherwise had a colonoscopy done performed by Dr. Solano recently on 06/01/2020 and was found to have 6 polyps.  All of them were revealing tubular adenoma, consistent with advanced adenoma.  One was at the 30 cm, and the other was in the mid transverse colon.  This was based on polyp size being larger than 10 mm.  The patient is due for repeat colonoscopy in 1 year.  He says he is doing relatively well and offers no complaints of shortness of breath, chest pain, abdominal pain, fevers, night sweats, or weight loss.  He still has some mild neuropathic symptoms.      PHYSICAL EXAMINATION:   GENERAL:  He is an elderly white male in no acute distress.   VITAL SIGNS:  Blood pressure 108/66, pulse 70, respirations 20, temperature 98.3.   HEENT:  Atraumatic, normocephalic.  Oropharynx nonerythematous.   NECK:  Supple.   LUNGS:  Clear to auscultation and percussion.   HEART:  Regular rhythm, S1, S2 normal.   ABDOMEN:  Soft, normoactive bowel sounds.  No mass, nontender.   LYMPHATICS:  No cervical, supraclavicular, axillary or inguinal nodes.    EXTREMITIES:  With trace ankle edema.   NEUROLOGIC:  Nonfocal.      LABORATORY DATA:  Reveal CEA is less than 0.3.  CBC:  White count 6.4, H and H 13.1 and 40.4, platelet count 202.  BUN is 15, creatinine 0.99.  Total bilirubin 0.6, alkaline phosphatase 81, ALT 40, AST 54.  .      IMPRESSION:     1.  Stage III moderately differentiated adenocarcinoma of the sigmoid colon with 2/11 lymph nodes positive for metastatic disease, consistent with pathologic T2 N1b M0 colon cancer.  PET scan was negative for metastatic disease.  The patient was started on adjuvant FOLFOX chemotherapy and completed 12 cycles.  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  PET scan did not reveal any evidence of metastatic disease.  CEA was normal.  The patient had a recent colonoscopy, which revealed multiple tubular adenomas with 2 being advanced adenomas.  We would like to restage the patient with CT chest, abdomen and pelvis in approximately 1 month.  Obtain CBC, CMP, LDH, and CEA.   2.  Hyperglycemia.  The patient was encouraged to follow up with his primary care physician concerning his diabetes control.      Forty minutes was spent with the patient, greater than half the time spent in counseling and coordination of care.         IVONNE SEYMOUR MD             D: 2020   T: 2020   MT: ALBERTO      Name:     LIEN MCKEON   MRN:      [id number]        Account:      [id number]   :      1945           Visit Date:   2020      Document: [id number]       cc: Bk Solano MD

## 2020-09-23 NOTE — NURSING NOTE
"Chief Complaint   Patient presents with     RECHECK     Colon cancer   Complains of numbness in his hands and feet, but continues to refuse treatment for this.    Initial /66   Pulse 78   Temp 98.3  F (36.8  C) (Oral)   Resp 20   Ht 1.74 m (5' 8.5\")   Wt 107.5 kg (237 lb)   SpO2 96%   BMI 35.51 kg/m   Estimated body mass index is 35.51 kg/m  as calculated from the following:    Height as of this encounter: 1.74 m (5' 8.5\").    Weight as of this encounter: 107.5 kg (237 lb).  Medication Reconciliation: complete    Amaris Ray CMA (AAMA)  "

## 2020-09-25 ENCOUNTER — TELEPHONE (OUTPATIENT)
Dept: INTERNAL MEDICINE | Facility: OTHER | Age: 75
End: 2020-09-25

## 2020-09-25 RX ORDER — SIMVASTATIN 40 MG
TABLET ORAL
Qty: 90 TABLET | Refills: 3 | Status: SHIPPED | OUTPATIENT
Start: 2020-09-25 | End: 2021-08-12

## 2020-09-25 NOTE — TELEPHONE ENCOUNTER
sent Rx request for the following:   simvastatin (ZOCOR) 40 MG tablet   Sig: TAKE 1 TABLET BY MOUTH AT BEDTIME FOR CHOLESTEROL.     Last Prescription Date:   10/03/2019  Last Fill Qty/Refills:         90, R-3    Last Office Visit:              09/06/2018   Future Office visit:           11/06/2020  Routing refill request to provider for review/approval because:  Labs not current:  LDL  LDL Cholesterol Calculated   Date Value Ref Range Status   12/18/2017 106 (H) <100 mg/dL    Patient needs to be seen because it has been more than 1 year since last office visit.      Unable to complete prescription refill per RN Medication Refill Policy.Pt is due for medication management appt.  Appointment reminder letters have been sent x3. Phone call to pt, informed him he is overdue for his annual checkup. Pt agreed to schedule an appt, transferred pt to scheduling line. Appointment scheduled for 11/06/2020. Will route to PCP for consideration.    ................... Evangelina Butler RN ....................  9/25/2020   9:51 AM

## 2020-09-25 NOTE — TELEPHONE ENCOUNTER
Patient needs refills on meds to get him thru until his physical appt on 11/6/20 (first available)  Does not want just a few - wants 3 months worth due to cost - please call him to advise  Minerva Simmons on 9/25/2020 at 10:03 AM

## 2020-09-25 NOTE — TELEPHONE ENCOUNTER
No answer at primary number provided, voicemail has not been set up yet.    Serena Rojas LPN 9/25/2020 3:50 PM

## 2020-09-25 NOTE — TELEPHONE ENCOUNTER
No answer at primary number provided-voicemail has not been set up yet.      Serena Rojas LPN 9/25/2020 1:47 PM

## 2020-09-28 NOTE — TELEPHONE ENCOUNTER
I have attempted to contact this patient by phone to return their call or discuss lab results, but there is no response.  Lillie Godwin LPN .............9/28/2020  1:30 PM

## 2020-10-20 DIAGNOSIS — E11.69 DIABETES MELLITUS TYPE 2 IN OBESE: ICD-10-CM

## 2020-10-20 DIAGNOSIS — E66.9 DIABETES MELLITUS TYPE 2 IN OBESE: ICD-10-CM

## 2020-10-20 RX ORDER — GLIPIZIDE 10 MG/1
TABLET ORAL
Qty: 360 TABLET | Refills: 3 | Status: SHIPPED | OUTPATIENT
Start: 2020-10-20 | End: 2020-11-06

## 2020-10-20 NOTE — TELEPHONE ENCOUNTER
Routing refill request to provider for review/approval because:  Labs not current:  HGBA1C    Patient needs to be seen because it has been more than 1 year since last office visit.      LOV:  9/6/2018  Patient noted to have appointment scheduled for 11/6/2020      Marina Joy RN on 10/20/2020 at 4:04 PM

## 2020-10-21 ENCOUNTER — HOSPITAL ENCOUNTER (OUTPATIENT)
Dept: CT IMAGING | Facility: OTHER | Age: 75
End: 2020-10-21
Attending: INTERNAL MEDICINE
Payer: MEDICARE

## 2020-10-21 DIAGNOSIS — C18.7 CANCER OF SIGMOID COLON (H): ICD-10-CM

## 2020-10-21 LAB
ALBUMIN SERPL-MCNC: 4 G/DL (ref 3.5–5.7)
ALP SERPL-CCNC: 98 U/L (ref 34–104)
ALT SERPL W P-5'-P-CCNC: 38 U/L (ref 7–52)
ANION GAP SERPL CALCULATED.3IONS-SCNC: 11 MMOL/L (ref 3–14)
AST SERPL W P-5'-P-CCNC: 45 U/L (ref 13–39)
BASOPHILS # BLD AUTO: 0 10E9/L (ref 0–0.2)
BASOPHILS NFR BLD AUTO: 0.3 %
BILIRUB SERPL-MCNC: 0.5 MG/DL (ref 0.3–1)
BUN SERPL-MCNC: 16 MG/DL (ref 7–25)
CALCIUM SERPL-MCNC: 9.3 MG/DL (ref 8.6–10.3)
CEA SERPL-MCNC: <3 NG/ML
CHLORIDE SERPL-SCNC: 102 MMOL/L (ref 98–107)
CO2 SERPL-SCNC: 27 MMOL/L (ref 21–31)
CREAT SERPL-MCNC: 0.97 MG/DL (ref 0.7–1.3)
DIFFERENTIAL METHOD BLD: ABNORMAL
EOSINOPHIL # BLD AUTO: 0.1 10E9/L (ref 0–0.7)
EOSINOPHIL NFR BLD AUTO: 1.8 %
ERYTHROCYTE [DISTWIDTH] IN BLOOD BY AUTOMATED COUNT: 14.4 % (ref 10–15)
GFR SERPL CREATININE-BSD FRML MDRD: 76 ML/MIN/{1.73_M2}
GLUCOSE SERPL-MCNC: 299 MG/DL (ref 70–105)
HCT VFR BLD AUTO: 41 % (ref 40–53)
HGB BLD-MCNC: 13.2 G/DL (ref 13.3–17.7)
IMM GRANULOCYTES # BLD: 0 10E9/L (ref 0–0.4)
IMM GRANULOCYTES NFR BLD: 0.5 %
LDH SERPL L TO P-CCNC: 161 U/L (ref 140–271)
LYMPHOCYTES # BLD AUTO: 1.4 10E9/L (ref 0.8–5.3)
LYMPHOCYTES NFR BLD AUTO: 22.2 %
MCH RBC QN AUTO: 29.4 PG (ref 26.5–33)
MCHC RBC AUTO-ENTMCNC: 32.2 G/DL (ref 31.5–36.5)
MCV RBC AUTO: 91 FL (ref 78–100)
MONOCYTES # BLD AUTO: 0.6 10E9/L (ref 0–1.3)
MONOCYTES NFR BLD AUTO: 9.7 %
NEUTROPHILS # BLD AUTO: 4.3 10E9/L (ref 1.6–8.3)
NEUTROPHILS NFR BLD AUTO: 65.5 %
PLATELET # BLD AUTO: 215 10E9/L (ref 150–450)
POTASSIUM SERPL-SCNC: 4.2 MMOL/L (ref 3.5–5.1)
PROT SERPL-MCNC: 6.8 G/DL (ref 6.4–8.9)
RBC # BLD AUTO: 4.49 10E12/L (ref 4.4–5.9)
SODIUM SERPL-SCNC: 140 MMOL/L (ref 134–144)
WBC # BLD AUTO: 6.5 10E9/L (ref 4–11)

## 2020-10-21 PROCEDURE — 71260 CT THORAX DX C+: CPT

## 2020-10-21 PROCEDURE — 255N000002 HC RX 255 OP 636: Performed by: INTERNAL MEDICINE

## 2020-10-21 PROCEDURE — 250N000011 HC RX IP 250 OP 636: Performed by: INTERNAL MEDICINE

## 2020-10-21 PROCEDURE — 80053 COMPREHEN METABOLIC PANEL: CPT | Mod: ZL | Performed by: INTERNAL MEDICINE

## 2020-10-21 PROCEDURE — 85025 COMPLETE CBC W/AUTO DIFF WBC: CPT | Mod: ZL | Performed by: INTERNAL MEDICINE

## 2020-10-21 PROCEDURE — 36415 COLL VENOUS BLD VENIPUNCTURE: CPT | Mod: ZL | Performed by: INTERNAL MEDICINE

## 2020-10-21 PROCEDURE — 82378 CARCINOEMBRYONIC ANTIGEN: CPT | Mod: ZL | Performed by: INTERNAL MEDICINE

## 2020-10-21 PROCEDURE — 83615 LACTATE (LD) (LDH) ENZYME: CPT | Mod: ZL | Performed by: INTERNAL MEDICINE

## 2020-10-21 RX ORDER — HEPARIN SODIUM (PORCINE) LOCK FLUSH IV SOLN 100 UNIT/ML 100 UNIT/ML
500 SOLUTION INTRAVENOUS
Status: COMPLETED | OUTPATIENT
Start: 2020-10-21 | End: 2020-10-21

## 2020-10-21 RX ADMIN — IOHEXOL 100 ML: 350 INJECTION, SOLUTION INTRAVENOUS at 09:22

## 2020-10-21 RX ADMIN — HEPARIN 500 UNITS: 100 SYRINGE at 09:26

## 2020-10-21 NOTE — PROGRESS NOTES
IV Contrast- Discharge Instructions After Your CT Scan      The IV contrast you received today will be filtered from your bloodstream by your kidneys during the next 24 hours and pass from the body in urine.  You will not be aware of this process and your urine will not change in color.  To help this process you should drink at least 4 additional glasses of water or juice today.  This reduces stress on your kidneys.    Most contrast reactions are immediate.  Should you develop symptoms of concern after discharge, contact the department at the number below.  After hours you should contact your personal physician.  If you develop breathing distress or wheezing, call 911.    1.  Has the patient had a previous reaction to IV contrast? NO    2.  Does the patient have kidney disease? NO    3.  Is the patient on dialysis? NO    If YES to any of these questions, exam will be reviewed with a Radiologist before administering contrast.  GFR Estimate   Date Value Ref Range Status   10/21/2020 76 >60 mL/min/[1.73_m2] Final     GFR Estimate If Black   Date Value Ref Range Status   10/21/2020 >90 >60 mL/min/[1.73_m2] Final

## 2020-10-28 ENCOUNTER — ONCOLOGY VISIT (OUTPATIENT)
Dept: ONCOLOGY | Facility: OTHER | Age: 75
End: 2020-10-28
Attending: INTERNAL MEDICINE
Payer: COMMERCIAL

## 2020-10-28 VITALS
TEMPERATURE: 98.3 F | HEART RATE: 95 BPM | HEIGHT: 69 IN | RESPIRATION RATE: 18 BRPM | BODY MASS INDEX: 35.55 KG/M2 | WEIGHT: 240 LBS | DIASTOLIC BLOOD PRESSURE: 58 MMHG | OXYGEN SATURATION: 96 % | SYSTOLIC BLOOD PRESSURE: 92 MMHG

## 2020-10-28 DIAGNOSIS — C18.7 CANCER OF SIGMOID COLON (H): Primary | ICD-10-CM

## 2020-10-28 PROCEDURE — 99215 OFFICE O/P EST HI 40 MIN: CPT | Performed by: INTERNAL MEDICINE

## 2020-10-28 PROCEDURE — G0463 HOSPITAL OUTPT CLINIC VISIT: HCPCS

## 2020-10-28 ASSESSMENT — MIFFLIN-ST. JEOR: SCORE: 1811.13

## 2020-10-28 ASSESSMENT — PAIN SCALES - GENERAL: PAINLEVEL: NO PAIN (0)

## 2020-10-28 NOTE — PROGRESS NOTES
Visit Date:   10/28/2020      HEMATOLOGY/ONCOLOGY CLINIC NOTE       HISTORY OF PRESENT ILLNESS:  Mr. Olivier returns for followup of colon cancer.  We had seen the patient in consultation on 06/01/2016.  At that time, he was a 70-year-old white male with a history of coronary artery disease, type 2 diabetes mellitus, hyperlipidemia and hypertension who presented to the Emergency Room on 04/25/2016 with complaints of chest pain radiating down his arms.  At that time, he was found to have a hemoglobin of 7.1 and subsequently was admitted.  CBC revealed microcytic indices with MCV of 62.  It was noted he was iron deficient.  He was transfused 2 units of packed red cells and ruled out for MI.  Subsequently he was seen by Dr. Solano who performed a colonoscopy and was noted to have a mass in the sigmoid colon that was consistent with adenocarcinoma.  He also had multiple adenomatous polyps.  The patient subsequently was admitted on 05/11/2016 for sigmoid colectomy.  It was performed on 05/17/2016.  Pathology of the sigmoid colon revealed a mass consistent with moderately differentiated adenocarcinoma.  Tumor size was 2 x 1 x 0.7 cm.  Tumor invaded the muscularis propria.  Two out of 11 lymph nodes were positive for metastatic carcinoma.  Margins were negative for tumor.  The tumor was moderately differentiated.  The patient was staged pathologic T2 N1b.  As part of the postoperative evaluation, the patient had a CT of the abdomen and pelvis on 05/12/2016, and this revealed there were 2 nonspecific low attenuation lesions in the liver, and metastasis could not be excluded.  There was no other lymphadenopathy or additional findings suggestive of metastases.  Preop CEA level was less than 3.  When we saw the patient, we wanted to rule out metastatic disease.  PET scan performed on 06/15/2016 was essentially negative for metastatic disease.  The lesion seen on prior PET in the liver was not hypermetabolic.  We felt the patient  had stage III disease and would be a candidate for adjuvant chemotherapy.  When we saw the patient 06/28/2016, the plan was to start FOLFOX x 12 cycles.  The patient was started and received a total of 10 cycles.  On 11/17/2016, he did note some cold-induced neuropathic symptoms with cough and a cold sensation when he drinks cold liquids, but otherwise was doing relatively well.  We elected to restage him after 12 cycles of FOLFOX.        The last chemotherapy was on 12/07/2016.  During his last dose of chemotherapy, he became severely ill.  He developed numbness in his hands and significant cold-induced neuropathy, as well as loss of sensation.  He had a PET scan, which was essentially negative for metastatic disease.  We started the patient on vitamin B6 to help his neuropathy.  His symptoms did improve.  He had a colonoscopy in 05/2017, which revealed tubular adenoma in the hepatic flexure of the colon.  The patient otherwise had a colonoscopy performed by Dr. Solano on 10/01/2020 and he was found to have 6 polyps.  All of them revealed tubular adenomas consistent with advanced adenoma.  One was up to 30 cm, and the other was in the mid transverse colon.  This was based on polyp size being larger than 10 mm.  The patient was due for a repeat colonoscopy in 06/2021.        When we saw the patient last on 09/23/2020, we elected to restage the patient with a CT of the chest, abdomen and pelvis.  This was performed on 10/21/2020 and the findings were that there was no change from previous CT of the chest, abdomen and pelvis done in 04/2020.        REVIEW OF SYSTEMS:  The patient otherwise is doing well.  He offers no complaints of shortness of breath, chest pain, abdominal pain, fevers, night sweats, weight loss, bright red blood per rectum, hematemesis or change in bowel habits.      PHYSICAL EXAMINATION:   GENERAL:  He is an obese, elderly white male in no acute distress.   VITAL SIGNS:  Blood pressure 92/58, pulse  95, respirations 18, temperature 98.3.   HEENT:  Atraumatic, normocephalic.  Oropharynx nonerythematous.   NECK:  Supple.   LUNGS:  Clear to auscultation and percussion.   HEART:  Regular rhythm, S1, S2 normal.   ABDOMEN:  Soft, normoactive bowel sounds.  No mass, nontender.   LYMPHATICS:  No cervical, supraclavicular, axillary or inguinal nodes.   EXTREMITIES:  No edema.   NEUROLOGIC:  Nonfocal.      LABORATORY DATA:  CBC reveals white count 6.5, H and H 13.2 and 41.0, platelet count is 215,000.  BUN 16, creatinine 0.97, total bilirubin 0.5, alkaline phosphatase 98, ALT 38, AST 45.  LDH is 161.      IMPRESSION:   1.  Stage III, moderately differentiated adenocarcinoma of the sigmoid colon with 2 out of 11 lymph nodes positive for metastatic disease consistent with pathologic T2 N1b M0 colon cancer.  PET scan was negative for metastatic disease.  The patient was started on adjuvant FOLFOX chemotherapy and completed 12 cycles.  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  PET scan did not reveal any evidence of metastatic disease.  CEA was normal.  The patient had a recent colonoscopy, which revealed multiple tubular adenomas with 2 being advanced adenomas.  Recent CT of the chest, abdomen and pelvis was negative for metastatic disease.  The plan is to continue surveillance.  We will see the patient in 3 months, obtain CBC, CMP, LDH and CEA.  He will be due for a repeat colonoscopy in 2021.   2.  Hyperglycemia.  The patient was encouraged to follow-up with his primary care physician concerning his diabetes control.         IVONNE SEYMOUR MD             D: 10/28/2020   T: 10/28/2020   MT: JOHANN      Name:     LIEN MCKEON   MRN:      [id number]        Account:      [id number]   :      1945           Visit Date:   10/28/2020      Document: [id number]       cc: Bk Solano MD

## 2020-10-28 NOTE — NURSING NOTE
"Chief Complaint   Patient presents with     RECHECK     Colon cancer   No current concerns or complaints.     Initial BP 92/58   Pulse 95   Temp 98.3  F (36.8  C) (Oral)   Resp 18   Ht 1.74 m (5' 8.5\")   Wt 108.9 kg (240 lb)   SpO2 96%   BMI 35.96 kg/m   Estimated body mass index is 35.96 kg/m  as calculated from the following:    Height as of this encounter: 1.74 m (5' 8.5\").    Weight as of this encounter: 108.9 kg (240 lb).  Medication Reconciliation: complete    Amaris Ray CMA (AAMA)  "
2 weeks

## 2020-11-06 ENCOUNTER — OFFICE VISIT (OUTPATIENT)
Dept: INTERNAL MEDICINE | Facility: OTHER | Age: 75
End: 2020-11-06
Attending: INTERNAL MEDICINE
Payer: COMMERCIAL

## 2020-11-06 VITALS
RESPIRATION RATE: 16 BRPM | BODY MASS INDEX: 35.25 KG/M2 | OXYGEN SATURATION: 93 % | SYSTOLIC BLOOD PRESSURE: 102 MMHG | DIASTOLIC BLOOD PRESSURE: 66 MMHG | WEIGHT: 238 LBS | HEART RATE: 77 BPM | TEMPERATURE: 96.1 F | HEIGHT: 69 IN

## 2020-11-06 DIAGNOSIS — C77.9 SECONDARY AND UNSPECIFIED MALIGNANT NEOPLASM OF LYMPH NODE, UNSPECIFIED (H): ICD-10-CM

## 2020-11-06 DIAGNOSIS — E66.812 OBESITY, CLASS II, BMI 35-39.9, ISOLATED (SEE ACTUAL BMI): ICD-10-CM

## 2020-11-06 DIAGNOSIS — E11.65 UNCONTROLLED TYPE 2 DIABETES MELLITUS WITH HYPERGLYCEMIA (H): Primary | ICD-10-CM

## 2020-11-06 DIAGNOSIS — G62.0 NEUROPATHY DUE TO CHEMOTHERAPEUTIC DRUG (H): ICD-10-CM

## 2020-11-06 DIAGNOSIS — Z00.00 ENCOUNTER FOR MEDICARE ANNUAL WELLNESS EXAM: ICD-10-CM

## 2020-11-06 DIAGNOSIS — Z87.39 HISTORY OF GOUT: ICD-10-CM

## 2020-11-06 DIAGNOSIS — E78.2 MIXED HYPERLIPIDEMIA: ICD-10-CM

## 2020-11-06 DIAGNOSIS — I25.10 CORONARY ARTERY DISEASE INVOLVING NATIVE CORONARY ARTERY OF NATIVE HEART WITHOUT ANGINA PECTORIS: ICD-10-CM

## 2020-11-06 DIAGNOSIS — K21.00 GASTROESOPHAGEAL REFLUX DISEASE WITH ESOPHAGITIS, UNSPECIFIED WHETHER HEMORRHAGE: ICD-10-CM

## 2020-11-06 DIAGNOSIS — T45.1X5A NEUROPATHY DUE TO CHEMOTHERAPEUTIC DRUG (H): ICD-10-CM

## 2020-11-06 PROCEDURE — G0438 PPPS, INITIAL VISIT: HCPCS | Performed by: INTERNAL MEDICINE

## 2020-11-06 PROCEDURE — G0463 HOSPITAL OUTPT CLINIC VISIT: HCPCS

## 2020-11-06 PROCEDURE — 99214 OFFICE O/P EST MOD 30 MIN: CPT | Mod: 25 | Performed by: INTERNAL MEDICINE

## 2020-11-06 RX ORDER — PANTOPRAZOLE SODIUM 40 MG/1
40 TABLET, DELAYED RELEASE ORAL DAILY
Qty: 90 TABLET | Refills: 3 | Status: SHIPPED | OUTPATIENT
Start: 2020-11-06 | End: 2022-01-07

## 2020-11-06 RX ORDER — CLOPIDOGREL BISULFATE 75 MG/1
75 TABLET ORAL DAILY
Qty: 90 TABLET | Refills: 3 | Status: SHIPPED | OUTPATIENT
Start: 2020-11-06 | End: 2022-01-07

## 2020-11-06 RX ORDER — LISINOPRIL 2.5 MG/1
2.5 TABLET ORAL DAILY
Qty: 90 TABLET | Refills: 3 | Status: SHIPPED | OUTPATIENT
Start: 2020-11-06 | End: 2022-01-07

## 2020-11-06 RX ORDER — GLIPIZIDE 10 MG/1
20 TABLET ORAL
Qty: 360 TABLET | Refills: 3 | Status: SHIPPED | OUTPATIENT
Start: 2020-11-06 | End: 2021-01-29

## 2020-11-06 RX ORDER — METOPROLOL TARTRATE 50 MG
50 TABLET ORAL 2 TIMES DAILY
Qty: 180 TABLET | Refills: 3 | Status: SHIPPED | OUTPATIENT
Start: 2020-11-06 | End: 2022-01-07

## 2020-11-06 RX ORDER — ALLOPURINOL 100 MG/1
100 TABLET ORAL 2 TIMES DAILY
Qty: 180 TABLET | Refills: 3 | Status: SHIPPED | OUTPATIENT
Start: 2020-11-06 | End: 2022-01-07

## 2020-11-06 ASSESSMENT — ENCOUNTER SYMPTOMS
FEVER: 0
HEMATURIA: 0
CONFUSION: 0
CHEST TIGHTNESS: 0
DIARRHEA: 1
BRUISES/BLEEDS EASILY: 0
CHILLS: 0
NUMBNESS: 1
WOUND: 0
BACK PAIN: 0
SHORTNESS OF BREATH: 0
ABDOMINAL PAIN: 0
ADENOPATHY: 0

## 2020-11-06 ASSESSMENT — MIFFLIN-ST. JEOR: SCORE: 1802.06

## 2020-11-06 ASSESSMENT — PAIN SCALES - GENERAL: PAINLEVEL: NO PAIN (0)

## 2020-11-06 NOTE — NURSING NOTE
"Patient presents to the clinic today for a medicare wellness visit.  Dariela Miles LPN 11/6/2020   9:18 AM    Chief Complaint   Patient presents with     Medicare Visit     Medicare Wellness       Initial /66 (BP Location: Right arm, Patient Position: Sitting, Cuff Size: Adult Regular)   Pulse 77   Temp 96.1  F (35.6  C) (Tympanic)   Resp 16   Ht 1.74 m (5' 8.5\")   Wt 108 kg (238 lb)   SpO2 93%   BMI 35.66 kg/m   Estimated body mass index is 35.66 kg/m  as calculated from the following:    Height as of this encounter: 1.74 m (5' 8.5\").    Weight as of this encounter: 108 kg (238 lb).  Medication Reconciliation: complete  Dariela Miles LPN    Previous A1C is not at goal of <8  Lab Results   Component Value Date    A1C 10.5 04/09/2019    A1C 11.1 09/06/2018     Urine microalbumin:creatine: Unknown  Foot exam Yearly  Eye exam Over a year ago    Tobacco User No  Patient is not on a daily aspirin  Patient is on a Statin.  Blood pressure today of:     BP Readings from Last 1 Encounters:   11/06/20 102/66      is at the goal of <139/89 for diabetics.    Dariela Miles LPN on 11/6/2020 at 9:25 AM      "

## 2020-11-06 NOTE — PATIENT INSTRUCTIONS
Patient Education   Personalized Prevention Plan  You are due for the preventive services outlined below.  Your care team is available to assist you in scheduling these services.  If you have already completed any of these items, please share that information with your care team to update in your medical record.  Health Maintenance Due   Topic Date Due     Discuss Advance Care Planning  1945     Eye Exam  1945     Hepatitis C Screening  11/08/1963     Hepatitis B Vaccine (1 of 3 - Risk 3-dose series) 11/08/1964     Diptheria Tetanus Pertussis (DTAP/TDAP/TD) Vaccine (1 - Tdap) 11/08/1970     Zoster (Shingles) Vaccine (1 of 2) 11/08/1995     Pneumococcal Vaccine (3 of 3 - PCV13) 01/29/2014     Kidney Microalbumin Urine Test  05/26/2018     Cholesterol Lab  12/18/2018     Diabetic Foot Exam  12/26/2018     A1C Lab  10/09/2019     Colorectal Cancer Screening  05/15/2020     Flu Vaccine (1) 09/01/2020        Patient Education   Personalized Prevention Plan  You are due for the preventive services outlined below.  Your care team is available to assist you in scheduling these services.  If you have already completed any of these items, please share that information with your care team to update in your medical record.  Health Maintenance Due   Topic Date Due     Eye Exam  1945     Hepatitis C Screening  11/08/1963     Hepatitis B Vaccine (1 of 3 - Risk 3-dose series) 11/08/1964     Diptheria Tetanus Pertussis (DTAP/TDAP/TD) Vaccine (1 - Tdap) 11/08/1970     Zoster (Shingles) Vaccine (1 of 2) 11/08/1995     Pneumococcal Vaccine (3 of 3 - PCV13) 01/29/2014     Kidney Microalbumin Urine Test  05/26/2018     Cholesterol Lab  12/18/2018     Diabetic Foot Exam  12/26/2018     A1C Lab  10/09/2019     Colorectal Cancer Screening  05/15/2020     Preventive Health Recommendations  See your health care provider every year to    Review health changes.     Discuss preventive care.      Review your medicines if your  doctor has prescribed any.    Talk with your health care provider about whether you should have a test to screen for prostate cancer (PSA).    Every 3 years, have a diabetes test (fasting glucose). If you are at risk for diabetes, you should have this test more often.    Every 5 years, have a cholesterol test. Have this test more often if you are at risk for high cholesterol or heart disease.     Every 10 years, have a colonoscopy. Or, have a yearly FIT test (stool test). These exams will check for colon cancer.    Talk to with your health care provider about screening for Abdominal Aortic Aneurysm if you have a family history of AAA or have a history of smoking.    Shots:     Get a flu shot each year.     Get a tetanus shot every 10 years.     Talk to your doctor about your pneumonia vaccines. There are now two you should receive - Pneumovax (PPSV 23) and Prevnar (PCV 13).    Talk to your pharmacist about a shingles vaccine.     Talk to your doctor about the hepatitis B vaccine.    Nutrition:     Eat at least 5 servings of fruits and vegetables each day.     Eat whole-grain bread, whole-wheat pasta and brown rice instead of white grains and rice.     Get adequate Calcium and Vitamin D.     Lifestyle    Exercise for at least 150 minutes a week (30 minutes a day, 5 days a week). This will help you control your weight and prevent disease.     Limit alcohol to one drink per day.     No smoking.     Wear sunscreen to prevent skin cancer.     See your dentist every six months for an exam and cleaning.     See your eye doctor every 1 to 2 years to screen for conditions such as glaucoma, macular degeneration and cataracts.    Personalized Prevention Plan  You are due for the preventive services outlined below.  Your care team is available to assist you in scheduling these services.  If you have already completed any of these items, please share that information with your care team to update in your medical record.  Granular  Maintenance   Topic Date Due     EYE EXAM  1945     HEPATITIS C SCREENING  11/08/1963     HEPATITIS B IMMUNIZATION (1 of 3 - Risk 3-dose series) 11/08/1964     DTAP/TDAP/TD IMMUNIZATION (1 - Tdap) 11/08/1970     ZOSTER IMMUNIZATION (1 of 2) 11/08/1995     Pneumococcal Vaccine: Pediatrics (0 to 5 Years) and At-Risk Patients (6 to 64 Years) (3 of 3 - PCV13) 01/29/2014     MICROALBUMIN  05/26/2018     LIPID  12/18/2018     DIABETIC FOOT EXAM  12/26/2018     A1C  10/09/2019     COLORECTAL CANCER SCREENING  05/15/2020     FALL RISK ASSESSMENT  05/01/2021     BMP  10/21/2021     MEDICARE ANNUAL WELLNESS VISIT  11/06/2021     ADVANCE CARE PLANNING  11/06/2025     PHQ-2  Completed     INFLUENZA VACCINE  Completed     Pneumococcal Vaccine: 65+ Years  Completed     AORTIC ANEURYSM SCREENING (SYSTEM ASSIGNED)  Completed     IPV IMMUNIZATION  Aged Out     MENINGITIS IMMUNIZATION  Aged Out         Labs on 1/18/2021 --    Follow-up with Dr. Roberts about 1/20 -- for Diabetes.       Aspects of Diabetes:   Recent Labs   Lab Test 10/21/20  0732 09/16/20  0827 04/23/20  0733 04/09/19  0830 04/09/19  0830 09/06/18  1103 09/06/18  1103 12/18/17  0749 12/18/17  0749 05/11/16  0953 05/11/16  0953 09/18/15  0902 09/18/15  0902   A1C  --   --   --   --  10.5*  --  11.1*  --   --   --   --   --   --    LDL  --   --   --   --   --   --   --   --  106*  --  93  --  103*   HDL  --   --   --   --   --   --   --   --  32  --  35  --  31   TRIG  --   --   --   --   --   --   --   --  271*  --  173*  --  225*   ALT 38 40 31   < > 71*   < >  --    < >  --   --   --   --   --    CR 0.97 0.99 0.97   < > 0.89   < >  --    < >  --    < > 0.89   < > 1.09   GFRESTIMATED 76 74 76   < > 84   < >  --    < >  --   --   --   --   --    GFRESTBLACK >90 89 >90   < > >90   < >  --    < >  --    < > >60   < > >60   POTASSIUM 4.2 4.4 4.5   < > 4.2   < >  --    < >  --    < > 4.4   < > 4.5    < > = values in this interval not displayed.      Hemoglobin A1c   Goal range is under 8%. Best is 6.5 to 7   Blood Pressure 102/66 Goal to keep less than 140/90   Tobacco  reports that he quit smoking about 35 years ago. His smoking use included cigarettes. He has a 30.00 pack-year smoking history. He has never used smokeless tobacco. Goal to abstain from tobacco   Aspirin or Plavix Anti-platelet therapy Aspirin or Plavix reduces risk of heart disease and stroke  -- sometimes used with other blood thinners, depending on bleeding risk and risk factors.    ACE/ARB Specific blood pressure meds These medications can reduce risk of kidney disease   Cholesterol Statins (Lipitor, Crestor, vs others) Statins reduce risk of heart disease and stroke   Eye Exam -- Do Yearly -- Annual diabetic eye exam   Healthy weight Wt Readings from Last 3 Encounters:   11/06/20 108 kg (238 lb)   10/28/20 108.9 kg (240 lb)   09/23/20 107.5 kg (237 lb)     Body mass index is 35.66 kg/m .  Goal BMI under 30, best is under 25.      -- Trying to exercise daily (goal at least 20 min/day) with moderate aerobic activity   -- Eat healthy (resources from ADA at http://www.diabetes.org/)   -- Taking good care of my feet. Consider seeing the Podiatrist   -- Check blood sugars as directed, record in log book and bring to every appointment    Insurance companies are grading you and I on your blood sugar control -- Goal is to get your A1c down to 7.9% or lower and NO Smoking!  -- Medicare and most insurance companies, will only cover Hemoglobin A1c labs to be rechecked every 91+ days.      Return for Diabetes labs and clinic follow-up appointment every 3 to 4 months.  --- (Go for about 91 to 100 days)  Schedule lab only appointment --- A few days AFTER: 2/4/21   Schedule clinic appointment with Dr. Roberts -- Same day as labs, or 1-2 days later.

## 2020-11-06 NOTE — PROGRESS NOTES
"  SUBJECTIVE:   Andrez Olivier is a 74 year old male who presents for Preventive Visit.  Patient has been advised of split billing requirements and indicates understanding: Yes  Are you in the first 12 months of your Medicare Part B coverage?  No    Physical Health:    In general, how would you rate your overall physical health? good    Outside of work, how many days during the week do you exercise? 6-7 days/week    Outside of work, approximately how many minutes a day do you exercise?greater than 60 minutes- Keeps busy outside    If you drink alcohol do you typically have >3 drinks per day or >7 drinks per week? Not Applicable    Do you usually eat at least 4 servings of fruit and vegetables a day, include whole grains & fiber and avoid regularly eating high fat or \"junk\" foods? NO    Do you have any problems taking medications regularly?  No    Do you have any side effects from medications? Feet and hands get numb    Needs assistance for the following daily activities: no assistance needed    Which of the following safety concerns are present in your home?  none identified     Hearing impairment: Yes, needs hearing aids    In the past 6 months, have you been bothered by leaking of urine? no    Mental Health:    In general, how would you rate your overall mental or emotional health? good  PHQ-2 Score: 0    Do you feel safe in your environment? Yes    Have you ever done Advance Care Planning? (For example, a Health Directive, POLST, or a discussion with a medical provider or your loved ones about your wishes): No, advance care planning information given to patient to review.  Patient plans to discuss their wishes with loved ones or provider.      Additional concerns to address?  No    Fall risk:  Fallen 2 or more times in the past year?: No  Any fall with injury in the past year?: No  click delete button to remove this line now  Cognitive Screenin) Repeat 3 items (Leader, Season, Table)    2) Clock draw: " NORMAL  3) 3 item recall: Recalls 2 objects   Results: NORMAL clock, 1-2 items recalled: COGNITIVE IMPAIRMENT LESS LIKELY    Mini-CogTM Copyright DIANE Green. Licensed by the author for use in [facility]; reprinted with permission ([email or web address]@[email or web address]). All rights reserved.      Do you have sleep apnea, excessive snoring or daytime drowsiness?: no    Reviewed and updated as needed this visit by clinical staff  Tobacco  Allergies  Meds  Problems  Med Hx  Surg Hx  Fam Hx  Soc Hx          Reviewed and updated as needed this visit by Provider  Tobacco  Allergies  Meds  Problems  Med Hx  Surg Hx  Fam Hx         Social History     Tobacco Use     Smoking status: Former Smoker     Packs/day: 1.00     Years: 30.00     Pack years: 30.00     Types: Cigarettes     Quit date: 1985     Years since quittin.8     Smokeless tobacco: Never Used   Substance Use Topics     Alcohol use: No     Alcohol/week: 0.0 standard drinks                           Current providers sharing in care for this patient include:   Patient Care Team:  Bk Roberts MD as PCP - General (Internal Medicine)  Bk Roberts MD as Assigned PCP  Emmy Stark MD as Assigned Cancer Care Provider    The following health maintenance items are reviewed in Epic and correct as of today:  Health Maintenance   Topic Date Due     EYE EXAM  1945     HEPATITIS C SCREENING  1963     HEPATITIS B IMMUNIZATION (1 of 3 - Risk 3-dose series) 1964     DTAP/TDAP/TD IMMUNIZATION (1 - Tdap) 1970     ZOSTER IMMUNIZATION (1 of 2) 1995     Pneumococcal Vaccine: Pediatrics (0 to 5 Years) and At-Risk Patients (6 to 64 Years) (3 of 3 - PCV13) 2014     MICROALBUMIN  2018     LIPID  2018     A1C  10/09/2019     COLORECTAL CANCER SCREENING  05/15/2020     FALL RISK ASSESSMENT  2021     BMP  10/21/2021     MEDICARE ANNUAL WELLNESS VISIT  2021     DIABETIC FOOT EXAM  2021     ADVANCE CARE  "PLANNING  11/06/2025     PHQ-2  Completed     INFLUENZA VACCINE  Completed     Pneumococcal Vaccine: 65+ Years  Completed     AORTIC ANEURYSM SCREENING (SYSTEM ASSIGNED)  Completed     IPV IMMUNIZATION  Aged Out     MENINGITIS IMMUNIZATION  Aged Out     Review of Systems   Constitutional: Negative for chills and fever.   HENT: Negative for congestion.    Eyes: Negative for visual disturbance.   Respiratory: Negative for chest tightness and shortness of breath.    Cardiovascular: Negative for chest pain.   Gastrointestinal: Positive for diarrhea (intermittently). Negative for abdominal pain.   Genitourinary: Negative for hematuria.   Musculoskeletal: Negative for back pain.   Skin: Negative for rash and wound.   Neurological: Positive for numbness (hands and feet from chemotherapy). Negative for syncope.   Hematological: Negative for adenopathy. Does not bruise/bleed easily.   Psychiatric/Behavioral: Negative for confusion.        OBJECTIVE:   /66 (BP Location: Right arm, Patient Position: Sitting, Cuff Size: Adult Regular)   Pulse 77   Temp 96.1  F (35.6  C) (Tympanic)   Resp 16   Ht 1.74 m (5' 8.5\")   Wt 108 kg (238 lb)   SpO2 93%   BMI 35.66 kg/m   Estimated body mass index is 35.66 kg/m  as calculated from the following:    Height as of this encounter: 1.74 m (5' 8.5\").    Weight as of this encounter: 108 kg (238 lb).  Physical Exam  Constitutional:       General: He is not in acute distress.     Appearance: He is well-developed. He is not diaphoretic.   HENT:      Head: Normocephalic and atraumatic.   Eyes:      General: No scleral icterus.     Conjunctiva/sclera: Conjunctivae normal.   Neck:      Musculoskeletal: Neck supple.   Cardiovascular:      Rate and Rhythm: Normal rate and regular rhythm.   Pulmonary:      Effort: Pulmonary effort is normal.      Breath sounds: Normal breath sounds.   Abdominal:      Palpations: Abdomen is soft.      Tenderness: There is no abdominal tenderness. "   Musculoskeletal:         General: No deformity.   Lymphadenopathy:      Cervical: No cervical adenopathy.   Skin:     General: Skin is warm and dry.      Findings: No rash.      Comments: Diabetic Foot Exam:  Findings:   LEFT: normal DP and PT pulses, no trophic changes or ulcerative lesions and reduced sensation to light touch throughout    RIGHT: normal DP and PT pulses, no trophic changes or ulcerative lesions and reduced sensation to light touch throughout    Neurological:      Mental Status: He is alert and oriented to person, place, and time. Mental status is at baseline.   Psychiatric:         Mood and Affect: Mood normal.         Behavior: Behavior normal.          Diagnostic Test Results:  Labs reviewed in Epic    ASSESSMENT / PLAN:       ICD-10-CM    1. Uncontrolled type 2 diabetes mellitus with hyperglycemia (H)  E11.65 metFORMIN (GLUCOPHAGE) 1000 MG tablet     Hemoglobin A1c     glipiZIDE (GLUCOTROL) 10 MG tablet   2. Gastroesophageal reflux disease with esophagitis, unspecified whether hemorrhage  K21.00 pantoprazole (PROTONIX) 40 MG EC tablet   3. Coronary artery disease involving native coronary artery of native heart without angina pectoris  I25.10 metoprolol tartrate (LOPRESSOR) 50 MG tablet     lisinopril (ZESTRIL) 2.5 MG tablet     clopidogrel (PLAVIX) 75 MG tablet   4. History of gout  Z87.39 allopurinol (ZYLOPRIM) 100 MG tablet   5. Encounter for Medicare annual wellness exam  Z00.00    6. Secondary and unspecified malignant neoplasm of lymph node, unspecified (H)  C77.9    7. Obesity, Class II, BMI 35-39.9, isolated (see actual BMI)  E66.9    8. Mixed hyperlipidemia  E78.2    9. 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    Hyperglycemia associated with type 2 diabetes.  States that he typically only eats about 2 meals a day.  Does not eat a very balanced or carbohydrate reduced  "diet.  Does continue with metformin 1000 mg twice daily as well as glipizide 20 mg twice daily.  Previous instructions said 1 to 2 tablets of glipizide, 10 to 20 mg twice daily with meals.  He would like to get lab work checked in 1 to 2 months with his next lab draw for oncology.  Declines A1c checked today.    Heartburn and reflux, ongoing.  Continues on Protonix.  Needs refills.    Coronary artery disease, currently stable.  Continues on metoprolol, Plavix, lisinopril.  No exertional chest pain reported.    History of gout, currently well controlled.  No recent gout attacks.  Continues on allopurinol.  Needs refills.    History of colon cancer with lymph node metastases.  Follows with oncology closely.  He is no longer on chemotherapy.    Obesity, BMI is 35.  Encouraged reduced oral caloric intake.  Weight loss, regular exercise.    Mixed hyperlipidemia, cholesterol levels are currently controlled.  Continues on simvastatin 40 mg daily.  No changes for now.    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  Has some numbness in the feet and hands bilaterally.    Patient has been advised of split billing requirements and indicates understanding: Yes    COUNSELING:  Reviewed preventive health counseling, as reflected in patient instructions  Special attention given to:       Consider AAA screening for ages 65-75 and smoking history       Regular exercise       Healthy diet/nutrition       Vision screening       Hearing screening       Dental care       Bladder control       Fall risk prevention       Immunizations    Estimated body mass index is 35.66 kg/m  as calculated from the following:    Height as of this encounter: 1.74 m (5' 8.5\").    Weight as of this encounter: 108 kg (238 lb).    Weight management plan: Discussed healthy diet and exercise guidelines    He reports that he quit smoking about 35 years ago. His smoking use included cigarettes. He has a 30.00 pack-year smoking history. He has never used " smokeless tobacco.    Appropriate preventive services were discussed with this patient, including applicable screening as appropriate for cardiovascular disease, diabetes, osteopenia/osteoporosis, and glaucoma.  As appropriate for age/gender, discussed screening for colorectal cancer, prostate cancer, breast cancer, and cervical cancer. Checklist reviewing preventive services available has been given to the patient.    Reviewed patients plan of care and provided an AVS. The Complex Care Plan (for patients with higher acuity and needing more deliberate coordination of services) for Andrez meets the Care Plan requirement. This Care Plan has been established and reviewed with the Patient.    Counseling Resources:  ATP IV Guidelines  Pooled Cohorts Equation Calculator  Breast Cancer Risk Calculator  BRCA-Related Cancer Risk Assessment: FHS-7 Tool  FRAX Risk Assessment  ICSI Preventive Guidelines  Dietary Guidelines for Americans, 2010  USDA's MyPlate  ASA Prophylaxis  Lung CA Screening    Bk Roberts MD  [facility] [place]

## 2021-01-22 DIAGNOSIS — C18.7 CANCER OF SIGMOID COLON (H): ICD-10-CM

## 2021-01-22 DIAGNOSIS — E11.65 UNCONTROLLED TYPE 2 DIABETES MELLITUS WITH HYPERGLYCEMIA (H): ICD-10-CM

## 2021-01-22 LAB
ALBUMIN SERPL-MCNC: 4.1 G/DL (ref 3.5–5.7)
ALP SERPL-CCNC: 87 U/L (ref 34–104)
ALT SERPL W P-5'-P-CCNC: 32 U/L (ref 7–52)
ANION GAP SERPL CALCULATED.3IONS-SCNC: 10 MMOL/L (ref 3–14)
AST SERPL W P-5'-P-CCNC: 33 U/L (ref 13–39)
BASOPHILS # BLD AUTO: 0 10E9/L (ref 0–0.2)
BASOPHILS NFR BLD AUTO: 0.3 %
BILIRUB SERPL-MCNC: 0.6 MG/DL (ref 0.3–1)
BUN SERPL-MCNC: 17 MG/DL (ref 7–25)
CALCIUM SERPL-MCNC: 9.1 MG/DL (ref 8.6–10.3)
CEA SERPL-MCNC: <3 NG/ML
CHLORIDE SERPL-SCNC: 99 MMOL/L (ref 98–107)
CO2 SERPL-SCNC: 26 MMOL/L (ref 21–31)
CREAT SERPL-MCNC: 1.2 MG/DL (ref 0.7–1.3)
DIFFERENTIAL METHOD BLD: NORMAL
EOSINOPHIL # BLD AUTO: 0.1 10E9/L (ref 0–0.7)
EOSINOPHIL NFR BLD AUTO: 1.8 %
ERYTHROCYTE [DISTWIDTH] IN BLOOD BY AUTOMATED COUNT: 14.9 % (ref 10–15)
GFR SERPL CREATININE-BSD FRML MDRD: 59 ML/MIN/{1.73_M2}
GLUCOSE SERPL-MCNC: 341 MG/DL (ref 70–105)
HBA1C MFR BLD: 10.9 % (ref 4–6)
HCT VFR BLD AUTO: 42 % (ref 40–53)
HGB BLD-MCNC: 13.4 G/DL (ref 13.3–17.7)
IMM GRANULOCYTES # BLD: 0 10E9/L (ref 0–0.4)
IMM GRANULOCYTES NFR BLD: 0.4 %
LDH SERPL L TO P-CCNC: 135 U/L (ref 140–271)
LYMPHOCYTES # BLD AUTO: 1.2 10E9/L (ref 0.8–5.3)
LYMPHOCYTES NFR BLD AUTO: 17.8 %
MCH RBC QN AUTO: 29.4 PG (ref 26.5–33)
MCHC RBC AUTO-ENTMCNC: 31.9 G/DL (ref 31.5–36.5)
MCV RBC AUTO: 92 FL (ref 78–100)
MONOCYTES # BLD AUTO: 0.7 10E9/L (ref 0–1.3)
MONOCYTES NFR BLD AUTO: 10.7 %
NEUTROPHILS # BLD AUTO: 4.7 10E9/L (ref 1.6–8.3)
NEUTROPHILS NFR BLD AUTO: 69 %
PLATELET # BLD AUTO: 216 10E9/L (ref 150–450)
POTASSIUM SERPL-SCNC: 4.7 MMOL/L (ref 3.5–5.1)
PROT SERPL-MCNC: 6.9 G/DL (ref 6.4–8.9)
RBC # BLD AUTO: 4.56 10E12/L (ref 4.4–5.9)
SODIUM SERPL-SCNC: 135 MMOL/L (ref 134–144)
WBC # BLD AUTO: 6.9 10E9/L (ref 4–11)

## 2021-01-22 PROCEDURE — 80053 COMPREHEN METABOLIC PANEL: CPT | Mod: ZL | Performed by: INTERNAL MEDICINE

## 2021-01-22 PROCEDURE — 36415 COLL VENOUS BLD VENIPUNCTURE: CPT | Mod: ZL | Performed by: INTERNAL MEDICINE

## 2021-01-22 PROCEDURE — 82378 CARCINOEMBRYONIC ANTIGEN: CPT | Mod: ZL | Performed by: INTERNAL MEDICINE

## 2021-01-22 PROCEDURE — 85025 COMPLETE CBC W/AUTO DIFF WBC: CPT | Mod: ZL | Performed by: INTERNAL MEDICINE

## 2021-01-22 PROCEDURE — 83036 HEMOGLOBIN GLYCOSYLATED A1C: CPT | Mod: ZL | Performed by: INTERNAL MEDICINE

## 2021-01-22 PROCEDURE — 83615 LACTATE (LD) (LDH) ENZYME: CPT | Mod: ZL | Performed by: INTERNAL MEDICINE

## 2021-01-29 ENCOUNTER — ONCOLOGY VISIT (OUTPATIENT)
Dept: ONCOLOGY | Facility: OTHER | Age: 76
End: 2021-01-29
Attending: NURSE PRACTITIONER
Payer: MEDICARE

## 2021-01-29 ENCOUNTER — OFFICE VISIT (OUTPATIENT)
Dept: INTERNAL MEDICINE | Facility: OTHER | Age: 76
End: 2021-01-29
Attending: INTERNAL MEDICINE
Payer: COMMERCIAL

## 2021-01-29 VITALS
BODY MASS INDEX: 35.51 KG/M2 | RESPIRATION RATE: 16 BRPM | DIASTOLIC BLOOD PRESSURE: 72 MMHG | WEIGHT: 237 LBS | TEMPERATURE: 97.1 F | OXYGEN SATURATION: 96 % | SYSTOLIC BLOOD PRESSURE: 110 MMHG | HEART RATE: 72 BPM

## 2021-01-29 VITALS
WEIGHT: 237 LBS | RESPIRATION RATE: 14 BRPM | HEART RATE: 73 BPM | OXYGEN SATURATION: 96 % | BODY MASS INDEX: 35.1 KG/M2 | DIASTOLIC BLOOD PRESSURE: 60 MMHG | TEMPERATURE: 96.5 F | HEIGHT: 69 IN | SYSTOLIC BLOOD PRESSURE: 120 MMHG

## 2021-01-29 DIAGNOSIS — C18.7 CANCER OF SIGMOID COLON (H): ICD-10-CM

## 2021-01-29 DIAGNOSIS — R12 HEARTBURN: ICD-10-CM

## 2021-01-29 DIAGNOSIS — T45.1X5A NEUROPATHY DUE TO CHEMOTHERAPEUTIC DRUG (H): ICD-10-CM

## 2021-01-29 DIAGNOSIS — C77.9 SECONDARY AND UNSPECIFIED MALIGNANT NEOPLASM OF LYMPH NODE, UNSPECIFIED (H): ICD-10-CM

## 2021-01-29 DIAGNOSIS — E11.65 UNCONTROLLED TYPE 2 DIABETES MELLITUS WITH HYPERGLYCEMIA (H): Primary | ICD-10-CM

## 2021-01-29 DIAGNOSIS — C18.7 CANCER OF SIGMOID COLON (H): Primary | ICD-10-CM

## 2021-01-29 DIAGNOSIS — E78.2 MIXED HYPERLIPIDEMIA: ICD-10-CM

## 2021-01-29 DIAGNOSIS — G62.0 NEUROPATHY DUE TO CHEMOTHERAPEUTIC DRUG (H): ICD-10-CM

## 2021-01-29 DIAGNOSIS — E66.01 MORBID OBESITY (H): ICD-10-CM

## 2021-01-29 DIAGNOSIS — I25.10 CORONARY ARTERY DISEASE INVOLVING NATIVE CORONARY ARTERY OF NATIVE HEART WITHOUT ANGINA PECTORIS: ICD-10-CM

## 2021-01-29 PROCEDURE — G0463 HOSPITAL OUTPT CLINIC VISIT: HCPCS

## 2021-01-29 PROCEDURE — G0463 HOSPITAL OUTPT CLINIC VISIT: HCPCS | Mod: 27

## 2021-01-29 PROCEDURE — 99213 OFFICE O/P EST LOW 20 MIN: CPT | Performed by: NURSE PRACTITIONER

## 2021-01-29 PROCEDURE — 99214 OFFICE O/P EST MOD 30 MIN: CPT | Performed by: INTERNAL MEDICINE

## 2021-01-29 RX ORDER — GLIPIZIDE 10 MG/1
TABLET ORAL
Qty: 360 TABLET | Refills: 3 | Status: SHIPPED | OUTPATIENT
Start: 2021-01-29 | End: 2022-01-07

## 2021-01-29 ASSESSMENT — PAIN SCALES - GENERAL
PAINLEVEL: NO PAIN (0)
PAINLEVEL: NO PAIN (0)

## 2021-01-29 ASSESSMENT — ENCOUNTER SYMPTOMS
NUMBNESS: 1
BRUISES/BLEEDS EASILY: 0
FEVER: 0
DIARRHEA: 1
ADENOPATHY: 0
BACK PAIN: 0
CHILLS: 0
WOUND: 0
HEMATURIA: 0
CHEST TIGHTNESS: 0
ABDOMINAL PAIN: 0
SHORTNESS OF BREATH: 0
CONFUSION: 0

## 2021-01-29 ASSESSMENT — MIFFLIN-ST. JEOR: SCORE: 1792.52

## 2021-01-29 NOTE — NURSING NOTE
"Chief Complaint   Patient presents with     RECHECK     colon cancer       Initial /60   Pulse 73   Temp 96.5  F (35.8  C)   Resp 14   Ht 1.74 m (5' 8.5\")   Wt 107.5 kg (237 lb)   SpO2 96%   BMI 35.51 kg/m   Estimated body mass index is 35.51 kg/m  as calculated from the following:    Height as of this encounter: 1.74 m (5' 8.5\").    Weight as of this encounter: 107.5 kg (237 lb).  Medication Reconciliation: complete    Angela Quick RN    "

## 2021-01-29 NOTE — PROGRESS NOTES
Mr. Olivier is a 75 year old male who presents to the clinic for evaluation of the following problems:      ICD-10-CM    1. Uncontrolled type 2 diabetes mellitus with hyperglycemia (H)  E11.65 Hemoglobin A1c     UA reflex to Microscopic     Albumin Random Urine Quantitative with Creat Ratio     TSH Reflex GH     glipiZIDE (GLUCOTROL) 10 MG tablet     Semaglutide 3 MG TABS     Semaglutide 7 MG TABS     Semaglutide 14 MG TABS   2. Morbid obesity (H)  E66.01    3. Coronary artery disease involving native coronary artery of native heart without angina pectoris  I25.10    4. Mixed hyperlipidemia  E78.2 Lipid Profile   5. Heartburn  R12    6. Cancer of sigmoid colon (H)  C18.7    7. Secondary and unspecified malignant neoplasm of lymph node, unspecified (H)  C77.9    8. 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    He has hyperglycemia and uncontrolled type 2 diabetes.  He is not interested in starting insulin therapy.  He did not do well with Canagliflozin.... Only taking 1 tablet of glipizide twice daily with food right now.  He also is on Metformin 1000 mg twice daily.  We had previously sent in a new prescription to increase his glipizide dosing up to 4 tablets daily or 40 mg however he never increased the dosing.  States that he usually has a piece of toast for breakfast he will have coffee and some cookies, and then typically will just eat supper at night with some snacking in between.  Advised a take 1 tablet of 10 mg glipizide with breakfast one with his coffee and cookies, and 2 tablets with supper.  He has not had hypoglycemia issues.    He is morbidly obese.  He would like to lose some weight.  We looked at some options.  Since he is not interested in injectable medication if at all possible, he would like to start oral medication.  Start semaglutide 3 mg tablet for a month then 7 mg tablet for a month then 40 mg daily.  Risks and benefits  discussed and reviewed with patient.  There is no history of thyroid cancer.  Patient himself does have history of sigmoid colon cancer.    Coronary artery disease, currently stable.  No exertional chest pain, heaviness or shortness of breath.    Mixed hyperlipidemia, check lipid panel.  We will check this with his next blood draws.  He is currently on simvastatin 40 mg daily.    Heartburn, has been an ongoing issue.  Continues on Protonix.  No changes for now.    History of chemotherapy for metastatic colon cancer, ended up getting neuropathy.  States that he has numbness in the hands that probably will never resolve.    Plan for follow-up.  3 to 4 months or sooner as needed.  Labs prior to appointment.    Functional Capacity: > or about 4 METS.   Review of Systems   Constitutional: Negative for chills and fever.   HENT: Negative for congestion.    Eyes: Negative for visual disturbance.   Respiratory: Negative for chest tightness and shortness of breath.    Cardiovascular: Negative for chest pain.   Gastrointestinal: Positive for diarrhea (intermittently). Negative for abdominal pain.   Genitourinary: Negative for hematuria.   Musculoskeletal: Negative for back pain.   Skin: Negative for rash and wound.   Allergic/Immunologic: Negative for immunocompromised state.   Neurological: Positive for numbness (hands and feet from chemotherapy). Negative for syncope.   Hematological: Negative for adenopathy. Does not bruise/bleed easily.   Psychiatric/Behavioral: Negative for confusion.        Reviewed and updated as needed this visit by Provider   Tobacco  Allergies  Meds  Problems  Med Hx  Surg Hx  Fam Hx          EXAM:   Vitals:    01/29/21 0925   BP: 110/72   BP Location: Right arm   Patient Position: Sitting   Cuff Size: Adult Large   Pulse: 72   Resp: 16   Temp: 97.1  F (36.2  C)   TempSrc: Tympanic   SpO2: 96%   Weight: 107.5 kg (237 lb)       Current Pain Score: No Pain (0)     BP Readings from Last 3  "Encounters:   01/29/21 110/72   01/29/21 120/60   11/06/20 102/66      Wt Readings from Last 3 Encounters:   01/29/21 107.5 kg (237 lb)   01/29/21 107.5 kg (237 lb)   11/06/20 108 kg (238 lb)      Estimated body mass index is 35.51 kg/m  as calculated from the following:    Height as of an earlier encounter on 1/29/21: 1.74 m (5' 8.5\").    Weight as of this encounter: 107.5 kg (237 lb).     Physical Exam  Constitutional:       General: He is not in acute distress.     Appearance: He is well-developed. He is obese. He is not diaphoretic.   HENT:      Head: Normocephalic and atraumatic.   Eyes:      General: No scleral icterus.     Conjunctiva/sclera: Conjunctivae normal.   Neck:      Musculoskeletal: Neck supple.   Cardiovascular:      Rate and Rhythm: Normal rate and regular rhythm.   Pulmonary:      Effort: Pulmonary effort is normal.      Breath sounds: Normal breath sounds.   Abdominal:      Palpations: Abdomen is soft.      Tenderness: There is no abdominal tenderness.   Musculoskeletal:         General: No deformity.   Lymphadenopathy:      Cervical: No cervical adenopathy.   Skin:     General: Skin is warm and dry.      Findings: No rash.   Neurological:      Mental Status: He is alert and oriented to person, place, and time. Mental status is at baseline.   Psychiatric:         Mood and Affect: Mood normal.         Behavior: Behavior normal.          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)       Digestive    Cancer of sigmoid colon (H)    Heartburn    Morbid obesity (H)    Relevant Medications    glipiZIDE (GLUCOTROL) 10 MG tablet    Semaglutide 3 MG TABS    Semaglutide 7 MG TABS    Semaglutide 14 MG TABS       Endocrine    Uncontrolled type 2 diabetes mellitus with hyperglycemia (H) - Primary    Relevant Medications    glipiZIDE (GLUCOTROL) 10 MG tablet    " Semaglutide 3 MG TABS    Semaglutide 7 MG TABS    Semaglutide 14 MG TABS    Other Relevant Orders    Hemoglobin A1c    UA reflex to Microscopic    Albumin Random Urine Quantitative with Creat Ratio    TSH Reflex GH    Mixed hyperlipidemia    Relevant Orders    Lipid Profile       Circulatory    Coronary artery disease involving native coronary artery of native heart without angina pectoris       Immune    Secondary and unspecified malignant neoplasm of lymph node, unspecified (H)        reviewed diet, exercise and weight control, recommended sodium restriction, cardiovascular risk and specific lipid/LDL goals reviewed, specific diabetic recommendations low cholesterol diet, weight control and daily exercise discussed  -- Expected clinical course discussed    -- Medications and their side effects discussed    Patient Instructions     Get your diabetes labs and urinalysis testing done in April - with your other labs.       Increase glipizide to 10 mg -- 4 tablet daily with food.   --- See instructions.     Start Semaglutide -- once daily for diabetes.     Uncontrolled type 2 diabetes mellitus with hyperglycemia (H)    - Hemoglobin A1c; Standing  - UA reflex to Microscopic; Standing  - Albumin Random Urine Quantitative with Creat Ratio; Standing  - TSH Reflex GH; Standing    -- Dose Increase 1/29/2021:  - glipiZIDE (GLUCOTROL) 10 MG tablet; Take 1 tablet with breakfast, 1 tablet with coffee/cookies and 2 tablet with supper -- total of 4 daily  Dispense: 360 tablet; Refill: 3    -- START:   - Semaglutide 3 MG TABS; Take 3 mg by mouth daily -- after 30 days, then increase dose to 7 mg  Dispense: 30 tablet; Refill: 0  - Semaglutide 7 MG TABS; Take 7 mg by mouth daily -- after 30 days, then increase dose to 14 mg  Dispense: 30 tablet; Refill: 0  - Semaglutide 14 MG TABS; Take 14 mg by mouth daily -- start when done with 7 mg tablets  Dispense: 90 tablet; Refill: 3    2. Morbid obesity (H)    To help with weight loss and  improve blood sugar control....    -- Try to avoid Carbohydrates as much as possible -- breads, pasta, baked goods, cakes, oatmeal, cold cereal, potatoes.   These are turned to sugar in one metabolic conversion, cause insulin secretion and increased fat deposition / weight gain.      -- Eat more lean meats, proteins, eggs, nuts, vegetables.       3. Coronary artery disease involving native coronary artery of native heart without angina pectoris  4. Mixed hyperlipidemia  - Lipid Profile; Standing    5. Heartburn    6. Cancer of sigmoid colon (H)  7. Secondary and unspecified malignant neoplasm of lymph node, unspecified (H)  8. 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20 0.97 0.99   < > 0.89   < >  --    < >  --    < > 0.89   < > 1.09   GFRESTIMATED 59* 76 74   < > 84   < >  --    < >  --   --   --   --   --    GFRESTBLACK 71 >90 89   < > >90   < >  --    < >  --    < > >60   < > >60   POTASSIUM 4.7 4.2 4.4   < > 4.2   < >  --    < >  --    < > 4.4   < > 4.5    < > = values in this interval not displayed.      Hemoglobin A1c  Goal range is under 8%. Best is 6.5 to 7   Blood Pressure 110/72 Goal to keep less than 140/90   Tobacco  reports that he quit smoking about 36 years ago. His smoking use included cigarettes. He has a 30.00 pack-year smoking history. He has never used smokeless tobacco. Goal to abstain from tobacco    Aspirin or Plavix Anti-platelet therapy Aspirin or Plavix reduces risk of heart disease and stroke  -- sometimes used with other blood thinners, depending on bleeding risk and risk factors.    ACE/ARB Specific blood pressure meds These medications can reduce risk of kidney disease   Cholesterol Statins (Lipitor, Crestor, vs others) Statins reduce risk of heart disease and stroke   Eye Exam -- Do Yearly -- Annual diabetic eye exam   Healthy weight Wt Readings from Last 3 Encounters:   01/29/21 107.5 kg (237 lb)   01/29/21 107.5 kg (237 lb)   11/06/20 108 kg (238 lb)     Body mass index is 35.51 kg/m .  Goal BMI under 30, best is under 25.      -- Trying to exercise daily (goal at least 20 min/day) with moderate aerobic activity   -- Eat healthy (resources from ADA at http://www.diabetes.org/)   -- Taking good care of my feet. Consider seeing the Podiatrist   -- Check blood sugars as directed, record in log book and bring to every appointment    Insurance companies are grading you and I on your blood sugar control -- Goal is to get your A1c down to 7.9% or lower and NO Smoking!  -- Medicare and most insurance companies, will only cover Hemoglobin A1c labs to be rechecked every 91+ days.      Return for Diabetes labs and clinic follow-up appointment every 3 to 4 months.  --- (Go for about 91 to 100 days)  Schedule lab only appointment --- A few days AFTER: 4/29/21   Schedule clinic appointment with Dr. Roberts -- Same day as labs, or 1-2 days later.        Bk Roberts MD   Internal Medicine  [facility]     Portions of this note were dictated using speech recognition software. The note has been proofread but errors in the text may have been overlooked. Please contact me if there are any concerns regarding the accuracy of the dictation.

## 2021-01-29 NOTE — PATIENT INSTRUCTIONS
Get your diabetes labs and urinalysis testing done in April - with your other labs.       Increase glipizide to 10 mg -- 4 tablet daily with food.   --- See instructions.     Start Semaglutide -- once daily for diabetes.     Uncontrolled type 2 diabetes mellitus with hyperglycemia (H)    - Hemoglobin A1c; Standing  - UA reflex to Microscopic; Standing  - Albumin Random Urine Quantitative with Creat Ratio; Standing  - TSH Reflex GH; Standing    -- Dose Increase 1/29/2021:  - glipiZIDE (GLUCOTROL) 10 MG tablet; Take 1 tablet with breakfast, 1 tablet with coffee/cookies and 2 tablet with supper -- total of 4 daily  Dispense: 360 tablet; Refill: 3    -- START:   - Semaglutide 3 MG TABS; Take 3 mg by mouth daily -- after 30 days, then increase dose to 7 mg  Dispense: 30 tablet; Refill: 0  - Semaglutide 7 MG TABS; Take 7 mg by mouth daily -- after 30 days, then increase dose to 14 mg  Dispense: 30 tablet; Refill: 0  - Semaglutide 14 MG TABS; Take 14 mg by mouth daily -- start when done with 7 mg tablets  Dispense: 90 tablet; Refill: 3    2. Morbid obesity (H)    To help with weight loss and improve blood sugar control....    -- Try to avoid Carbohydrates as much as possible -- breads, pasta, baked goods, cakes, oatmeal, cold cereal, potatoes.   These are turned to sugar in one metabolic conversion, cause insulin secretion and increased fat deposition / weight gain.      -- Eat more lean meats, proteins, eggs, nuts, vegetables.       3. Coronary artery disease involving native coronary artery of native heart without angina pectoris  4. Mixed hyperlipidemia  - Lipid Profile; Standing    5. Heartburn    6. Cancer of sigmoid colon (H)  7. Secondary and unspecified malignant neoplasm of lymph node, unspecified (H)  8. 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)    Aspects of Diabetes:   Recent Labs   Lab Test 01/22/21  0812 10/21/20  0732 09/16/20  0827 04/09/19  0830 04/09/19  0830 09/06/18  1103 09/06/18  1103 12/18/17  0749  12/18/17  0749 05/11/16  0953 05/11/16  0953 09/18/15  0902 09/18/15  0902   A1C 10.9*  --   --   --  10.5*  --  11.1*  --   --   --   --   --   --    LDL  --   --   --   --   --   --   --   --  106*  --  93  --  103*   HDL  --   --   --   --   --   --   --   --  32  --  35  --  31   TRIG  --   --   --   --   --   --   --   --  271*  --  173*  --  225*   ALT 32 38 40   < > 71*   < >  --    < >  --   --   --   --   --    CR 1.20 0.97 0.99   < > 0.89   < >  --    < >  --    < > 0.89   < > 1.09   GFRESTIMATED 59* 76 74   < > 84   < >  --    < >  --   --   --   --   --    GFRESTBLACK 71 >90 89   < > >90   < >  --    < >  --    < > >60   < > >60   POTASSIUM 4.7 4.2 4.4   < > 4.2   < >  --    < >  --    < > 4.4   < > 4.5    < > = values in this interval not displayed.      Hemoglobin A1c  Goal range is under 8%. Best is 6.5 to 7   Blood Pressure 110/72 Goal to keep less than 140/90   Tobacco  reports that he quit smoking about 36 years ago. His smoking use included cigarettes. He has a 30.00 pack-year smoking history. He has never used smokeless tobacco. Goal to abstain from tobacco   Aspirin or Plavix Anti-platelet therapy Aspirin or Plavix reduces risk of heart disease and stroke  -- sometimes used with other blood thinners, depending on bleeding risk and risk factors.    ACE/ARB Specific blood pressure meds These medications can reduce risk of kidney disease   Cholesterol Statins (Lipitor, Crestor, vs others) Statins reduce risk of heart disease and stroke   Eye Exam -- Do Yearly -- Annual diabetic eye exam   Healthy weight Wt Readings from Last 3 Encounters:   01/29/21 107.5 kg (237 lb)   01/29/21 107.5 kg (237 lb)   11/06/20 108 kg (238 lb)     Body mass index is 35.51 kg/m .  Goal BMI under 30, best is under 25.      -- Trying to exercise daily (goal at least 20 min/day) with moderate aerobic activity   -- Eat healthy (resources from ADA at http://www.diabetes.org/)   -- Taking good care of my feet. Consider seeing  the Podiatrist   -- Check blood sugars as directed, record in log book and bring to every appointment    Insurance companies are grading you and I on your blood sugar control -- Goal is to get your A1c down to 7.9% or lower and NO Smoking!  -- Medicare and most insurance companies, will only cover Hemoglobin A1c labs to be rechecked every 91+ days.      Return for Diabetes labs and clinic follow-up appointment every 3 to 4 months.  --- (Go for about 91 to 100 days)  Schedule lab only appointment --- A few days AFTER: 4/29/21   Schedule clinic appointment with Dr. Roberts -- Same day as labs, or 1-2 days later.

## 2021-01-29 NOTE — PROGRESS NOTES
Patient presents to clinic for DM check.    Previous A1C is not at goal of <8  Lab Results   Component Value Date    A1C 10.9 01/22/2021    A1C 10.5 04/09/2019    A1C 11.1 09/06/2018     Urine microalbumin:creatine: due  Foot exam 11/6/20  Eye exam due    Tobacco User no  Patient is not on a daily aspirin  Patient is on a Statin.  Blood pressure today of:     BP Readings from Last 1 Encounters:   01/29/21 110/72      is at the goal of <139/89 for diabetics.    Brissa Cervantes LPN on 1/29/2021 at 9:28 AM

## 2021-01-29 NOTE — PROGRESS NOTES
Oncology Follow-up Visit:  January 29, 2021  Diagnosis:Colon cancer    History Of Present Illness:  Patient presents to the clinic today for followup of colon cancer. Patient was seen in consultation on June 1, 2016. Patient presented to the emergency room on April 25, 2016, with complaints of chest pain radiating down his arms, which was primarily worse with exertion. In the emergency department, he was found to have a hemoglobin of 7.1, and he subsequently was admitted. CBC revealed microcytic indices with an MCV of 62. He was noted to be iron deficient. He was transfused 2 units of packed red cells and was ruled out for MI. He was seen by Dr. Solano, who performed colonoscopy and was noted to have a mass in the sigmoid colon that was consistent with adenocarcinoma. He also had multiple adenomatous polyps. The patient was admitted on May 11, 2016, for a sigmoid colectomy. This was performed on May 17, 2016. Pathology revealed in the sigmoid colon a mass consistent with moderately differentiated adenocarcinoma. The tumor size was 2 x 1 x 0.7 cm. The tumor invaded the muscularis propria, 2/8 lymph nodes were positive for metastatic carcinoma. Margins were negative for tumor. The grade of the tumor was ruled as moderately differentiated. The patient was staged pathologic stage T2N1b as part of the postop evaluation. The patient had a CT abdomen and pelvis on May 12, 2016. This revealed that there were 2 nonspecific low-attenuation lesions in the liver. Metastasis could not be excluded. There was no lymphadenopathy or additional findings to suggest metastases. The patient had a preop CEA, which was less than 3. PET scan was performed on Lluvia 15, 2016, and was essentially negative for metastatic disease. Lesions seen on prior PET in the liver were not hypermetabolic. We felt that the patient had stage III disease and he would be a candidate for adjuvant chemotherapy. When patient was seen on June 28, 2016, the plan was  "to start FOLFOX x12 cycles.  When he was seen on November 17, 2016,  he did note some cold-induced neuropathic symptoms. He completed 12 cycles of FOLFOX. His last chemotherapy was administered on December 7, 2016.  He did have a PET scan done after 12 cycles of chemotherapy, and this came back essentially negative for metastatic disease.   He had staging studies on April 6, 2017 including CT abdomen and pelvis, which was essentially negative. CT of the chest was negative. The patient also underwent a colonoscopy in May 2017, which revealed a tubular adenoma at the hepatic flexure of the colon. Patient had a colonoscopy done performed by Dr. Solano  on 06/01/2020 and was found to have 6 polyps.  All of them were revealing tubular adenoma, consistent with advanced adenoma.  One was at the 30 cm, and the other was in the mid transverse colon. This was based on polyp size being larger than 10 mm. Patient will be due for repeat colonoscopy in 6/2021. Scans have been sable. Tumor marker is normal. All other labs are stable. Patient has been feeling well. He has no new concerns at this time.      Review Of Systems:  Review Of Systems  Eyes/Ears/Nose/Throat: denies new vision or hearing changes  Respiratory: No shortness of breath, dyspnea on exertion, cough, or hemoptysis  Cardiovascular: denies chest pain or palpitations  Gastrointestinal: denies abdominal pain, no bowel changes  Genitourinary: denies dysuria or hematuria  Musculoskeletal: denies new bone pain or muscle weakness  Neurologic: reports occasional dizziness with position changes, denies headaches  Hematologic/Lymphatic/Immunologic:denies fevers, chills, night sweats      Nursing Notes:   Angela Quick RN  1/29/2021  8:36 AM  Signed  Chief Complaint   Patient presents with     RECHECK     colon cancer       Initial /60   Pulse 73   Temp 96.5  F (35.8  C)   Resp 14   Ht 1.74 m (5' 8.5\")   Wt 107.5 kg (237 lb)   SpO2 96%   BMI 35.51 kg/m   " "Estimated body mass index is 35.51 kg/m  as calculated from the following:    Height as of this encounter: 1.74 m (5' 8.5\").    Weight as of this encounter: 107.5 kg (237 lb).  Medication Reconciliation: complete    Angela Quick RN        Past medical, social, surgical, and family histories reviewed.    Allergies:  Allergies as of 01/29/2021 - Reviewed 01/29/2021   Allergen Reaction Noted     Aspirin  05/26/2017     Canagliflozin  05/18/2016     Morphine  05/18/2016       Current Medications:  Current Outpatient Medications   Medication Sig Dispense Refill     allopurinol (ZYLOPRIM) 100 MG tablet Take 1 tablet (100 mg) by mouth 2 times daily 180 tablet 3     clopidogrel (PLAVIX) 75 MG tablet Take 1 tablet (75 mg) by mouth daily 90 tablet 3     Cyanocobalamin (VITAMIN B-12 CR) 1000 MCG TBCR Take 1 tablet by mouth once daily.       glipiZIDE (GLUCOTROL) 10 MG tablet Take 2 tablets (20 mg) by mouth 2 times daily (before meals) -- Dose Increase 11/6/2020 360 tablet 3     lisinopril (ZESTRIL) 2.5 MG tablet Take 1 tablet (2.5 mg) by mouth daily 90 tablet 3     metFORMIN (GLUCOPHAGE) 1000 MG tablet TAKE 1 TABLET BY MOUTH TWICE DAILY WITH MEALS 180 tablet 3     metoprolol tartrate (LOPRESSOR) 50 MG tablet Take 1 tablet (50 mg) by mouth 2 times daily 180 tablet 3     nitroGLYcerin (NITROSTAT) 0.4 MG sublingual tablet Place 1 tablet under the tongue every 5 minutes if needed for Chest Pain.       pantoprazole (PROTONIX) 40 MG EC tablet Take 1 tablet (40 mg) by mouth daily 90 tablet 3     simvastatin (ZOCOR) 40 MG tablet TAKE 1 TABLET BY MOUTH AT BEDTIME FOR CHOLESTEROL. 90 tablet 3        Physical Exam:  /60   Pulse 73   Temp 96.5  F (35.8  C)   Resp 14   Ht 1.74 m (5' 8.5\")   Wt 107.5 kg (237 lb)   SpO2 96%   BMI 35.51 kg/m      GENERAL APPEARANCE: 75 year old male, alert and no distress     NECK: no adenopathy, no asymmetry or masses     LYMPHATICS: No cervical, supraclavicular, axillary lymphadenopathy     " RESP: lungs clear to auscultation - no rales, rhonchi or wheezes     CARDIOVASCULAR: regular rates and rhythm, normal S1 S2     ABDOMEN: nontender, bowel sounds normal     MUSCULOSKELETAL: extremities normal- no gross deformities noted, No edema b/l LE.     SKIN: no suspicious lesions or rashes on exposed skin     PSYCHIATRIC: mentation appears normal and affect normal    Laboratory/Imaging Studies  Orders Only on 01/22/2021   Component Date Value Ref Range Status     Lactate Dehydrogenase 01/22/2021 135* 140 - 271 U/L Final     Sodium 01/22/2021 135  134 - 144 mmol/L Final     Potassium 01/22/2021 4.7  3.5 - 5.1 mmol/L Final     Chloride 01/22/2021 99  98 - 107 mmol/L Final     Carbon Dioxide 01/22/2021 26  21 - 31 mmol/L Final     Anion Gap 01/22/2021 10  3 - 14 mmol/L Final     Glucose 01/22/2021 341* 70 - 105 mg/dL Final     Urea Nitrogen 01/22/2021 17  7 - 25 mg/dL Final     Creatinine 01/22/2021 1.20  0.70 - 1.30 mg/dL Final     GFR Estimate 01/22/2021 59* >60 mL/min/[1.73_m2] Final     GFR Estimate If Black 01/22/2021 71  >60 mL/min/[1.73_m2] Final     Calcium 01/22/2021 9.1  8.6 - 10.3 mg/dL Final     Bilirubin Total 01/22/2021 0.6  0.3 - 1.0 mg/dL Final     Albumin 01/22/2021 4.1  3.5 - 5.7 g/dL Final     Protein Total 01/22/2021 6.9  6.4 - 8.9 g/dL Final     Alkaline Phosphatase 01/22/2021 87  34 - 104 U/L Final     ALT 01/22/2021 32  7 - 52 U/L Final     AST 01/22/2021 33  13 - 39 U/L Final     WBC 01/22/2021 6.9  4.0 - 11.0 10e9/L Final     RBC Count 01/22/2021 4.56  4.4 - 5.9 10e12/L Final     Hemoglobin 01/22/2021 13.4  13.3 - 17.7 g/dL Final     Hematocrit 01/22/2021 42.0  40.0 - 53.0 % Final     MCV 01/22/2021 92  78 - 100 fl Final     MCH 01/22/2021 29.4  26.5 - 33.0 pg Final     MCHC 01/22/2021 31.9  31.5 - 36.5 g/dL Final     RDW 01/22/2021 14.9  10.0 - 15.0 % Final     Platelet Count 01/22/2021 216  150 - 450 10e9/L Final     Diff Method 01/22/2021 Automated Method   Final     % Neutrophils  01/22/2021 69.0  % Final     % Lymphocytes 01/22/2021 17.8  % Final     % Monocytes 01/22/2021 10.7  % Final     % Eosinophils 01/22/2021 1.8  % Final     % Basophils 01/22/2021 0.3  % Final     % Immature Granulocytes 01/22/2021 0.4  % Final     Absolute Neutrophil 01/22/2021 4.7  1.6 - 8.3 10e9/L Final     Absolute Lymphocytes 01/22/2021 1.2  0.8 - 5.3 10e9/L Final     Absolute Monocytes 01/22/2021 0.7  0.0 - 1.3 10e9/L Final     Absolute Eosinophils 01/22/2021 0.1  0.0 - 0.7 10e9/L Final     Absolute Basophils 01/22/2021 0.0  0.0 - 0.2 10e9/L Final     Abs Immature Granulocytes 01/22/2021 0.0  0 - 0.4 10e9/L Final     Carcinoembryonic Agn 01/22/2021 <3.0  <3.0 ng/mL Final     Hemoglobin A1C 01/22/2021 10.9* 4.0 - 6.0 % Final        ASSESSMENT/PLAN:  Stage III, moderately differentiated adenocarcinoma of the sigmoid colon with 2 out of 11 lymph nodes positive for metastatic disease consistent with pathologic T2 N1b M0 colon cancer.  PET scan was negative for metastatic disease.  The patient was started on adjuvant FOLFOX chemotherapy and completed 12 cycles. PET scan did not reveal any evidence of metastatic disease.The patient had a colonoscopy in June 2020, which revealed multiple tubular adenomas with 2 being advanced adenomas. CT of the chest, abdomen and pelvis done in October 2020 was negative for metastatic disease. We will see the patient in 3 months with CBC, CMP, LDH and CEA and Ct scans    Twenty five minutes spent with this encounter with time spent reviewing patients record, counseling patient regarding disease process, interpretation of lab results, discussing plan for ongoing surveillance, obtaining a review of systems, performing a physical exam, ordering tests, documenting in EHR and coordination or care

## 2021-02-17 ENCOUNTER — IMMUNIZATION (OUTPATIENT)
Dept: FAMILY MEDICINE | Facility: OTHER | Age: 76
End: 2021-02-17
Attending: INTERNAL MEDICINE
Payer: MEDICARE

## 2021-02-17 PROCEDURE — 91300 PR COVID VAC PFIZER DIL RECON 30 MCG/0.3 ML IM: CPT

## 2021-03-10 ENCOUNTER — IMMUNIZATION (OUTPATIENT)
Dept: FAMILY MEDICINE | Facility: OTHER | Age: 76
End: 2021-03-10
Attending: FAMILY MEDICINE
Payer: MEDICARE

## 2021-03-10 PROCEDURE — 91300 PR COVID VAC PFIZER DIL RECON 30 MCG/0.3 ML IM: CPT

## 2021-03-31 DIAGNOSIS — E11.65 UNCONTROLLED TYPE 2 DIABETES MELLITUS WITH HYPERGLYCEMIA (H): ICD-10-CM

## 2021-03-31 RX ORDER — ORAL SEMAGLUTIDE 7 MG/1
TABLET ORAL
Qty: 30 TABLET | Refills: 0 | OUTPATIENT
Start: 2021-03-31

## 2021-03-31 NOTE — TELEPHONE ENCOUNTER
Semaglutide 14 MG TABS 90 tablet 3 1/29/2021  --   Sig - Route: Take 14 mg by mouth daily -- start when done with 7 mg tablets - Oral     [facility]  [facility] and they have all Rx's and will fill the 14 mgs    Marina Joy RN on 3/31/2021 at 10:28 AM

## 2021-04-19 ENCOUNTER — HOSPITAL ENCOUNTER (OUTPATIENT)
Dept: CT IMAGING | Facility: OTHER | Age: 76
End: 2021-04-19
Attending: NURSE PRACTITIONER
Payer: MEDICARE

## 2021-04-19 DIAGNOSIS — C18.7 CANCER OF SIGMOID COLON (H): ICD-10-CM

## 2021-04-19 LAB
ALBUMIN SERPL-MCNC: 3.9 G/DL (ref 3.5–5.7)
ALP SERPL-CCNC: 79 U/L (ref 34–104)
ALT SERPL W P-5'-P-CCNC: 21 U/L (ref 7–52)
ANION GAP SERPL CALCULATED.3IONS-SCNC: 10 MMOL/L (ref 3–14)
AST SERPL W P-5'-P-CCNC: 23 U/L (ref 13–39)
BASOPHILS # BLD AUTO: 0 10E9/L (ref 0–0.2)
BASOPHILS NFR BLD AUTO: 0.3 %
BILIRUB SERPL-MCNC: 0.5 MG/DL (ref 0.3–1)
BUN SERPL-MCNC: 17 MG/DL (ref 7–25)
CALCIUM SERPL-MCNC: 9.1 MG/DL (ref 8.6–10.3)
CEA SERPL-MCNC: <3 NG/ML
CHLORIDE SERPL-SCNC: 103 MMOL/L (ref 98–107)
CO2 SERPL-SCNC: 26 MMOL/L (ref 21–31)
CREAT SERPL-MCNC: 1.27 MG/DL (ref 0.7–1.3)
DIFFERENTIAL METHOD BLD: ABNORMAL
EOSINOPHIL # BLD AUTO: 0.1 10E9/L (ref 0–0.7)
EOSINOPHIL NFR BLD AUTO: 1.4 %
ERYTHROCYTE [DISTWIDTH] IN BLOOD BY AUTOMATED COUNT: 14.3 % (ref 10–15)
GFR SERPL CREATININE-BSD FRML MDRD: 55 ML/MIN/{1.73_M2}
GLUCOSE SERPL-MCNC: 291 MG/DL (ref 70–105)
HCT VFR BLD AUTO: 40.3 % (ref 40–53)
HGB BLD-MCNC: 13.2 G/DL (ref 13.3–17.7)
IMM GRANULOCYTES # BLD: 0 10E9/L (ref 0–0.4)
IMM GRANULOCYTES NFR BLD: 0.5 %
LDH SERPL L TO P-CCNC: 129 U/L (ref 140–271)
LYMPHOCYTES # BLD AUTO: 1.5 10E9/L (ref 0.8–5.3)
LYMPHOCYTES NFR BLD AUTO: 19.6 %
MCH RBC QN AUTO: 29.8 PG (ref 26.5–33)
MCHC RBC AUTO-ENTMCNC: 32.8 G/DL (ref 31.5–36.5)
MCV RBC AUTO: 91 FL (ref 78–100)
MONOCYTES # BLD AUTO: 1 10E9/L (ref 0–1.3)
MONOCYTES NFR BLD AUTO: 12.4 %
NEUTROPHILS # BLD AUTO: 5.1 10E9/L (ref 1.6–8.3)
NEUTROPHILS NFR BLD AUTO: 65.8 %
PLATELET # BLD AUTO: 236 10E9/L (ref 150–450)
POTASSIUM SERPL-SCNC: 4.4 MMOL/L (ref 3.5–5.1)
PROT SERPL-MCNC: 6.6 G/DL (ref 6.4–8.9)
RBC # BLD AUTO: 4.43 10E12/L (ref 4.4–5.9)
SODIUM SERPL-SCNC: 139 MMOL/L (ref 134–144)
WBC # BLD AUTO: 7.8 10E9/L (ref 4–11)

## 2021-04-19 PROCEDURE — 85025 COMPLETE CBC W/AUTO DIFF WBC: CPT | Mod: ZL | Performed by: NURSE PRACTITIONER

## 2021-04-19 PROCEDURE — 71260 CT THORAX DX C+: CPT

## 2021-04-19 PROCEDURE — 36415 COLL VENOUS BLD VENIPUNCTURE: CPT | Mod: ZL | Performed by: NURSE PRACTITIONER

## 2021-04-19 PROCEDURE — 250N000011 HC RX IP 250 OP 636: Performed by: NURSE PRACTITIONER

## 2021-04-19 PROCEDURE — 74177 CT ABD & PELVIS W/CONTRAST: CPT

## 2021-04-19 PROCEDURE — 80053 COMPREHEN METABOLIC PANEL: CPT | Mod: ZL | Performed by: NURSE PRACTITIONER

## 2021-04-19 PROCEDURE — 83615 LACTATE (LD) (LDH) ENZYME: CPT | Mod: ZL | Performed by: NURSE PRACTITIONER

## 2021-04-19 PROCEDURE — 82378 CARCINOEMBRYONIC ANTIGEN: CPT | Mod: ZL | Performed by: NURSE PRACTITIONER

## 2021-04-19 RX ORDER — IOPAMIDOL 755 MG/ML
100 INJECTION, SOLUTION INTRAVASCULAR ONCE
Status: COMPLETED | OUTPATIENT
Start: 2021-04-19 | End: 2021-04-19

## 2021-04-19 RX ORDER — HEPARIN SODIUM (PORCINE) LOCK FLUSH IV SOLN 100 UNIT/ML 100 UNIT/ML
5 SOLUTION INTRAVENOUS ONCE
Status: COMPLETED | OUTPATIENT
Start: 2021-04-19 | End: 2021-04-19

## 2021-04-19 RX ORDER — HEPARIN SODIUM (PORCINE) LOCK FLUSH IV SOLN 100 UNIT/ML 100 UNIT/ML
5 SOLUTION INTRAVENOUS EVERY 8 HOURS
Status: DISCONTINUED | OUTPATIENT
Start: 2021-04-19 | End: 2021-04-19

## 2021-04-19 RX ADMIN — IOPAMIDOL 100 ML: 755 INJECTION, SOLUTION INTRAVENOUS at 09:41

## 2021-04-19 RX ADMIN — Medication 5 ML: at 10:00

## 2021-04-19 NOTE — PROGRESS NOTES
Port accessed by RN for CT. See MAR and flowsheet. De-accessed without problems. Flushed per protocol.  Sabine Kapoor RN on 4/19/2021 at 10:11 AM

## 2021-04-29 ENCOUNTER — TRANSFERRED RECORDS (OUTPATIENT)
Dept: HEALTH INFORMATION MANAGEMENT | Facility: OTHER | Age: 76
End: 2021-04-29

## 2021-04-29 LAB — RETINOPATHY: NEGATIVE

## 2021-05-03 DIAGNOSIS — E78.2 MIXED HYPERLIPIDEMIA: ICD-10-CM

## 2021-05-03 DIAGNOSIS — E11.65 UNCONTROLLED TYPE 2 DIABETES MELLITUS WITH HYPERGLYCEMIA (H): ICD-10-CM

## 2021-05-03 LAB
ALBUMIN UR-MCNC: 10 MG/DL
APPEARANCE UR: CLEAR
BILIRUB UR QL STRIP: NEGATIVE
CHOLEST SERPL-MCNC: 172 MG/DL
COLOR UR AUTO: ABNORMAL
CREAT UR-MCNC: 162 MG/DL
GLUCOSE UR STRIP-MCNC: 300 MG/DL
HBA1C MFR BLD: 9.6 % (ref 4–6)
HDLC SERPL-MCNC: 28 MG/DL (ref 23–92)
HGB UR QL STRIP: NEGATIVE
HYALINE CASTS #/AREA URNS LPF: 1 /LPF (ref 0–2)
KETONES UR STRIP-MCNC: NEGATIVE MG/DL
LDLC SERPL CALC-MCNC: 78 MG/DL
LEUKOCYTE ESTERASE UR QL STRIP: NEGATIVE
MICROALBUMIN UR-MCNC: 26 MG/L
MICROALBUMIN/CREAT UR: 15.74 MG/G CR (ref 0–17)
MUCOUS THREADS #/AREA URNS LPF: PRESENT /LPF
NITRATE UR QL: NEGATIVE
NONHDLC SERPL-MCNC: 144 MG/DL
PH UR STRIP: 5 PH (ref 5–7)
RBC #/AREA URNS AUTO: 0 /HPF (ref 0–2)
SOURCE: ABNORMAL
SP GR UR STRIP: 1.02 (ref 1–1.03)
T4 FREE SERPL-MCNC: 0.65 NG/DL (ref 0.6–1.6)
TRIGL SERPL-MCNC: 332 MG/DL
TSH SERPL DL<=0.05 MIU/L-ACNC: 8.81 IU/ML (ref 0.34–5.6)
UROBILINOGEN UR STRIP-MCNC: NORMAL MG/DL (ref 0–2)
WBC #/AREA URNS AUTO: <1 /HPF (ref 0–5)

## 2021-05-03 PROCEDURE — 81001 URINALYSIS AUTO W/SCOPE: CPT | Mod: ZL | Performed by: INTERNAL MEDICINE

## 2021-05-03 PROCEDURE — 84439 ASSAY OF FREE THYROXINE: CPT | Mod: ZL | Performed by: INTERNAL MEDICINE

## 2021-05-03 PROCEDURE — 84443 ASSAY THYROID STIM HORMONE: CPT | Mod: ZL | Performed by: INTERNAL MEDICINE

## 2021-05-03 PROCEDURE — 36415 COLL VENOUS BLD VENIPUNCTURE: CPT | Mod: ZL | Performed by: INTERNAL MEDICINE

## 2021-05-03 PROCEDURE — 80061 LIPID PANEL: CPT | Mod: ZL | Performed by: INTERNAL MEDICINE

## 2021-05-03 PROCEDURE — 83036 HEMOGLOBIN GLYCOSYLATED A1C: CPT | Mod: ZL | Performed by: INTERNAL MEDICINE

## 2021-05-03 PROCEDURE — 82043 UR ALBUMIN QUANTITATIVE: CPT | Mod: ZL | Performed by: INTERNAL MEDICINE

## 2021-05-04 ENCOUNTER — ONCOLOGY VISIT (OUTPATIENT)
Dept: ONCOLOGY | Facility: OTHER | Age: 76
End: 2021-05-04
Attending: NURSE PRACTITIONER
Payer: COMMERCIAL

## 2021-05-04 VITALS
HEART RATE: 82 BPM | SYSTOLIC BLOOD PRESSURE: 104 MMHG | TEMPERATURE: 97.5 F | DIASTOLIC BLOOD PRESSURE: 70 MMHG | RESPIRATION RATE: 20 BRPM | OXYGEN SATURATION: 97 % | WEIGHT: 234 LBS | BODY MASS INDEX: 35.06 KG/M2

## 2021-05-04 DIAGNOSIS — C18.7 CANCER OF SIGMOID COLON (H): Primary | ICD-10-CM

## 2021-05-04 DIAGNOSIS — D50.9 IRON DEFICIENCY ANEMIA, UNSPECIFIED IRON DEFICIENCY ANEMIA TYPE: ICD-10-CM

## 2021-05-04 PROCEDURE — 99214 OFFICE O/P EST MOD 30 MIN: CPT | Performed by: NURSE PRACTITIONER

## 2021-05-04 PROCEDURE — G0463 HOSPITAL OUTPT CLINIC VISIT: HCPCS

## 2021-05-04 RX ORDER — ACETAMINOPHEN 160 MG
1 TABLET,DISINTEGRATING ORAL DAILY
COMMUNITY

## 2021-05-04 RX ORDER — MULTIVITAMIN WITH IRON
100 TABLET ORAL DAILY
COMMUNITY
End: 2023-05-10

## 2021-05-04 ASSESSMENT — PAIN SCALES - GENERAL: PAINLEVEL: NO PAIN (0)

## 2021-05-04 NOTE — NURSING NOTE
"Chief Complaint   Patient presents with     RECHECK     Colon cancer       Initial /70   Pulse 82   Temp 97.5  F (36.4  C) (Tympanic)   Resp 20   Wt 106.1 kg (234 lb)   SpO2 97%   BMI 35.06 kg/m   Estimated body mass index is 35.06 kg/m  as calculated from the following:    Height as of 1/29/21: 1.74 m (5' 8.5\").    Weight as of this encounter: 106.1 kg (234 lb).  Medication Reconciliation: complete    Amaris Ray, NUNU (AAMA)  "

## 2021-05-04 NOTE — PROGRESS NOTES
Oncology Follow-up Visit:  May 4, 2021  Diagnosis:Colon cancer    History Of Present Illness:  Patient presents to the clinic today for followup of colon cancer. Patient was seen in consultation on June 1, 2016. Patient presented to the emergency room on April 25, 2016, with complaints of chest pain radiating down his arms, which was primarily worse with exertion. In the emergency department, he was found to have a hemoglobin of 7.1, and he subsequently was admitted. CBC revealed microcytic indices with an MCV of 62. He was noted to be iron deficient. He was transfused 2 units of packed red cells and was ruled out for MI. He was seen by Dr. Solano, who performed colonoscopy and was noted to have a mass in the sigmoid colon that was consistent with adenocarcinoma. He also had multiple adenomatous polyps. The patient was admitted on May 11, 2016, for a sigmoid colectomy. This was performed on May 17, 2016. Pathology revealed in the sigmoid colon a mass consistent with moderately differentiated adenocarcinoma. The tumor size was 2 x 1 x 0.7 cm. The tumor invaded the muscularis propria, 2/8 lymph nodes were positive for metastatic carcinoma. Margins were negative for tumor. The grade of the tumor was ruled as moderately differentiated. The patient was staged pathologic stage T2N1b as part of the postop evaluation. The patient had a CT abdomen and pelvis on May 12, 2016. This revealed that there were 2 nonspecific low-attenuation lesions in the liver. Metastasis could not be excluded. There was no lymphadenopathy or additional findings to suggest metastases. The patient had a preop CEA, which was less than 3. PET scan was performed on Lluvia 15, 2016, and was essentially negative for metastatic disease. Lesions seen on prior PET in the liver were not hypermetabolic. We felt that the patient had stage III disease and he would be a candidate for adjuvant chemotherapy. When patient was seen on June 28, 2016, the plan was to  start FOLFOX x12 cycles.  When he was seen on November 17, 2016,  he did note some cold-induced neuropathic symptoms. He completed 12 cycles of FOLFOX. His last chemotherapy was administered on December 7, 2016.  He did have a PET scan done after 12 cycles of chemotherapy, and this came back essentially negative for metastatic disease.   He had staging studies on April 6, 2017 including CT abdomen and pelvis, which was essentially negative. CT of the chest was negative. The patient also underwent a colonoscopy in May 2017, which revealed a tubular adenoma at the hepatic flexure of the colon. Patient had a colonoscopy done performed by Dr. Solano on 06/01/2020 and was found to have 6 polyps.  All of them were revealing tubular adenoma, consistent with advanced adenoma.  One was at the 30 cm, and the other was in the mid transverse colon. This was based on polyp size being larger than 10 mm. Patient will be due for repeat colonoscopy in 6/2021.  Patient had labs done on 4/19/21 showing a normal tumor marker. Hemoglobin just slightly decreased. Iron studies were not done at this visit. All other labs are stable. CT chest, abdomen, pelvis was done on 4/19/21 and was stable. Patient has been feeling well. He does report ongoing neuropathy in his fingers, feet and lower legs and has recently been put on disability for this at the [facility]. He has no new concerns at this time.     Review Of Systems:  Review Of Systems  Eyes/Ears/Nose/Throat: denies new vision or hearing changes  Respiratory: reports some shortness of breath with exertion, denies cough or hemoptysis  Cardiovascular: denies chest pain or palpitations  Gastrointestinal: reports soft stools, denies new bowel chagnes  Genitourinary: denies dysuria or hematuria  Musculoskeletal: denies new bone pain or muscle weakness  Neurologic: denies headaches, no dizziness  Hematologic/Lymphatic/Immunologic: reports stable night sweats which he has had for years, denies fevers,  chills      There are no exam notes on file for this visit.    Past medical, social, surgical, and family histories reviewed.    Allergies:  Allergies as of 05/04/2021 - Reviewed 05/04/2021   Allergen Reaction Noted     Aspirin  05/26/2017     Canagliflozin  05/18/2016     Morphine  05/18/2016       Current Medications:  Current Outpatient Medications   Medication Sig Dispense Refill     allopurinol (ZYLOPRIM) 100 MG tablet Take 1 tablet (100 mg) by mouth 2 times daily 180 tablet 3     Cholecalciferol (VITAMIN D3) 50 MCG (2000 UT) CAPS Take 1 capsule by mouth daily       clopidogrel (PLAVIX) 75 MG tablet Take 1 tablet (75 mg) by mouth daily 90 tablet 3     Cyanocobalamin (VITAMIN B-12 CR) 1000 MCG TBCR Take 1 tablet by mouth once daily.       glipiZIDE (GLUCOTROL) 10 MG tablet Take 1 tablet with breakfast, 1 tablet with coffee/cookies and 2 tablet with supper -- total of 4 daily 360 tablet 3     lisinopril (ZESTRIL) 2.5 MG tablet Take 1 tablet (2.5 mg) by mouth daily 90 tablet 3     metFORMIN (GLUCOPHAGE) 1000 MG tablet TAKE 1 TABLET BY MOUTH TWICE DAILY WITH MEALS 180 tablet 3     metoprolol tartrate (LOPRESSOR) 50 MG tablet Take 1 tablet (50 mg) by mouth 2 times daily 180 tablet 3     pantoprazole (PROTONIX) 40 MG EC tablet Take 1 tablet (40 mg) by mouth daily 90 tablet 3     Semaglutide 7 MG TABS Take 7 mg by mouth daily -- after 30 days, then increase dose to 14 mg 30 tablet 0     simvastatin (ZOCOR) 40 MG tablet TAKE 1 TABLET BY MOUTH AT BEDTIME FOR CHOLESTEROL. 90 tablet 3     vitamin B6 (PYRIDOXINE) 100 MG tablet Take 100 mg by mouth daily       nitroGLYcerin (NITROSTAT) 0.4 MG sublingual tablet Place 1 tablet under the tongue every 5 minutes if needed for Chest Pain.          Physical Exam:  There were no vitals taken for this visit.    GENERAL APPEARANCE: 75 year old male, alert and no distress     NECK: no adenopathy, no asymmetry or masses     LYMPHATICS: No cervical, supraclavicular, axillary  lymphadenopathy     RESP: lungs clear to auscultation - no rales, rhonchi or wheezes     CARDIOVASCULAR: regular rates and rhythm, normal S1 S2     ABDOMEN:  soft, nontender,  bowel sounds normal     MUSCULOSKELETAL: extremities normal- no gross deformities noted, No edema b/l LE.     SKIN: no suspicious lesions or rashes on exposed skin     PSYCHIATRIC: mentation appears normal and affect normal    Laboratory/Imaging Studies  Orders Only on 05/03/2021   Component Date Value Ref Range Status     TSH Reflex 05/03/2021 8.81* 0.34 - 5.60 IU/mL Final     Cholesterol 05/03/2021 172  <200 mg/dL Final     Triglycerides 05/03/2021 332* <150 mg/dL Final    Comment: Borderline high:  150-199 mg/dl  High:             200-499 mg/dl  Very high:       >499 mg/dl       HDL Cholesterol 05/03/2021 28  23 - 92 mg/dL Final     LDL Cholesterol Calculated 05/03/2021 78  <100 mg/dL Final    Desirable:       <100 mg/dl     Non HDL Cholesterol 05/03/2021 144* <130 mg/dL Final    Comment: Above Desirable:  130-159 mg/dl  Borderline high:  160-189 mg/dl  High:             190-219 mg/dl  Very high:       >219 mg/dl       Hemoglobin A1C 05/03/2021 9.6* 4.0 - 6.0 % Final     Creatinine Urine 05/03/2021 162  mg/dL Final     Albumin Urine mg/L 05/03/2021 26  mg/L Final     Albumin Urine mg/g Cr 05/03/2021 15.74  0 - 17 mg/g Cr Final     Color Urine 05/03/2021 Light Yellow   Final     Appearance Urine 05/03/2021 Clear   Final     Glucose Urine 05/03/2021 300* NEG^Negative mg/dL Final     Bilirubin Urine 05/03/2021 Negative  NEG^Negative Final     Ketones Urine 05/03/2021 Negative  NEG^Negative mg/dL Final     Specific Gravity Urine 05/03/2021 1.020  1.003 - 1.035 Final     Blood Urine 05/03/2021 Negative  NEG^Negative Final     pH Urine 05/03/2021 5.0  5.0 - 7.0 pH Final     Protein Albumin Urine 05/03/2021 10* NEG^Negative mg/dL Final     Urobilinogen mg/dL 05/03/2021 Normal  0.0 - 2.0 mg/dL Final     Nitrite Urine 05/03/2021 Negative   NEG^Negative Final     Leukocyte Esterase Urine 05/03/2021 Negative  NEG^Negative Final     Source 05/03/2021 Midstream Urine   Final     RBC Urine 05/03/2021 0  0 - 2 /HPF Final     WBC Urine 05/03/2021 <1  0 - 5 /HPF Final     Mucous Urine 05/03/2021 Present* NEG^Negative /LPF Final     Hyaline Casts 05/03/2021 1  0 - 2 /LPF Final     T4 Free 05/03/2021 0.65  0.60 - 1.60 ng/dL Final        ASSESSMENT/PLAN:  Stage III, moderately differentiated adenocarcinoma of the sigmoid colon with 2 out of 11 lymph nodes positive for metastatic disease consistent with pathologic T2 N1b M0 colon cancer.  PET scan was negative for metastatic disease.  The patient was started on adjuvant FOLFOX chemotherapy and completed 12 cycles. PET scan did not reveal any evidence of metastatic disease.The patient had a colonoscopy in June 2020, which revealed multiple tubular adenomas with 2 being advanced adenomas. CT of the chest, abdomen and pelvis done on 4/19/21 was negative for metastatic disease. Lbs from 4/19/21 show a normal tumor marker. All other labs are stable. We will see the patient in 4 months with CBC, CMP, LDH, CEA and iron studies    Thirty five minutes spent with this encounter with time spent reviewing patient records, counseling patient regarding disease process, interpretation and review of labs with patient, review of CT scans with patient, discussing ongoing neuropathy, discussing plan for follow up, ordering tests, documenting in EHR and coordination of care

## 2021-05-05 ENCOUNTER — TELEPHONE (OUTPATIENT)
Dept: SURGERY | Facility: OTHER | Age: 76
End: 2021-05-05

## 2021-05-05 ENCOUNTER — OFFICE VISIT (OUTPATIENT)
Dept: INTERNAL MEDICINE | Facility: OTHER | Age: 76
End: 2021-05-05
Attending: INTERNAL MEDICINE
Payer: COMMERCIAL

## 2021-05-05 VITALS
HEART RATE: 94 BPM | SYSTOLIC BLOOD PRESSURE: 138 MMHG | TEMPERATURE: 96.8 F | WEIGHT: 235.2 LBS | OXYGEN SATURATION: 96 % | BODY MASS INDEX: 35.24 KG/M2 | DIASTOLIC BLOOD PRESSURE: 78 MMHG | RESPIRATION RATE: 16 BRPM

## 2021-05-05 DIAGNOSIS — E53.8 VITAMIN B12 DEFICIENCY: Primary | ICD-10-CM

## 2021-05-05 DIAGNOSIS — E66.01 MORBID OBESITY (H): ICD-10-CM

## 2021-05-05 DIAGNOSIS — I25.10 CORONARY ARTERY DISEASE INVOLVING NATIVE CORONARY ARTERY OF NATIVE HEART WITHOUT ANGINA PECTORIS: ICD-10-CM

## 2021-05-05 DIAGNOSIS — E11.65 UNCONTROLLED TYPE 2 DIABETES MELLITUS WITH HYPERGLYCEMIA (H): ICD-10-CM

## 2021-05-05 DIAGNOSIS — Z86.0101 H/O ADENOMATOUS POLYP OF COLON: Primary | ICD-10-CM

## 2021-05-05 DIAGNOSIS — E78.2 MIXED HYPERLIPIDEMIA: ICD-10-CM

## 2021-05-05 DIAGNOSIS — Z86.0101 H/O ADENOMATOUS POLYP OF COLON: ICD-10-CM

## 2021-05-05 DIAGNOSIS — R12 HEARTBURN: ICD-10-CM

## 2021-05-05 PROCEDURE — G0463 HOSPITAL OUTPT CLINIC VISIT: HCPCS

## 2021-05-05 PROCEDURE — 99214 OFFICE O/P EST MOD 30 MIN: CPT | Performed by: INTERNAL MEDICINE

## 2021-05-05 RX ORDER — BISACODYL 5 MG/1
TABLET, DELAYED RELEASE ORAL
Qty: 2 TABLET | Refills: 0 | Status: ON HOLD | OUTPATIENT
Start: 2021-05-05 | End: 2021-07-19

## 2021-05-05 RX ORDER — POLYETHYLENE GLYCOL 3350 17 G/17G
POWDER, FOR SOLUTION ORAL
Qty: 510 G | Refills: 0 | Status: ON HOLD | OUTPATIENT
Start: 2021-05-05 | End: 2021-07-19

## 2021-05-05 ASSESSMENT — ENCOUNTER SYMPTOMS
BRUISES/BLEEDS EASILY: 0
ADENOPATHY: 0
WOUND: 0
FEVER: 0
SHORTNESS OF BREATH: 0
BACK PAIN: 0
CONFUSION: 0
ABDOMINAL PAIN: 0
DIARRHEA: 1
HEMATURIA: 0
CHEST TIGHTNESS: 0
NUMBNESS: 1
CHILLS: 0

## 2021-05-05 ASSESSMENT — PAIN SCALES - GENERAL: PAINLEVEL: NO PAIN (0)

## 2021-05-05 NOTE — TELEPHONE ENCOUNTER
Patient scheduled for a colonoscopy with you on 07/12 ,   He is on Plavix and need to know when he will have to stop before procedure.   Please advise.   Thank you. Tanvi Pedro on 5/5/2021 at 9:26 AM

## 2021-05-05 NOTE — PROGRESS NOTES
Mr. Olivier is a 75 year old male who presents to the clinic for evaluation of the following problems:      ICD-10-CM    1. Vitamin B12 deficiency  E53.8 Vitamin B12   2. Uncontrolled type 2 diabetes mellitus with hyperglycemia (H)  E11.65 Semaglutide 7 MG TABS     metFORMIN (GLUCOPHAGE) 1000 MG tablet     empagliflozin (JARDIANCE) 10 MG TABS tablet   3. Mixed hyperlipidemia  E78.2    4. Coronary artery disease involving native coronary artery of native heart without angina pectoris  I25.10    5. Heartburn  R12    6. Morbid obesity (H)  E66.01    7. H/O adenomatous polyp of colon  Z86.010 GASTROENTEROLOGY ADULT REF PROCEDURE ONLY     HPI  Patient presents for follow-up of multiple issues.    Vitamin B12 deficiency.  Noted with previous lab work.  It was not checked a couple days ago unfortunately.  Has been taking 1000 mcg daily.  Check labs with next lab draw in about 3 months.    Uncontrolled type 2 diabetes.  Hyperglycemia persists.  Was not able to tolerate 40 mg of semaglutide.  Only 7 mg daily.  Med list updated and refill sent to pharmacy.  Continues on Metformin 1000 mg twice daily.  Start Jardiance 5 to 10 mg daily.  He took 100 mg of Invokana in the past but got some dehydration symptoms.  Advised if necessary going to half a tablet of Jardiance daily would be helpful even half tablet twice daily if needed.    Mixed hyperlipidemia, cholesterol levels have improved with use of simvastatin.  Continue current dosing.    Coronary disease, denies exertional angina.  Tolerating current medications.  Currently on Plavix, metoprolol.    Heartburn, some intermittent issues.  Takes Protonix regularly.  Has had history of esophagitis, gastritis.    Precancerous polyps noted in June 2020 on colonoscopy.  Was advised to follow-up in 1 year.  Colonoscopy orders updated.  Plan for colonoscopy sometime in July 2021.    Functional Capacity: > 4 METS.   Review of Systems   Constitutional: Negative for chills and fever.  "  HENT: Negative for congestion.    Eyes: Negative for visual disturbance.   Respiratory: Negative for chest tightness and shortness of breath.    Cardiovascular: Negative for chest pain.   Gastrointestinal: Positive for diarrhea (intermittently). Negative for abdominal pain.   Genitourinary: Negative for hematuria.   Musculoskeletal: Negative for back pain.   Skin: Negative for rash and wound.   Allergic/Immunologic: Negative for immunocompromised state.   Neurological: Positive for numbness (hands and feet from Agent Orange exposure + Hx of chemotherapy ). Negative for syncope.   Hematological: Negative for adenopathy. Does not bruise/bleed easily.   Psychiatric/Behavioral: Negative for confusion.      Reviewed and updated as needed this visit by Provider   Tobacco  Allergies  Meds  Problems  Med Hx  Surg Hx  Fam Hx          EXAM:   Vitals:    05/05/21 0822   BP: 138/78   BP Location: Right arm   Patient Position: Sitting   Cuff Size: Adult Regular   Pulse: 94   Resp: 16   Temp: 96.8  F (36  C)   TempSrc: Tympanic   SpO2: 96%   Weight: 106.7 kg (235 lb 3.2 oz)       Current Pain Score: No Pain (0)     BP Readings from Last 3 Encounters:   05/05/21 138/78   05/04/21 104/70   01/29/21 110/72      Wt Readings from Last 3 Encounters:   05/05/21 106.7 kg (235 lb 3.2 oz)   05/04/21 106.1 kg (234 lb)   01/29/21 107.5 kg (237 lb)      Estimated body mass index is 35.24 kg/m  as calculated from the following:    Height as of 1/29/21: 1.74 m (5' 8.5\").    Weight as of this encounter: 106.7 kg (235 lb 3.2 oz).     Physical Exam  Constitutional:       General: He is not in acute distress.     Appearance: He is well-developed. He is obese. He is not diaphoretic.   HENT:      Head: Normocephalic and atraumatic.   Eyes:      General: No scleral icterus.     Conjunctiva/sclera: Conjunctivae normal.   Neck:      Musculoskeletal: Neck supple.      Vascular: No carotid bruit.   Cardiovascular:      Rate and Rhythm: Normal " rate and regular rhythm.      Pulses: Normal pulses.   Pulmonary:      Effort: Pulmonary effort is normal.      Breath sounds: Normal breath sounds.   Abdominal:      Palpations: Abdomen is soft.      Tenderness: There is no abdominal tenderness.   Musculoskeletal:         General: No deformity.      Right lower leg: No edema.      Left lower leg: No edema.   Lymphadenopathy:      Cervical: No cervical adenopathy.   Skin:     General: Skin is warm and dry.      Findings: No rash.   Neurological:      Mental Status: He is alert and oriented to person, place, and time. Mental status is at baseline.   Psychiatric:         Mood and Affect: Mood normal.         Behavior: Behavior normal.        Procedures   INVESTIGATIONS:  - Labs reviewed in [facility]     ASSESSMENT AND PLAN:  Problem List Items Addressed This Visit        Digestive    Heartburn    Morbid obesity (H)    Relevant Medications    Semaglutide 7 MG TABS    metFORMIN (GLUCOPHAGE) 1000 MG tablet    empagliflozin (JARDIANCE) 10 MG TABS tablet       Endocrine    Uncontrolled type 2 diabetes mellitus with hyperglycemia (H)    Relevant Medications    Semaglutide 7 MG TABS    metFORMIN (GLUCOPHAGE) 1000 MG tablet    empagliflozin (JARDIANCE) 10 MG TABS tablet    Mixed hyperlipidemia       Circulatory    Coronary artery disease involving native coronary artery of native heart without angina pectoris       Other    H/O adenomatous polyp of colon    Relevant Orders    GASTROENTEROLOGY ADULT REF PROCEDURE ONLY      Other Visit Diagnoses     Vitamin B12 deficiency    -  Primary    Relevant Orders    Vitamin B12        reviewed diet, exercise and weight control, recommended sodium restriction, cardiovascular risk and specific lipid/LDL goals reviewed, specific diabetic recommendations low cholesterol diet, weight control and daily exercise discussed, use of aspirin to prevent MI and TIA's discussed  -- Expected clinical course discussed    -- Medications and their side  effects discussed    The 10-year ASCVD risk score (Opal SHELBY Jr., et al., 2013) is: 51.8%    Values used to calculate the score:      Age: 75 years      Sex: Male      Is Non- : No      Diabetic: Yes      Tobacco smoker: No      Systolic Blood Pressure: 138 mmHg      Is BP treated: No      HDL Cholesterol: 28 mg/dL      Total Cholesterol: 172 mg/dL    Patient Instructions     Blood pressure is well controlled.   Diabetes control has improved -- Goal is 7.9% or lower.     -- Start Jardiance 10 mg once daily. For Uncontrolled Diabetes.    -- if needed, okay to take 1/2 tablet (5 mg) twice daily.    Medications refilled.   Other labs are stable.     Orders Only on 05/03/2021   Component Date Value Ref Range Status     TSH Reflex 05/03/2021 8.81* 0.34 - 5.60 IU/mL Final     Cholesterol 05/03/2021 172  <200 mg/dL Final     Triglycerides 05/03/2021 332* <150 mg/dL Final    Comment: Borderline high:  150-199 mg/dl  High:             200-499 mg/dl  Very high:       >499 mg/dl       HDL Cholesterol 05/03/2021 28  23 - 92 mg/dL Final     LDL Cholesterol Calculated 05/03/2021 78  <100 mg/dL Final    Desirable:       <100 mg/dl     Non HDL Cholesterol 05/03/2021 144* <130 mg/dL Final    Comment: Above Desirable:  130-159 mg/dl  Borderline high:  160-189 mg/dl  High:             190-219 mg/dl  Very high:       >219 mg/dl       Hemoglobin A1C 05/03/2021 9.6* 4.0 - 6.0 % Final     Creatinine Urine 05/03/2021 162  mg/dL Final     Albumin Urine mg/L 05/03/2021 26  mg/L Final     Albumin Urine mg/g Cr 05/03/2021 15.74  0 - 17 mg/g Cr Final     Color Urine 05/03/2021 Light Yellow   Final     Appearance Urine 05/03/2021 Clear   Final     Glucose Urine 05/03/2021 300* NEG^Negative mg/dL Final     Bilirubin Urine 05/03/2021 Negative  NEG^Negative Final     Ketones Urine 05/03/2021 Negative  NEG^Negative mg/dL Final     Specific Gravity Urine 05/03/2021 1.020  1.003 - 1.035 Final     Blood Urine 05/03/2021 Negative   NEG^Negative Final     pH Urine 05/03/2021 5.0  5.0 - 7.0 pH Final     Protein Albumin Urine 05/03/2021 10* NEG^Negative mg/dL Final     Urobilinogen mg/dL 05/03/2021 Normal  0.0 - 2.0 mg/dL Final     Nitrite Urine 05/03/2021 Negative  NEG^Negative Final     Leukocyte Esterase Urine 05/03/2021 Negative  NEG^Negative Final     Source 05/03/2021 Midstream Urine   Final     RBC Urine 05/03/2021 0  0 - 2 /HPF Final     WBC Urine 05/03/2021 <1  0 - 5 /HPF Final     Mucous Urine 05/03/2021 Present* NEG^Negative /LPF Final     Hyaline Casts 05/03/2021 1  0 - 2 /LPF Final     T4 Free 05/03/2021 0.65  0.60 - 1.60 ng/dL Final        Aspects of Diabetes:   Recent Labs   Lab Test 05/03/21  0832 04/19/21  0739 01/22/21  0812 10/21/20  0732 04/09/19  0830 04/09/19  0830 12/18/17  0749 12/18/17  0749 05/11/16  0953 05/11/16  0953   A1C 9.6*  --  10.9*  --   --  10.5*   < >  --   --   --    LDL 78  --   --   --   --   --   --  106*  --  93   HDL 28  --   --   --   --   --   --  32  --  35   TRIG 332*  --   --   --   --   --   --  271*  --  173*   ALT  --  21 32 38   < > 71*   < >  --   --   --    CR  --  1.27 1.20 0.97   < > 0.89   < >  --    < > 0.89   GFRESTIMATED  --  55* 59* 76   < > 84   < >  --   --   --    GFRESTBLACK  --  67 71 >90   < > >90   < >  --    < > >60   POTASSIUM  --  4.4 4.7 4.2   < > 4.2   < >  --    < > 4.4    < > = values in this interval not displayed.      Hemoglobin A1c  Goal range is under 8%. Best is 6.5 to 7   Blood Pressure 138/78 Goal to keep less than 140/90   Tobacco  reports that he quit smoking about 36 years ago. His smoking use included cigarettes. He has a 30.00 pack-year smoking history. He has never used smokeless tobacco. Goal to abstain from tobacco   Aspirin or Plavix Anti-platelet therapy Aspirin or Plavix reduces risk of heart disease and stroke  -- sometimes used with other blood thinners, depending on bleeding risk and risk factors.    ACE/ARB Specific blood pressure meds These  medications can reduce risk of kidney disease   Cholesterol Statins (Lipitor, Crestor, vs others) Statins reduce risk of heart disease and stroke   Eye Exam -- Do Yearly -- Annual diabetic eye exam   Healthy weight Wt Readings from Last 3 Encounters:   05/05/21 106.7 kg (235 lb 3.2 oz)   05/04/21 106.1 kg (234 lb)   01/29/21 107.5 kg (237 lb)     Body mass index is 35.24 kg/m .  Goal BMI under 30, best is under 25.      -- Trying to exercise daily (goal at least 20 min/day) with moderate aerobic activity   -- Eat healthy (resources from ADA at http://www.diabetes.org/)   -- Taking good care of my feet. Consider seeing the Podiatrist   -- Check blood sugars as directed, record in log book and bring to every appointment    Insurance companies are grading you and I on your blood sugar control -- Goal is to get your A1c down to 7.9% or lower and NO Smoking!  -- Medicare and most insurance companies, will only cover Hemoglobin A1c labs to be rechecked every 91+ days.      Return for Diabetes labs and clinic follow-up appointment every 3 to 4 months.    Schedule lab only appointment --- A few days AFTER: 8/3/21   Schedule clinic appointment with Dr. Roberts -- Same day as labs, or 1-2 days later.      Bk Roberts MD   Internal Medicine  [facility] and [facility]

## 2021-05-05 NOTE — PATIENT INSTRUCTIONS
Blood pressure is well controlled.   Diabetes control has improved -- Goal is 7.9% or lower.     -- Start Jardiance 10 mg once daily. For Uncontrolled Diabetes.    -- if needed, okay to take 1/2 tablet (5 mg) twice daily.    Medications refilled.   Other labs are stable.     Orders Only on 05/03/2021   Component Date Value Ref Range Status     TSH Reflex 05/03/2021 8.81* 0.34 - 5.60 IU/mL Final     Cholesterol 05/03/2021 172  <200 mg/dL Final     Triglycerides 05/03/2021 332* <150 mg/dL Final    Comment: Borderline high:  150-199 mg/dl  High:             200-499 mg/dl  Very high:       >499 mg/dl       HDL Cholesterol 05/03/2021 28  23 - 92 mg/dL Final     LDL Cholesterol Calculated 05/03/2021 78  <100 mg/dL Final    Desirable:       <100 mg/dl     Non HDL Cholesterol 05/03/2021 144* <130 mg/dL Final    Comment: Above Desirable:  130-159 mg/dl  Borderline high:  160-189 mg/dl  High:             190-219 mg/dl  Very high:       >219 mg/dl       Hemoglobin A1C 05/03/2021 9.6* 4.0 - 6.0 % Final     Creatinine Urine 05/03/2021 162  mg/dL Final     Albumin Urine mg/L 05/03/2021 26  mg/L Final     Albumin Urine mg/g Cr 05/03/2021 15.74  0 - 17 mg/g Cr Final     Color Urine 05/03/2021 Light Yellow   Final     Appearance Urine 05/03/2021 Clear   Final     Glucose Urine 05/03/2021 300* NEG^Negative mg/dL Final     Bilirubin Urine 05/03/2021 Negative  NEG^Negative Final     Ketones Urine 05/03/2021 Negative  NEG^Negative mg/dL Final     Specific Gravity Urine 05/03/2021 1.020  1.003 - 1.035 Final     Blood Urine 05/03/2021 Negative  NEG^Negative Final     pH Urine 05/03/2021 5.0  5.0 - 7.0 pH Final     Protein Albumin Urine 05/03/2021 10* NEG^Negative mg/dL Final     Urobilinogen mg/dL 05/03/2021 Normal  0.0 - 2.0 mg/dL Final     Nitrite Urine 05/03/2021 Negative  NEG^Negative Final     Leukocyte Esterase Urine 05/03/2021 Negative  NEG^Negative Final     Source 05/03/2021 Midstream Urine   Final     RBC Urine 05/03/2021 0  0 -  2 /HPF Final     WBC Urine 05/03/2021 <1  0 - 5 /HPF Final     Mucous Urine 05/03/2021 Present* NEG^Negative /LPF Final     Hyaline Casts 05/03/2021 1  0 - 2 /LPF Final     T4 Free 05/03/2021 0.65  0.60 - 1.60 ng/dL Final        Aspects of Diabetes:   Recent Labs   Lab Test 05/03/21  0832 04/19/21  0739 01/22/21  0812 10/21/20  0732 04/09/19  0830 04/09/19  0830 12/18/17  0749 12/18/17  0749 05/11/16  0953 05/11/16  0953   A1C 9.6*  --  10.9*  --   --  10.5*   < >  --   --   --    LDL 78  --   --   --   --   --   --  106*  --  93   HDL 28  --   --   --   --   --   --  32  --  35   TRIG 332*  --   --   --   --   --   --  271*  --  173*   ALT  --  21 32 38   < > 71*   < >  --   --   --    CR  --  1.27 1.20 0.97   < > 0.89   < >  --    < > 0.89   GFRESTIMATED  --  55* 59* 76   < > 84   < >  --   --   --    GFRESTBLACK  --  67 71 >90   < > >90   < >  --    < > >60   POTASSIUM  --  4.4 4.7 4.2   < > 4.2   < >  --    < > 4.4    < > = values in this interval not displayed.      Hemoglobin A1c  Goal range is under 8%. Best is 6.5 to 7   Blood Pressure 138/78 Goal to keep less than 140/90   Tobacco  reports that he quit smoking about 36 years ago. His smoking use included cigarettes. He has a 30.00 pack-year smoking history. He has never used smokeless tobacco. Goal to abstain from tobacco   Aspirin or Plavix Anti-platelet therapy Aspirin or Plavix reduces risk of heart disease and stroke  -- sometimes used with other blood thinners, depending on bleeding risk and risk factors.    ACE/ARB Specific blood pressure meds These medications can reduce risk of kidney disease   Cholesterol Statins (Lipitor, Crestor, vs others) Statins reduce risk of heart disease and stroke   Eye Exam -- Do Yearly -- Annual diabetic eye exam   Healthy weight Wt Readings from Last 3 Encounters:   05/05/21 106.7 kg (235 lb 3.2 oz)   05/04/21 106.1 kg (234 lb)   01/29/21 107.5 kg (237 lb)     Body mass index is 35.24 kg/m .  Goal BMI under 30, best is  under 25.      -- Trying to exercise daily (goal at least 20 min/day) with moderate aerobic activity   -- Eat healthy (resources from ADA at http://www.diabetes.org/)   -- Taking good care of my feet. Consider seeing the Podiatrist   -- Check blood sugars as directed, record in log book and bring to every appointment    Insurance companies are grading you and I on your blood sugar control -- Goal is to get your A1c down to 7.9% or lower and NO Smoking!  -- Medicare and most insurance companies, will only cover Hemoglobin A1c labs to be rechecked every 91+ days.      Return for Diabetes labs and clinic follow-up appointment every 3 to 4 months.    Schedule lab only appointment --- A few days AFTER: 8/3/21   Schedule clinic appointment with Dr. Roberts -- Same day as labs, or 1-2 days later.

## 2021-05-05 NOTE — TELEPHONE ENCOUNTER
Screening Questions for the Scheduling of Screening Colonoscopies   (If Colonoscopy is diagnostic, Provider should review the chart before scheduling.)  Are you younger than 50 or older than 80?   NO   Do you take aspirin or fish oil?  NO  (if yes, tell patient to stop 1 week prior to Colonoscopy)  Do you take warfarin (Coumadin), clopidogrel (Plavix), apixaban (Eliquis), dabigatram (Pradaxa), rivaroxaban (Xarelto) or any blood thinner? YES - PLAVIX   Do you use oxygen at home?  NO   Do you have kidney disease? NO   Are you on dialysis? NO   Have you had a stroke or heart attack in the last year? NO   Have you had a stent in your heart or any blood vessel in the last year? NO   Have you had a transplant of any organ? NO   Have you had a colonoscopy or upper endoscopy (EGD) before? YES          When?  2020  Date of scheduled Colonoscopy. 07/12/2021  Provider DOUGLAS   [facility]

## 2021-07-05 ENCOUNTER — TELEPHONE (OUTPATIENT)
Dept: INTERNAL MEDICINE | Facility: OTHER | Age: 76
End: 2021-07-05

## 2021-07-05 NOTE — LETTER
July 5, 2021      Andrez Olivier  [address] [facility] [address]      Your healthcare team cares about your health. To provide you with the best care,   we have reviewed your chart and based on our findings, we see that you are due to:     - DIABETES FOLLOW UP: Schedule a DIABETIC EYE EXAM.  This exam is done with an optometrist. You can schedule this appointment with your eye doctor.  If you need a referral, please let us know.    If you have already completed these items, please contact the clinic via phone or   [facility] so your care team can review and update your records. Thank you for   choosing [facility] for your healthcare needs. For any questions,   concerns, or to schedule an appointment please contact the clinic.       Healthy Regards,      Your [facility] Care Team          Enclosure: N/A

## 2021-07-05 NOTE — TELEPHONE ENCOUNTER
Patient Quality Outreach      Summary:    Patient has the following on his problem list/HM:   Diabetes    Last A1C:  Lab Results   Component Value Date    A1C 9.6 2021    A1C 10.9 2021       Last LDL:    Lab Results   Component Value Date    LDL 78 2021       Is the patient on a Statin? Yes          Is the patient on Aspirin? No    Medications     HMG CoA Reductase Inhibitors     simvastatin (ZOCOR) 40 MG tablet             Last three blood pressure readings:  BP Readings from Last 3 Encounters:   21 138/78   21 104/70   21 110/72            Tobacco Use      Smoking status: Former Smoker        Packs/day: 1.00        Years: 30.00        Pack years: 30        Types: Cigarettes        Quit date: 1985        Years since quittin.5      Smokeless tobacco: Never Used          Patient is due/failing the following:   Eye Exam    Type of outreach:    Sent letter.    Questions for provider review:    None                                                                                                                                     Lillie Godwin LPN .............2021  11:54 AM       Chart routed to .

## 2021-07-15 ENCOUNTER — ALLIED HEALTH/NURSE VISIT (OUTPATIENT)
Dept: FAMILY MEDICINE | Facility: OTHER | Age: 76
End: 2021-07-15
Attending: SURGERY
Payer: MEDICARE

## 2021-07-15 DIAGNOSIS — Z20.822 COVID-19 RULED OUT: Primary | ICD-10-CM

## 2021-07-15 LAB — SARS-COV-2 RNA RESP QL NAA+PROBE: NEGATIVE

## 2021-07-15 PROCEDURE — U0003 INFECTIOUS AGENT DETECTION BY NUCLEIC ACID (DNA OR RNA); SEVERE ACUTE RESPIRATORY SYNDROME CORONAVIRUS 2 (SARS-COV-2) (CORONAVIRUS DISEASE [COVID-19]), AMPLIFIED PROBE TECHNIQUE, MAKING USE OF HIGH THROUGHPUT TECHNOLOGIES AS DESCRIBED BY CMS-2020-01-R: HCPCS | Mod: ZL

## 2021-07-15 PROCEDURE — C9803 HOPD COVID-19 SPEC COLLECT: HCPCS

## 2021-07-15 NOTE — PROGRESS NOTES
Patient swabbed for COVID-19 testing.  Jamee Kelly LPN on 7/15/2021 at 9:10 AM    Procedure on 7-19-21 at [facility].

## 2021-07-19 ENCOUNTER — HOSPITAL ENCOUNTER (OUTPATIENT)
Facility: OTHER | Age: 76
Discharge: HOME | End: 2021-07-19
Attending: SURGERY | Admitting: SURGERY
Payer: MEDICARE

## 2021-07-19 ENCOUNTER — ANESTHESIA EVENT (OUTPATIENT)
Dept: SURGERY | Facility: OTHER | Age: 76
End: 2021-07-19
Payer: MEDICARE

## 2021-07-19 ENCOUNTER — ANESTHESIA (OUTPATIENT)
Dept: SURGERY | Facility: OTHER | Age: 76
End: 2021-07-19
Payer: MEDICARE

## 2021-07-19 VITALS
HEART RATE: 87 BPM | BODY MASS INDEX: 33.08 KG/M2 | RESPIRATION RATE: 14 BRPM | OXYGEN SATURATION: 91 % | WEIGHT: 220.8 LBS | DIASTOLIC BLOOD PRESSURE: 68 MMHG | SYSTOLIC BLOOD PRESSURE: 113 MMHG | TEMPERATURE: 97.3 F

## 2021-07-19 PROBLEM — K57.30 DIVERTICULOSIS OF COLON: Status: ACTIVE | Noted: 2021-07-19

## 2021-07-19 PROCEDURE — 250N000009 HC RX 250: Performed by: NURSE ANESTHETIST, CERTIFIED REGISTERED

## 2021-07-19 PROCEDURE — 45380 COLONOSCOPY AND BIOPSY: CPT | Performed by: SURGERY

## 2021-07-19 PROCEDURE — 45384 COLONOSCOPY W/LESION REMOVAL: CPT | Mod: PT | Performed by: SURGERY

## 2021-07-19 PROCEDURE — 250N000011 HC RX IP 250 OP 636: Performed by: SURGERY

## 2021-07-19 PROCEDURE — 250N000011 HC RX IP 250 OP 636: Performed by: NURSE ANESTHETIST, CERTIFIED REGISTERED

## 2021-07-19 PROCEDURE — 258N000003 HC RX IP 258 OP 636: Performed by: SURGERY

## 2021-07-19 PROCEDURE — 99100 ANES PT EXTEME AGE<1 YR&>70: CPT | Performed by: NURSE ANESTHETIST, CERTIFIED REGISTERED

## 2021-07-19 PROCEDURE — 45384 COLONOSCOPY W/LESION REMOVAL: CPT | Performed by: NURSE ANESTHETIST, CERTIFIED REGISTERED

## 2021-07-19 PROCEDURE — 88305 TISSUE EXAM BY PATHOLOGIST: CPT

## 2021-07-19 RX ORDER — PROPOFOL 10 MG/ML
INJECTION, EMULSION INTRAVENOUS PRN
Status: DISCONTINUED | OUTPATIENT
Start: 2021-07-19 | End: 2021-07-19

## 2021-07-19 RX ORDER — NALOXONE HYDROCHLORIDE 0.4 MG/ML
0.2 INJECTION, SOLUTION INTRAMUSCULAR; INTRAVENOUS; SUBCUTANEOUS
Status: DISCONTINUED | OUTPATIENT
Start: 2021-07-19 | End: 2021-07-19 | Stop reason: HOSPADM

## 2021-07-19 RX ORDER — PROPOFOL 10 MG/ML
INJECTION, EMULSION INTRAVENOUS CONTINUOUS PRN
Status: DISCONTINUED | OUTPATIENT
Start: 2021-07-19 | End: 2021-07-19

## 2021-07-19 RX ORDER — LIDOCAINE 40 MG/G
CREAM TOPICAL
Status: DISCONTINUED | OUTPATIENT
Start: 2021-07-19 | End: 2021-07-19 | Stop reason: HOSPADM

## 2021-07-19 RX ORDER — LIDOCAINE HYDROCHLORIDE 20 MG/ML
INJECTION, SOLUTION INFILTRATION; PERINEURAL PRN
Status: DISCONTINUED | OUTPATIENT
Start: 2021-07-19 | End: 2021-07-19

## 2021-07-19 RX ORDER — HEPARIN SODIUM (PORCINE) LOCK FLUSH IV SOLN 100 UNIT/ML 100 UNIT/ML
500 SOLUTION INTRAVENOUS
Status: COMPLETED | OUTPATIENT
Start: 2021-07-19 | End: 2021-07-19

## 2021-07-19 RX ORDER — NALOXONE HYDROCHLORIDE 0.4 MG/ML
0.4 INJECTION, SOLUTION INTRAMUSCULAR; INTRAVENOUS; SUBCUTANEOUS
Status: DISCONTINUED | OUTPATIENT
Start: 2021-07-19 | End: 2021-07-19 | Stop reason: HOSPADM

## 2021-07-19 RX ORDER — SODIUM CHLORIDE, SODIUM LACTATE, POTASSIUM CHLORIDE, CALCIUM CHLORIDE 600; 310; 30; 20 MG/100ML; MG/100ML; MG/100ML; MG/100ML
INJECTION, SOLUTION INTRAVENOUS CONTINUOUS
Status: DISCONTINUED | OUTPATIENT
Start: 2021-07-19 | End: 2021-07-19 | Stop reason: HOSPADM

## 2021-07-19 RX ORDER — ONDANSETRON 2 MG/ML
4 INJECTION INTRAMUSCULAR; INTRAVENOUS
Status: DISCONTINUED | OUTPATIENT
Start: 2021-07-19 | End: 2021-07-19 | Stop reason: HOSPADM

## 2021-07-19 RX ORDER — FLUMAZENIL 0.1 MG/ML
0.2 INJECTION, SOLUTION INTRAVENOUS
Status: DISCONTINUED | OUTPATIENT
Start: 2021-07-19 | End: 2021-07-19 | Stop reason: HOSPADM

## 2021-07-19 RX ADMIN — PROPOFOL 140 MCG/KG/MIN: 10 INJECTION, EMULSION INTRAVENOUS at 09:00

## 2021-07-19 RX ADMIN — SODIUM CHLORIDE, POTASSIUM CHLORIDE, SODIUM LACTATE AND CALCIUM CHLORIDE: 600; 310; 30; 20 INJECTION, SOLUTION INTRAVENOUS at 08:47

## 2021-07-19 RX ADMIN — LIDOCAINE HYDROCHLORIDE 40 MG: 20 INJECTION, SOLUTION INFILTRATION; PERINEURAL at 08:59

## 2021-07-19 RX ADMIN — Medication 500 UNITS: at 10:05

## 2021-07-19 RX ADMIN — PROPOFOL 50 MG: 10 INJECTION, EMULSION INTRAVENOUS at 08:59

## 2021-07-19 ASSESSMENT — LIFESTYLE VARIABLES: TOBACCO_USE: 1

## 2021-07-19 NOTE — H&P
History and Physical    CHIEF COMPLAINT / REASON FOR PROCEDURE:  H/o polyps and colon cancer    PERTINENT HISTORY   Patient is a 75 year old male who presents today for colonoscopy for h/o polyps and colon cancer.   Last colonoscopy 2020.  Patient has no complaints.    Past Medical History:   Diagnosis Date     Acute myocardial infarction (H)     1989,MI at age 44, s/p angioplasty, [facility]; MI at age 49, s/p stent placement at [facility]     Atherosclerotic heart disease of native coronary artery without angina pectoris     2/1/2013,BRYON to,mid RCA (angina and abnormal myoview), [facility]     Diverticulosis of large intestine without perforation or abscess without bleeding     5/6/2016     Gastritis, erosive 05/06/2016     Gastro-esophageal reflux disease with esophagitis     5/10/2016     Gout     No Comments Provided     History of colonic polyps     5/6/2016     Malignant neoplasm of sigmoid colon (H)     5/6/2016     Other gastritis without bleeding     5/6/2016     Personal history of nicotine dependence     No Comments Provided     Past Surgical History:   Procedure Laterality Date     APPENDECTOMY OPEN      at age of 12     COLON SURGERY      5/17/16,Colectomy, Sigmoid     COLONOSCOPY      5/6/16,F/U 2017; Dr Solano     COLONOSCOPY  05/15/2017    05/15/2017,F/U 2020     COLONOSCOPY  06/01/2020    F/U 2021 advanced adenomas     ESOPHAGOSCOPY, GASTROSCOPY, DUODENOSCOPY (EGD), COMBINED      5/6/16,EGD; Dr Solano     HEART CATH, ANGIOPLASTY      1989,Stenting coronary artery, presumably LAD     OTHER SURGICAL HISTORY      [id number],OTHER     OTHER SURGICAL HISTORY      2000,[id number],FLEXIBLE SIGMOIDOSCOPY     OTHER SURGICAL HISTORY      2/1/2013,[id number],CARDIAC CATHETERIZATION,BRYON to,mid RCA (angina and abnormal myoview)     OTHER SURGICAL HISTORY      6/28/16,[id number],OTHER,Left,Left subclavian Power Port       Bleeding tendencies:  No    ALLERGIES/SENSITIVITIES:   Allergies   Allergen Reactions     Aspirin      Other reaction(s): Other -  Describe In Comment Field  ---- Has been 4 years since stent - as of 5/2017 - Hold Aspirin while on Plavix for now     Canagliflozin      Other reaction(s): Dizziness     Morphine      Other reaction(s): Other - Describe In Comment Field  Pt had a bad experience at age 49 and would like to avoid.        CURRENT MEDICATIONS:    Prior to Admission medications    Medication Sig Start Date End Date Taking? Authorizing Provider   allopurinol (ZYLOPRIM) 100 MG tablet Take 1 tablet (100 mg) by mouth 2 times daily 11/6/20  Yes Bk Roberts MD   Cholecalciferol (VITAMIN D3) 50 MCG (2000 UT) CAPS Take 1 capsule by mouth daily   Yes Reported, Patient   clopidogrel (PLAVIX) 75 MG tablet Take 1 tablet (75 mg) by mouth daily 11/6/20  Yes Bk Roberts MD   Cyanocobalamin (VITAMIN B-12 CR) 1000 MCG TBCR Take 1 tablet by mouth once daily.   Yes Reported, Patient   empagliflozin (JARDIANCE) 10 MG TABS tablet Take 1 tablet (10 mg) by mouth daily -For diabetes 5/5/21  Yes Bk Roberts MD   glipiZIDE (GLUCOTROL) 10 MG tablet Take 1 tablet with breakfast, 1 tablet with coffee/cookies and 2 tablet with supper -- total of 4 daily 1/29/21  Yes Bk Roberts MD   lisinopril (ZESTRIL) 2.5 MG tablet Take 1 tablet (2.5 mg) by mouth daily 11/6/20  Yes Bk Roberts MD   metFORMIN (GLUCOPHAGE) 1000 MG tablet Take 1 tablet (1,000 mg) by mouth 2 times daily (with meals) 5/5/21  Yes Bk Roberts MD   metoprolol tartrate (LOPRESSOR) 50 MG tablet Take 1 tablet (50 mg) by mouth 2 times daily 11/6/20  Yes Bk Roberts MD   pantoprazole (PROTONIX) 40 MG EC tablet Take 1 tablet (40 mg) by mouth daily 11/6/20  Yes Bk Roberts MD   Semaglutide 7 MG TABS Take 7 mg by mouth daily -- cancel refills on 14 mg (didn't tolerate higher dose) 5/5/21  Yes Bk Roberts MD   simvastatin (ZOCOR) 40 MG tablet TAKE 1 TABLET BY MOUTH AT BEDTIME FOR CHOLESTEROL. 9/25/20  Yes Bk Roberts MD   vitamin B6 (PYRIDOXINE) 100 MG tablet Take 100 mg by  mouth daily   Yes Reported, Patient   nitroGLYcerin (NITROSTAT) 0.4 MG sublingual tablet Place 1 tablet under the tongue every 5 minutes if needed for Chest Pain. 5/26/17   Reported, Patient       Physical Exam:  /84   Pulse 104   Temp 97.3  F (36.3  C) (Tympanic)   Resp 14   Wt 100.2 kg (220 lb 12.8 oz)   SpO2 93%   BMI 33.08 kg/m    EXAM:  Chest/Respiratory Exam: Normal - Clear to auscultation without rales, rhonchi, or wheezing.  Cardiovascular Exam: normal, regular rate and rhythm      PLAN: COLONOSCOPY .  Patient understands risks of bleeding, perforation, potential inability to reach cecum, aspiration and wishes to proceed. MAC needed for age and ASA III.

## 2021-07-19 NOTE — ANESTHESIA CARE TRANSFER NOTE
Patient: Andrez Olivier    Procedure(s):  COLONOSCOPY, WITH POLYPECTOMY AND BIOPSY    Diagnosis: History of colon cancer [Z85.038]  Diagnosis Additional Information: No value filed.    Anesthesia Type:   MAC     Note:      Level of Consciousness: drowsy and awake  Oxygen Supplementation: room air    Independent Airway: airway patency satisfactory and stable    Vital Signs Stable: post-procedure vital signs reviewed and stable  Report to RN Given: handoff report given  Patient transferred to: Phase II    Handoff Report: Identifed the Patient, Identified the Reponsible Provider, Reviewed the pertinent medical history, Discussed the surgical course, Reviewed Intra-OP anesthesia mangement and issues during anesthesia, Set expectations for post-procedure period and Allowed opportunity for questions and acknowledgement of understanding      Vitals: (Last set prior to Anesthesia Care Transfer)  CRNA VITALS  7/19/2021 0854 - 7/19/2021 0925      7/19/2021             Pulse:  88    Ht Rate:  88    SpO2:  94 %    Resp Rate (set):  10        Electronically Signed By: SARKIS DAUGHERTY CRNA  July 19, 2021  9:25 AM

## 2021-07-19 NOTE — ANESTHESIA PREPROCEDURE EVALUATION
Anesthesia Pre-Procedure Evaluation    Patient: Andrez Olivier   MRN: [id number] : 1945        Preoperative Diagnosis: History of colon cancer [Z85.038]   Procedure : Procedure(s):  COLONOSCOPY     Past Medical History:   Diagnosis Date     Acute myocardial infarction (H)     ,MI at age 44, s/p angioplasty, [facility]; MI at age 49, s/p stent placement at [facility]     Atherosclerotic heart disease of native coronary artery without angina pectoris     2013,BRYON to,mid RCA (angina and abnormal myoview), [facility]     Diverticulosis of large intestine without perforation or abscess without bleeding     2016     Gastritis, erosive 2016     Gastro-esophageal reflux disease with esophagitis     5/10/2016     Gout     No Comments Provided     History of colonic polyps     2016     Malignant neoplasm of sigmoid colon (H)     2016     Other gastritis without bleeding     2016     Personal history of nicotine dependence     No Comments Provided      Past Surgical History:   Procedure Laterality Date     APPENDECTOMY OPEN      at age of 12     COLON SURGERY      16,Colectomy, Sigmoid     COLONOSCOPY      16,F/U ; Dr Solano     COLONOSCOPY  05/15/2017    05/15/2017,F/U      COLONOSCOPY  2020    F/U  advanced adenomas     ESOPHAGOSCOPY, GASTROSCOPY, DUODENOSCOPY (EGD), COMBINED      16,EGD; Dr Solnao     HEART CATH, ANGIOPLASTY      ,Stenting coronary artery, presumably LAD     OTHER SURGICAL HISTORY      [id number],OTHER     OTHER SURGICAL HISTORY      ,2089,FLEXIBLE SIGMOIDOSCOPY     OTHER SURGICAL HISTORY      2013,[id number],CARDIAC CATHETERIZATION,BRYON to,mid RCA (angina and abnormal myoview)     OTHER SURGICAL HISTORY      16,[id number],OTHER,Left,Left subclavian Power Port      Allergies   Allergen Reactions     Aspirin      Other reaction(s): Other - Describe In Comment Field  ---- Has been 4 years since stent - as of 2017 - Hold Aspirin while on Plavix for now      Canagliflozin      Other reaction(s): Dizziness     Morphine      Other reaction(s): Other - Describe In Comment Field  Pt had a bad experience at age 49 and would like to avoid.      Social History     Tobacco Use     Smoking status: Former Smoker     Packs/day: 1.00     Years: 30.00     Pack years: 30.00     Types: Cigarettes     Quit date: 1985     Years since quittin.5     Smokeless tobacco: Never Used   Substance Use Topics     Alcohol use: No     Alcohol/week: 0.0 standard drinks      Wt Readings from Last 1 Encounters:   21 100.2 kg (220 lb 12.8 oz)        Anesthesia Evaluation   Pt has had prior anesthetic.         ROS/MED HX  ENT/Pulmonary:     (+) tobacco use,     Neurologic:  - neg neurologic ROS     Cardiovascular:     (+) --CAD ---    METS/Exercise Tolerance: 3 - Able to walk 1-2 blocks without stopping    Hematologic:     (+) anemia,     Musculoskeletal:   (+) arthritis,     GI/Hepatic: Comment: Hx of Diverticulosis     Hx of Gastric erosion     (+) GERD,     Renal/Genitourinary:  - neg Renal ROS     Endo:     (+) type I DM, Obesity,     Psychiatric/Substance Use:  - neg psychiatric ROS     Infectious Disease:       Malignancy: Comment: Hx Sigmoid Adenoma     Hx of polyps  (+) Malignancy, History of Other.    Other:  - neg other ROS          Physical Exam    Airway        Mallampati: II   TM distance: > 3 FB   Neck ROM: full   Mouth opening: > 3 cm    Respiratory Devices and Support         Dental  no notable dental history         Cardiovascular   cardiovascular exam normal       Rhythm and rate: regular and normal     Pulmonary   pulmonary exam normal        breath sounds clear to auscultation           OUTSIDE LABS:  CBC:   Lab Results   Component Value Date    WBC 7.8 2021    WBC 6.9 2021    HGB 13.2 (L) 2021    HGB 13.4 2021    HCT 40.3 2021    HCT 42.0 2021     2021     2021     BMP:   Lab Results   Component Value Date      04/19/2021     01/22/2021    POTASSIUM 4.4 04/19/2021    POTASSIUM 4.7 01/22/2021    CHLORIDE 103 04/19/2021    CHLORIDE 99 01/22/2021    CO2 26 04/19/2021    CO2 26 01/22/2021    BUN 17 04/19/2021    BUN 17 01/22/2021    CR 1.27 04/19/2021    CR 1.20 01/22/2021     (H) 04/19/2021     (H) 01/22/2021     COAGS:   Lab Results   Component Value Date    PTT 34 04/25/2016    INR 1.1 04/25/2016     POC: No results found for: BGM, HCG, HCGS  HEPATIC:   Lab Results   Component Value Date    ALBUMIN 3.9 04/19/2021    PROTTOTAL 6.6 04/19/2021    ALT 21 04/19/2021    AST 23 04/19/2021    ALKPHOS 79 04/19/2021    BILITOTAL 0.5 04/19/2021     OTHER:   Lab Results   Component Value Date    A1C 9.6 (H) 05/03/2021    FABIO 9.1 04/19/2021    MAG 2.0 04/26/2016    T4 0.65 05/03/2021       Anesthesia Plan    ASA Status:  3   NPO Status:  NPO Appropriate    Anesthesia Type: MAC.     - Reason for MAC: straight local not clinically adequate              Consents    Anesthesia Plan(s) and associated risks, benefits, and realistic alternatives discussed. Questions answered and patient/representative(s) expressed understanding.     - Discussed with:  Patient         Postoperative Care            Comments:                David Kellerman, APRN CRNA

## 2021-07-19 NOTE — DISCHARGE INSTRUCTIONS
[facility]  Adult Discharge Orders & Instructions    ________________________________________________________________          For 12 hours after surgery  1. Get plenty of rest.  A responsible adult must stay with you for at least 12 hours after you leave the hospital.   2. You may feel lightheaded.  IF so, sit for a few minutes before standing.  Have someone help you get up.   3. You may have a slight fever. Call the doctor if your fever is over 101 F (38.3 C) (taken under the tongue) or lasts longer than 24 hours.  4. You may have a dry mouth, a sore throat, muscle aches or trouble sleeping.  These should go away after 24 hours.  5. Do not make important or legal decisions.  6.   Do not drive or use heavy equipment.  If you have weakness or tingling, don't drive or use heavy equipment until this feeling goes away.    To contact a doctor, call   [phone]_______________________

## 2021-07-19 NOTE — ANESTHESIA POSTPROCEDURE EVALUATION
Patient: Andrez Olivier    Procedure(s):  COLONOSCOPY, WITH POLYPECTOMY AND BIOPSY    Diagnosis:History of colon cancer [Z85.038]  Diagnosis Additional Information: No value filed.    Anesthesia Type:  MAC    Note:  Disposition: Outpatient   Postop Pain Control: Uneventful            Sign Out: Well controlled pain   PONV: No   Neuro/Psych: Uneventful            Sign Out: Acceptable/Baseline neuro status   Airway/Respiratory: Uneventful            Sign Out: Acceptable/Baseline resp. status   CV/Hemodynamics: Uneventful            Sign Out: Acceptable CV status; No obvious hypovolemia; No obvious fluid overload   Other NRE: NONE   DID A NON-ROUTINE EVENT OCCUR? No           Last vitals:  Vitals:    07/19/21 0827 07/19/21 0930   BP: 133/84 113/68   Pulse: 104 87   Resp: 14 14   Temp: 97.3  F (36.3  C)    SpO2: 93% 91%       Last vitals prior to Anesthesia Care Transfer:  CRNA VITALS  7/19/2021 0854 - 7/19/2021 0954      7/19/2021             Pulse:  88    Ht Rate:  88    SpO2:  94 %    Resp Rate (set):  10          Electronically Signed By: SARKIS DAUGHERTY CRNA  July 19, 2021  10:20 AM

## 2021-07-19 NOTE — OP NOTE
PROCEDURE NOTE    DATE OF SERVICE: 7/19/2021    SURGEON: Baljeet Solano MD    PRE-OP DIAGNOSIS:    History of Colon Cancer  History of Polyps      POST-OP DIAGNOSIS:  Same  Diverticulosis  Polyps at AC and HF    PROCEDURE:   Colonoscopy with hot biopsy      ANESTHESIA:  CAT Allen CRNA    INDICATION FOR THE PROCEDURE: The patient is a 75 year old male with h/o colon cancer and polyps . The patient has no other complaints  . After explaining the risks to include bleeding, perforation, potential inability toreach the cecum, the patient wished to proceed.    PROCEDURE:After adequate sedation, the patient was in the left lateral decubitus position.  Rectal exam was performed.  There was normal tone and no palpable masses .  The colonoscope was introduced into the rectum and advanced to the cecum with Mild difficulty.  The patient's prep was good.  The terminal cecum was reached.  The cecum, ascending, transverse, and descending  colon was with some diverticulosis. Flat polyps ( 0.5 cm ) of AC and HF were hot biopsied and destroyed .  The scope was retroflexed in the rectum.  The rectum was unremarkable  .  The scope was straightened and removed.  The patient tolerated the procedure well.     ESTIMATED BLOOD LOSS: none    COMPLICATIONS:  None    TISSUE REMOVED:  Yes    RECOMMEND:      Follow-up pending pathology  Fiber  Given literature on diverticulosis    Baljeet Solano MD FACS

## 2021-07-20 LAB
PATH REPORT.COMMENTS IMP SPEC: NORMAL
PATH REPORT.FINAL DX SPEC: NORMAL
PHOTO IMAGE: NORMAL

## 2021-07-21 PROBLEM — K63.5 COLON POLYPS: Status: RESOLVED | Noted: 2020-06-01 | Resolved: 2021-07-21

## 2021-08-10 ENCOUNTER — LAB (OUTPATIENT)
Dept: LAB | Facility: OTHER | Age: 76
End: 2021-08-10
Attending: INTERNAL MEDICINE
Payer: MEDICARE

## 2021-08-10 DIAGNOSIS — E11.65 UNCONTROLLED TYPE 2 DIABETES MELLITUS WITH HYPERGLYCEMIA (H): Primary | ICD-10-CM

## 2021-08-10 DIAGNOSIS — E78.2 MIXED HYPERLIPIDEMIA: ICD-10-CM

## 2021-08-10 DIAGNOSIS — E53.8 VITAMIN B12 DEFICIENCY: ICD-10-CM

## 2021-08-10 LAB
ALBUMIN UR-MCNC: NEGATIVE MG/DL
APPEARANCE UR: CLEAR
BILIRUB UR QL STRIP: NEGATIVE
CHOLEST SERPL-MCNC: 178 MG/DL
COLOR UR AUTO: YELLOW
CREAT UR-MCNC: 71 MG/DL
FASTING STATUS PATIENT QL REPORTED: YES
GLUCOSE UR STRIP-MCNC: >1000 MG/DL
HBA1C MFR BLD: 8.5 % (ref 4–6.2)
HDLC SERPL-MCNC: 33 MG/DL (ref 23–92)
HGB UR QL STRIP: NEGATIVE
KETONES UR STRIP-MCNC: NEGATIVE MG/DL
LDLC SERPL CALC-MCNC: 84 MG/DL
LEUKOCYTE ESTERASE UR QL STRIP: NEGATIVE
MICROALBUMIN UR-MCNC: <5 MG/L
MICROALBUMIN/CREAT UR: NORMAL MG/G{CREAT}
NITRATE UR QL: NEGATIVE
NONHDLC SERPL-MCNC: 145 MG/DL
PH UR STRIP: 5 [PH] (ref 5–9)
SP GR UR STRIP: 1.03 (ref 1–1.03)
T4 FREE SERPL-MCNC: 0.57 NG/DL (ref 0.6–1.6)
TRIGL SERPL-MCNC: 304 MG/DL
TSH SERPL DL<=0.005 MIU/L-ACNC: 9.38 MU/L (ref 0.4–4)
UROBILINOGEN UR STRIP-MCNC: NORMAL MG/DL
VIT B12 SERPL-MCNC: 411 PG/ML (ref 180–914)

## 2021-08-10 PROCEDURE — 84439 ASSAY OF FREE THYROXINE: CPT | Mod: ZL

## 2021-08-10 PROCEDURE — 82607 VITAMIN B-12: CPT | Mod: ZL

## 2021-08-10 PROCEDURE — 36415 COLL VENOUS BLD VENIPUNCTURE: CPT | Mod: ZL

## 2021-08-10 PROCEDURE — 82043 UR ALBUMIN QUANTITATIVE: CPT | Mod: ZL

## 2021-08-10 PROCEDURE — 80061 LIPID PANEL: CPT | Mod: ZL

## 2021-08-10 PROCEDURE — 81003 URINALYSIS AUTO W/O SCOPE: CPT | Mod: ZL

## 2021-08-10 PROCEDURE — 84443 ASSAY THYROID STIM HORMONE: CPT | Mod: ZL

## 2021-08-10 PROCEDURE — 83036 HEMOGLOBIN GLYCOSYLATED A1C: CPT | Mod: ZL

## 2021-08-11 RX ORDER — SIMVASTATIN 40 MG
TABLET ORAL
Qty: 90 TABLET | Refills: 3 | Status: CANCELLED | OUTPATIENT
Start: 2021-08-11

## 2021-08-12 ENCOUNTER — OFFICE VISIT (OUTPATIENT)
Dept: INTERNAL MEDICINE | Facility: OTHER | Age: 76
End: 2021-08-12
Attending: INTERNAL MEDICINE
Payer: COMMERCIAL

## 2021-08-12 VITALS
HEART RATE: 72 BPM | RESPIRATION RATE: 16 BRPM | OXYGEN SATURATION: 96 % | SYSTOLIC BLOOD PRESSURE: 128 MMHG | TEMPERATURE: 96.6 F | WEIGHT: 225.4 LBS | BODY MASS INDEX: 33.77 KG/M2 | DIASTOLIC BLOOD PRESSURE: 50 MMHG

## 2021-08-12 DIAGNOSIS — E78.2 MIXED HYPERLIPIDEMIA: ICD-10-CM

## 2021-08-12 DIAGNOSIS — E03.4 HYPOTHYROIDISM DUE TO ACQUIRED ATROPHY OF THYROID: ICD-10-CM

## 2021-08-12 DIAGNOSIS — E11.65 UNCONTROLLED TYPE 2 DIABETES MELLITUS WITH HYPERGLYCEMIA (H): Primary | ICD-10-CM

## 2021-08-12 DIAGNOSIS — Z71.85 VACCINE COUNSELING: ICD-10-CM

## 2021-08-12 DIAGNOSIS — E53.8 VITAMIN B12 DEFICIENCY: ICD-10-CM

## 2021-08-12 DIAGNOSIS — I25.10 CORONARY ARTERY DISEASE INVOLVING NATIVE CORONARY ARTERY OF NATIVE HEART WITHOUT ANGINA PECTORIS: ICD-10-CM

## 2021-08-12 PROCEDURE — 99214 OFFICE O/P EST MOD 30 MIN: CPT | Performed by: INTERNAL MEDICINE

## 2021-08-12 PROCEDURE — G0463 HOSPITAL OUTPT CLINIC VISIT: HCPCS

## 2021-08-12 RX ORDER — CYANOCOBALAMIN (VITAMIN B-12) 2500 MCG
2500 TABLET, SUBLINGUAL SUBLINGUAL DAILY
Qty: 100 TABLET | Refills: 3 | Status: SHIPPED | OUTPATIENT
Start: 2021-08-12 | End: 2022-01-07 | Stop reason: DRUGHIGH

## 2021-08-12 RX ORDER — LEVOTHYROXINE SODIUM 50 UG/1
50 TABLET ORAL DAILY
Qty: 90 TABLET | Refills: 3 | Status: SHIPPED | OUTPATIENT
Start: 2021-08-12 | End: 2022-01-09

## 2021-08-12 RX ORDER — SIMVASTATIN 40 MG
40 TABLET ORAL AT BEDTIME
Qty: 90 TABLET | Refills: 3 | Status: SHIPPED | OUTPATIENT
Start: 2021-08-12 | End: 2022-07-06 | Stop reason: ALTCHOICE

## 2021-08-12 ASSESSMENT — ENCOUNTER SYMPTOMS
CONFUSION: 0
BACK PAIN: 0
ADENOPATHY: 0
CHILLS: 0
FEVER: 0
HEMATURIA: 0
DIARRHEA: 1
ABDOMINAL PAIN: 0
SHORTNESS OF BREATH: 0
WOUND: 0
NUMBNESS: 1
BRUISES/BLEEDS EASILY: 0
CHEST TIGHTNESS: 0

## 2021-08-12 ASSESSMENT — PAIN SCALES - GENERAL: PAINLEVEL: NO PAIN (0)

## 2021-08-12 NOTE — PROGRESS NOTES
Medication Reconciliation: complete     FOOD SECURITY SCREENING QUESTIONS  Hunger Vital Signs:  Within the past 12 months we worried whether our food would run out before we got money to buy more. Never  Within the past 12 months the food we bought just didn't last and we didn't have money to get more. Never  Lillie Godwin LPN .............8/12/2021  8:37 AM

## 2021-08-12 NOTE — PATIENT INSTRUCTIONS
"Blood pressure is well controlled.   Diabetes control has improved.... almost to goal of 7.9% or lower.     Medications refilled.   Labs have improved slightly.     Increase B12 supplement -- start under the tongue dissolving tablet.     Start synthroid / thyroid replacement tablet.    Your Body mass index (BMI) is:  Estimated body mass index is 33.77 kg/m  as calculated from the following:    Height as of 1/29/21: 1.74 m (5' 8.5\").    Weight as of this encounter: 102.2 kg (225 lb 6.4 oz).   (BMI ranges: Normal 18.5 - 25, Overweight 25 - 30, Obesity greater than 30)     Facts about losing weight:   -- Overweight and Obesity increase your risk for developing diabetes, high blood pressure and stroke, and shorten your life.   -- 90% of weight loss comes from dietary changes, only 10% from exercise   -- For every 1 pound of of weight loss -- this is equal to about 8 to 10 pounds of weight off of your knees.   -- there are about 3500 calories in 1 pound of body weight.     -- Goal Caloric intake -- 1200 to 1500 calories daily.     Get out and exercise, bike ride, walk for 10 to 15 minutes after each meal -- this can significantly lowers the spike in blood sugar after eating.     To help with weight loss and improve blood sugar control....    -- Try to avoid Carbohydrates as much as possible -- breads, pasta, baked goods, cakes, oatmeal, cold cereal, potatoes.   -- Eat more lean meats, proteins, eggs, nuts, vegetables.    -- Start or Continue regular daily exercise.      -- Eat more lean meats, proteins, eggs, nuts, vegetables.      What have successful people done to lose large amounts of weight, and keep it off?   -- Used both diet and exercise to lose weight   -- Ate a healthy breakfast every day   -- Exercised an hour per day   -- Watched less than 10 hours of TV per week   -- Weighed themselves weekly   -- Drink a large glass of water 10-15 minutes before your meals.   -- Use smaller dinner plates and don't go back " for seconds.     What should I do?   -- Work on 5-10% weight loss   -- Focus on a few healthy dietary changes   -- Eat more fresh fruits and vegetables, and fewer carbohydrates (http://myplate.gov/)   -- Exercise by some means -- every day   -- Weigh yourself once a week    Weight Management   Weight Watchers     Meets Mondays 9:30, 11:45, or 5:30    Judd Marina, [address], [place], [place]     Contact Chari [phone] [email or web address]  Weight Watchers [email or web address]    -- Consider My Fitness Pal on your smart phone  [email or web address]     Lab on 08/10/2021   Component Date Value Ref Range Status     Vitamin B12 08/10/2021 411  180 - 914 pg/mL Final     Cholesterol 08/10/2021 178  <200 mg/dL Final    Age 0-19 years  Desirable: <170 mg/dL  Borderline high:  170-199 mg/dl  High:            >199 mg/dl    Age 20 years and older  Desirable: <200 mg/dL     Triglycerides 08/10/2021 304* <150 mg/dL Final    0-9 years:  Normal:    Less than 75 mg/dL  Borderline high:  75-99 mg/dL  High:             Greater than or equal to 100 mg/dL    0-19 years:  Normal:    Less than 90 mg/dL  Borderline high:   mg/dL  High:             Greater than or equal to 130 mg/dL    20 years and older:  Normal:    Less than 150 mg/dL  Borderline high:  150-199 mg/dL  High:             200-499 mg/dL  Very high:   Greater than or equal to 500 mg/dL     Direct Measure HDL 08/10/2021 33  23 - 92 mg/dL Final    0-19 years:       Greater than or equal to 45 mg/dL   Low: Less than 40 mg/dL   Borderline low: 40-44 mg/dL     20 years and older:   Female: Greater than or equal to 50 mg/dL   Male:   Greater than or equal to 40 mg/dL          LDL Cholesterol Calculated 08/10/2021 84  <=100 mg/dL Final    Age 0-19 years:  Desirable: 0-110 mg/dL   Borderline high: 110-129 mg/dL   High: >= 130 mg/dL    Age 20 years and older:  Desirable: <100mg/dL  Above desirable: 100-129 mg/dL   Borderline high: 130-159 mg/dL   High: 160-189  mg/dL   Very high: >= 190 mg/dL     Non HDL Cholesterol 08/10/2021 145* <130 mg/dL Final    0-19 years:  Desirable:          Less than 120 mg/dL  Borderline high:   120-144 mg/dL  High:                   Greater than or equal to 145 mg/dL    20 years and older:  Desirable:          130 mg/dL  Above Desirable: 130-159 mg/dL  Borderline high:   160-189 mg/dL  High:               190-219 mg/dL  Very high:     Greater than or equal to 220 mg/dL     Patient Fasting > 8hrs? 08/10/2021 Yes   Final     Hemoglobin A1C 08/10/2021 8.5* 4.0 - 6.2 % Final     TSH 08/10/2021 9.38* 0.40 - 4.00 mU/L Final     Creatinine Urine mg/dL 08/10/2021 71  mg/dL Final     Albumin Urine mg/L 08/10/2021 <5  mg/L Final     Albumin Urine mg/g Cr 08/10/2021    Final    Unable to calculate:  Urine creatinine or albumin value below detectable level     Color Urine 08/10/2021 Yellow  Colorless, Straw, Light Yellow, Yellow Final     Appearance Urine 08/10/2021 Clear  Clear Final     Glucose Urine 08/10/2021 >1000* Negative mg/dL Final     Bilirubin Urine 08/10/2021 Negative  Negative Final     Ketones Urine 08/10/2021 Negative  Negative mg/dL Final     Specific Gravity Urine 08/10/2021 1.027  1.000 - 1.030 Final     Blood Urine 08/10/2021 Negative  Negative Final     pH Urine 08/10/2021 5.0  5.0 - 9.0 Final     Protein Albumin Urine 08/10/2021 Negative  Negative mg/dL Final     Urobilinogen Urine 08/10/2021 Normal  Normal, 2.0 mg/dL Final     Nitrite Urine 08/10/2021 Negative  Negative Final     Leukocyte Esterase Urine 08/10/2021 Negative  Negative Final     Free T4 08/10/2021 0.57* 0.60 - 1.60 ng/dL Final        Aspects of Diabetes:   Recent Labs   Lab Test 08/10/21  0835 05/03/21  0832 04/19/21  0739 01/22/21  0812 10/21/20  0732 04/09/19  0830 01/22/18  0903 12/18/17  0749   A1C 8.5* 9.6*  --  10.9*  --   --    < >  --    LDL 84 78  --   --   --   --   --  106*   HDL 33 28  --   --   --   --   --  32   TRIG 304* 332*  --   --   --   --   --  271*    ALT  --   --  21 32 38  --    < >  --    CR  --   --  1.27 1.20 0.97   < >  --   --    GFRESTIMATED  --   --  55* 59* 76   < >   < >  --    GFRESTBLACK  --   --  67 71 >90   < >  --   --    POTASSIUM  --   --  4.4 4.7 4.2   < >  --   --    TSH 9.38*  --   --   --   --   --   --   --     < > = values in this interval not displayed.      Hemoglobin A1c  Goal range is under 8%. Best is 6.5 to 7   Blood Pressure 128/50 Goal to keep less than 140/90   Tobacco  reports that he quit smoking about 36 years ago. His smoking use included cigarettes. He has a 30.00 pack-year smoking history. He has never used smokeless tobacco. Goal to abstain from tobacco   Aspirin or Plavix Anti-platelet therapy Aspirin or Plavix reduces risk of heart disease and stroke  -- sometimes used with other blood thinners, depending on bleeding risk and risk factors.    ACE/ARB Specific blood pressure meds These medications can reduce risk of kidney disease   Cholesterol Statins (Lipitor, Crestor, vs others) Statins reduce risk of heart disease and stroke   Eye Exam -- Do Yearly -- Annual diabetic eye exam   Healthy weight Wt Readings from Last 3 Encounters:   08/12/21 102.2 kg (225 lb 6.4 oz)   07/19/21 100.2 kg (220 lb 12.8 oz)   05/05/21 106.7 kg (235 lb 3.2 oz)     Body mass index is 33.77 kg/m .  Goal BMI under 30, best is under 25.      -- Trying to exercise daily (goal at least 20 min/day) with moderate aerobic activity   -- Eat healthy (resources from ADA at http://www.diabetes.org/)   -- Taking good care of my feet. Consider seeing the Podiatrist   -- Check blood sugars as directed, record in log book and bring to every appointment    Insurance companies are grading you and I on your blood sugar control -- Goal is to get your A1c down to 7.9% or lower and NO Smoking!  -- Medicare and most insurance companies, will only cover Hemoglobin A1c labs to be rechecked every 91+ days.      Return for Diabetes labs and clinic follow-up appointment  every 3 to 4 months.    Schedule lab only appointment --- A few days AFTER: 11/10/21   Schedule clinic appointment with Dr. Roberts -- Same day as labs, or 1-2 days later.

## 2021-08-12 NOTE — PROGRESS NOTES
Mr. Olivier is a 75 year old male who presents to the clinic for evaluation of the following problems:      ICD-10-CM    1. Uncontrolled type 2 diabetes mellitus with hyperglycemia (H)  E11.65    2. Mixed hyperlipidemia  E78.2 simvastatin (ZOCOR) 40 MG tablet   3. Coronary artery disease involving native coronary artery of native heart without angina pectoris  I25.10    4. Vitamin B12 deficiency  E53.8 vitamin B-12 (CYANOCOBALAMIN) 2500 MCG sublingual tablet   5. Hypothyroidism due to acquired atrophy of thyroid  E03.4 levothyroxine (SYNTHROID/LEVOTHROID) 50 MCG tablet   6. Vaccine counseling  Z71.89      HPI  Patient has uncontrolled type 2 diabetes with hyperglycemia.  Has noticed significant improvement in blood sugars but he still here annually has some high ones.  Denies hypoglycemia.  Hemoglobin A1c has improved by greater than 1%.  Encouraged him to continue to work on healthy eating, diet, exercise, weight loss.  Oral carbohydrate reduction.  Continues with Jardiance, doing well.  Also on glipizide and Metformin and semaglutide tablets.  Standing orders are currently up-to-date.  Plan to recheck labs in 3 to 4 months.    Mixed hyperlipidemia, cholesterol levels have improved with use of simvastatin.  Needs refills.    Coronary artery disease, denies exertional angina.  Currently on Plavix, simvastatin.  Also on lisinopril and metoprolol.  Doing well.  No changes for now.    B12 deficiency, B12 levels came back kind of low.  Start sublingual B12 tablets 2500 mcg daily.  Had been taking the B12 1000 mcg daily but it does not appear to be an adequate enough dose for him.    Hypothyroidism, new diagnosis.  TSH and free T4 are both abnormal.  Discussed treatment options.  He simply wishes to proceed with starting a thyroid replacement.  Start Synthroid 50 mcg daily.  Plan to check labs in about 3 months and dose adjust based on those results.    Vaccine counseling completed.  Discussed pneumococcal  "vaccination.    Functional Capacity: > or about 4 METS.   Review of Systems   Constitutional: Negative for chills and fever.   HENT: Negative for congestion.    Eyes: Negative for visual disturbance.   Respiratory: Negative for chest tightness and shortness of breath.    Cardiovascular: Negative for chest pain.   Gastrointestinal: Positive for diarrhea (intermittently). Negative for abdominal pain.   Genitourinary: Negative for hematuria.   Musculoskeletal: Negative for back pain.   Skin: Negative for rash and wound.   Allergic/Immunologic: Negative for immunocompromised state.   Neurological: Positive for numbness (hands and feet from Agent Orange exposure + Hx of chemotherapy ). Negative for syncope.   Hematological: Negative for adenopathy. Does not bruise/bleed easily.   Psychiatric/Behavioral: Negative for confusion.        Reviewed and updated as needed this visit by Provider   Tobacco  Allergies  Meds  Problems  Med Hx  Surg Hx  Fam Hx          EXAM:   Vitals:    08/12/21 0833   BP: 128/50   BP Location: Right arm   Patient Position: Sitting   Cuff Size: Adult Regular   Pulse: 72   Resp: 16   Temp: (!) 96.6  F (35.9  C)   TempSrc: Tympanic   SpO2: 96%   Weight: 102.2 kg (225 lb 6.4 oz)       Current Pain Score: No Pain (0)     BP Readings from Last 3 Encounters:   08/12/21 128/50   07/19/21 113/68   05/05/21 138/78      Wt Readings from Last 3 Encounters:   08/12/21 102.2 kg (225 lb 6.4 oz)   07/19/21 100.2 kg (220 lb 12.8 oz)   05/05/21 106.7 kg (235 lb 3.2 oz)      Estimated body mass index is 33.77 kg/m  as calculated from the following:    Height as of 1/29/21: 1.74 m (5' 8.5\").    Weight as of this encounter: 102.2 kg (225 lb 6.4 oz).     Physical Exam  Constitutional:       General: He is not in acute distress.     Appearance: He is well-developed. He is obese. He is not diaphoretic.   HENT:      Head: Normocephalic and atraumatic.   Eyes:      General: No scleral icterus.     Conjunctiva/sclera: " Conjunctivae normal.   Neck:      Vascular: No carotid bruit.   Cardiovascular:      Rate and Rhythm: Normal rate and regular rhythm.      Pulses: Normal pulses.   Pulmonary:      Effort: Pulmonary effort is normal.      Breath sounds: Normal breath sounds.   Abdominal:      Palpations: Abdomen is soft.      Tenderness: There is no abdominal tenderness.   Musculoskeletal:         General: No deformity.      Cervical back: Neck supple.      Right lower leg: No edema.      Left lower leg: No edema.   Lymphadenopathy:      Cervical: No cervical adenopathy.   Skin:     General: Skin is warm and dry.      Findings: No rash.   Neurological:      Mental Status: He is alert and oriented to person, place, and time. Mental status is at baseline.   Psychiatric:         Mood and Affect: Mood normal.         Behavior: Behavior normal.        Procedures   INVESTIGATIONS:  - Labs reviewed in Epic     ASSESSMENT AND PLAN:  Problem List Items Addressed This Visit        Digestive    Vitamin B12 deficiency    Relevant Medications    vitamin B-12 (CYANOCOBALAMIN) 2500 MCG sublingual tablet       Endocrine    Uncontrolled type 2 diabetes mellitus with hyperglycemia (H) - Primary    Relevant Medications    levothyroxine (SYNTHROID/LEVOTHROID) 50 MCG tablet    Mixed hyperlipidemia    Relevant Medications    simvastatin (ZOCOR) 40 MG tablet    Hypothyroidism due to acquired atrophy of thyroid    Relevant Medications    levothyroxine (SYNTHROID/LEVOTHROID) 50 MCG tablet       Circulatory    Coronary artery disease involving native coronary artery of native heart without angina pectoris      Other Visit Diagnoses     Vaccine counseling            reviewed diet, exercise and weight control, recommended sodium restriction, cardiovascular risk and specific lipid/LDL goals reviewed, specific diabetic recommendations low cholesterol diet, weight control and daily exercise discussed, use of aspirin to prevent MI and TIA's discussed  -- Expected  "clinical course discussed    -- Medications and their side effects discussed    The 10-year ASCVD risk score (Opal SHELBY Jr., et al., 2013) is: 45.7%    Values used to calculate the score:      Age: 75 years      Sex: Male      Is Non- : No      Diabetic: Yes      Tobacco smoker: No      Systolic Blood Pressure: 128 mmHg      Is BP treated: No      HDL Cholesterol: 33 mg/dL      Total Cholesterol: 178 mg/dL    Patient Instructions     Blood pressure is well controlled.   Diabetes control has improved.... almost to goal of 7.9% or lower.     Medications refilled.   Labs have improved slightly.     Increase B12 supplement -- start under the tongue dissolving tablet.     Start synthroid / thyroid replacement tablet.    Your Body mass index (BMI) is:  Estimated body mass index is 33.77 kg/m  as calculated from the following:    Height as of 1/29/21: 1.74 m (5' 8.5\").    Weight as of this encounter: 102.2 kg (225 lb 6.4 oz).   (BMI ranges: Normal 18.5 - 25, Overweight 25 - 30, Obesity greater than 30)     Facts about losing weight:   -- Overweight and Obesity increase your risk for developing diabetes, high blood pressure and stroke, and shorten your life.   -- 90% of weight loss comes from dietary changes, only 10% from exercise   -- For every 1 pound of of weight loss -- this is equal to about 8 to 10 pounds of weight off of your knees.   -- there are about 3500 calories in 1 pound of body weight.     -- Goal Caloric intake -- 1200 to 1500 calories daily.     Get out and exercise, bike ride, walk for 10 to 15 minutes after each meal -- this can significantly lowers the spike in blood sugar after eating.     To help with weight loss and improve blood sugar control....    -- Try to avoid Carbohydrates as much as possible -- breads, pasta, baked goods, cakes, oatmeal, cold cereal, potatoes.   -- Eat more lean meats, proteins, eggs, nuts, vegetables.    -- Start or Continue regular daily exercise.  "     -- Eat more lean meats, proteins, eggs, nuts, vegetables.      What have successful people done to lose large amounts of weight, and keep it off?   -- Used both diet and exercise to lose weight   -- Ate a healthy breakfast every day   -- Exercised an hour per day   -- Watched less than 10 hours of TV per week   -- Weighed themselves weekly   -- Drink a large glass of water 10-15 minutes before your meals.   -- Use smaller dinner plates and don't go back for seconds.     What should I do?   -- Work on 5-10% weight loss   -- Focus on a few healthy dietary changes   -- Eat more fresh fruits and vegetables, and fewer carbohydrates (http://myplate.gov/)   -- Exercise by some means -- every day   -- Weigh yourself once a week    Weight Management   Weight Watchers     Meets Mondays 9:30, 11:45, or 5:30    Judd Marina, [address], [place], [place]     Contact Chari [phone] [email or web address]  Weight [employer] [email or web address]    -- Consider My Fitness Pal on your smart phone  [email or web address]     Lab on 08/10/2021   Component Date Value Ref Range Status     Vitamin B12 08/10/2021 411  180 - 914 pg/mL Final     Cholesterol 08/10/2021 178  <200 mg/dL Final    Age 0-19 years  Desirable: <170 mg/dL  Borderline high:  170-199 mg/dl  High:            >199 mg/dl    Age 20 years and older  Desirable: <200 mg/dL     Triglycerides 08/10/2021 304* <150 mg/dL Final    0-9 years:  Normal:    Less than 75 mg/dL  Borderline high:  75-99 mg/dL  High:             Greater than or equal to 100 mg/dL    0-19 years:  Normal:    Less than 90 mg/dL  Borderline high:   mg/dL  High:             Greater than or equal to 130 mg/dL    20 years and older:  Normal:    Less than 150 mg/dL  Borderline high:  150-199 mg/dL  High:             200-499 mg/dL  Very high:   Greater than or equal to 500 mg/dL     Direct Measure HDL 08/10/2021 33  23 - 92 mg/dL Final    0-19 years:       Greater than or equal to 45 mg/dL    Low: Less than 40 mg/dL   Borderline low: 40-44 mg/dL     20 years and older:   Female: Greater than or equal to 50 mg/dL   Male:   Greater than or equal to 40 mg/dL          LDL Cholesterol Calculated 08/10/2021 84  <=100 mg/dL Final    Age 0-19 years:  Desirable: 0-110 mg/dL   Borderline high: 110-129 mg/dL   High: >= 130 mg/dL    Age 20 years and older:  Desirable: <100mg/dL  Above desirable: 100-129 mg/dL   Borderline high: 130-159 mg/dL   High: 160-189 mg/dL   Very high: >= 190 mg/dL     Non HDL Cholesterol 08/10/2021 145* <130 mg/dL Final    0-19 years:  Desirable:          Less than 120 mg/dL  Borderline high:   120-144 mg/dL  High:                   Greater than or equal to 145 mg/dL    20 years and older:  Desirable:          130 mg/dL  Above Desirable: 130-159 mg/dL  Borderline high:   160-189 mg/dL  High:               190-219 mg/dL  Very high:     Greater than or equal to 220 mg/dL     Patient Fasting > 8hrs? 08/10/2021 Yes   Final     Hemoglobin A1C 08/10/2021 8.5* 4.0 - 6.2 % Final     TSH 08/10/2021 9.38* 0.40 - 4.00 mU/L Final     Creatinine Urine mg/dL 08/10/2021 71  mg/dL Final     Albumin Urine mg/L 08/10/2021 <5  mg/L Final     Albumin Urine mg/g Cr 08/10/2021    Final    Unable to calculate:  Urine creatinine or albumin value below detectable level     Color Urine 08/10/2021 Yellow  Colorless, Straw, Light Yellow, Yellow Final     Appearance Urine 08/10/2021 Clear  Clear Final     Glucose Urine 08/10/2021 >1000* Negative mg/dL Final     Bilirubin Urine 08/10/2021 Negative  Negative Final     Ketones Urine 08/10/2021 Negative  Negative mg/dL Final     Specific Gravity Urine 08/10/2021 1.027  1.000 - 1.030 Final     Blood Urine 08/10/2021 Negative  Negative Final     pH Urine 08/10/2021 5.0  5.0 - 9.0 Final     Protein Albumin Urine 08/10/2021 Negative  Negative mg/dL Final     Urobilinogen Urine 08/10/2021 Normal  Normal, 2.0 mg/dL Final     Nitrite Urine 08/10/2021 Negative  Negative Final      Leukocyte Esterase Urine 08/10/2021 Negative  Negative Final     Free T4 08/10/2021 0.57* 0.60 - 1.60 ng/dL Final        Aspects of Diabetes:   Recent Labs   Lab Test 08/10/21  0835 05/03/21  0832 04/19/21  0739 01/22/21  0812 10/21/20  0732 04/09/19  0830 01/22/18  0903 12/18/17  0749   A1C 8.5* 9.6*  --  10.9*  --   --    < >  --    LDL 84 78  --   --   --   --   --  106*   HDL 33 28  --   --   --   --   --  32   TRIG 304* 332*  --   --   --   --   --  271*   ALT  --   --  21 32 38  --    < >  --    CR  --   --  1.27 1.20 0.97   < >  --   --    GFRESTIMATED  --   --  55* 59* 76   < >   < >  --    GFRESTBLACK  --   --  67 71 >90   < >  --   --    POTASSIUM  --   --  4.4 4.7 4.2   < >  --   --    TSH 9.38*  --   --   --   --   --   --   --     < > = values in this interval not displayed.      Hemoglobin A1c  Goal range is under 8%. Best is 6.5 to 7   Blood Pressure 128/50 Goal to keep less than 140/90   Tobacco  reports that he quit smoking about 36 years ago. His smoking use included cigarettes. He has a 30.00 pack-year smoking history. He has never used smokeless tobacco. Goal to abstain from tobacco   Aspirin or Plavix Anti-platelet therapy Aspirin or Plavix reduces risk of heart disease and stroke  -- sometimes used with other blood thinners, depending on bleeding risk and risk factors.    ACE/ARB Specific blood pressure meds These medications can reduce risk of kidney disease   Cholesterol Statins (Lipitor, Crestor, vs others) Statins reduce risk of heart disease and stroke   Eye Exam -- Do Yearly -- Annual diabetic eye exam   Healthy weight Wt Readings from Last 3 Encounters:   08/12/21 102.2 kg (225 lb 6.4 oz)   07/19/21 100.2 kg (220 lb 12.8 oz)   05/05/21 106.7 kg (235 lb 3.2 oz)     Body mass index is 33.77 kg/m .  Goal BMI under 30, best is under 25.      -- Trying to exercise daily (goal at least 20 min/day) with moderate aerobic activity   -- Eat healthy (resources from ADA at  http://www.diabetes.org/)   -- Taking good care of my feet. Consider seeing the Podiatrist   -- Check blood sugars as directed, record in log book and bring to every appointment    Insurance companies are grading you and I on your blood sugar control -- Goal is to get your A1c down to 7.9% or lower and NO Smoking!  -- Medicare and most insurance companies, will only cover Hemoglobin A1c labs to be rechecked every 91+ days.      Return for Diabetes labs and clinic follow-up appointment every 3 to 4 months.    Schedule lab only appointment --- A few days AFTER: 11/10/21   Schedule clinic appointment with Dr. Roberts -- Same day as labs, or 1-2 days later.      Electronically signed by:  Bk Roberts MD on 8/12/2021  Internal Medicine  [facility]

## 2021-09-07 ENCOUNTER — LAB (OUTPATIENT)
Dept: LAB | Facility: OTHER | Age: 76
End: 2021-09-07
Attending: NURSE PRACTITIONER
Payer: MEDICARE

## 2021-09-07 DIAGNOSIS — D50.9 IRON DEFICIENCY ANEMIA, UNSPECIFIED IRON DEFICIENCY ANEMIA TYPE: ICD-10-CM

## 2021-09-07 DIAGNOSIS — C18.7 CANCER OF SIGMOID COLON (H): ICD-10-CM

## 2021-09-07 LAB
ALBUMIN SERPL-MCNC: 4.1 G/DL (ref 3.5–5.7)
ALP SERPL-CCNC: 79 U/L (ref 34–104)
ALT SERPL W P-5'-P-CCNC: 12 U/L (ref 7–52)
ANION GAP SERPL CALCULATED.3IONS-SCNC: 11 MMOL/L (ref 3–14)
AST SERPL W P-5'-P-CCNC: 13 U/L (ref 13–39)
BASOPHILS # BLD AUTO: 0 10E3/UL (ref 0–0.2)
BASOPHILS NFR BLD AUTO: 0 %
BILIRUB SERPL-MCNC: 0.5 MG/DL (ref 0.3–1)
BUN SERPL-MCNC: 21 MG/DL (ref 7–25)
CALCIUM SERPL-MCNC: 9.6 MG/DL (ref 8.6–10.3)
CEA SERPL-MCNC: <3 NG/ML (ref 0–3)
CHLORIDE BLD-SCNC: 103 MMOL/L (ref 98–107)
CO2 SERPL-SCNC: 26 MMOL/L (ref 21–31)
CREAT SERPL-MCNC: 1.26 MG/DL (ref 0.7–1.3)
EOSINOPHIL # BLD AUTO: 0.2 10E3/UL (ref 0–0.7)
EOSINOPHIL NFR BLD AUTO: 2 %
ERYTHROCYTE [DISTWIDTH] IN BLOOD BY AUTOMATED COUNT: 14.5 % (ref 10–15)
FERRITIN SERPL-MCNC: 55 NG/ML (ref 24–336)
GFR SERPL CREATININE-BSD FRML MDRD: 55 ML/MIN/1.73M2
GLUCOSE BLD-MCNC: 227 MG/DL (ref 70–105)
HCT VFR BLD AUTO: 43.4 % (ref 40–53)
HGB BLD-MCNC: 13.9 G/DL (ref 13.3–17.7)
IMM GRANULOCYTES # BLD: 0 10E3/UL
IMM GRANULOCYTES NFR BLD: 0 %
IRON SATN MFR SERPL: 14 % (ref 20–55)
IRON SERPL-MCNC: 55 UG/DL (ref 50–212)
LDH SERPL L TO P-CCNC: 135 U/L (ref 140–271)
LYMPHOCYTES # BLD AUTO: 1.8 10E3/UL (ref 0.8–5.3)
LYMPHOCYTES NFR BLD AUTO: 21 %
MCH RBC QN AUTO: 29.6 PG (ref 26.5–33)
MCHC RBC AUTO-ENTMCNC: 32 G/DL (ref 31.5–36.5)
MCV RBC AUTO: 92 FL (ref 78–100)
MONOCYTES # BLD AUTO: 0.9 10E3/UL (ref 0–1.3)
MONOCYTES NFR BLD AUTO: 11 %
NEUTROPHILS # BLD AUTO: 5.7 10E3/UL (ref 1.6–8.3)
NEUTROPHILS NFR BLD AUTO: 66 %
NRBC # BLD AUTO: 0 10E3/UL
NRBC BLD AUTO-RTO: 0 /100
PLATELET # BLD AUTO: 256 10E3/UL (ref 150–450)
POTASSIUM BLD-SCNC: 4.7 MMOL/L (ref 3.5–5.1)
PROT SERPL-MCNC: 6.9 G/DL (ref 6.4–8.9)
RBC # BLD AUTO: 4.7 10E6/UL (ref 4.4–5.9)
SODIUM SERPL-SCNC: 140 MMOL/L (ref 134–144)
TIBC SERPL-MCNC: 394.8 UG/DL (ref 245–400)
TRANSFERRIN SERPL-MCNC: 282 MG/DL (ref 203–362)
UIBC (UNSATURATED): 339.8 MG/DL
WBC # BLD AUTO: 8.7 10E3/UL (ref 4–11)

## 2021-09-07 PROCEDURE — 83550 IRON BINDING TEST: CPT | Mod: ZL

## 2021-09-07 PROCEDURE — 36415 COLL VENOUS BLD VENIPUNCTURE: CPT | Mod: ZL

## 2021-09-07 PROCEDURE — 83615 LACTATE (LD) (LDH) ENZYME: CPT | Mod: ZL

## 2021-09-07 PROCEDURE — 82728 ASSAY OF FERRITIN: CPT | Mod: ZL

## 2021-09-07 PROCEDURE — 80053 COMPREHEN METABOLIC PANEL: CPT | Mod: ZL

## 2021-09-07 PROCEDURE — 82378 CARCINOEMBRYONIC ANTIGEN: CPT | Mod: ZL

## 2021-09-07 PROCEDURE — 85025 COMPLETE CBC W/AUTO DIFF WBC: CPT | Mod: ZL

## 2021-09-13 ENCOUNTER — ONCOLOGY VISIT (OUTPATIENT)
Dept: ONCOLOGY | Facility: OTHER | Age: 76
End: 2021-09-13
Attending: NURSE PRACTITIONER
Payer: COMMERCIAL

## 2021-09-13 VITALS
BODY MASS INDEX: 33.56 KG/M2 | WEIGHT: 224 LBS | TEMPERATURE: 96.8 F | RESPIRATION RATE: 12 BRPM | OXYGEN SATURATION: 97 % | DIASTOLIC BLOOD PRESSURE: 60 MMHG | HEART RATE: 68 BPM | SYSTOLIC BLOOD PRESSURE: 102 MMHG

## 2021-09-13 DIAGNOSIS — C18.7 CANCER OF SIGMOID COLON (H): Primary | ICD-10-CM

## 2021-09-13 PROCEDURE — G0463 HOSPITAL OUTPT CLINIC VISIT: HCPCS

## 2021-09-13 PROCEDURE — 99213 OFFICE O/P EST LOW 20 MIN: CPT | Performed by: NURSE PRACTITIONER

## 2021-09-13 ASSESSMENT — PAIN SCALES - GENERAL: PAINLEVEL: SEVERE PAIN (6)

## 2021-09-13 NOTE — NURSING NOTE
Medication Reconciliation: complete    FOOD SECURITY SCREENING QUESTIONS  Hunger Vital Signs:  Within the past 12 months we worried whether our food would run out before we got money to buy more. Never  Within the past 12 months the food we bought just didn't last and we didn't have money to get more. Never  Angela Quick RN 9/13/2021 8:34 AM

## 2021-09-13 NOTE — PROGRESS NOTES
Oncology Follow-up Visit:  September 13, 2021  Diagnosis:Colon cancer    History Of Present Illness:  Patient presents to the clinic today for followup of colon cancer. Patient was seen in consultation on June 1, 2016. Patient presented to the emergency room on April 25, 2016, with complaints of chest pain radiating down his arms, which was primarily worse with exertion. In the emergency department, he was found to have a hemoglobin of 7.1, and he subsequently was admitted. CBC revealed microcytic indices with an MCV of 62. He was noted to be iron deficient. He was transfused 2 units of packed red cells and was ruled out for MI. He was seen by Dr. Solano, who performed colonoscopy and was noted to have a mass in the sigmoid colon that was consistent with adenocarcinoma. He also had multiple adenomatous polyps. The patient was admitted on May 11, 2016, for a sigmoid colectomy. This was performed on May 17, 2016. Pathology revealed in the sigmoid colon a mass consistent with moderately differentiated adenocarcinoma. The tumor size was 2 x 1 x 0.7 cm. The tumor invaded the muscularis propria, 2/8 lymph nodes were positive for metastatic carcinoma. Margins were negative for tumor. The grade of the tumor was ruled as moderately differentiated. The patient was staged pathologic stage T2N1b as part of the postop evaluation. The patient had a CT abdomen and pelvis on May 12, 2016. This revealed that there were 2 nonspecific low-attenuation lesions in the liver. Metastasis could not be excluded. There was no lymphadenopathy or additional findings to suggest metastases. The patient had a preop CEA, which was less than 3. PET scan was performed on Lluvia 15, 2016, and was essentially negative for metastatic disease. Lesions seen on prior PET in the liver were not hypermetabolic. We felt that the patient had stage III disease and he would be a candidate for adjuvant chemotherapy. When patient was seen on June 28, 2016, the plan was  to start FOLFOX x12 cycles.  When he was seen on November 17, 2016,  he did note some cold-induced neuropathic symptoms. He completed 12 cycles of FOLFOX. His last chemotherapy was administered on December 7, 2016.  He did have a PET scan done after 12 cycles of chemotherapy, and this came back essentially negative for metastatic disease.   He had staging studies on April 6, 2017 including CT abdomen and pelvis, which was essentially negative. CT of the chest was negative. The patient also underwent a colonoscopy in May 2017, which revealed a tubular adenoma at the hepatic flexure of the colon. Patient had a colonoscopy done performed by Dr. Solano on 06/01/2020 and was found to have 6 polyps.  All of them were revealing tubular adenoma, consistent with advanced adenoma.  One was at the 30 cm, and the other was in the mid transverse colon. This was based on polyp size being larger than 10 mm. The patient had a colonoscopy in June 2021, which revealed multiple tubular adenomas with a 3 year follow up recommended.  CT chest, abdomen, pelvis was done on 4/19/21 and was stable. Patient has been feeling well. Labs from 9/7/21 showed a normal tumor marker. CBC is normal. All other labs are stable. Patient remains active. He has no new concerns at this time.     Review Of Systems:  Review Of Systems  Eyes/Ears/Nose/Throat:denies new vision or hearing changes  Respiratory: No shortness of breath, cough  Cardiovascular: denies chest pain or palpitations  Gastrointestinal: denies abdominal pain, no bowel changes  Genitourinary: denies dysuria or hematuria  Musculoskeletal: denies new bone pain or muscle weakness  Neurologic: denies headaches, no dizziness  Hematologic/Lymphatic/Immunologic: denies fevers, night sweats      There are no exam notes on file for this visit.    Past medical, social, surgical, and family histories reviewed.    Allergies:  Allergies as of 09/13/2021 - Reviewed 08/12/2021   Allergen Reaction Noted      Aspirin  05/26/2017     Canagliflozin  05/18/2016     Morphine  05/18/2016       Current Medications:  Current Outpatient Medications   Medication Sig Dispense Refill     allopurinol (ZYLOPRIM) 100 MG tablet Take 1 tablet (100 mg) by mouth 2 times daily 180 tablet 3     Cholecalciferol (VITAMIN D3) 50 MCG (2000 UT) CAPS Take 1 capsule by mouth daily       clopidogrel (PLAVIX) 75 MG tablet Take 1 tablet (75 mg) by mouth daily 90 tablet 3     empagliflozin (JARDIANCE) 10 MG TABS tablet Take 1 tablet (10 mg) by mouth daily -For diabetes 90 tablet 1     glipiZIDE (GLUCOTROL) 10 MG tablet Take 1 tablet with breakfast, 1 tablet with coffee/cookies and 2 tablet with supper -- total of 4 daily 360 tablet 3     levothyroxine (SYNTHROID/LEVOTHROID) 50 MCG tablet Take 1 tablet (50 mcg) by mouth daily -- 1st thing in AM on empty stomach 90 tablet 3     lisinopril (ZESTRIL) 2.5 MG tablet Take 1 tablet (2.5 mg) by mouth daily 90 tablet 3     metFORMIN (GLUCOPHAGE) 1000 MG tablet Take 1 tablet (1,000 mg) by mouth 2 times daily (with meals) 180 tablet 3     metoprolol tartrate (LOPRESSOR) 50 MG tablet Take 1 tablet (50 mg) by mouth 2 times daily 180 tablet 3     nitroGLYcerin (NITROSTAT) 0.4 MG sublingual tablet Place 1 tablet under the tongue every 5 minutes if needed for Chest Pain.       pantoprazole (PROTONIX) 40 MG EC tablet Take 1 tablet (40 mg) by mouth daily 90 tablet 3     Semaglutide 7 MG TABS Take 7 mg by mouth daily -- cancel refills on 14 mg (didn't tolerate higher dose) 90 tablet 3     simvastatin (ZOCOR) 40 MG tablet Take 1 tablet (40 mg) by mouth At Bedtime 90 tablet 3     vitamin B-12 (CYANOCOBALAMIN) 2500 MCG sublingual tablet Take 1 tablet (2,500 mcg) by mouth daily -- stop previous B12 tablet 100 tablet 3     vitamin B6 (PYRIDOXINE) 100 MG tablet Take 100 mg by mouth daily          Physical Exam:  There were no vitals taken for this visit.    GENERAL APPEARANCE: 75 year old male, alert and no distress     NECK:  no adenopathy, no asymmetry or masses     LYMPHATICS: No cervical, supraclavicular, axillary lymphadenopathy     RESP: lungs clear to auscultation - no rales, rhonchi or wheezes     CARDIOVASCULAR: regular rates and rhythm, normal S1 S2     ABDOMEN:  soft, nontender, no HSM or masses and bowel sounds normal     MUSCULOSKELETAL: extremities normal- no gross deformities noted,  No edema b/l LE.     SKIN: no suspicious lesions or rashes on exposed skin     PSYCHIATRIC: mentation appears normal and affect normal    Laboratory/Imaging Studies  Lab on 09/07/2021   Component Date Value Ref Range Status     Ferritin 09/07/2021 55  24 - 336 ng/mL Final     Transferrin 09/07/2021 282  203 - 362 mg/dL Final     Iron 09/07/2021 55  50 - 212 ug/dL Final     UIBC (Unsaturated) 09/07/2021 339.80  mg/dL Final     Iron Binding Capacity 09/07/2021 394.80  245.00 - 400.00 ug/dL Final     Iron Saturation 09/07/2021 14* 20 - 55 % Final     CEA 09/07/2021 <3.0  0.0 - 3.0 ng/mL Final     Lactate Dehydrogenase 09/07/2021 135* 140 - 271 U/L Final     Sodium 09/07/2021 140  134 - 144 mmol/L Final     Potassium 09/07/2021 4.7  3.5 - 5.1 mmol/L Final     Chloride 09/07/2021 103  98 - 107 mmol/L Final     Carbon Dioxide (CO2) 09/07/2021 26  21 - 31 mmol/L Final     Anion Gap 09/07/2021 11  3 - 14 mmol/L Final     Urea Nitrogen 09/07/2021 21  7 - 25 mg/dL Final     Creatinine 09/07/2021 1.26  0.70 - 1.30 mg/dL Final     Calcium 09/07/2021 9.6  8.6 - 10.3 mg/dL Final     Glucose 09/07/2021 227* 70 - 105 mg/dL Final     Alkaline Phosphatase 09/07/2021 79  34 - 104 U/L Final     AST 09/07/2021 13  13 - 39 U/L Final     ALT 09/07/2021 12  7 - 52 U/L Final     Protein Total 09/07/2021 6.9  6.4 - 8.9 g/dL Final     Albumin 09/07/2021 4.1  3.5 - 5.7 g/dL Final     Bilirubin Total 09/07/2021 0.5  0.3 - 1.0 mg/dL Final     GFR Estimate 09/07/2021 55* >60 mL/min/1.73m2 Final    As of July 11, 2021, eGFR is calculated by the CKD-EPI creatinine equation,  without race adjustment. eGFR can be influenced by muscle mass, exercise, and diet. The reported eGFR is an estimation only and is only applicable if the renal function is stable.     WBC Count 09/07/2021 8.7  4.0 - 11.0 10e3/uL Final     RBC Count 09/07/2021 4.70  4.40 - 5.90 10e6/uL Final     Hemoglobin 09/07/2021 13.9  13.3 - 17.7 g/dL Final     Hematocrit 09/07/2021 43.4  40.0 - 53.0 % Final     MCV 09/07/2021 92  78 - 100 fL Final     MCH 09/07/2021 29.6  26.5 - 33.0 pg Final     MCHC 09/07/2021 32.0  31.5 - 36.5 g/dL Final     RDW 09/07/2021 14.5  10.0 - 15.0 % Final     Platelet Count 09/07/2021 256  150 - 450 10e3/uL Final     % Neutrophils 09/07/2021 66  % Final     % Lymphocytes 09/07/2021 21  % Final     % Monocytes 09/07/2021 11  % Final     % Eosinophils 09/07/2021 2  % Final     % Basophils 09/07/2021 0  % Final     % Immature Granulocytes 09/07/2021 0  % Final     NRBCs per 100 WBC 09/07/2021 0  <1 /100 Final     Absolute Neutrophils 09/07/2021 5.7  1.6 - 8.3 10e3/uL Final     Absolute Lymphocytes 09/07/2021 1.8  0.8 - 5.3 10e3/uL Final     Absolute Monocytes 09/07/2021 0.9  0.0 - 1.3 10e3/uL Final     Absolute Eosinophils 09/07/2021 0.2  0.0 - 0.7 10e3/uL Final     Absolute Basophils 09/07/2021 0.0  0.0 - 0.2 10e3/uL Final     Absolute Immature Granulocytes 09/07/2021 0.0  <=0.0 10e3/uL Final     Absolute NRBCs 09/07/2021 0.0  10e3/uL Final        ASSESSMENT/PLAN:  Stage III, moderately differentiated adenocarcinoma of the sigmoid colon with 2 out of 11 lymph nodes positive for metastatic disease consistent with pathologic T2 N1b M0 colon cancer.  PET scan was negative for metastatic disease. The patient was started on adjuvant FOLFOX chemotherapy and completed 12 cycles. PET scan did not reveal any evidence of metastatic disease.The patient had a colonoscopy in June 2021, which revealed multiple tubular adenomas with a 3 year follow up recommended. CT of the chest, abdomen and pelvis done on 4/19/21  was negative for metastatic disease. Labs from 9/7/21 show a normal tumor marker. CBC is normal. All other labs are stable. We will see the patient in 6 months with CBC, CMP, LDH, CEA and iron studies and CT chest, abdomen and pelvis     Twenty eight minutes spent with this encounter with time spent reviewing patient records, counseling patient regarding disease process, interpretation and review of labs with patient, obtaining a review of systems, performing a physical exam,  discussing plan for follow up, documenting in EHR and coordination of care

## 2021-11-30 ENCOUNTER — IMMUNIZATION (OUTPATIENT)
Dept: FAMILY MEDICINE | Facility: OTHER | Age: 76
End: 2021-11-30
Attending: FAMILY MEDICINE
Payer: MEDICARE

## 2021-11-30 PROCEDURE — 90662 IIV NO PRSV INCREASED AG IM: CPT

## 2021-11-30 PROCEDURE — 91300 PR COVID VAC PFIZER DIL RECON 30 MCG/0.3 ML IM: CPT

## 2021-11-30 PROCEDURE — G0008 ADMIN INFLUENZA VIRUS VAC: HCPCS

## 2022-01-06 ENCOUNTER — LAB (OUTPATIENT)
Dept: LAB | Facility: OTHER | Age: 77
End: 2022-01-06
Attending: INTERNAL MEDICINE
Payer: MEDICARE

## 2022-01-06 DIAGNOSIS — E11.65 UNCONTROLLED TYPE 2 DIABETES MELLITUS WITH HYPERGLYCEMIA (H): ICD-10-CM

## 2022-01-06 DIAGNOSIS — E53.8 VITAMIN B12 DEFICIENCY: ICD-10-CM

## 2022-01-06 DIAGNOSIS — E78.2 MIXED HYPERLIPIDEMIA: ICD-10-CM

## 2022-01-06 LAB
ALBUMIN UR-MCNC: NEGATIVE MG/DL
APPEARANCE UR: CLEAR
BILIRUB UR QL STRIP: NEGATIVE
CHOLEST SERPL-MCNC: 192 MG/DL
COLOR UR AUTO: ABNORMAL
CREAT UR-MCNC: 78 MG/DL
FASTING STATUS PATIENT QL REPORTED: YES
GLUCOSE UR STRIP-MCNC: >1000 MG/DL
HBA1C MFR BLD: 9.5 % (ref 4–6.2)
HDLC SERPL-MCNC: 34 MG/DL (ref 23–92)
HGB UR QL STRIP: NEGATIVE
KETONES UR STRIP-MCNC: NEGATIVE MG/DL
LDLC SERPL CALC-MCNC: 96 MG/DL
LEUKOCYTE ESTERASE UR QL STRIP: NEGATIVE
MICROALBUMIN UR-MCNC: 10 MG/L
MICROALBUMIN/CREAT UR: 12.82 MG/G CR (ref 0–17)
NITRATE UR QL: NEGATIVE
NONHDLC SERPL-MCNC: 158 MG/DL
PH UR STRIP: 5 [PH] (ref 5–9)
SP GR UR STRIP: 1.02 (ref 1–1.03)
T4 FREE SERPL-MCNC: 0.77 NG/DL (ref 0.6–1.6)
TRIGL SERPL-MCNC: 309 MG/DL
TSH SERPL DL<=0.005 MIU/L-ACNC: 5.71 MU/L (ref 0.4–4)
UROBILINOGEN UR STRIP-MCNC: NORMAL MG/DL

## 2022-01-06 PROCEDURE — 36415 COLL VENOUS BLD VENIPUNCTURE: CPT | Mod: ZL

## 2022-01-06 PROCEDURE — 81003 URINALYSIS AUTO W/O SCOPE: CPT | Mod: ZL

## 2022-01-06 PROCEDURE — 84443 ASSAY THYROID STIM HORMONE: CPT | Mod: ZL

## 2022-01-06 PROCEDURE — 82607 VITAMIN B-12: CPT | Mod: ZL

## 2022-01-06 PROCEDURE — 84439 ASSAY OF FREE THYROXINE: CPT | Mod: ZL

## 2022-01-06 PROCEDURE — 82043 UR ALBUMIN QUANTITATIVE: CPT | Mod: ZL

## 2022-01-06 PROCEDURE — 80061 LIPID PANEL: CPT | Mod: ZL

## 2022-01-06 PROCEDURE — 83036 HEMOGLOBIN GLYCOSYLATED A1C: CPT | Mod: ZL

## 2022-01-07 ENCOUNTER — OFFICE VISIT (OUTPATIENT)
Dept: INTERNAL MEDICINE | Facility: OTHER | Age: 77
End: 2022-01-07
Attending: INTERNAL MEDICINE
Payer: COMMERCIAL

## 2022-01-07 VITALS
HEART RATE: 92 BPM | DIASTOLIC BLOOD PRESSURE: 66 MMHG | TEMPERATURE: 97.6 F | RESPIRATION RATE: 16 BRPM | OXYGEN SATURATION: 97 % | WEIGHT: 224 LBS | HEIGHT: 68 IN | BODY MASS INDEX: 33.95 KG/M2 | SYSTOLIC BLOOD PRESSURE: 104 MMHG

## 2022-01-07 DIAGNOSIS — Z71.85 VACCINE COUNSELING: ICD-10-CM

## 2022-01-07 DIAGNOSIS — E66.01 MORBID OBESITY (H): ICD-10-CM

## 2022-01-07 DIAGNOSIS — E11.65 UNCONTROLLED TYPE 2 DIABETES MELLITUS WITH HYPERGLYCEMIA (H): Primary | ICD-10-CM

## 2022-01-07 DIAGNOSIS — E03.4 HYPOTHYROIDISM DUE TO ACQUIRED ATROPHY OF THYROID: ICD-10-CM

## 2022-01-07 DIAGNOSIS — E53.8 VITAMIN B12 DEFICIENCY: ICD-10-CM

## 2022-01-07 DIAGNOSIS — Z87.39 HISTORY OF GOUT: ICD-10-CM

## 2022-01-07 DIAGNOSIS — N18.31 CHRONIC KIDNEY DISEASE, STAGE 3A (H): ICD-10-CM

## 2022-01-07 DIAGNOSIS — K21.00 GASTROESOPHAGEAL REFLUX DISEASE WITH ESOPHAGITIS, UNSPECIFIED WHETHER HEMORRHAGE: ICD-10-CM

## 2022-01-07 DIAGNOSIS — C77.9 SECONDARY AND UNSPECIFIED MALIGNANT NEOPLASM OF LYMPH NODE, UNSPECIFIED (H): ICD-10-CM

## 2022-01-07 DIAGNOSIS — Z00.00 ENCOUNTER FOR MEDICARE ANNUAL WELLNESS EXAM: ICD-10-CM

## 2022-01-07 DIAGNOSIS — T45.1X5A NEUROPATHY DUE TO CHEMOTHERAPEUTIC DRUG (H): ICD-10-CM

## 2022-01-07 DIAGNOSIS — G62.0 NEUROPATHY DUE TO CHEMOTHERAPEUTIC DRUG (H): ICD-10-CM

## 2022-01-07 DIAGNOSIS — I25.10 CORONARY ARTERY DISEASE INVOLVING NATIVE CORONARY ARTERY OF NATIVE HEART WITHOUT ANGINA PECTORIS: ICD-10-CM

## 2022-01-07 LAB — VIT B12 SERPL-MCNC: 281 PG/ML (ref 180–914)

## 2022-01-07 PROCEDURE — 99214 OFFICE O/P EST MOD 30 MIN: CPT | Mod: 25 | Performed by: INTERNAL MEDICINE

## 2022-01-07 PROCEDURE — G0439 PPPS, SUBSEQ VISIT: HCPCS | Performed by: INTERNAL MEDICINE

## 2022-01-07 PROCEDURE — G0463 HOSPITAL OUTPT CLINIC VISIT: HCPCS

## 2022-01-07 RX ORDER — LANOLIN ALCOHOL/MO/W.PET/CERES
1000 CREAM (GRAM) TOPICAL DAILY
COMMUNITY
End: 2022-10-28

## 2022-01-07 RX ORDER — CYANOCOBALAMIN (VITAMIN B-12) 2500 MCG
2500 TABLET, SUBLINGUAL SUBLINGUAL DAILY
Qty: 90 TABLET | Refills: 3 | Status: SHIPPED | OUTPATIENT
Start: 2022-01-07 | End: 2022-04-19

## 2022-01-07 RX ORDER — ALLOPURINOL 100 MG/1
100 TABLET ORAL 2 TIMES DAILY
Qty: 180 TABLET | Refills: 1 | Status: SHIPPED | OUTPATIENT
Start: 2022-01-07 | End: 2022-04-19

## 2022-01-07 RX ORDER — METOPROLOL TARTRATE 50 MG
50 TABLET ORAL 2 TIMES DAILY
Qty: 180 TABLET | Refills: 1 | Status: SHIPPED | OUTPATIENT
Start: 2022-01-07 | End: 2022-04-19

## 2022-01-07 RX ORDER — GLIPIZIDE 10 MG/1
TABLET ORAL
Qty: 360 TABLET | Refills: 1 | Status: SHIPPED | OUTPATIENT
Start: 2022-01-07 | End: 2022-04-19

## 2022-01-07 RX ORDER — LISINOPRIL 2.5 MG/1
2.5 TABLET ORAL DAILY
Qty: 90 TABLET | Refills: 1 | Status: SHIPPED | OUTPATIENT
Start: 2022-01-07 | End: 2022-04-19

## 2022-01-07 RX ORDER — CLOPIDOGREL BISULFATE 75 MG/1
75 TABLET ORAL DAILY
Qty: 90 TABLET | Refills: 1 | Status: SHIPPED | OUTPATIENT
Start: 2022-01-07 | End: 2022-04-19

## 2022-01-07 RX ORDER — PANTOPRAZOLE SODIUM 40 MG/1
40 TABLET, DELAYED RELEASE ORAL DAILY
Qty: 90 TABLET | Refills: 1 | Status: SHIPPED | OUTPATIENT
Start: 2022-01-07 | End: 2022-04-19

## 2022-01-07 ASSESSMENT — ENCOUNTER SYMPTOMS
NUMBNESS: 1
WOUND: 0
ABDOMINAL PAIN: 0
SHORTNESS OF BREATH: 0
HEMATURIA: 0
DIARRHEA: 1
FEVER: 0
CHILLS: 0
CONFUSION: 0
ADENOPATHY: 0
BRUISES/BLEEDS EASILY: 0
BACK PAIN: 0
CHEST TIGHTNESS: 0

## 2022-01-07 ASSESSMENT — MIFFLIN-ST. JEOR: SCORE: 1712.62

## 2022-01-07 ASSESSMENT — PAIN SCALES - GENERAL: PAINLEVEL: NO PAIN (0)

## 2022-01-07 NOTE — PROGRESS NOTES
"  SUBJECTIVE:   Andrez Olivier is a 76 year old male who presents for Preventive Visit.    Patient has been advised of split billing requirements and indicates understanding: Yes  Are you in the first 12 months of your Medicare Part B coverage?  No    Physical Health:    In general, how would you rate your overall physical health? good    Outside of work, how many days during the week do you exercise? none    Outside of work, approximately how many minutes a day do you exercise?not applicable    If you drink alcohol do you typically have >3 drinks per day or >7 drinks per week? No    Do you usually eat at least 4 servings of fruit and vegetables a day, include whole grains & fiber and avoid regularly eating high fat or \"junk\" foods? NO    Do you have any problems taking medications regularly?  No    Do you have any side effects from medications? light headedness    Needs assistance for the following daily activities: no assistance needed    Which of the following safety concerns are present in your home?  none identified     Hearing impairment: Yes, Need to ask people to speak up or repeat themselves.    In the past 6 months, have you been bothered by leaking of urine? no    Mental Health:    In general, how would you rate your overall mental or emotional health? good  PHQ-2 Score: 0    Do you feel safe in your environment? Yes    Have you ever done Advance Care Planning? (For example, a Health Directive, POLST, or a discussion with a medical provider or your loved ones about your wishes): No, advance care planning information given to patient to review.  Patient declined advance care planning discussion at this time.    Additional concerns to address?  No    Fall risk:  Fallen 2 or more times in the past year?: No  Any fall with injury in the past year?: No    Cognitive Screenin) Repeat 3 items (Leader, Season, Table)    2) Clock draw: NORMAL  3) 3 item recall: Recalls 1 object   Results: NORMAL clock, 1-2 " items recalled: COGNITIVE IMPAIRMENT LESS LIKELY    Mini-CogTM Copyright DIANE Green. Licensed by the author for use in [facility]; reprinted with permission ([email or web address]@[email or web address]). All rights reserved.      Do you have sleep apnea, excessive snoring or daytime drowsiness?: yes daytime drowsiness    Reviewed and updated as needed this visit by clinical staff  Tobacco  Allergies  Meds  Problems  Med Hx  Surg Hx  Fam Hx  Soc Hx         Reviewed and updated as needed this visit by Provider  Tobacco  Allergies  Meds  Problems  Med Hx  Surg Hx  Fam Hx        Social History     Tobacco Use     Smoking status: Former Smoker     Packs/day: 1.00     Years: 30.00     Pack years: 30.00     Types: Cigarettes     Quit date: 1985     Years since quittin.0     Smokeless tobacco: Never Used   Substance Use Topics     Alcohol use: No     Alcohol/week: 0.0 standard drinks                           Current providers sharing in care for this patient include:   Patient Care Team:  Bk Roberts MD as PCP - General (Internal Medicine)  Bk Roberts MD as Assigned PCP  Radha Adamson APRN CNP as Assigned Cancer Care Provider    The following health maintenance items are reviewed in Epic and correct as of today:  Health Maintenance   Topic Date Due     DIABETIC FOOT EXAM  2021     EYE EXAM  2022     COVID-19 Vaccine (4 - Booster for Pfizer series) 2022     A1C  2022     BMP  2022     HEMOGLOBIN  2022     LIPID  2023     MICROALBUMIN  2023     MEDICARE ANNUAL WELLNESS VISIT  2023     FALL RISK ASSESSMENT  2023     COLORECTAL CANCER SCREENING  2024     ADVANCE CARE PLANNING  2027     DTAP/TDAP/TD IMMUNIZATION (2 - Td or Tdap) 2030     PHQ-2  Completed     INFLUENZA VACCINE  Completed     URINALYSIS  Completed     ZOSTER IMMUNIZATION  Completed     HEPATITIS C SCREENING  Addressed     IPV IMMUNIZATION  Aged Out     MENINGITIS  "IMMUNIZATION  Aged Out     Pneumococcal Vaccine: 65+ Years  Discontinued      Review of Systems   Constitutional: Negative for chills and fever.   HENT: Negative for congestion.    Eyes: Negative for visual disturbance.   Respiratory: Negative for chest tightness and shortness of breath.    Cardiovascular: Negative for chest pain.   Gastrointestinal: Positive for diarrhea (intermittently). Negative for abdominal pain.   Genitourinary: Negative for hematuria.   Musculoskeletal: Negative for back pain.   Skin: Negative for rash and wound.   Allergic/Immunologic: Negative for immunocompromised state.   Neurological: Positive for numbness (hands and feet from Agent Orange exposure + Hx of chemotherapy ). Negative for syncope.   Hematological: Negative for adenopathy. Does not bruise/bleed easily.   Psychiatric/Behavioral: Negative for confusion.        OBJECTIVE:   /66   Pulse 92   Temp 97.6  F (36.4  C) (Temporal)   Resp 16   Ht 1.715 m (5' 7.5\")   Wt 101.6 kg (224 lb)   SpO2 97%   BMI 34.57 kg/m   Estimated body mass index is 34.57 kg/m  as calculated from the following:    Height as of this encounter: 1.715 m (5' 7.5\").    Weight as of this encounter: 101.6 kg (224 lb).    Physical Exam  Constitutional:       General: He is not in acute distress.     Appearance: He is well-developed. He is obese. He is not diaphoretic.   HENT:      Head: Normocephalic and atraumatic.   Eyes:      General: No scleral icterus.     Conjunctiva/sclera: Conjunctivae normal.   Neck:      Vascular: No carotid bruit.   Cardiovascular:      Rate and Rhythm: Normal rate and regular rhythm.      Pulses: Normal pulses.   Pulmonary:      Effort: Pulmonary effort is normal.      Breath sounds: Normal breath sounds.   Abdominal:      Palpations: Abdomen is soft.      Tenderness: There is no abdominal tenderness.   Musculoskeletal:         General: No deformity.      Cervical back: Neck supple.      Right lower leg: No edema.      Left " lower leg: No edema.   Lymphadenopathy:      Cervical: No cervical adenopathy.   Skin:     General: Skin is warm and dry.      Findings: No rash.   Neurological:      Mental Status: He is alert and oriented to person, place, and time. Mental status is at baseline.   Psychiatric:         Mood and Affect: Mood normal.         Behavior: Behavior normal.       Diagnostic Test Results:  Labs reviewed in [facility]  Recent Labs   Lab Test 01/06/22  0707 09/07/21  0817 08/10/21  0835 05/03/21  0832 04/19/21  0739 01/22/21  0812   A1C 9.5*  --  8.5* 9.6*  --  10.9*   LDL 96  --  84 78  --   --    HDL 34  --  33 28  --   --    TRIG 309*  --  304* 332*  --   --    ALT  --  12  --   --  21 32   CR  --  1.26  --   --  1.27 1.20   GFRESTIMATED  --  55*  --   --  55* 59*   GFRESTBLACK  --   --   --   --  67 71   POTASSIUM  --  4.7  --   --  4.4 4.7   TSH 5.71*  --  9.38*  --   --   --    Hemoglobin A1c is high.  LDL is not at goal.  HDL is at baseline.  Glycerides are high.  TSH is high.  Increase Synthroid.  Previous creatinine showed CKD stage IIIa.    ASSESSMENT / PLAN:       ICD-10-CM    1. Uncontrolled type 2 diabetes mellitus with hyperglycemia (H)  E11.65 empagliflozin (JARDIANCE) 10 MG TABS tablet     glipiZIDE (GLUCOTROL) 10 MG tablet   2. Vitamin B12 deficiency  E53.8 Vitamin B12     vitamin B-12 (CYANOCOBALAMIN) 2500 MCG sublingual tablet   3. Chronic kidney disease, stage 3a (H)  N18.31    4. Encounter for Medicare annual wellness exam  Z00.00    5. History of gout  Z87.39 allopurinol (ZYLOPRIM) 100 MG tablet   6. Coronary artery disease involving native coronary artery of native heart without angina pectoris  I25.10 clopidogrel (PLAVIX) 75 MG tablet     lisinopril (ZESTRIL) 2.5 MG tablet     metoprolol tartrate (LOPRESSOR) 50 MG tablet   7. Gastroesophageal reflux disease with esophagitis, unspecified whether hemorrhage  K21.00 pantoprazole (PROTONIX) 40 MG EC tablet   8. Secondary and unspecified malignant neoplasm of  lymph node, unspecified (H)  C77.9    9. 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 Morbid obesity (H)  E66.01    11. Hypothyroidism due to acquired atrophy of thyroid  E03.4 levothyroxine (SYNTHROID/LEVOTHROID) 75 MCG tablet   12. Vaccine counseling  Z71.85    Patient presents for annual Medicare wellness visit as well as follow-up multiple issues.    Uncontrolled type 2 diabetes with hyperglycemia.  Believes that he is taking all his medications as prescribed.  Needs refills of glipizide, Jardiance.  Also taking semaglutide, metformin.  He is not interested in insulin therapy unless absolutely necessary.  He is lab work comes back today higher than previous levels.  Hemoglobin A1c is high and not at goal.  Encouraged to reduce  oral carbohydrate intake.  Regular exercise, weight loss.    Vitamin B12 deficiency.  Check levels.  Start B12 sublingual tablet.    Chronic kidney disease stage IIIa.  Previous lab work was noted.  Encouraged NSAID avoidance.    History of gout.  No recent gout attacks.  Doing well with allopurinol.  Needs refills.    Coronary artery disease, has history of stent placement in the past.  Currently taking Plavix, metoprolol, lisinopril.  Doing well with current dosing.  Needs refills.    Heartburn and reflux.  Ongoing problem.  Needs to take Protonix regularly.  Has recurrent symptoms if he forgets a dose.  Refills.    Unspecified lymphoma.  Has history of sigmoid colon cancer.  Still undergoing treatment.  Has neuropathy due to chemotherapy.  Following with oncology.    Obesity, discussed need for reduced oral caloric intake.  Regular exercise.  Reduce carbohydrate intake.  Information printed and reviewed.    Hypothyroidism.  Taking oral replacement.  TSH is elevated.  Increase Synthroid dosing.  Previously 50 mcg daily we will increase to 75 mcg daily.    Vitamin B12 deficiency.  Ongoing problem.  B12 levels are very low, less than 400.  Start sublingual B12  "tablets 2500 mcg daily.    Vaccine counseling completed.  Consider pneumococcal vaccination.    Patient has been advised of split billing requirements and indicates understanding: Yes    COUNSELING:  Reviewed preventive health counseling, as reflected in patient instructions  Special attention given to:       Consider AAA screening for ages 65-75 and smoking history       Regular exercise       Healthy diet/nutrition       Vision screening       Hearing screening       Dental care       Bladder control       Fall risk prevention       Immunizations       PLAVIX prophylaxis     Estimated body mass index is 34.57 kg/m  as calculated from the following:    Height as of this encounter: 1.715 m (5' 7.5\").    Weight as of this encounter: 101.6 kg (224 lb).    Weight management plan: Discussed healthy diet and exercise guidelines    He reports that he quit smoking about 37 years ago. His smoking use included cigarettes. He has a 30.00 pack-year smoking history. He has never used smokeless tobacco.    Appropriate preventive services were discussed with this patient, including applicable screening as appropriate for cardiovascular disease, diabetes, osteopenia/osteoporosis, and glaucoma.  As appropriate for age/gender, discussed screening for colorectal cancer, prostate cancer, breast cancer, and cervical cancer. Checklist reviewing preventive services available has been given to the patient.    Reviewed patients plan of care and provided an AVS. The Complex Care Plan (for patients with higher acuity and needing more deliberate coordination of services) for Andrez meets the Care Plan requirement. This Care Plan has been established and reviewed with the Patient.    Counseling Resources:  ATP IV Guidelines  Pooled Cohorts Equation Calculator  Breast Cancer Risk Calculator  BRCA-Related Cancer Risk Assessment: FHS-7 Tool  FRAX Risk Assessment  ICSI Preventive Guidelines  Dietary Guidelines for Americans, 2010  USDA's " MyPlate  ASA Prophylaxis  Lung CA Screening    Bk Roberts MD  [facility]    Answers for HPI/ROS submitted by the patient on 1/7/2022  How many servings of fruits and vegetables do you eat daily?: 0-1  On average, how many sweetened beverages do you drink each day (Examples: soda, juice, sweet tea, etc.  Do NOT count diet or artificially sweetened beverages)?: 1  How many minutes a day do you exercise enough to make your heart beat faster?: 9 or less  How many days a week do you exercise enough to make your heart beat faster?: 3 or less  How many days per week do you miss taking your medication?: 0

## 2022-01-07 NOTE — NURSING NOTE
"Chief Complaint   Patient presents with     Medicare Visit     annual     Lab Results   Component Value Date    A1C 9.5 01/06/2022    A1C 8.5 08/10/2021    A1C 9.6 05/03/2021    A1C 10.9 01/22/2021    A1C 10.5 04/09/2019    ALBUMIN 4.1 09/07/2021    ALBUMIN 3.9 04/19/2021     Previous A1C is not at goal of <8  Date of last Foot exam 11/06/2020  Eye exam Yes- Date of last eye exam: within the past year,  [facility]  Patient is not a Tobacco User  Patient is not on a daily aspirin  Patient is on a Statin.  Blood pressure today of:     BP Readings from Last 1 Encounters:   01/07/22 104/66      is at the goal of <139/89 for diabetics.    Anaya Shepherd LPN on 1/7/2022 at 11:16 AM        Initial /66   Pulse 92   Temp 97.6  F (36.4  C) (Temporal)   Resp 16   Ht 1.715 m (5' 7.5\")   Wt 101.6 kg (224 lb)   SpO2 97%   BMI 34.57 kg/m   Estimated body mass index is 34.57 kg/m  as calculated from the following:    Height as of this encounter: 1.715 m (5' 7.5\").    Weight as of this encounter: 101.6 kg (224 lb).  Medication Reconciliation: complete    FOOD SECURITY SCREENING QUESTIONS  Hunger Vital Signs:  Within the past 12 months we worried whether our food would run out before we got money to buy more. Never  Within the past 12 months the food we bought just didn't last and we didn't have money to get more. Never    Anaya Shepherd LPN  "

## 2022-01-07 NOTE — PATIENT INSTRUCTIONS
Patient Education   Personalized Prevention Plan  You are due for the preventive services outlined below.  Your care team is available to assist you in scheduling these services.  If you have already completed any of these items, please share that information with your care team to update in your medical record.  Health Maintenance Due   Topic Date Due     Pneumococcal Vaccine (3 of 3 - PCV13) 01/29/2014     Diabetic Foot Exam  11/06/2021         Blood pressure is well controlled.   Diabetes levels are high.     To help with weight loss and improve blood sugar control....    -- Try to avoid Carbohydrates as much as possible -- breads, pasta, baked goods, cakes, oatmeal, cold cereal, potatoes.   -- Eat more lean meats, proteins, eggs, nuts, vegetables.    -- Start or Continue regular daily exercise.     Get out and exercise, bike ride, walk for 10 to 15 minutes after each meal -- this can significantly lowers the spike in blood sugar after eating.     Medications refilled.   Labs are otherwise stable.       Lab on 01/06/2022   Component Date Value Ref Range Status     Cholesterol 01/06/2022 192  <200 mg/dL Final     Triglycerides 01/06/2022 309* <150 mg/dL Final     Direct Measure HDL 01/06/2022 34  23 - 92 mg/dL Final     LDL Cholesterol Calculated 01/06/2022 96  <=100 mg/dL Final     Non HDL Cholesterol 01/06/2022 158* <130 mg/dL Final     Patient Fasting > 8hrs? 01/06/2022 Yes   Final     Hemoglobin A1C 01/06/2022 9.5* 4.0 - 6.2 % Final     TSH 01/06/2022 5.71* 0.40 - 4.00 mU/L Final     Color Urine 01/06/2022 Light Yellow  Colorless, Straw, Light Yellow, Yellow Final     Appearance Urine 01/06/2022 Clear  Clear Final     Glucose Urine 01/06/2022 >1000* Negative mg/dL Final     Bilirubin Urine 01/06/2022 Negative  Negative Final     Ketones Urine 01/06/2022 Negative  Negative mg/dL Final     Specific Gravity Urine 01/06/2022 1.025  1.000 - 1.030 Final     Blood Urine 01/06/2022 Negative  Negative Final     pH  Urine 01/06/2022 5.0  5.0 - 9.0 Final     Protein Albumin Urine 01/06/2022 Negative  Negative mg/dL Final     Urobilinogen Urine 01/06/2022 Normal  Normal, 2.0 mg/dL Final     Nitrite Urine 01/06/2022 Negative  Negative Final     Leukocyte Esterase Urine 01/06/2022 Negative  Negative Final     Creatinine Urine mg/dL 01/06/2022 78  mg/dL Final     Albumin Urine mg/L 01/06/2022 10  mg/L Final     Albumin Urine mg/g Cr 01/06/2022 12.82  0.00 - 17.00 mg/g Cr Final     Free T4 01/06/2022 0.77  0.60 - 1.60 ng/dL Final        Aspects of Diabetes:   Recent Labs   Lab Test 01/06/22  0707 09/07/21  0817 08/10/21  0835 05/03/21  0832 04/19/21  0739 01/22/21  0812   A1C 9.5*  --  8.5* 9.6*  --  10.9*   LDL 96  --  84 78  --   --    HDL 34  --  33 28  --   --    TRIG 309*  --  304* 332*  --   --    ALT  --  12  --   --  21 32   CR  --  1.26  --   --  1.27 1.20   GFRESTIMATED  --  55*  --   --  55* 59*   GFRESTBLACK  --   --   --   --  67 71   POTASSIUM  --  4.7  --   --  4.4 4.7   TSH 5.71*  --  9.38*  --   --   --       Hemoglobin A1c  Goal range is under 8%. Best is 6.5 to 7   Blood Pressure 104/66 Goal to keep less than 140/90   Tobacco  reports that he quit smoking about 37 years ago. His smoking use included cigarettes. He has a 30.00 pack-year smoking history. He has never used smokeless tobacco. Goal to abstain from tobacco   Aspirin or Plavix Anti-platelet therapy Aspirin or Plavix reduces risk of heart disease and stroke  -- sometimes used with other blood thinners, depending on bleeding risk and risk factors.    ACE/ARB Specific blood pressure meds These medications can reduce risk of kidney disease   Cholesterol Statins (Lipitor, Crestor, vs others) Statins reduce risk of heart disease and stroke   Eye Exam -- Do Yearly -- Annual diabetic eye exam   Healthy weight Wt Readings from Last 3 Encounters:   01/07/22 101.6 kg (224 lb)   09/13/21 101.6 kg (224 lb)   08/12/21 102.2 kg (225 lb 6.4 oz)     Body mass index is  34.57 kg/m .  Goal BMI under 30, best is under 25.      -- Trying to exercise daily (goal at least 20 min/day) with moderate aerobic activity   -- Eat healthy (resources from ADA at http://www.diabetes.org/)   -- Taking good care of my feet. Consider seeing the Podiatrist   -- Check blood sugars as directed, record in log book and bring to every appointment    Insurance companies are grading you and I on your blood sugar control -- Goal is to get your A1c down to 7.9% or lower and NO Smoking!  -- Medicare and most insurance companies, will only cover Hemoglobin A1c labs to be rechecked every 91+ days.      Return for Diabetes labs and clinic follow-up appointment every 3 to 4 months.    Schedule lab only appointment --- A few days AFTER: 4/7/22   Schedule clinic appointment with Dr. Roberts -- Same day as labs, or 1-2 days later.

## 2022-01-09 RX ORDER — LEVOTHYROXINE SODIUM 75 UG/1
75 TABLET ORAL DAILY
Qty: 90 TABLET | Refills: 1 | Status: SHIPPED | OUTPATIENT
Start: 2022-01-09 | End: 2022-04-19

## 2022-03-07 ENCOUNTER — HOSPITAL ENCOUNTER (OUTPATIENT)
Dept: CT IMAGING | Facility: OTHER | Age: 77
End: 2022-03-07
Attending: NURSE PRACTITIONER
Payer: MEDICARE

## 2022-03-07 ENCOUNTER — LAB (OUTPATIENT)
Dept: LAB | Facility: OTHER | Age: 77
End: 2022-03-07
Attending: INTERNAL MEDICINE
Payer: MEDICARE

## 2022-03-07 DIAGNOSIS — C18.7 CANCER OF SIGMOID COLON (H): ICD-10-CM

## 2022-03-07 LAB
ALBUMIN SERPL-MCNC: 4.2 G/DL (ref 3.5–5.7)
ALP SERPL-CCNC: 85 U/L (ref 34–104)
ALT SERPL W P-5'-P-CCNC: 11 U/L (ref 7–52)
ANION GAP SERPL CALCULATED.3IONS-SCNC: 9 MMOL/L (ref 3–14)
AST SERPL W P-5'-P-CCNC: 13 U/L (ref 13–39)
BASOPHILS # BLD AUTO: 0 10E3/UL (ref 0–0.2)
BASOPHILS NFR BLD AUTO: 0 %
BILIRUB SERPL-MCNC: 0.6 MG/DL (ref 0.3–1)
BUN SERPL-MCNC: 20 MG/DL (ref 7–25)
CALCIUM SERPL-MCNC: 9.3 MG/DL (ref 8.6–10.3)
CEA SERPL-MCNC: <3 NG/ML (ref 0–3)
CHLORIDE BLD-SCNC: 103 MMOL/L (ref 98–107)
CO2 SERPL-SCNC: 27 MMOL/L (ref 21–31)
CREAT SERPL-MCNC: 1.19 MG/DL (ref 0.7–1.3)
EOSINOPHIL # BLD AUTO: 0.1 10E3/UL (ref 0–0.7)
EOSINOPHIL NFR BLD AUTO: 2 %
ERYTHROCYTE [DISTWIDTH] IN BLOOD BY AUTOMATED COUNT: 14.6 % (ref 10–15)
GFR SERPL CREATININE-BSD FRML MDRD: 63 ML/MIN/1.73M2
GLUCOSE BLD-MCNC: 167 MG/DL (ref 70–105)
HCT VFR BLD AUTO: 44.7 % (ref 40–53)
HGB BLD-MCNC: 14.4 G/DL (ref 13.3–17.7)
IMM GRANULOCYTES # BLD: 0 10E3/UL
IMM GRANULOCYTES NFR BLD: 0 %
LDH SERPL L TO P-CCNC: 143 U/L (ref 140–271)
LYMPHOCYTES # BLD AUTO: 2 10E3/UL (ref 0.8–5.3)
LYMPHOCYTES NFR BLD AUTO: 23 %
MCH RBC QN AUTO: 29.8 PG (ref 26.5–33)
MCHC RBC AUTO-ENTMCNC: 32.2 G/DL (ref 31.5–36.5)
MCV RBC AUTO: 93 FL (ref 78–100)
MONOCYTES # BLD AUTO: 0.9 10E3/UL (ref 0–1.3)
MONOCYTES NFR BLD AUTO: 10 %
NEUTROPHILS # BLD AUTO: 5.6 10E3/UL (ref 1.6–8.3)
NEUTROPHILS NFR BLD AUTO: 65 %
NRBC # BLD AUTO: 0 10E3/UL
NRBC BLD AUTO-RTO: 0 /100
PLATELET # BLD AUTO: 233 10E3/UL (ref 150–450)
POTASSIUM BLD-SCNC: 4.6 MMOL/L (ref 3.5–5.1)
PROT SERPL-MCNC: 6.8 G/DL (ref 6.4–8.9)
RBC # BLD AUTO: 4.83 10E6/UL (ref 4.4–5.9)
SODIUM SERPL-SCNC: 139 MMOL/L (ref 134–144)
WBC # BLD AUTO: 8.6 10E3/UL (ref 4–11)

## 2022-03-07 PROCEDURE — 250N000011 HC RX IP 250 OP 636: Performed by: NURSE PRACTITIONER

## 2022-03-07 PROCEDURE — 80053 COMPREHEN METABOLIC PANEL: CPT | Mod: ZL

## 2022-03-07 PROCEDURE — 85025 COMPLETE CBC W/AUTO DIFF WBC: CPT | Mod: ZL

## 2022-03-07 PROCEDURE — 71260 CT THORAX DX C+: CPT

## 2022-03-07 PROCEDURE — 36415 COLL VENOUS BLD VENIPUNCTURE: CPT | Mod: ZL

## 2022-03-07 PROCEDURE — 83615 LACTATE (LD) (LDH) ENZYME: CPT | Mod: ZL

## 2022-03-07 PROCEDURE — 82378 CARCINOEMBRYONIC ANTIGEN: CPT | Mod: ZL

## 2022-03-07 PROCEDURE — 74177 CT ABD & PELVIS W/CONTRAST: CPT

## 2022-03-07 RX ORDER — HEPARIN SODIUM (PORCINE) LOCK FLUSH IV SOLN 100 UNIT/ML 100 UNIT/ML
500 SOLUTION INTRAVENOUS
Status: COMPLETED | OUTPATIENT
Start: 2022-03-07 | End: 2022-03-07

## 2022-03-07 RX ORDER — IOPAMIDOL 755 MG/ML
130 INJECTION, SOLUTION INTRAVASCULAR ONCE
Status: COMPLETED | OUTPATIENT
Start: 2022-03-07 | End: 2022-03-07

## 2022-03-07 RX ADMIN — IOPAMIDOL 130 ML: 755 INJECTION, SOLUTION INTRAVENOUS at 10:00

## 2022-03-07 RX ADMIN — HEPARIN 500 UNITS: 100 SYRINGE at 10:04

## 2022-03-07 NOTE — PROGRESS NOTES
1.  Has the patient had a previous reaction to IV contrast?  no    2.  Does the patient have kidney disease? Yes - CKD3 - GFR 63    3.  Is the patient on dialysis? no    If YES to any of these questions, exam will be reviewed with a Radiologist before administering contrast.    IV Contrast- Discharge Instructions After Your CT Scan      The IV contrast you received today will be filtered from your bloodstream by your kidneys during the next 24 hours and pass from the body in urine.  You will not be aware of this process and your urine will not change in color.  To help this process you should drink at least 4 additional glasses of water or juice today.  This reduces stress on your kidneys.    Most contrast reactions are immediate.  Should you develop symptoms of concern after discharge, contact the department at the number below.  After hours you should contact your personal physician.  If you develop breathing distress or wheezing, call 911.

## 2022-03-16 ENCOUNTER — ONCOLOGY VISIT (OUTPATIENT)
Dept: ONCOLOGY | Facility: OTHER | Age: 77
End: 2022-03-16
Attending: INTERNAL MEDICINE
Payer: COMMERCIAL

## 2022-03-16 ENCOUNTER — TELEPHONE (OUTPATIENT)
Dept: INTERNAL MEDICINE | Facility: OTHER | Age: 77
End: 2022-03-16

## 2022-03-16 VITALS
BODY MASS INDEX: 34.57 KG/M2 | RESPIRATION RATE: 20 BRPM | TEMPERATURE: 97.2 F | WEIGHT: 224 LBS | OXYGEN SATURATION: 92 % | SYSTOLIC BLOOD PRESSURE: 76 MMHG | DIASTOLIC BLOOD PRESSURE: 52 MMHG | HEART RATE: 82 BPM

## 2022-03-16 DIAGNOSIS — C18.7 CANCER OF SIGMOID COLON (H): Primary | ICD-10-CM

## 2022-03-16 PROCEDURE — G0463 HOSPITAL OUTPT CLINIC VISIT: HCPCS

## 2022-03-16 PROCEDURE — 99215 OFFICE O/P EST HI 40 MIN: CPT | Performed by: INTERNAL MEDICINE

## 2022-03-16 ASSESSMENT — PAIN SCALES - GENERAL: PAINLEVEL: NO PAIN (0)

## 2022-03-16 NOTE — NURSING NOTE
Chief Complaint   Patient presents with     Oncology Clinic Visit     F/U colon cancer     Medication Reconciliation: complete    Amaris Ray CMA ([facility])

## 2022-03-16 NOTE — NURSING NOTE
Spoke to patient about his low BP readings today.  He has been dizzy, especially when transitioning from sitting to standing.  He refuses to be seen today unless he can go see Bk Roberts MD.  Call placed to his nurse.  Patient waited for 20 minutes after finishing with Dr Stark, but wouldn't wait longer.  He left AMA.  Advised to push fluids, change positions slowly, and come in if his symptoms worsen.  He has an appointment 4/19/22 with Bk Roberts MD.     Amaris Ray CMA ([facility])

## 2022-03-16 NOTE — PROGRESS NOTES
Visit Date: 03/16/2022    HISTORY OF PRESENT ILLNESS:  Mr. Olivier returns for followup of colon cancer.  We had seen the patient in consultation on 06/01/2016.  At that time, he was a 70-year-old white male with history of coronary artery disease, type 2 diabetes mellitus, hyperlipidemia and hypertension, who presented to the Emergency Room on 04/25/2016 with complaints of chest pain radiating down his arms.  At that time, he was found to have a hemoglobin of 7.1 and subsequently was admitted.  CBC revealed microcytic indices with MCV 62.  He was noted to be iron deficient.  He was transfused 2 units of packed red cells and ruled out for MI.  Subsequently was seen by Dr. Solano who performed colonoscopy and was noted to have a mass in the sigmoid colon consistent with adenocarcinoma.  He was noted to have multiple adenomatous polyps.  The patient subsequently was admitted on 05/11/2016 for sigmoid colectomy.  It was performed on 05/17/2016.  Pathology of the sigmoid colon revealed a mass consistent with moderately differentiated adenocarcinoma, tumor size was 2 x 1 x 0.7 cm.  Tumor invaded the muscularis propria with 2/11 lymph nodes were positive for metastatic carcinoma.  Margins were negative for tumor.  The tumor was moderately differentiated.  The patient was staged pathologic T2 N1b.  As part of the postoperative evaluation, the patient had a CT of the abdomen and pelvis on 05/12/2016.  This revealed there were two nonspecific low attenuation lesions in the liver, metastases cannot be excluded.  There was no other lymphadenopathy or additional findings suggestive of metastases.  Preop CEA level was less than 3.  When we saw the patient, we wanted to rule out metastatic disease.  PET scan performed on 06/15/2016 was essentially negative for metastatic disease.  The lesions seen on prior PET of the liver was not hypermetabolic.  We felt the patient had stage III disease and would be a candidate for adjuvant  chemotherapy.  When we saw the patient on 06/28/2016, plan was to start FOLFOX x12 cycles.  The patient was started on and received a total of 10 cycles.  On 11/17/2016, he did note some cold-induced neuropathic symptoms with cough and cold sensation when he drank cold liquids.  Otherwise, he did relatively well.  We elected to restage him after 12 cycles of FOLFOX.  His last dose of FOLFOX was on 12/07/2016.  During his last dose, he became severely ill, developed numbness in his hands and significant cold-induced neuropathy as well as a loss of sensation.  He had a PET scan, which was essentially negative for metastatic disease.  We started the patient on vitamin B6.  His neuropathy symptoms did improve.  He had a colonoscopy in 05/2017, which revealed tubular adenoma in the hepatic flexure of the colon.  The patient otherwise had a colonoscopy performed by Dr. Solano, 10/01/2020, and he was found to have six polyps, all of them revealed tubular adenomas consistent with advanced adenoma.  One was up to 30 cm.  The other was in the mid transverse colon.  This was based on polyp size being larger than 10 mm.  The patient was due for repeat colonoscopy in 06/2021.  When we saw the patient on 09/23/2020, we elected to restage the patient with CT chest, abdomen and pelvis, which was performed on 10/21/2020 and the findings were there was no change of previous CTs done in 04/2020.  Since then, he has had serial CTs that have been essentially negative except for a small hemangioma in the liver that has been stable.  He did have a colonoscopy performed by Dr. Solano on 06/01/2020, and was found to have six polyps, all of them were tubular adenomas consistent with advanced adenoma.  One was at 30 cm.  The other was in the mid transverse colon.  This was based on polyp size being larger than 10 mm.  He had a repeat colonoscopy in 06/2021, which revealed multiple tubular adenomas, and it was recommended he have a 3-year followup  colonoscopy.  Otherwise, he had recent scans again, which have essentially been stable with a similar hyperenhancing focus unchanged in the medial spleen and a 1.6 x 0.2 cm hypoenhancing lesion along the posterior capsule of the liver, which is unchanged compared to previous CT.  Therefore, no evidence of metastatic disease.  The patient's CEA remains normal.  Otherwise, he is doing relatively well.  He denies any fevers, night sweats, weight loss, bright red blood per rectum, hematemesis.  He is now at the 6-year sara of his diagnosis.  He does see Dr. Roberts.  He does complain of occasional dizzy spells.    PHYSICAL EXAMINATION:   GENERAL:  He is an elderly white male in no acute distress.  VITAL SIGNS:  Reveal blood pressure 76/52, pulse 82, respirations 20, temperature 97.2.  HEENT:  Atraumatic, normocephalic.  Oropharynx nonerythematous.  NECK:  Supple.  LUNGS:  Clear to auscultation and percussion.  HEART:  Regular rate and rhythm.  S1, S2 normal.  ABDOMEN:  Obese, soft, nontender.  LYMPHATICS:  No cervical, supraclavicular, axillary or inguinal nodes.  EXTREMITIES:  No edema.  NEUROLOGIC:  Nonfocal.    LABORATORY DATA:  Reveal CEA of less than 3.  Glucose 167.  LFTs are normal.  CBC is within normal limits.    ASSESSMENT AND PLAN:  1.  Stage III moderately differentiated adenocarcinoma of sigmoid colon with 2/11 lymph nodes positive for metastatic disease, consistent with pathologic T2 N1b M0 colon cancer.  PET scan was negative for metastatic disease.  The patient was started on adjuvant FOLFOX chemotherapy and completed 12 cycles.  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  PET scan did not reveal any evidence of metastatic disease.  CEA is normal.  The patient has had serial colonoscopies, last one was in 06/2021, which revealed multiple tubular adenomas.  He will be due for one in 2024.  Otherwise, he is beyond the 5-year sara.  We would like to see the patient in 6 months  and obtain CBC, CMP, LDH, CEA.  Otherwise we will likely do yearly scans.  2.  Hypertension, probably related to antihypertensives.  The patient will follow up with Dr. Roberts.    Sixty-five minutes were spent on this patient.  Time was spent reviewing multiple physician provider notes, lab results, imaging results, performing history and physical, documenting history and physical, ordering followup labs and scans.    Emmy Stark MD        D: 2022   T: 2022   MT: DOUGMT    Name:     LIEN MCKEON  MRN:      [id number]        Account:    [id number]   :      1945           Visit Date: 2022     Document: [id number]    cc:  MD Baljeet Green MD

## 2022-03-16 NOTE — TELEPHONE ENCOUNTER
Spoke to oncology nurse states patient left ama. Said Patient was told to increase fluids, transition slow, and if symptoms increased, worsen or no change to go to ER.   Pratima Sorensen LPN .............3/16/2022     9:50 AM

## 2022-03-16 NOTE — TELEPHONE ENCOUNTER
Additional attempts made to reach patient at home and mobile without success. Gricelda Ervin RN on 3/16/2022 at 1:16 PM

## 2022-03-16 NOTE — TELEPHONE ENCOUNTER
Patient was in Oncology was c/o of dizziness bp were 68/38 and then checked and was 76/52. He has not taken any of his bp meds. Patient refuses to see another provider, is MAUDE able to see patient today, or have any recommendations?  Pratima Sorensen LPN .............3/16/2022     8:46 AM

## 2022-03-24 NOTE — TELEPHONE ENCOUNTER
After name and date of birth verified. Spoke with patient and he reports that he is feeling better not feeling lightheaded and no dizziness. He had forgotten to take his medication that day and hasn't had it happen since. He sill see you in April for his appt.  Norma J. Gosselin, LPN .......  3/24/2022  9:31 AM

## 2022-04-19 ENCOUNTER — LAB (OUTPATIENT)
Dept: LAB | Facility: OTHER | Age: 77
End: 2022-04-19
Attending: INTERNAL MEDICINE
Payer: MEDICARE

## 2022-04-19 ENCOUNTER — OFFICE VISIT (OUTPATIENT)
Dept: INTERNAL MEDICINE | Facility: OTHER | Age: 77
End: 2022-04-19
Attending: INTERNAL MEDICINE
Payer: MEDICARE

## 2022-04-19 VITALS
TEMPERATURE: 96.6 F | HEIGHT: 68 IN | OXYGEN SATURATION: 92 % | BODY MASS INDEX: 34.04 KG/M2 | DIASTOLIC BLOOD PRESSURE: 74 MMHG | HEART RATE: 65 BPM | RESPIRATION RATE: 20 BRPM | WEIGHT: 224.6 LBS | SYSTOLIC BLOOD PRESSURE: 130 MMHG

## 2022-04-19 DIAGNOSIS — Z85.038 HISTORY OF COLON CANCER: ICD-10-CM

## 2022-04-19 DIAGNOSIS — N18.31 CHRONIC KIDNEY DISEASE, STAGE 3A (H): ICD-10-CM

## 2022-04-19 DIAGNOSIS — E78.2 MIXED HYPERLIPIDEMIA: ICD-10-CM

## 2022-04-19 DIAGNOSIS — I10 BENIGN ESSENTIAL HYPERTENSION: ICD-10-CM

## 2022-04-19 DIAGNOSIS — R73.9 ELEVATED RANDOM BLOOD GLUCOSE LEVEL: ICD-10-CM

## 2022-04-19 DIAGNOSIS — I25.10 CORONARY ARTERY DISEASE INVOLVING NATIVE CORONARY ARTERY OF NATIVE HEART WITHOUT ANGINA PECTORIS: ICD-10-CM

## 2022-04-19 DIAGNOSIS — E03.4 HYPOTHYROIDISM DUE TO ACQUIRED ATROPHY OF THYROID: ICD-10-CM

## 2022-04-19 DIAGNOSIS — K21.00 GASTROESOPHAGEAL REFLUX DISEASE WITH ESOPHAGITIS, UNSPECIFIED WHETHER HEMORRHAGE: ICD-10-CM

## 2022-04-19 DIAGNOSIS — Z87.39 HISTORY OF GOUT: ICD-10-CM

## 2022-04-19 DIAGNOSIS — C18.7 CANCER OF SIGMOID COLON (H): ICD-10-CM

## 2022-04-19 DIAGNOSIS — E53.8 VITAMIN B12 DEFICIENCY: ICD-10-CM

## 2022-04-19 DIAGNOSIS — E11.65 UNCONTROLLED TYPE 2 DIABETES MELLITUS WITH HYPERGLYCEMIA (H): Primary | ICD-10-CM

## 2022-04-19 LAB
ALBUMIN SERPL-MCNC: 4.1 G/DL (ref 3.5–5.7)
ALBUMIN SERPL-MCNC: 4.1 G/DL (ref 3.5–5.7)
ALBUMIN UR-MCNC: NEGATIVE MG/DL
ALP SERPL-CCNC: 85 U/L (ref 34–104)
ALP SERPL-CCNC: 85 U/L (ref 34–104)
ALT SERPL W P-5'-P-CCNC: 10 U/L (ref 7–52)
ALT SERPL W P-5'-P-CCNC: 11 U/L (ref 7–52)
ANION GAP SERPL CALCULATED.3IONS-SCNC: 12 MMOL/L (ref 3–14)
ANION GAP SERPL CALCULATED.3IONS-SCNC: 8 MMOL/L (ref 3–14)
APPEARANCE UR: CLEAR
AST SERPL W P-5'-P-CCNC: 11 U/L (ref 13–39)
AST SERPL W P-5'-P-CCNC: 11 U/L (ref 13–39)
BASOPHILS # BLD AUTO: 0 10E3/UL (ref 0–0.2)
BASOPHILS NFR BLD AUTO: 0 %
BILIRUB SERPL-MCNC: 0.5 MG/DL (ref 0.3–1)
BILIRUB SERPL-MCNC: 0.5 MG/DL (ref 0.3–1)
BILIRUB UR QL STRIP: NEGATIVE
BUN SERPL-MCNC: 18 MG/DL (ref 7–25)
BUN SERPL-MCNC: 19 MG/DL (ref 7–25)
CALCIUM SERPL-MCNC: 9.3 MG/DL (ref 8.6–10.3)
CALCIUM SERPL-MCNC: 9.4 MG/DL (ref 8.6–10.3)
CEA SERPL-MCNC: <3 NG/ML (ref 0–3)
CHLORIDE BLD-SCNC: 101 MMOL/L (ref 98–107)
CHLORIDE BLD-SCNC: 102 MMOL/L (ref 98–107)
CHOLEST SERPL-MCNC: 196 MG/DL
CO2 SERPL-SCNC: 24 MMOL/L (ref 21–31)
CO2 SERPL-SCNC: 26 MMOL/L (ref 21–31)
COLOR UR AUTO: ABNORMAL
CREAT SERPL-MCNC: 1.04 MG/DL (ref 0.7–1.3)
CREAT SERPL-MCNC: 1.06 MG/DL (ref 0.7–1.3)
CREAT UR-MCNC: 66 MG/DL
EOSINOPHIL # BLD AUTO: 0.2 10E3/UL (ref 0–0.7)
EOSINOPHIL NFR BLD AUTO: 2 %
ERYTHROCYTE [DISTWIDTH] IN BLOOD BY AUTOMATED COUNT: 14.7 % (ref 10–15)
FASTING STATUS PATIENT QL REPORTED: ABNORMAL
GFR SERPL CREATININE-BSD FRML MDRD: 73 ML/MIN/1.73M2
GFR SERPL CREATININE-BSD FRML MDRD: 74 ML/MIN/1.73M2
GLUCOSE BLD-MCNC: 228 MG/DL (ref 70–105)
GLUCOSE BLD-MCNC: 230 MG/DL (ref 70–105)
GLUCOSE UR STRIP-MCNC: >1000 MG/DL
HBA1C MFR BLD: 9.6 % (ref 4–6.2)
HCT VFR BLD AUTO: 45 % (ref 40–53)
HDLC SERPL-MCNC: 34 MG/DL (ref 23–92)
HGB BLD-MCNC: 14.6 G/DL (ref 13.3–17.7)
HGB UR QL STRIP: NEGATIVE
HOLD SPECIMEN: NORMAL
IMM GRANULOCYTES # BLD: 0 10E3/UL
IMM GRANULOCYTES NFR BLD: 0 %
KETONES UR STRIP-MCNC: NEGATIVE MG/DL
LDH SERPL L TO P-CCNC: 138 U/L (ref 140–271)
LDLC SERPL CALC-MCNC: 100 MG/DL
LEUKOCYTE ESTERASE UR QL STRIP: NEGATIVE
LYMPHOCYTES # BLD AUTO: 1.6 10E3/UL (ref 0.8–5.3)
LYMPHOCYTES NFR BLD AUTO: 18 %
MCH RBC QN AUTO: 29.9 PG (ref 26.5–33)
MCHC RBC AUTO-ENTMCNC: 32.4 G/DL (ref 31.5–36.5)
MCV RBC AUTO: 92 FL (ref 78–100)
MICROALBUMIN UR-MCNC: 7 MG/L
MICROALBUMIN/CREAT UR: 10.61 MG/G CR (ref 0–17)
MONOCYTES # BLD AUTO: 0.9 10E3/UL (ref 0–1.3)
MONOCYTES NFR BLD AUTO: 10 %
NEUTROPHILS # BLD AUTO: 6.2 10E3/UL (ref 1.6–8.3)
NEUTROPHILS NFR BLD AUTO: 70 %
NITRATE UR QL: NEGATIVE
NONHDLC SERPL-MCNC: 162 MG/DL
NRBC # BLD AUTO: 0 10E3/UL
NRBC BLD AUTO-RTO: 0 /100
PH UR STRIP: 5 [PH] (ref 5–9)
PLATELET # BLD AUTO: 245 10E3/UL (ref 150–450)
POTASSIUM BLD-SCNC: 4.5 MMOL/L (ref 3.5–5.1)
POTASSIUM BLD-SCNC: 4.5 MMOL/L (ref 3.5–5.1)
PROT SERPL-MCNC: 6.7 G/DL (ref 6.4–8.9)
PROT SERPL-MCNC: 6.7 G/DL (ref 6.4–8.9)
RBC # BLD AUTO: 4.89 10E6/UL (ref 4.4–5.9)
SODIUM SERPL-SCNC: 136 MMOL/L (ref 134–144)
SODIUM SERPL-SCNC: 137 MMOL/L (ref 134–144)
SP GR UR STRIP: 1.03 (ref 1–1.03)
T4 FREE SERPL-MCNC: 0.76 NG/DL (ref 0.6–1.6)
TRIGL SERPL-MCNC: 310 MG/DL
TSH SERPL DL<=0.005 MIU/L-ACNC: 4.97 MU/L (ref 0.4–4)
UROBILINOGEN UR STRIP-MCNC: NORMAL MG/DL
WBC # BLD AUTO: 8.9 10E3/UL (ref 4–11)

## 2022-04-19 PROCEDURE — 99214 OFFICE O/P EST MOD 30 MIN: CPT | Performed by: INTERNAL MEDICINE

## 2022-04-19 PROCEDURE — 85025 COMPLETE CBC W/AUTO DIFF WBC: CPT | Mod: ZL

## 2022-04-19 PROCEDURE — G0463 HOSPITAL OUTPT CLINIC VISIT: HCPCS

## 2022-04-19 PROCEDURE — 81003 URINALYSIS AUTO W/O SCOPE: CPT | Mod: ZL

## 2022-04-19 PROCEDURE — 36415 COLL VENOUS BLD VENIPUNCTURE: CPT | Mod: ZL

## 2022-04-19 PROCEDURE — 83036 HEMOGLOBIN GLYCOSYLATED A1C: CPT | Mod: ZL

## 2022-04-19 PROCEDURE — 82378 CARCINOEMBRYONIC ANTIGEN: CPT | Mod: ZL

## 2022-04-19 PROCEDURE — 84155 ASSAY OF PROTEIN SERUM: CPT | Mod: ZL

## 2022-04-19 PROCEDURE — 83615 LACTATE (LD) (LDH) ENZYME: CPT | Mod: ZL

## 2022-04-19 PROCEDURE — 80053 COMPREHEN METABOLIC PANEL: CPT | Mod: ZL,XU

## 2022-04-19 PROCEDURE — 84439 ASSAY OF FREE THYROXINE: CPT | Mod: ZL

## 2022-04-19 PROCEDURE — 80061 LIPID PANEL: CPT | Mod: ZL

## 2022-04-19 PROCEDURE — 84443 ASSAY THYROID STIM HORMONE: CPT | Mod: ZL

## 2022-04-19 PROCEDURE — 82043 UR ALBUMIN QUANTITATIVE: CPT | Mod: ZL

## 2022-04-19 RX ORDER — METOPROLOL TARTRATE 50 MG
50 TABLET ORAL 2 TIMES DAILY
Qty: 180 TABLET | Refills: 1 | Status: SHIPPED | OUTPATIENT
Start: 2022-04-19 | End: 2022-07-21

## 2022-04-19 RX ORDER — PANTOPRAZOLE SODIUM 40 MG/1
40 TABLET, DELAYED RELEASE ORAL DAILY
Qty: 90 TABLET | Refills: 1 | Status: SHIPPED | OUTPATIENT
Start: 2022-04-19 | End: 2022-07-21

## 2022-04-19 RX ORDER — GLIPIZIDE 10 MG/1
TABLET ORAL
Qty: 360 TABLET | Refills: 1 | Status: SHIPPED | OUTPATIENT
Start: 2022-04-19 | End: 2022-07-21

## 2022-04-19 RX ORDER — CLOPIDOGREL BISULFATE 75 MG/1
75 TABLET ORAL DAILY
Qty: 90 TABLET | Refills: 1 | Status: SHIPPED | OUTPATIENT
Start: 2022-04-19 | End: 2022-04-25

## 2022-04-19 RX ORDER — CYANOCOBALAMIN (VITAMIN B-12) 2500 MCG
2500 TABLET, SUBLINGUAL SUBLINGUAL DAILY
Qty: 90 TABLET | Refills: 3 | Status: SHIPPED | OUTPATIENT
Start: 2022-04-19 | End: 2023-05-10

## 2022-04-19 RX ORDER — LISINOPRIL 2.5 MG/1
2.5 TABLET ORAL DAILY
Qty: 90 TABLET | Refills: 1 | Status: SHIPPED | OUTPATIENT
Start: 2022-04-19 | End: 2022-07-21

## 2022-04-19 RX ORDER — LEVOTHYROXINE SODIUM 75 UG/1
75 TABLET ORAL DAILY
Qty: 90 TABLET | Refills: 1 | Status: SHIPPED | OUTPATIENT
Start: 2022-04-19 | End: 2022-04-19

## 2022-04-19 RX ORDER — LEVOTHYROXINE SODIUM 100 UG/1
100 TABLET ORAL DAILY
Qty: 90 TABLET | Refills: 1 | Status: SHIPPED | OUTPATIENT
Start: 2022-04-19 | End: 2022-07-21

## 2022-04-19 RX ORDER — ALLOPURINOL 100 MG/1
100 TABLET ORAL 2 TIMES DAILY
Qty: 180 TABLET | Refills: 1 | Status: SHIPPED | OUTPATIENT
Start: 2022-04-19 | End: 2022-07-21

## 2022-04-19 ASSESSMENT — ENCOUNTER SYMPTOMS
ENDOCRINE COMMENTS: + HYPERGLYCEMIA
FEVER: 0
ADENOPATHY: 0
DIARRHEA: 1
WOUND: 0
ABDOMINAL PAIN: 0
BACK PAIN: 0
BRUISES/BLEEDS EASILY: 0
NUMBNESS: 1
CHILLS: 0
CONFUSION: 0
SHORTNESS OF BREATH: 0
CHEST TIGHTNESS: 0
HEMATURIA: 0

## 2022-04-19 ASSESSMENT — PAIN SCALES - GENERAL: PAINLEVEL: NO PAIN (0)

## 2022-04-19 NOTE — PATIENT INSTRUCTIONS
Blood pressure is well controlled.     Diabetes levels have been high... keep working to keep your blood sugar levels down.     Medications refilled.   Labs are pending.     To help with weight loss and improve blood sugar control....    -- Try to avoid Carbohydrates as much as possible -- breads, pasta, baked goods, cakes, oatmeal, cold cereal, potatoes.   -- Eat more lean meats, proteins, eggs, nuts, vegetables.    -- Start or Continue regular daily exercise.     Get out and exercise, bike ride, walk for 10 to 15 minutes after each meal -- this can significantly lowers the spike in blood sugar after eating.     Results for orders placed or performed in visit on 04/19/22   CEA     Status: Normal   Result Value Ref Range    CEA <3.0 0.0 - 3.0 ng/mL    Narrative    The DXI CEA assay is a two site immunoenzymatic assay.   Assay values obtained with different assay methods cannot be used interchangeably due to differences in assay methods and reagent specificity.   Lactate Dehydrogenase     Status: Abnormal   Result Value Ref Range    Lactate Dehydrogenase 138 (L) 140 - 271 U/L   Comprehensive metabolic panel     Status: Abnormal   Result Value Ref Range    Sodium 136 134 - 144 mmol/L    Potassium 4.5 3.5 - 5.1 mmol/L    Chloride 102 98 - 107 mmol/L    Carbon Dioxide (CO2) 26 21 - 31 mmol/L    Anion Gap 8 3 - 14 mmol/L    Urea Nitrogen 18 7 - 25 mg/dL    Creatinine 1.04 0.70 - 1.30 mg/dL    Calcium 9.4 8.6 - 10.3 mg/dL    Glucose 228 (H) 70 - 105 mg/dL    Alkaline Phosphatase 85 34 - 104 U/L    AST 11 (L) 13 - 39 U/L    ALT 10 7 - 52 U/L    Protein Total 6.7 6.4 - 8.9 g/dL    Albumin 4.1 3.5 - 5.7 g/dL    Bilirubin Total 0.5 0.3 - 1.0 mg/dL    GFR Estimate 74 >60 mL/min/1.73m2   CBC with platelets and differential     Status: None   Result Value Ref Range    WBC Count 8.9 4.0 - 11.0 10e3/uL    RBC Count 4.89 4.40 - 5.90 10e6/uL    Hemoglobin 14.6 13.3 - 17.7 g/dL    Hematocrit 45.0 40.0 - 53.0 %    MCV 92 78 - 100 fL     MCH 29.9 26.5 - 33.0 pg    MCHC 32.4 31.5 - 36.5 g/dL    RDW 14.7 10.0 - 15.0 %    Platelet Count 245 150 - 450 10e3/uL    % Neutrophils 70 %    % Lymphocytes 18 %    % Monocytes 10 %    % Eosinophils 2 %    % Basophils 0 %    % Immature Granulocytes 0 %    NRBCs per 100 WBC 0 <1 /100    Absolute Neutrophils 6.2 1.6 - 8.3 10e3/uL    Absolute Lymphocytes 1.6 0.8 - 5.3 10e3/uL    Absolute Monocytes 0.9 0.0 - 1.3 10e3/uL    Absolute Eosinophils 0.2 0.0 - 0.7 10e3/uL    Absolute Basophils 0.0 0.0 - 0.2 10e3/uL    Absolute Immature Granulocytes 0.0 <=0.4 10e3/uL    Absolute NRBCs 0.0 10e3/uL   Extra Tube     Status: None (In process)    Narrative    The following orders were created for panel order Extra Tube.  Procedure                               Abnormality         Status                     ---------                               -----------         ------                     Extra Urine Collection[[id number]]                           In process                   Please view results for these tests on the individual orders.   CBC with Platelets & Differential     Status: None    Narrative    The following orders were created for panel order CBC with Platelets & Differential.  Procedure                               Abnormality         Status                     ---------                               -----------         ------                     CBC with platelets and d...[[id number]]                      Final result                 Please view results for these tests on the individual orders.      Aspects of Diabetes:   Recent Labs   Lab Test 04/19/22  0953 03/07/22  0751 01/06/22  0707 09/07/21  0817 09/07/21  0817 08/10/21  0835 05/03/21  0832 04/19/21  0739 01/22/21  0812   A1C  --   --  9.5*  --   --  8.5* 9.6*  --  10.9*   LDL  --   --  96  --   --  84 78  --   --    HDL  --   --  34  --   --  33 28  --   --    TRIG  --   --  309*  --   --  304* 332*  --   --    ALT 10 11  --   --  12  --   --  21 32   CR  1.04 1.19  --    < > 1.26  --   --  1.27 1.20   GFRESTIMATED 74 63  --    < > 55*  --   --  55* 59*   GFRESTBLACK  --   --   --   --   --   --   --  67 71   POTASSIUM 4.5 4.6  --    < > 4.7  --   --  4.4 4.7   TSH  --   --  5.71*  --   --  9.38*  --   --   --     < > = values in this interval not displayed.      Hemoglobin A1c  Goal range is under 8%. Best is 6.5 to 7   Blood Pressure 130/74 Goal to keep less than 140/90   Tobacco  reports that he quit smoking about 37 years ago. His smoking use included cigarettes. He has a 30.00 pack-year smoking history. He has never used smokeless tobacco. Goal to abstain from tobacco   Aspirin or Plavix Anti-platelet therapy Aspirin or Plavix reduces risk of heart disease and stroke  -- sometimes used with other blood thinners, depending on bleeding risk and risk factors.    ACE/ARB Specific blood pressure meds These medications can reduce risk of kidney disease   Cholesterol Statins (Lipitor, Crestor, vs others) Statins reduce risk of heart disease and stroke   Eye Exam -- Do Yearly -- Annual diabetic eye exam   Healthy weight Wt Readings from Last 3 Encounters:   04/19/22 101.9 kg (224 lb 9.6 oz)   03/16/22 101.6 kg (224 lb)   01/07/22 101.6 kg (224 lb)     Body mass index is 34.15 kg/m .  Goal BMI under 30, best is under 25.      -- Trying to exercise daily (goal at least 20 min/day) with moderate aerobic activity   -- Eat healthy (resources from ADA at http://www.diabetes.org/)   -- Taking good care of my feet. Consider seeing the Podiatrist   -- Check blood sugars as directed, record in log book and bring to every appointment    Insurance companies are grading you and I on your blood sugar control -- Goal is to get your A1c down to 7.9% or lower and NO Smoking!  -- Medicare and most insurance companies, will only cover Hemoglobin A1c labs to be rechecked every 91+ days.      Return for Diabetes labs and clinic follow-up appointment every 3 to 4 months.    Schedule lab only  appointment --- A few days AFTER: 7/18/22   Schedule clinic appointment with Dr. Roberts -- Same day as labs, or 1-2 days later.

## 2022-04-19 NOTE — PROGRESS NOTES
Assessment & Plan       ICD-10-CM    1. Uncontrolled type 2 diabetes mellitus with hyperglycemia (H)  E11.65 Hemoglobin A1c     empagliflozin (JARDIANCE) 10 MG TABS tablet     glipiZIDE (GLUCOTROL) 10 MG tablet     Semaglutide 7 MG TABS   2. Benign essential hypertension  I10 UA reflex to Microscopic     CBC with platelets     Albumin Random Urine Quantitative with Creat Ratio     Comprehensive metabolic panel   3. Mixed hyperlipidemia  E78.2 Lipid Profile   4. Chronic kidney disease, stage 3a (H)  N18.31    5. Coronary artery disease involving native coronary artery of native heart without angina pectoris  I25.10 clopidogrel (PLAVIX) 75 MG tablet     lisinopril (ZESTRIL) 2.5 MG tablet     metoprolol tartrate (LOPRESSOR) 50 MG tablet   6. History of colon cancer  Z85.038    7. Hypothyroidism due to acquired atrophy of thyroid  E03.4 TSH with free T4 reflex     levothyroxine (SYNTHROID/LEVOTHROID) 100 MCG tablet     DISCONTINUED: levothyroxine (SYNTHROID/LEVOTHROID) 75 MCG tablet   8. Vitamin B12 deficiency  E53.8 vitamin B-12 (CYANOCOBALAMIN) 2500 MCG sublingual tablet   9. History of gout  Z87.39 allopurinol (ZYLOPRIM) 100 MG tablet   10. Gastroesophageal reflux disease with esophagitis, unspecified whether hemorrhage  K21.00 pantoprazole (PROTONIX) 40 MG EC tablet   Patient presents for follow-up of multiple issues.    Uncontrolled type 2 diabetes with hyperglycemia.  Pharmacy data indicate that he has not had any recent dispersement's of prescription for glipizide despite having prescription for up to 4 tablets daily, blood sugars are once again very high.  Hemoglobin A1c is high and not at goal.  Indicates that he has been taking Jardiance.  Pharmacy data confirms that he has had recent prescriptions filled.  Continues with semaglutide tablet 7 mg daily as well.  Needs refills.  Check lab work today.  Plan for recheck in 3 to 4 months.  Encouraged better dietary control.  Regular exercise.    Hypertension.   Blood pressure is currently well controlled.  Denies syncope or presyncope.  Doing well with current medication regimen.  Needs refills.  Check labs.    Mixed hyperlipidemia.  Cholesterol levels are not at goal.  Currently taking simvastatin 40 mg daily.  We will need to consider increasing dose or changing medications if healthy diet exercise and weight loss are not adequate to improve cholesterol levels.    Stage IIIa chronic kidney disease.  Kidney function has been slowly declining.  Encourage NSAID avoidance.    Coronary artery disease.  Appears stable. Denies exertional angina.  Continues with Plavix, lisinopril, metoprolol.    History of colon cancer.  Has been more than 5 years now.  Still following with oncology but now just twice yearly checkups.    Hypothyroidism.  TSH is high.  Needs dose increase of Synthroid.  Currently taking 75 mcg daily this is increased up to 100 mcg daily.  Plan to recheck labs in 3+ months    Vitamin B12 deficiency.  No longer taking B12 supplements.  Encouraged restarting.  Needs refills.    History of gout.  No recent gout attacks.  Allopurinol has been effective.  Needs refills.    Heartburn and reflux, ongoing but improved with Protonix.  Needs refills.     Encouraged weight loss and regular exercise.     Return in about 3 months (around 7/19/2022) for - Labs every 91+ days, DM Follow-up 1-2 days later, with Dr. Roberts.    Bk Roberts MD  [facility]   Andrez is a 76 year old who presents for the following health issues     History of Present Illness       Diabetes:   He presents for follow up of diabetes.  He is checking home blood glucose a few times a month. He checks blood glucose before meals.  Blood glucose is sometimes over 200 and never under 70. He is aware of hypoglycemia symptoms including dizziness. He has no concerns regarding his diabetes at this time.  He is having numbness in feet. The patient has not had a diabetic eye  "exam in the last 12 months.           Review of Systems   Constitutional: Negative for chills and fever.   HENT: Negative for congestion.    Eyes: Negative for visual disturbance.   Respiratory: Negative for chest tightness and shortness of breath.    Cardiovascular: Negative for chest pain.   Gastrointestinal: Positive for diarrhea (intermittently). Negative for abdominal pain.   Endocrine:        + Hyperglycemia   Genitourinary: Negative for hematuria.   Musculoskeletal: Negative for back pain.   Skin: Negative for rash and wound.   Allergic/Immunologic: Negative for immunocompromised state.   Neurological: Positive for numbness (hands and feet from Agent Orange exposure + Hx of chemotherapy ). Negative for syncope.   Hematological: Negative for adenopathy. Does not bruise/bleed easily.   Psychiatric/Behavioral: Negative for confusion.          Objective    /74 (BP Location: Right arm, Patient Position: Sitting, Cuff Size: Adult Large)   Pulse 65   Temp (!) 96.6  F (35.9  C) (Temporal)   Resp 20   Ht 1.727 m (5' 8\")   Wt 101.9 kg (224 lb 9.6 oz)   SpO2 92%   BMI 34.15 kg/m    Body mass index is 34.15 kg/m .  Physical Exam  Constitutional:       General: He is not in acute distress.     Appearance: He is well-developed. He is obese. He is not diaphoretic.   HENT:      Head: Normocephalic and atraumatic.   Eyes:      General: No scleral icterus.     Conjunctiva/sclera: Conjunctivae normal.   Neck:      Vascular: No carotid bruit.   Cardiovascular:      Rate and Rhythm: Normal rate and regular rhythm.      Pulses: Normal pulses.   Pulmonary:      Effort: Pulmonary effort is normal.      Breath sounds: Normal breath sounds.   Abdominal:      Palpations: Abdomen is soft.      Tenderness: There is no abdominal tenderness.   Musculoskeletal:         General: No deformity.      Cervical back: Neck supple.      Right lower leg: No edema.      Left lower leg: No edema.   Lymphadenopathy:      Cervical: No " cervical adenopathy.   Skin:     General: Skin is warm and dry.      Findings: No rash.      Comments: Diabetic Foot Exam:  Findings:   LEFT: normal DP and PT pulses, no trophic changes or ulcerative lesions and Very Abnormal monofilament exam - All Numb   RIGHT: normal DP and PT pulses, no trophic changes or ulcerative lesions and Very Abnormal monofilament exam - All Numb   Neurological:      Mental Status: He is alert and oriented to person, place, and time. Mental status is at baseline.   Psychiatric:         Mood and Affect: Mood normal.         Behavior: Behavior normal.        Recent Labs   Lab Test 04/19/22  0953 03/07/22  0751 01/06/22  0707 09/07/21  0817 08/10/21  0835 08/10/21  0835 05/03/21  0832 04/19/21  0739 01/22/21  0812   A1C 9.6*  --  9.5*  --   --  8.5*   < >  --  10.9*     --  96  --   --  84   < >  --   --    HDL 34  --  34  --   --  33   < >  --   --    TRIG 310*  --  309*  --   --  304*   < >  --   --    ALT 11  10 11  --  12  --   --   --  21 32   CR 1.06  1.04 1.19  --  1.26   < >  --   --  1.27 1.20   GFRESTIMATED 73  74 63  --  55*   < >  --   --  55* 59*   GFRESTBLACK  --   --   --   --   --   --   --  67 71   POTASSIUM 4.5  4.5 4.6  --  4.7   < >  --   --  4.4 4.7   TSH 4.97*  --  5.71*  --   --  9.38*   < >  --   --     < > = values in this interval not displayed.   Hemoglobin A1c is high and not at goal.  LDL and triglycerides are high and not at goal.  HDL is normal.  ALT and creatinine are at baseline.  Potassium is normal.  TSH is high.

## 2022-04-21 DIAGNOSIS — I25.10 CORONARY ARTERY DISEASE INVOLVING NATIVE CORONARY ARTERY OF NATIVE HEART WITHOUT ANGINA PECTORIS: ICD-10-CM

## 2022-04-25 RX ORDER — CLOPIDOGREL BISULFATE 75 MG/1
TABLET ORAL
Qty: 90 TABLET | Refills: 4 | Status: SHIPPED | OUTPATIENT
Start: 2022-04-25 | End: 2023-05-10

## 2022-06-21 DIAGNOSIS — E11.65 UNCONTROLLED TYPE 2 DIABETES MELLITUS WITH HYPERGLYCEMIA (H): ICD-10-CM

## 2022-06-22 ENCOUNTER — HOSPITAL ENCOUNTER (EMERGENCY)
Facility: OTHER | Age: 77
Discharge: ANOTHER HOSPITAL | End: 2022-06-22
Attending: EMERGENCY MEDICINE | Admitting: EMERGENCY MEDICINE
Payer: MEDICARE

## 2022-06-22 ENCOUNTER — APPOINTMENT (OUTPATIENT)
Dept: CT IMAGING | Facility: OTHER | Age: 77
End: 2022-06-22
Attending: EMERGENCY MEDICINE
Payer: MEDICARE

## 2022-06-22 ENCOUNTER — TRANSFERRED RECORDS (OUTPATIENT)
Dept: HEALTH INFORMATION MANAGEMENT | Facility: OTHER | Age: 77
End: 2022-06-22

## 2022-06-22 VITALS
HEIGHT: 68 IN | OXYGEN SATURATION: 91 % | HEART RATE: 87 BPM | SYSTOLIC BLOOD PRESSURE: 131 MMHG | BODY MASS INDEX: 33.95 KG/M2 | RESPIRATION RATE: 18 BRPM | TEMPERATURE: 95.6 F | WEIGHT: 224 LBS | DIASTOLIC BLOOD PRESSURE: 74 MMHG

## 2022-06-22 DIAGNOSIS — I63.9 CEREBROVASCULAR ACCIDENT (CVA), UNSPECIFIED MECHANISM (H): ICD-10-CM

## 2022-06-22 LAB
ANION GAP SERPL CALCULATED.3IONS-SCNC: 13 MMOL/L (ref 3–14)
APTT PPP: 24 SECONDS (ref 22–38)
ATRIAL RATE - MUSE: 83 BPM
BASOPHILS # BLD AUTO: 0 10E3/UL (ref 0–0.2)
BASOPHILS NFR BLD AUTO: 0 %
BUN SERPL-MCNC: 23 MG/DL (ref 7–25)
CALCIUM SERPL-MCNC: 9.2 MG/DL (ref 8.6–10.3)
CHLORIDE BLD-SCNC: 102 MMOL/L (ref 98–107)
CO2 SERPL-SCNC: 21 MMOL/L (ref 21–31)
CREAT SERPL-MCNC: 1.22 MG/DL (ref 0.7–1.3)
DIASTOLIC BLOOD PRESSURE - MUSE: NORMAL MMHG
EOSINOPHIL # BLD AUTO: 0.1 10E3/UL (ref 0–0.7)
EOSINOPHIL NFR BLD AUTO: 1 %
ERYTHROCYTE [DISTWIDTH] IN BLOOD BY AUTOMATED COUNT: 15 % (ref 10–15)
FLUAV RNA SPEC QL NAA+PROBE: NEGATIVE
FLUBV RNA RESP QL NAA+PROBE: NEGATIVE
GFR SERPL CREATININE-BSD FRML MDRD: 61 ML/MIN/1.73M2
GLUCOSE BLD-MCNC: 287 MG/DL (ref 70–105)
GLUCOSE BLDC GLUCOMTR-MCNC: 292 MG/DL (ref 70–99)
HCT VFR BLD AUTO: 42.2 % (ref 40–53)
HGB BLD-MCNC: 14 G/DL (ref 13.3–17.7)
IMM GRANULOCYTES # BLD: 0.2 10E3/UL
IMM GRANULOCYTES NFR BLD: 1 %
INR PPP: 1.04 (ref 0.85–1.15)
INTERPRETATION ECG - MUSE: NORMAL
LYMPHOCYTES # BLD AUTO: 2 10E3/UL (ref 0.8–5.3)
LYMPHOCYTES NFR BLD AUTO: 19 %
MCH RBC QN AUTO: 30.2 PG (ref 26.5–33)
MCHC RBC AUTO-ENTMCNC: 33.2 G/DL (ref 31.5–36.5)
MCV RBC AUTO: 91 FL (ref 78–100)
MONOCYTES # BLD AUTO: 1 10E3/UL (ref 0–1.3)
MONOCYTES NFR BLD AUTO: 10 %
NEUTROPHILS # BLD AUTO: 7.2 10E3/UL (ref 1.6–8.3)
NEUTROPHILS NFR BLD AUTO: 69 %
NRBC # BLD AUTO: 0 10E3/UL
NRBC BLD AUTO-RTO: 0 /100
P AXIS - MUSE: 26 DEGREES
PLATELET # BLD AUTO: 233 10E3/UL (ref 150–450)
POTASSIUM BLD-SCNC: 4.2 MMOL/L (ref 3.5–5.1)
PR INTERVAL - MUSE: 178 MS
QRS DURATION - MUSE: 92 MS
QT - MUSE: 408 MS
QTC - MUSE: 479 MS
R AXIS - MUSE: 11 DEGREES
RBC # BLD AUTO: 4.64 10E6/UL (ref 4.4–5.9)
RSV RNA SPEC NAA+PROBE: NEGATIVE
SARS-COV-2 RNA RESP QL NAA+PROBE: POSITIVE
SODIUM SERPL-SCNC: 136 MMOL/L (ref 134–144)
SYSTOLIC BLOOD PRESSURE - MUSE: NORMAL MMHG
T AXIS - MUSE: 47 DEGREES
TROPONIN I SERPL-MCNC: 4.7 PG/ML (ref 0–34)
VENTRICULAR RATE- MUSE: 83 BPM
WBC # BLD AUTO: 10.5 10E3/UL (ref 4–11)

## 2022-06-22 PROCEDURE — 96374 THER/PROPH/DIAG INJ IV PUSH: CPT | Performed by: EMERGENCY MEDICINE

## 2022-06-22 PROCEDURE — 99285 EMERGENCY DEPT VISIT HI MDM: CPT | Mod: 25 | Performed by: EMERGENCY MEDICINE

## 2022-06-22 PROCEDURE — 85730 THROMBOPLASTIN TIME PARTIAL: CPT | Performed by: EMERGENCY MEDICINE

## 2022-06-22 PROCEDURE — 93005 ELECTROCARDIOGRAM TRACING: CPT | Performed by: EMERGENCY MEDICINE

## 2022-06-22 PROCEDURE — 93010 ELECTROCARDIOGRAM REPORT: CPT | Performed by: INTERNAL MEDICINE

## 2022-06-22 PROCEDURE — 70498 CT ANGIOGRAPHY NECK: CPT | Mod: TC

## 2022-06-22 PROCEDURE — 250N000011 HC RX IP 250 OP 636: Performed by: EMERGENCY MEDICINE

## 2022-06-22 PROCEDURE — 84484 ASSAY OF TROPONIN QUANT: CPT | Performed by: EMERGENCY MEDICINE

## 2022-06-22 PROCEDURE — 85610 PROTHROMBIN TIME: CPT | Performed by: EMERGENCY MEDICINE

## 2022-06-22 PROCEDURE — 250N000009 HC RX 250: Performed by: EMERGENCY MEDICINE

## 2022-06-22 PROCEDURE — 70496 CT ANGIOGRAPHY HEAD: CPT | Mod: TC

## 2022-06-22 PROCEDURE — 36415 COLL VENOUS BLD VENIPUNCTURE: CPT | Performed by: EMERGENCY MEDICINE

## 2022-06-22 PROCEDURE — 99285 EMERGENCY DEPT VISIT HI MDM: CPT | Performed by: EMERGENCY MEDICINE

## 2022-06-22 PROCEDURE — 87637 SARSCOV2&INF A&B&RSV AMP PRB: CPT | Performed by: EMERGENCY MEDICINE

## 2022-06-22 PROCEDURE — 85025 COMPLETE CBC W/AUTO DIFF WBC: CPT | Performed by: EMERGENCY MEDICINE

## 2022-06-22 PROCEDURE — 80048 BASIC METABOLIC PNL TOTAL CA: CPT | Performed by: EMERGENCY MEDICINE

## 2022-06-22 PROCEDURE — 70450 CT HEAD/BRAIN W/O DYE: CPT | Mod: TC

## 2022-06-22 PROCEDURE — 250N000013 HC RX MED GY IP 250 OP 250 PS 637: Performed by: EMERGENCY MEDICINE

## 2022-06-22 PROCEDURE — C9803 HOPD COVID-19 SPEC COLLECT: HCPCS | Performed by: EMERGENCY MEDICINE

## 2022-06-22 RX ORDER — ONDANSETRON 2 MG/ML
4 INJECTION INTRAMUSCULAR; INTRAVENOUS EVERY 30 MIN PRN
Status: DISCONTINUED | OUTPATIENT
Start: 2022-06-22 | End: 2022-06-22 | Stop reason: HOSPADM

## 2022-06-22 RX ORDER — ATORVASTATIN CALCIUM 40 MG/1
80 TABLET, FILM COATED ORAL ONCE
Status: COMPLETED | OUTPATIENT
Start: 2022-06-22 | End: 2022-06-22

## 2022-06-22 RX ORDER — LIDOCAINE HYDROCHLORIDE 20 MG/ML
15 SOLUTION OROPHARYNGEAL ONCE
Status: COMPLETED | OUTPATIENT
Start: 2022-06-22 | End: 2022-06-22

## 2022-06-22 RX ORDER — IOPAMIDOL 755 MG/ML
100 INJECTION, SOLUTION INTRAVASCULAR ONCE
Status: COMPLETED | OUTPATIENT
Start: 2022-06-22 | End: 2022-06-22

## 2022-06-22 RX ORDER — MAGNESIUM HYDROXIDE/ALUMINUM HYDROXICE/SIMETHICONE 120; 1200; 1200 MG/30ML; MG/30ML; MG/30ML
15 SUSPENSION ORAL ONCE
Status: COMPLETED | OUTPATIENT
Start: 2022-06-22 | End: 2022-06-22

## 2022-06-22 RX ORDER — CLOPIDOGREL BISULFATE 75 MG/1
300 TABLET ORAL ONCE
Status: COMPLETED | OUTPATIENT
Start: 2022-06-22 | End: 2022-06-22

## 2022-06-22 RX ORDER — ASPIRIN 325 MG
325 TABLET ORAL ONCE
Status: COMPLETED | OUTPATIENT
Start: 2022-06-22 | End: 2022-06-22

## 2022-06-22 RX ADMIN — ATORVASTATIN CALCIUM 80 MG: 40 TABLET, FILM COATED ORAL at 01:55

## 2022-06-22 RX ADMIN — CLOPIDOGREL BISULFATE 300 MG: 75 TABLET, FILM COATED ORAL at 01:46

## 2022-06-22 RX ADMIN — LIDOCAINE HYDROCHLORIDE 15 ML: 20 SOLUTION ORAL; TOPICAL at 00:54

## 2022-06-22 RX ADMIN — ONDANSETRON 4 MG: 2 INJECTION INTRAMUSCULAR; INTRAVENOUS at 00:43

## 2022-06-22 RX ADMIN — IOPAMIDOL 100 ML: 755 INJECTION, SOLUTION INTRAVENOUS at 00:40

## 2022-06-22 RX ADMIN — ASPIRIN 325 MG ORAL TABLET 325 MG: 325 PILL ORAL at 01:46

## 2022-06-22 RX ADMIN — ALUMINA, MAGNESIA, AND SIMETHICONE ORAL SUSPENSION REGULAR STRENGTH 15 ML: 1200; 1200; 120 SUSPENSION ORAL at 00:54

## 2022-06-22 ASSESSMENT — ENCOUNTER SYMPTOMS
SHORTNESS OF BREATH: 0
CHEST TIGHTNESS: 0
ARTHRALGIAS: 0
NUMBNESS: 0
DYSURIA: 0
CHILLS: 0
CONFUSION: 0
FEVER: 0
ABDOMINAL PAIN: 0
SPEECH DIFFICULTY: 0
DIZZINESS: 1
NAUSEA: 1
FACIAL ASYMMETRY: 0

## 2022-06-22 NOTE — CONSULTS
"          [facility]    Stroke Telephone Note    I was called by Tom Moon on 06/22/22 regarding patient Andrez Olivier. The patient is a 76 year old male 77 yo man pmh diabetes. Pt with onset of dizziness (vertigo) with blurred vision and difficulty moving. Symptoms onset at 3pm 6/21/22   Per ed no focal deficits currently. .    Stroke Code Data (for stroke code without tele)  Stroke code activated 06/22/22   0012   Stroke provider first response  06/22/22   0012            Last known normal 06/22/22   1400        Time of discovery   (or onset of symptoms) 06/21/22   1500   Head CT read by Stroke Neuro Dr/Provider 06/22/22   0042   Was stroke code de-escalated? Yes 06/22/22 0042          Imaging Findings   CT no intracranial hemorrhage, completed acute infarct, or mass effect  CTA without proximal Large Vessel Occlusion (LVO)    CT/CTA without findings amenable to acute intervention.      Does appear to be a possible soft plaque vs dissection (plaque is favored) in the left vertebral artery.       Intravenous Thrombolysis  Not given due to:   - unclear or unfavorable risk-benefit profile for extended window thrombolysis beyond the conventional 4.5 hour time window    Endovascular Treatment  Not initiated due to absence of proximal vessel occlusion    Impression  Acute ischemic stroke of posterior circulation due to large-artery atherosclerosis    Due to soft plaque in Left vertebral artery, would be prudent to transfer patient to comprehensive stroke center for closer monitoring as if clot were to migrate would occlude basilar artery    Recommendations    Transfer to comprehensive stroke center  - Use orderset: \"Ischemic Stroke Routine Admission\" or \"Ischemic Stroke No Thrombolytics/No Thrombectomy ICU Admission\"  - Neurochecks and Vital Signs every 2 hours   - Permissive HTN; goal SBP < 220 mmHg  - Daily aspirin 325 mg for secondary stroke prevention  - Plavix (clopidogrel) 300 mg PO " "loading dose x 1  - Plavix (clopidogrel) 75 mg PO Daily  - Statin: Atorvastatin 80  - MRI Brain with and without contrast  - MRA Brain without contrast with dissection protocol  - MRA Neck with and without contrast with dissection protocol  - TTE (with Bubble Study if age 60 yrs or less)  - Telemetry, EKG  - Bedside Glucose Monitoring  - A1c, Lipid Panel, Troponin x 3  - PT/OT/SLP  - Stroke Education  - Euthermia, Euglycemia    My recommendations are based on the information provided over the phone by Andrez Olivier's in-person providers. They are not intended to replace the clinical judgment of his in-person providers. I was not requested to personally see or examine the patient at this time.    The Stroke Staff is Dr. Ni.    Reyes Carreon MD  Vascular Neurology Fellow  To page me or covering stroke neurology team member, click here: [email or web address]   Choose \"On Call\" tab at top, then search dropdown box for \"Neurology Adult\", select location, press Enter, then look for stroke/neuro ICU/telestroke.          "

## 2022-06-22 NOTE — TELEPHONE ENCOUNTER
Routing refill request to provider for review/approval because:    LOV: 4/19/22  Patient noted to be in ER and noted going to be transferred.  Marina Joy RN on 6/22/2022 at 10:15 AM

## 2022-06-22 NOTE — ED PROVIDER NOTES
History     Chief Complaint   Patient presents with     Dizziness     HPI  Andrez Olivier is a 76 year old male who comes in by ambulance from his home.  At about 2-3 this afternoon he started getting dizzy.  Sounds more like vertigo when we discussed his symptoms, more spinning sensation.  Also blurred vision to the point where he said he thought he might be losing his vision.  He said if he laid perfectly still it was not as bad to any type of movement made it very severe.  He was having dry heaves has not been able to keep anything down since this began.  He continued to get worse so he eventually called 911.  Paramedics report stable vital signs.  They had a negative befast exam.  He had an IV placed and was given some Zofran.  He is still having his symptoms but they seem better.  A tier 2 stroke code was called based on the ambulance report.  I received a call from [facility] stroke team just minutes after the patient arrived.  At this time we are going to do CT CTA and stroke work-up.  MRI is not available tonight.    Allergies:  Allergies   Allergen Reactions     Aspirin      Other reaction(s): Other - Describe In Comment Field  ---- Has been 4 years since stent - as of 5/2017 - Hold Aspirin while on Plavix for now     Canagliflozin      Other reaction(s): Dizziness     Morphine      Other reaction(s): Other - Describe In Comment Field  Pt had a bad experience at age 49 and would like to avoid.       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) 11/06/2020     Priority: Medium     Coronary artery disease involving native coronary artery of native heart without angina pectoris 01/19/2018     Priority: Medium          -MI 1990-LAD with severe lesions in 2 small branches        -2/1/2013 BRYON to mid RCA (angina and abnormal myoview)       Iron deficiency anemia 05/26/2017     Priority: Medium     Heartburn 05/10/2016     Priority: Medium     History of colon cancer 05/06/2016     Priority: Medium     H/O adenomatous polyp of colon 05/06/2016     Priority: Medium     History of tobacco abuse 05/19/2015     Priority: Medium     Microscopic hematuria 05/19/2015     Priority: Medium     Nocturia 05/19/2015     Priority: Medium     Uncontrolled type 2 diabetes mellitus with hyperglycemia (H) 03/14/2014     Priority: Medium     History of gout 05/12/2010     Priority: Medium     Mixed hyperlipidemia 05/12/2010     Priority: Medium        Past Medical History:    Past Medical History:   Diagnosis Date     Acute myocardial infarction (H)      Atherosclerotic heart disease of native coronary artery without angina pectoris      Diverticulosis of large intestine without perforation or abscess without bleeding      Gastritis, erosive 05/06/2016     Gastro-esophageal reflux disease with esophagitis      Gout      History of colonic polyps      Malignant neoplasm of sigmoid colon (H)      Other gastritis without bleeding      Personal history of nicotine dependence        Past Surgical History:    Past Surgical History:   Procedure Laterality Date     APPENDECTOMY OPEN      at age of 12     COLON SURGERY      5/17/16,Colectomy, Sigmoid     COLONOSCOPY      5/6/16,F/U 2017; Dr Solano     COLONOSCOPY  05/15/2017    05/15/2017,F/U 2020     COLONOSCOPY  06/01/2020    F/U 2021 advanced adenomas     COLONOSCOPY N/A 07/19/2021    F/U 2024 tubular adenomas, H/O colon cancer     ESOPHAGOSCOPY, GASTROSCOPY, DUODENOSCOPY  (EGD), COMBINED      16,EGD; Dr Solano     HEART CATH, ANGIOPLASTY      ,Stenting coronary artery, presumably LAD     OTHER SURGICAL HISTORY      [id number],OTHER     OTHER SURGICAL HISTORY      ,FLEXIBLE SIGMOIDOSCOPY     OTHER SURGICAL HISTORY      2013,[id number],CARDIAC CATHETERIZATION,BRYON to,mid RCA (angina and abnormal myoview)     OTHER SURGICAL HISTORY      16,[id number],OTHER,Left,Left subclavian Power Port       Family History:    Family History   Problem Relation Age of Onset     Other - See Comments Father         PE     Other - See Comments Mother         Old Age     Diabetes Sister         Diabetes,? Sisters-DM     Arthritis Brother         Arthritis,? Brothers- Gout       Social History:  Marital Status:   [5]  Social History     Tobacco Use     Smoking status: Former Smoker     Packs/day: 1.00     Years: 30.00     Pack years: 30.00     Types: Cigarettes     Quit date: 1985     Years since quittin.4     Smokeless tobacco: Never Used   Vaping Use     Vaping Use: Never used   Substance Use Topics     Alcohol use: No     Alcohol/week: 0.0 standard drinks     Drug use: No        Medications:    allopurinol (ZYLOPRIM) 100 MG tablet  Cholecalciferol (VITAMIN D3) 50 MCG ( UT) CAPS  clopidogrel (PLAVIX) 75 MG tablet  cyanocobalamin (VITAMIN B-12) 1000 MCG tablet  empagliflozin (JARDIANCE) 10 MG TABS tablet  glipiZIDE (GLUCOTROL) 10 MG tablet  levothyroxine (SYNTHROID/LEVOTHROID) 100 MCG tablet  lisinopril (ZESTRIL) 2.5 MG tablet  metFORMIN (GLUCOPHAGE) 1000 MG tablet  metoprolol tartrate (LOPRESSOR) 50 MG tablet  nitroGLYcerin (NITROSTAT) 0.4 MG sublingual tablet  pantoprazole (PROTONIX) 40 MG EC tablet  Semaglutide 7 MG TABS  simvastatin (ZOCOR) 40 MG tablet  vitamin B-12 (CYANOCOBALAMIN) 2500 MCG sublingual tablet  vitamin B6 (PYRIDOXINE) 100 MG tablet          Review of Systems   Constitutional: Negative for chills and fever.   HENT: Negative for congestion.    Eyes:  "Negative for visual disturbance.   Respiratory: Negative for chest tightness and shortness of breath.    Cardiovascular: Negative for chest pain.   Gastrointestinal: Positive for nausea. Negative for abdominal pain.   Genitourinary: Negative for dysuria.   Musculoskeletal: Negative for arthralgias.   Skin: Negative for rash.   Neurological: Positive for dizziness. Negative for syncope, facial asymmetry, speech difficulty and numbness.   Psychiatric/Behavioral: Negative for confusion.       Physical Exam   BP: (!) 153/85  Pulse: 82  Temp: (!) 95.6  F (35.3  C)  Resp: 16  Height: 172.7 cm (5' 8\")  Weight: 101.6 kg (224 lb)  SpO2: 93 %      Physical Exam  Vitals and nursing note reviewed.   Constitutional:       Appearance: Normal appearance.   HENT:      Head: Normocephalic and atraumatic.      Mouth/Throat:      Mouth: Mucous membranes are moist.   Eyes:      Conjunctiva/sclera: Conjunctivae normal.   Cardiovascular:      Rate and Rhythm: Normal rate and regular rhythm.      Heart sounds: Normal heart sounds.   Pulmonary:      Effort: Pulmonary effort is normal.      Breath sounds: Normal breath sounds.   Skin:     General: Skin is warm and dry.   Neurological:      Mental Status: He is alert and oriented to person, place, and time.   Psychiatric:         Behavior: Behavior normal.         ED Course                 Procedures                Results for orders placed or performed during the hospital encounter of 06/22/22 (from the past 24 hour(s))   CBC with Platelets & Differential    Narrative    The following orders were created for panel order CBC with Platelets & Differential.  Procedure                               Abnormality         Status                     ---------                               -----------         ------                     CBC with platelets and d...[[id number]]                      Final result                 Please view results for these tests on the individual orders.   Basic " metabolic panel   Result Value Ref Range    Sodium 136 134 - 144 mmol/L    Potassium 4.2 3.5 - 5.1 mmol/L    Chloride 102 98 - 107 mmol/L    Carbon Dioxide (CO2) 21 21 - 31 mmol/L    Anion Gap 13 3 - 14 mmol/L    Urea Nitrogen 23 7 - 25 mg/dL    Creatinine 1.22 0.70 - 1.30 mg/dL    Calcium 9.2 8.6 - 10.3 mg/dL    Glucose 287 (H) 70 - 105 mg/dL    GFR Estimate 61 >60 mL/min/1.73m2   INR   Result Value Ref Range    INR 1.04 0.85 - 1.15   Partial thromboplastin time   Result Value Ref Range    aPTT 24 22 - 38 Seconds   Troponin I   Result Value Ref Range    Troponin I 4.7 0.0 - 34.0 pg/mL   CBC with platelets and differential   Result Value Ref Range    WBC Count 10.5 4.0 - 11.0 10e3/uL    RBC Count 4.64 4.40 - 5.90 10e6/uL    Hemoglobin 14.0 13.3 - 17.7 g/dL    Hematocrit 42.2 40.0 - 53.0 %    MCV 91 78 - 100 fL    MCH 30.2 26.5 - 33.0 pg    MCHC 33.2 31.5 - 36.5 g/dL    RDW 15.0 10.0 - 15.0 %    Platelet Count 233 150 - 450 10e3/uL    % Neutrophils 69 %    % Lymphocytes 19 %    % Monocytes 10 %    % Eosinophils 1 %    % Basophils 0 %    % Immature Granulocytes 1 %    NRBCs per 100 WBC 0 <1 /100    Absolute Neutrophils 7.2 1.6 - 8.3 10e3/uL    Absolute Lymphocytes 2.0 0.8 - 5.3 10e3/uL    Absolute Monocytes 1.0 0.0 - 1.3 10e3/uL    Absolute Eosinophils 0.1 0.0 - 0.7 10e3/uL    Absolute Basophils 0.0 0.0 - 0.2 10e3/uL    Absolute Immature Granulocytes 0.2 <=0.4 10e3/uL    Absolute NRBCs 0.0 10e3/uL   Glucose by meter   Result Value Ref Range    GLUCOSE BY METER POCT 292 (H) 70 - 99 mg/dL   CT Head w/o Contrast    Addendum: 6/22/2022    Addendum created by Suraj Garland MD on 6/22/2022 12:49:05 AM CDT:  THIS REPORT CONTAINS FINDINGS THAT MAY BE CRITICAL TO PATIENT CARE. The   findings were verbally communicated via telephone conference with GARRETT Jenkins at 12:48 AM CDT on 6/22/2022. The findings were acknowledged and   understood.        Narrative    Initial report created on 6/22/2022 12:43:47 AM  CDT:    PROCEDURE INFORMATION:   Exam: CT Head Without Contrast   Exam date and time: 6/22/2022 12:25 AM   Age: 76 years old   Clinical indication: Stroke-like symptoms; Dizziness/giddiness and   syncope/collapse; Additional info: Code stroke to evaluate for potential   thrombolysis and thrombectomy. Please read immediately.     TECHNIQUE:   Imaging protocol: Computed tomography of the head without contrast.   Radiation optimization: All CT scans at this facility use at least one of these   dose optimization techniques: automated exposure control; mA and/or kV   adjustment per patient size (includes targeted exams where dose is matched to   clinical indication); or iterative reconstruction.   Other technique: STROKE PROTOCOL was implemented.     COMPARISON:   No relevant prior studies available.     FINDINGS:   Brain: Age consistent cerebral and cerebellar atrophy. Age consistent   periventricular white matter abnormality. No intracranial hemorrhage or   intracranial mass.   Cerebral ventricles: No ventriculomegaly.   Paranasal sinuses: Maxillary, ethmoid and frontal sinus disease.   Mastoid air cells: Visualized mastoid air cells are well aerated.     Bones/joints: Unremarkable. No acute fracture.   Soft tissues: Unremarkable.       Impression    IMPRESSION:   No acute intracranial pathology.   ASSESSMENT:   ASPECTS ([place] Stroke Program Early CT Score) is 10.     THIS DOCUMENT HAS BEEN ELECTRONICALLY SIGNED BY WALE GARLAND MD   CTA Head Neck with Contrast    Addendum: 6/22/2022    Addendum created by Wale Garland MD on 6/22/2022 1:21:16 AM CDT:  THIS REPORT CONTAINS FINDINGS THAT MAY BE CRITICAL TO PATIENT CARE. The   findings were received by GARRETT Jenkins at 1:21 AM CDT on 6/22/2022. The   findings were acknowledged and understood.        Narrative    Initial report created on 6/22/2022 1:00:10 AM CDT:    PROCEDURE INFORMATION:   Exam: CTA Head With Contrast, Arteriography   Exam date and  time: 6/22/2022 12:25 AM   Age: 76 years old   Clinical indication: Stroke-like symptoms; Dizziness/giddiness and   drowsines/somnolence and syncope/collapse and vomiting; Additional info: PT   here by meds 1 into bay 2, PT developed dizziness this afternoon, PT was having   difficulty standing and was very nauseated. HX of vertigo.     TECHNIQUE:   Imaging protocol: Computed tomographic angiography of the head with contrast.   Exam focused on the arteries.   3D rendering (Not supervised by radiologist): MIP and/or 3D reconstructed   images were created by the technologist.   Radiation optimization: All CT scans at this facility use at least one of these   dose optimization techniques: automated exposure control; mA and/or kV   adjustment per patient size (includes targeted exams where dose is matched to   clinical indication); or iterative reconstruction.   Contrast material: ISOVUE 370; Contrast volume: 100 ml; Contrast route:   INTRAVENOUS (IV);      COMPARISON:   No relevant prior studies available.     FINDINGS:     ANTERIOR CIRCULATION:   Right internal carotid artery: Atherosclerotic change of the intracavernous   portion of the right internal carotid artery. No focal stenosis of this vessel.   Right middle cerebral artery: Unremarkable. No occlusion or significant   stenosis. No aneurysm.    Right anterior cerebral artery: Unremarkable. No occlusion or significant   stenosis. No aneurysm.      Left internal carotid artery: Atherosclerotic change of the intracavernous   portion of the left internal carotid artery. No focal stenosis of this vessel.   Left middle cerebral artery: Unremarkable. No occlusion or significant   stenosis. No aneurysm.    Left anterior cerebral artery: Unremarkable. No occlusion or significant   stenosis. No aneurysm.      POSTERIOR CIRCULATION:   Right vertebral artery: Atherosclerotic change of the intracranial right   vertebral artery. No focal stenosis of the artery. Hypoplastic  right vertebral   artery. This represents a normal anatomical variant.   Left vertebral artery: Unremarkable. No occlusion or significant stenosis. No   aneurysm.    Basilar artery: Unremarkable. No occlusion or significant stenosis. No   aneurysm.   Right posterior cerebral artery: Unremarkable. No occlusion or significant   stenosis. No aneurysm.    Left posterior cerebral artery: Unremarkable. No occlusion or significant   stenosis. No aneurysm.      Brain: No definite mass, mass effect, or midline shift.   Cerebral ventricles: No ventriculomegaly.   Bones/joints: Unremarkable. No acute fracture.   Soft tissues: Unremarkable.       Impression    IMPRESSION:   All cerebral arteries are normal.    Atherosclerotic change of the intracavernous internal carotid arteries as   described above.    =========================  PROCEDURE INFORMATION:   Exam: CTA Neck With Contrast   Exam date and time: 6/22/2022 12:25 AM   Age: 76 years old   Clinical indication: Stroke-like symptoms; Dizziness/giddiness and   drowsines/somnolence and syncope/collapse and vomiting; Additional info: PT   here by meds 1 into bay 2, PT developed dizziness this afternoon, PT was having   difficulty standing and was very nauseated. HX of vertigo.     TECHNIQUE:   Imaging protocol: Computed tomographic angiography of the neck with contrast.   3D rendering (Not supervised by radiologist): MIP and/or 3D reconstructed   images were created by the technologist.   Radiation optimization: All CT scans at this facility use at least one of these   dose optimization techniques: automated exposure control; mA and/or kV   adjustment per patient size (includes targeted exams where dose is matched to   clinical indication); or iterative reconstruction.   Contrast material: ISOVUE 370; Contrast volume: 100 ml; Contrast route:   INTRAVENOUS (IV);      COMPARISON:   No relevant prior studies available.     FINDINGS:   Right common carotid artery: No stenosis. No  dissection or occlusion.   Right internal carotid artery: Moderate atherosclerotic change of the right   internal carotid artery. No hemodynamically significant stenosis applying the   NASCET criteria.   Right external carotid artery: No occlusion or stenosis of the origin.      Left common carotid artery: No stenosis. No dissection or occlusion.   Left internal carotid artery: Moderate atherosclerotic change of the left   internal carotid artery. No hemodynamically significant applying the NASCET   criteria.   Left external carotid artery: No occlusion or stenosis of the origin.      Right vertebral artery: Hypoplastic right vertebral artery. This represents a   normal anatomical variant.   Left vertebral artery: No stenosis. No dissection or occlusion.     Aorta: Diffuse atherosclerotic change of the thoracic aorta.     Soft tissues: Normal. No significant soft tissue swelling.     Bones/joints: No acute fracture.     Lungs: Interstitial pulmonary edema.     IMPRESSION:   Moderate atherosclerotic change of the internal carotid arteries. No   hemodynamically significant stenosis applying the NASCET criteria.    Hypoplastic right vertebral artery.  This represents a normal anatomical   variant.       REFERENCES:   NASCET CRITERIA. The degree of internal carotid artery stenosis is based on   NASCET criteria. Normal is no stenosis. Mild is less than 50% stenosis.   Moderate is 50-69% stenosis. Severe is 70% to 99% stenosis. Total occlusion is   no detectable patent lumen.     THIS DOCUMENT HAS BEEN ELECTRONICALLY SIGNED BY WALE SUTTON MD       Medications   ondansetron (ZOFRAN) injection 4 mg (4 mg Intravenous Given 6/22/22 0043)   aspirin (ASA) tablet 325 mg (has no administration in time range)   clopidogrel (PLAVIX) tablet 300 mg (has no administration in time range)   atorvastatin (LIPITOR) tablet 80 mg (has no administration in time range)   iopamidol (ISOVUE-370) solution 100 mL (100 mLs Intravenous  Given 6/22/22 0040)   alum & mag hydroxide-simethicone (MAALOX) suspension 15 mL (15 mLs Oral Given 6/22/22 0054)     And   lidocaine (viscous) (XYLOCAINE) 2 % solution 15 mL (15 mLs Mouth/Throat Given 6/22/22 0054)       Assessments & Plan (with Medical Decision Making)     I have reviewed the nursing notes.    I have reviewed the findings, diagnosis, plan and need for follow up with the patient.  Patient with acute onset of dizziness and visual changes which is significantly improved.  Tier 2 stroke code called prior to arrival and I have been working with Dr. Carreon on this.  He reviewed the imaging.  He is concerned for possible soft plaque in the left vertebral artery.  He recommended starting aspirin Plavix and atorvastatin, which has been done.  He recommended transfer to higher level of care to see neurology for further study and possible intervention.  I spoke with Dr. Gill, neurologist at [facility], and that Dr. Snider, hospitalist at [facility] and they have accepted the patient in transfer.  Vital signs stable at this time    New Prescriptions    No medications on file       Final diagnoses:   Cerebrovascular accident (CVA), unspecified mechanism (H)       6/22/2022   [facility]     Tom Moon MD  06/22/22 0128

## 2022-06-22 NOTE — ED TRIAGE NOTES
Pt here by meds 1 into bay 2, pt developed dizziness this afternoon, pt was having difficulty standing and was very nauseated, VSS, pt was given zofran IV and is feeling better, stroke code called per MD request 3 minutes prior to arrival     Triage Assessment     Row Name 06/22/22 0010       Triage Assessment (Adult)    Airway WDL WDL       Respiratory WDL    Respiratory WDL WDL       Skin Circulation/Temperature WDL    Skin Circulation/Temperature WDL WDL       Cardiac WDL    Cardiac WDL WDL       Peripheral/Neurovascular WDL    Peripheral Neurovascular WDL WDL       Cognitive/Neuro/Behavioral WDL    Cognitive/Neuro/Behavioral WDL WDL

## 2022-07-06 ENCOUNTER — OFFICE VISIT (OUTPATIENT)
Dept: FAMILY MEDICINE | Facility: OTHER | Age: 77
End: 2022-07-06
Attending: FAMILY MEDICINE
Payer: COMMERCIAL

## 2022-07-06 VITALS
OXYGEN SATURATION: 97 % | WEIGHT: 224.2 LBS | HEIGHT: 70 IN | BODY MASS INDEX: 32.1 KG/M2 | TEMPERATURE: 98 F | RESPIRATION RATE: 20 BRPM

## 2022-07-06 DIAGNOSIS — R93.0 MASS IN REGION OF SELLA TURCICA PRESENT ON MAGNETIC RESONANCE IMAGING: ICD-10-CM

## 2022-07-06 DIAGNOSIS — H53.9 VISION CHANGES: ICD-10-CM

## 2022-07-06 DIAGNOSIS — R42 VERTIGO: ICD-10-CM

## 2022-07-06 DIAGNOSIS — I65.02 THROMBOSIS OF LEFT VERTEBRAL ARTERY: ICD-10-CM

## 2022-07-06 DIAGNOSIS — I25.10 CORONARY ARTERY DISEASE INVOLVING NATIVE CORONARY ARTERY OF NATIVE HEART WITHOUT ANGINA PECTORIS: ICD-10-CM

## 2022-07-06 DIAGNOSIS — U07.1 INFECTION DUE TO 2019 NOVEL CORONAVIRUS: ICD-10-CM

## 2022-07-06 DIAGNOSIS — Z95.5 STATUS POST CORONARY ARTERY STENT PLACEMENT: ICD-10-CM

## 2022-07-06 DIAGNOSIS — I50.32 CHRONIC DIASTOLIC CONGESTIVE HEART FAILURE (H): ICD-10-CM

## 2022-07-06 DIAGNOSIS — E11.65 UNCONTROLLED TYPE 2 DIABETES MELLITUS WITH HYPERGLYCEMIA (H): ICD-10-CM

## 2022-07-06 DIAGNOSIS — G45.9 TIA (TRANSIENT ISCHEMIC ATTACK): Primary | ICD-10-CM

## 2022-07-06 PROCEDURE — G0463 HOSPITAL OUTPT CLINIC VISIT: HCPCS | Performed by: FAMILY MEDICINE

## 2022-07-06 PROCEDURE — 99213 OFFICE O/P EST LOW 20 MIN: CPT | Performed by: FAMILY MEDICINE

## 2022-07-06 PROCEDURE — 99495 TRANSJ CARE MGMT MOD F2F 14D: CPT | Performed by: FAMILY MEDICINE

## 2022-07-06 PROCEDURE — G0463 HOSPITAL OUTPT CLINIC VISIT: HCPCS

## 2022-07-06 RX ORDER — ATORVASTATIN CALCIUM 40 MG/1
40 TABLET, FILM COATED ORAL DAILY
Qty: 90 TABLET | Refills: 0 | Status: SHIPPED | OUTPATIENT
Start: 2022-07-06 | End: 2022-07-21

## 2022-07-06 RX ORDER — ATORVASTATIN CALCIUM 40 MG/1
1 TABLET, FILM COATED ORAL DAILY
COMMUNITY
Start: 2022-06-24 | End: 2022-07-06

## 2022-07-06 ASSESSMENT — PAIN SCALES - GENERAL: PAINLEVEL: NO PAIN (0)

## 2022-07-06 NOTE — NURSING NOTE
Patient here for hospital follow up after a CVA on 06/22/2022. Medication Reconciliation: complete.    Nicol Pedraza LPN  7/6/2022 2:06 PM

## 2022-07-06 NOTE — PROGRESS NOTES
"Hospitalization Follow-up Visit         HPI       Hospital Follow-up Visit:    Hospital:  [facility]   Date of Admission: 6/22/2022  Date of Discharge: 6/24/2022  Reason(s) for Admission: TIA  Vertebral artery thrombosis, left  Visual changes  Vertigo  COVID-19  Chronic systolic heart failure  Mass in region of sella turcica present on MRI  Type II diabetes mellitus without long-term use of insulin            Problems taking medications regularly:  None       Post Discharge Medication Reconciliation: discharge medications reconciled and changed, per note/orders.       Problems adhering to non-medication therapy:  None       Medications reviewed by: by myself.    Summary of hospitalization:  See outside records, reviewed and scanned  Diagnostic Tests/Treatments reviewed.  Follow up needed: Neurology, Cardiology  Other Healthcare Providers Involved in Patient s Care:         Specialist appointment - Stroke Neurology  Update since discharge: improved.   Plan of care communicated with patient        TIA, Left vertebral artery thrombosis, Visual changes, Vertigo:  Reports that he developed visual changes and vertigo, prompting CVA work-up.  MRI was negative, and his symptoms resolved.  He was diagnosed with TIA.  Left vertebral artery thrombosis was identified on additional imaging.  He reports that he has had some dizziness when he wakes up in the morning, which he has attributed to his low blood pressure which he states has been present \"all [his] life.\"  He has stroke neurology follow-up scheduled.  He is currently wearing an MCOT.    COVID-19:  Did not require treatment.    Chronic systolic heart failure:  He reports a history of myocardial infarction x 2, most recently at age 65.  He believes he has four stents in place.  He is currently managed on beta blocker, low dose ACE-I, and Plavix.  Xarelto was added during his recent hospitalization.  He has no established cardiologist.    Mass in region of " "sella turcica present on MRI:  Needs neurology follow-up.    Diabetes mellitus type II:  Blood sugars elevated while inpatient.  He does not check his blood sugar outpatient though he has the ability to do so.  He refuses any type of injectable medication.  He is due for follow-up with his PCP later this summer.           Review of Systems:   CONSTITUTIONAL: no fatigue, no unexpected change in weight  SKIN: no worrisome rashes, no worrisome moles, no worrisome lesions  EYES: no acute vision problems or changes  ENT: no ear problems, no mouth problems, no throat problems  RESP: no significant cough, no shortness of breath  CV: no chest pain, no palpitations, no new or worsening peripheral edema  GI: no nausea, no vomiting, no constipation, no diarrhea            Physical Exam:     Vitals:    07/06/22 1407   BP: (!) 80/50   Pulse: 72   Resp: 20   Temp: 98  F (36.7  C)   SpO2: 95%   Weight: 101.7 kg (224 lb 3.2 oz)   Height: 1.778 m (5' 10\")     Body mass index is 32.17 kg/m .    GENERAL: healthy, alert, well nourished, well hydrated, no distress  HENT: ear canals- normal; TMs- normal; Nose- normal; Mouth- no ulcers, no lesions  NECK: no tenderness, no adenopathy, no asymmetry, no masses, no stiffness; thyroid- normal to palpation  RESP: lungs clear to auscultation - no rales, no rhonchi, no wheezes  CV: regular rates and rhythm, normal S1 S2, no S3 or S4 and no murmur, no click or rub -  ABDOMEN: soft, no tenderness, no  hepatosplenomegaly, no masses, normal bowel sounds  NEURO: no cranial nerve deficit    Assessment and Plan     1. TIA  2. Thrombosis of left vertebral artery  3. Vision changes  4. Vertigo  Symptoms resolved < 24 hours.  He has been taking Simvastatin in addition to new Atorvastatin.  Discontinue Simvastatin.  Continue Xarelto.  Continue MCOT for full thirty days.  Follow-up with stroke neurology as scheduled.    5. Chronic diastolic congestive heart failure  6. Coronary artery disease  7. S/P " coronary stent placement  Echocardiogram with EF 25-30%.  BP borderline upon presentation but improved and within normal range on recheck.  Orthostatics normal.  Continue current medication regimen.  Referral to establish with cardiologist.    8. Mass in region of sella turcica  Referral to neurology.    9. Uncontrolled type 2 diabetes mellitus with hyperglycemia  Follow-up with PCP at scheduled appointment.     10. Infection due to 2019 novel coronavirus  Resolved.    E&M code to be billed if TCM cannot be: 71854    Type of decision making: Moderate complexity ([id number])      Options for treatment and follow-up care were reviewed with the patient  Andrez Olivier   engaged in the decision making process and verbalized understanding of the options discussed and agreed with the final plan.      Phoebe Avilez, DO

## 2022-07-21 ENCOUNTER — LAB (OUTPATIENT)
Dept: LAB | Facility: OTHER | Age: 77
End: 2022-07-21
Attending: INTERNAL MEDICINE
Payer: MEDICARE

## 2022-07-21 ENCOUNTER — OFFICE VISIT (OUTPATIENT)
Dept: INTERNAL MEDICINE | Facility: OTHER | Age: 77
End: 2022-07-21
Attending: INTERNAL MEDICINE
Payer: MEDICARE

## 2022-07-21 ENCOUNTER — TELEPHONE (OUTPATIENT)
Dept: FAMILY MEDICINE | Facility: OTHER | Age: 77
End: 2022-07-21

## 2022-07-21 VITALS
DIASTOLIC BLOOD PRESSURE: 66 MMHG | BODY MASS INDEX: 33.03 KG/M2 | HEART RATE: 68 BPM | OXYGEN SATURATION: 98 % | TEMPERATURE: 96.6 F | SYSTOLIC BLOOD PRESSURE: 130 MMHG | RESPIRATION RATE: 18 BRPM | WEIGHT: 223 LBS | HEIGHT: 69 IN

## 2022-07-21 DIAGNOSIS — U07.1 INFECTION DUE TO 2019 NOVEL CORONAVIRUS: ICD-10-CM

## 2022-07-21 DIAGNOSIS — I50.22 CHRONIC SYSTOLIC HEART FAILURE (H): ICD-10-CM

## 2022-07-21 DIAGNOSIS — E78.2 MIXED HYPERLIPIDEMIA: ICD-10-CM

## 2022-07-21 DIAGNOSIS — Z87.39 HISTORY OF GOUT: ICD-10-CM

## 2022-07-21 DIAGNOSIS — I25.10 CORONARY ARTERY DISEASE INVOLVING NATIVE CORONARY ARTERY OF NATIVE HEART WITHOUT ANGINA PECTORIS: ICD-10-CM

## 2022-07-21 DIAGNOSIS — E11.65 UNCONTROLLED TYPE 2 DIABETES MELLITUS WITH HYPERGLYCEMIA (H): Primary | ICD-10-CM

## 2022-07-21 DIAGNOSIS — K21.00 GASTROESOPHAGEAL REFLUX DISEASE WITH ESOPHAGITIS, UNSPECIFIED WHETHER HEMORRHAGE: ICD-10-CM

## 2022-07-21 DIAGNOSIS — E53.8 VITAMIN B12 DEFICIENCY: ICD-10-CM

## 2022-07-21 DIAGNOSIS — E03.4 HYPOTHYROIDISM DUE TO ACQUIRED ATROPHY OF THYROID: ICD-10-CM

## 2022-07-21 DIAGNOSIS — I10 BENIGN ESSENTIAL HYPERTENSION: ICD-10-CM

## 2022-07-21 DIAGNOSIS — C77.9 SECONDARY AND UNSPECIFIED MALIGNANT NEOPLASM OF LYMPH NODE, UNSPECIFIED (H): ICD-10-CM

## 2022-07-21 DIAGNOSIS — Z86.73 HISTORY OF TIA (TRANSIENT ISCHEMIC ATTACK) AND STROKE: ICD-10-CM

## 2022-07-21 DIAGNOSIS — N18.31 CHRONIC KIDNEY DISEASE, STAGE 3A (H): ICD-10-CM

## 2022-07-21 DIAGNOSIS — R93.0 MASS IN REGION OF SELLA TURCICA PRESENT ON MAGNETIC RESONANCE IMAGING: ICD-10-CM

## 2022-07-21 DIAGNOSIS — R73.9 ELEVATED RANDOM BLOOD GLUCOSE LEVEL: ICD-10-CM

## 2022-07-21 LAB
ALBUMIN SERPL-MCNC: 4.1 G/DL (ref 3.5–5.7)
ALBUMIN UR-MCNC: NEGATIVE MG/DL
ALP SERPL-CCNC: 86 U/L (ref 34–104)
ALT SERPL W P-5'-P-CCNC: 9 U/L (ref 7–52)
ANION GAP SERPL CALCULATED.3IONS-SCNC: 10 MMOL/L (ref 3–14)
APPEARANCE UR: CLEAR
AST SERPL W P-5'-P-CCNC: 11 U/L (ref 13–39)
BILIRUB SERPL-MCNC: 0.6 MG/DL (ref 0.3–1)
BILIRUB UR QL STRIP: NEGATIVE
BUN SERPL-MCNC: 15 MG/DL (ref 7–25)
CALCIUM SERPL-MCNC: 9.5 MG/DL (ref 8.6–10.3)
CHLORIDE BLD-SCNC: 104 MMOL/L (ref 98–107)
CHOLEST SERPL-MCNC: 136 MG/DL
CO2 SERPL-SCNC: 27 MMOL/L (ref 21–31)
COLOR UR AUTO: ABNORMAL
CREAT SERPL-MCNC: 1.09 MG/DL (ref 0.7–1.3)
ERYTHROCYTE [DISTWIDTH] IN BLOOD BY AUTOMATED COUNT: 15.2 % (ref 10–15)
FASTING STATUS PATIENT QL REPORTED: YES
GFR SERPL CREATININE-BSD FRML MDRD: 70 ML/MIN/1.73M2
GLUCOSE BLD-MCNC: 166 MG/DL (ref 70–105)
GLUCOSE UR STRIP-MCNC: >1000 MG/DL
HBA1C MFR BLD: 8.7 % (ref 4–6.2)
HCT VFR BLD AUTO: 42.6 % (ref 40–53)
HDLC SERPL-MCNC: 30 MG/DL (ref 23–92)
HGB BLD-MCNC: 13.6 G/DL (ref 13.3–17.7)
HGB UR QL STRIP: NEGATIVE
KETONES UR STRIP-MCNC: NEGATIVE MG/DL
LDLC SERPL CALC-MCNC: 46 MG/DL
LEUKOCYTE ESTERASE UR QL STRIP: NEGATIVE
MCH RBC QN AUTO: 29.4 PG (ref 26.5–33)
MCHC RBC AUTO-ENTMCNC: 31.9 G/DL (ref 31.5–36.5)
MCV RBC AUTO: 92 FL (ref 78–100)
NITRATE UR QL: NEGATIVE
NONHDLC SERPL-MCNC: 106 MG/DL
PH UR STRIP: 5 [PH] (ref 5–9)
PLATELET # BLD AUTO: 218 10E3/UL (ref 150–450)
POTASSIUM BLD-SCNC: 4 MMOL/L (ref 3.5–5.1)
PROT SERPL-MCNC: 6.7 G/DL (ref 6.4–8.9)
RBC # BLD AUTO: 4.62 10E6/UL (ref 4.4–5.9)
SODIUM SERPL-SCNC: 141 MMOL/L (ref 134–144)
SP GR UR STRIP: 1.03 (ref 1–1.03)
TRIGL SERPL-MCNC: 298 MG/DL
TSH SERPL DL<=0.005 MIU/L-ACNC: 3.56 MU/L (ref 0.4–4)
UROBILINOGEN UR STRIP-MCNC: NORMAL MG/DL
VIT B12 SERPL-MCNC: 653 PG/ML (ref 180–914)
WBC # BLD AUTO: 6.6 10E3/UL (ref 4–11)

## 2022-07-21 PROCEDURE — 82043 UR ALBUMIN QUANTITATIVE: CPT | Mod: ZL

## 2022-07-21 PROCEDURE — 83036 HEMOGLOBIN GLYCOSYLATED A1C: CPT | Mod: ZL

## 2022-07-21 PROCEDURE — 36415 COLL VENOUS BLD VENIPUNCTURE: CPT | Mod: ZL

## 2022-07-21 PROCEDURE — 80053 COMPREHEN METABOLIC PANEL: CPT | Mod: ZL

## 2022-07-21 PROCEDURE — 84443 ASSAY THYROID STIM HORMONE: CPT | Mod: ZL

## 2022-07-21 PROCEDURE — 80061 LIPID PANEL: CPT | Mod: ZL

## 2022-07-21 PROCEDURE — 85027 COMPLETE CBC AUTOMATED: CPT | Mod: ZL

## 2022-07-21 PROCEDURE — 82607 VITAMIN B-12: CPT | Mod: ZL

## 2022-07-21 PROCEDURE — 99214 OFFICE O/P EST MOD 30 MIN: CPT | Performed by: INTERNAL MEDICINE

## 2022-07-21 PROCEDURE — 81003 URINALYSIS AUTO W/O SCOPE: CPT | Mod: ZL

## 2022-07-21 PROCEDURE — G0463 HOSPITAL OUTPT CLINIC VISIT: HCPCS

## 2022-07-21 RX ORDER — GLIPIZIDE 10 MG/1
TABLET ORAL
Qty: 270 TABLET | Refills: 1 | Status: SHIPPED | OUTPATIENT
Start: 2022-07-21 | End: 2022-10-28

## 2022-07-21 RX ORDER — PANTOPRAZOLE SODIUM 40 MG/1
40 TABLET, DELAYED RELEASE ORAL DAILY
Qty: 90 TABLET | Refills: 1 | Status: SHIPPED | OUTPATIENT
Start: 2022-07-21 | End: 2022-10-28

## 2022-07-21 RX ORDER — LEVOTHYROXINE SODIUM 100 UG/1
100 TABLET ORAL DAILY
Qty: 90 TABLET | Refills: 1 | Status: SHIPPED | OUTPATIENT
Start: 2022-07-21 | End: 2022-09-16

## 2022-07-21 RX ORDER — METOPROLOL TARTRATE 50 MG
50 TABLET ORAL 2 TIMES DAILY
Qty: 180 TABLET | Refills: 1 | Status: SHIPPED | OUTPATIENT
Start: 2022-07-21 | End: 2022-08-09 | Stop reason: ALTCHOICE

## 2022-07-21 RX ORDER — ATORVASTATIN CALCIUM 40 MG/1
40 TABLET, FILM COATED ORAL DAILY
Qty: 90 TABLET | Refills: 1 | Status: SHIPPED | OUTPATIENT
Start: 2022-07-21 | End: 2022-10-28

## 2022-07-21 RX ORDER — ALLOPURINOL 100 MG/1
100 TABLET ORAL 2 TIMES DAILY
Qty: 180 TABLET | Refills: 1 | Status: SHIPPED | OUTPATIENT
Start: 2022-07-21 | End: 2022-10-28

## 2022-07-21 RX ORDER — LISINOPRIL 2.5 MG/1
2.5 TABLET ORAL DAILY
Qty: 90 TABLET | Refills: 1 | Status: SHIPPED | OUTPATIENT
Start: 2022-07-21 | End: 2022-08-09 | Stop reason: ALTCHOICE

## 2022-07-21 ASSESSMENT — ENCOUNTER SYMPTOMS
NUMBNESS: 1
DIARRHEA: 1
FEVER: 0
CHEST TIGHTNESS: 0
ENDOCRINE COMMENTS: + HYPERGLYCEMIA
ABDOMINAL PAIN: 0
CHILLS: 0
ADENOPATHY: 0
SHORTNESS OF BREATH: 0
WOUND: 0
BRUISES/BLEEDS EASILY: 0
CONFUSION: 0
BACK PAIN: 0
HEMATURIA: 0

## 2022-07-21 ASSESSMENT — PAIN SCALES - GENERAL: PAINLEVEL: NO PAIN (0)

## 2022-07-21 NOTE — PROGRESS NOTES
Assessment & Plan       ICD-10-CM    1. Uncontrolled type 2 diabetes mellitus with hyperglycemia (H)  E11.65 glipiZIDE (GLUCOTROL) 10 MG tablet     empagliflozin (JARDIANCE) 10 MG TABS tablet     metFORMIN (GLUCOPHAGE) 1000 MG tablet     Semaglutide 7 MG TABS   2. Vitamin B12 deficiency  E53.8 Vitamin B12   3. History of gout  Z87.39 allopurinol (ZYLOPRIM) 100 MG tablet   4. Hypothyroidism due to acquired atrophy of thyroid  E03.4 levothyroxine (SYNTHROID/LEVOTHROID) 100 MCG tablet   5. Gastroesophageal reflux disease with esophagitis, unspecified whether hemorrhage  K21.00 pantoprazole (PROTONIX) 40 MG EC tablet   6. Mixed hyperlipidemia  E78.2    7. Chronic kidney disease, stage 3a (H)  N18.31    8. Mass in region of sella turcica present on magnetic resonance imaging - ?Adenoma - MRI June 2022 - [facility]  R93.0    9. History of TIA (transient ischemic attack) and stroke  Z86.73 atorvastatin (LIPITOR) 40 MG tablet   10. Coronary artery disease involving native coronary artery of native heart without angina pectoris  I25.10 atorvastatin (LIPITOR) 40 MG tablet     lisinopril (ZESTRIL) 2.5 MG tablet     metoprolol tartrate (LOPRESSOR) 50 MG tablet   11. Chronic systolic heart failure (H) - ECHO 6/22/2022 at [facility] -- EF 25-30%  I50.22 empagliflozin (JARDIANCE) 10 MG TABS tablet     lisinopril (ZESTRIL) 2.5 MG tablet     Semaglutide 7 MG TABS   12. Infection due to 2019 novel coronavirus - 6/22/2022  U07.1    13. Secondary and unspecified malignant neoplasm of lymph node, unspecified (H)  C77.9    Patient presents for diabetes follow-up as well as follow-up multiple issues.    Type 2 diabetes with hyperglycemia, labs have improved but his blood sugars remain elevated.  He cut out many of his sweets and treats.  Recommend that he continue to work on reducing his sugar and carbohydrate intake.  He stopped having coffee and cookies and subsequently reduced his glipizide tablet down to 1 in the morning and  2 in the evening with supper.  Continue Jardiance and metformin and semaglutide.  He did not tolerate higher dosing of semaglutide.  Needs updated prescriptions.    Vitamin B12 deficiency.  Taking oral replacement.  To recheck B12 levels.    History of gout, no recent gout attacks.  Doing well with allopurinol.  Needs refills.    Hypothyroidism.  Doing well with oral thyroid replacement.  TSH is at goal.  No changes.  Needs refills.    Heartburn and reflux, currently taking Protonix regularly.  Has recurrent symptoms if he misses a dose.  Needs refills.    Mixed hyperlipidemia.  LDL cholesterol levels are at goal but triglycerides are high and not at goal.  Hopefully with improved blood sugar control this will improve.  He was recently switched from simvastatin to atorvastatin 40 mg after having TIA and stroke.  Reports that his vision was severely affected with his TIA and he has regained basically all of the weakness that was noted with that event.  He is currently wearing a heart monitor.  He had brain MRI that showed sella turcica mass.  Possibly adenoma.  He has follow-up with neurology.  He is now on Plavix and Xarelto.    Long-term oral anticoagulation.  Currently taking Xarelto plus Plavix.    Stage IIIa chronic kidney disease.  Kidney function has been slowly declining.  Lab work is currently at baseline.  Encourage NSAID avoidance.    Coronary artery disease.  Denies exertional angina.  Does note some lightheadedness and dizziness at times.  Currently taking Lipitor, metoprolol.    Chronic systolic heart failure.  Ejection fraction of 25 to 30%.  This was just completed at his hospitalization for his TIA work-up.  This was a new finding.  He needs to see cardiology in follow-up.    Recent COVID infection, diagnosed in June 2022.  Currently asymptomatic.    Unspecified lymph node malignancy - previous diagnosis.     Encouraged weight loss and regular exercise.     Return in about 3 months (around  10/21/2022) for - Labs every 91+ days, DM Follow-up 1-2 days later, with Dr. Roberts.    Bk Roberts MD  [facility] AND \[facility]\""     Subjective   Andrez is a 76 year old, presenting for the following health issues:  Diabetes      History of Present Illness       Diabetes:   He presents for follow up of diabetes.  He is not checking blood glucose. He is aware of hypoglycemia symptoms including dizziness and weakness. He has no concerns regarding his diabetes at this time.  He is having numbness in feet and blurry vision. The patient has not had a diabetic eye exam in the last 12 months.         Reason for visit:  Diabetic recheck  Symptom onset:  More than a month  Symptom progression:  Staying the same  Had these symptoms before:  Yes    He eats 0-1 servings of fruits and vegetables daily.He consumes 1 sweetened beverage(s) daily.He exercises with enough effort to increase his heart rate 9 or less minutes per day.  He exercises with enough effort to increase his heart rate 3 or less days per week. He is missing 1 dose(s) of medications per week.     Review of Systems   Constitutional: Negative for chills and fever.   HENT: Negative for congestion.    Eyes: Negative for visual disturbance.   Respiratory: Negative for chest tightness and shortness of breath.    Cardiovascular: Negative for chest pain.   Gastrointestinal: Positive for diarrhea (intermittently). Negative for abdominal pain.   Endocrine:        + Hyperglycemia   Genitourinary: Negative for hematuria.   Musculoskeletal: Negative for back pain.   Skin: Negative for rash and wound.   Allergic/Immunologic: Negative for immunocompromised state.   Neurological: Positive for numbness (hands and feet from Agent Orange exposure + Hx of chemotherapy ). Negative for syncope.        Hx recent TIA / stroke   Hematological: Negative for adenopathy. Does not bruise/bleed easily.   Psychiatric/Behavioral: Negative for confusion.          Objective    BP  "130/66 (BP Location: Right arm, Patient Position: Sitting, Cuff Size: Adult Large)   Pulse 68   Temp (!) 96.6  F (35.9  C) (Temporal)   Resp 18   Ht 1.759 m (5' 9.25\")   Wt 101.2 kg (223 lb)   SpO2 98%   BMI 32.69 kg/m    Body mass index is 32.69 kg/m .  Physical Exam  Constitutional:       General: He is not in acute distress.     Appearance: He is well-developed. He is obese. He is not diaphoretic.   HENT:      Head: Normocephalic and atraumatic.   Eyes:      General: No scleral icterus.     Conjunctiva/sclera: Conjunctivae normal.   Neck:      Vascular: No carotid bruit.   Cardiovascular:      Rate and Rhythm: Normal rate and regular rhythm.      Pulses: Normal pulses.      Comments: + Wearing cardiac monitor  Pulmonary:      Effort: Pulmonary effort is normal.      Breath sounds: Normal breath sounds.   Abdominal:      Palpations: Abdomen is soft.      Tenderness: There is no abdominal tenderness.   Musculoskeletal:         General: No deformity.      Cervical back: Neck supple.      Right lower leg: No edema.      Left lower leg: No edema.   Lymphadenopathy:      Cervical: No cervical adenopathy.   Skin:     General: Skin is warm and dry.      Findings: No rash.   Neurological:      Mental Status: He is alert and oriented to person, place, and time. Mental status is at baseline.   Psychiatric:         Mood and Affect: Mood normal.         Behavior: Behavior normal.          Recent Labs   Lab Test 07/21/22  0705 06/22/22  0019 04/19/22  0953 03/07/22  0751 01/06/22  0707 05/03/21  0832 04/19/21  0739 01/22/21  0812   A1C 8.7*  --  9.6*  --  9.5*   < >  --  10.9*   LDL 46  --  100  --  96   < >  --   --    HDL 30  --  34  --  34   < >  --   --    TRIG 298*  --  310*  --  309*   < >  --   --    ALT 9  --  11  10 11  --    < > 21 32   CR 1.09 1.22 1.06  1.04 1.19  --    < > 1.27 1.20   GFRESTIMATED 70 61 73  74 63  --    < > 55* 59*   GFRESTBLACK  --   --   --   --   --   --  67 71   POTASSIUM 4.0 4.2 4.5 "  4.5 4.6  --    < > 4.4 4.7   TSH 3.56  --  4.97*  --  5.71*   < >  --   --     < > = values in this interval not displayed.   Hemoglobin A1c is high but has improved.  LDL and HDL are at goal.  Triglycerides are high and not at goal.  ALT is normal.  Creatinine has improved slightly.  Potassium and TSH are normal.              .  ..

## 2022-07-21 NOTE — TELEPHONE ENCOUNTER
We received a fax back from [facility] that their Neurology Department declined the neurology referral and recommended resending a Neurosurgery referral.     Geneva Hanks on 7/21/2022 at 12:10 PM

## 2022-07-21 NOTE — PATIENT INSTRUCTIONS
Get your eyes checked once yearly.     [facility] - [address] - for eye exam.   [phone]       Blood pressure is well controlled.   Diabetes needs help. A1c is better, but still high.     Goal A1c is 7.9% or lower.     To help with weight loss and improve blood sugar control....    -- Try to avoid Carbohydrates as much as possible -- breads, pasta, baked goods, cakes, oatmeal, cold cereal, potatoes.   -- Eat more lean meats, proteins, eggs, nuts, vegetables.    -- Start or Continue regular daily exercise.     Get out and exercise, bike ride, walk for 10 to 15 minutes after each meal -- this can significantly lowers the spike in blood sugar after eating.     Medications refilled.   Labs are otherwise stable.       Results for orders placed or performed in visit on 07/21/22   Comprehensive metabolic panel     Status: Abnormal   Result Value Ref Range    Sodium 141 134 - 144 mmol/L    Potassium 4.0 3.5 - 5.1 mmol/L    Chloride 104 98 - 107 mmol/L    Carbon Dioxide (CO2) 27 21 - 31 mmol/L    Anion Gap 10 3 - 14 mmol/L    Urea Nitrogen 15 7 - 25 mg/dL    Creatinine 1.09 0.70 - 1.30 mg/dL    Calcium 9.5 8.6 - 10.3 mg/dL    Glucose 166 (H) 70 - 105 mg/dL    Alkaline Phosphatase 86 34 - 104 U/L    AST 11 (L) 13 - 39 U/L    ALT 9 7 - 52 U/L    Protein Total 6.7 6.4 - 8.9 g/dL    Albumin 4.1 3.5 - 5.7 g/dL    Bilirubin Total 0.6 0.3 - 1.0 mg/dL    GFR Estimate 70 >60 mL/min/1.73m2   Lipid Profile     Status: Abnormal   Result Value Ref Range    Cholesterol 136 <200 mg/dL    Triglycerides 298 (H) <150 mg/dL    Direct Measure HDL 30 23 - 92 mg/dL    LDL Cholesterol Calculated 46 <=100 mg/dL    Non HDL Cholesterol 106 <130 mg/dL    Patient Fasting > 8hrs? Yes     Narrative    Cholesterol  Desirable:  <200 mg/dL    Triglycerides  Normal:  Less than 150 mg/dL  Borderline High:  150-199 mg/dL  High:  200-499 mg/dL  Very High:  Greater than or equal to 500 mg/dL    Direct Measure HDL  Female:  Greater than  or equal to 50 mg/dL   Male:  Greater than or equal to 40 mg/dL    LDL Cholesterol  Desirable:  <100mg/dL  Above Desirable:  100-129 mg/dL   Borderline High:  130-159 mg/dL   High:  160-189 mg/dL   Very High:  >= 190 mg/dL    Non HDL Cholesterol  Desirable:  130 mg/dL  Above Desirable:  130-159 mg/dL  Borderline High:  160-189 mg/dL  High:  190-219 mg/dL  Very High:  Greater than or equal to 220 mg/dL   CBC with platelets     Status: Abnormal   Result Value Ref Range    WBC Count 6.6 4.0 - 11.0 10e3/uL    RBC Count 4.62 4.40 - 5.90 10e6/uL    Hemoglobin 13.6 13.3 - 17.7 g/dL    Hematocrit 42.6 40.0 - 53.0 %    MCV 92 78 - 100 fL    MCH 29.4 26.5 - 33.0 pg    MCHC 31.9 31.5 - 36.5 g/dL    RDW 15.2 (H) 10.0 - 15.0 %    Platelet Count 218 150 - 450 10e3/uL   Hemoglobin A1c     Status: Abnormal   Result Value Ref Range    Hemoglobin A1C 8.7 (H) 4.0 - 6.2 %   TSH with free T4 reflex     Status: Normal   Result Value Ref Range    TSH 3.56 0.40 - 4.00 mU/L   UA reflex to Microscopic     Status: Abnormal   Result Value Ref Range    Color Urine Light Yellow Colorless, Straw, Light Yellow, Yellow    Appearance Urine Clear Clear    Glucose Urine >1000 (A) Negative mg/dL    Bilirubin Urine Negative Negative    Ketones Urine Negative Negative mg/dL    Specific Gravity Urine 1.026 1.000 - 1.030    Blood Urine Negative Negative    pH Urine 5.0 5.0 - 9.0    Protein Albumin Urine Negative Negative mg/dL    Urobilinogen Urine Normal Normal, 2.0 mg/dL    Nitrite Urine Negative Negative    Leukocyte Esterase Urine Negative Negative    Narrative    Microscopic not indicated          Aspects of Diabetes:   Recent Labs   Lab Test 07/21/22  0705 06/22/22  0019 04/19/22  0953 03/07/22  0751 01/06/22  0707 05/03/21  0832 04/19/21  0739 01/22/21  0812   A1C 8.7*  --  9.6*  --  9.5*   < >  --  10.9*   LDL 46  --  100  --  96   < >  --   --    HDL 30  --  34  --  34   < >  --   --    TRIG 298*  --  310*  --  309*   < >  --   --    ALT 9  --  11   10 11  --    < > 21 32   CR 1.09 1.22 1.06  1.04 1.19  --    < > 1.27 1.20   GFRESTIMATED 70 61 73  74 63  --    < > 55* 59*   GFRESTBLACK  --   --   --   --   --   --  67 71   POTASSIUM 4.0 4.2 4.5  4.5 4.6  --    < > 4.4 4.7   TSH 3.56  --  4.97*  --  5.71*   < >  --   --     < > = values in this interval not displayed.      Hemoglobin A1c  Goal range is under 8%. Best is 6.5 to 7   Blood Pressure 130/66 Goal to keep less than 140/90   Tobacco  reports that he quit smoking about 37 years ago. His smoking use included cigarettes. He has a 30.00 pack-year smoking history. He has never used smokeless tobacco. Goal to abstain from tobacco   Aspirin or Plavix Anti-platelet therapy Aspirin or Plavix reduces risk of heart disease and stroke  -- sometimes used with other blood thinners, depending on bleeding risk and risk factors.    ACE/ARB Specific blood pressure meds These medications can reduce risk of kidney disease   Cholesterol Statins (Lipitor, Crestor, vs others) Statins reduce risk of heart disease and stroke   Eye Exam -- Do Yearly -- Annual diabetic eye exam   Healthy weight Wt Readings from Last 3 Encounters:   07/21/22 101.2 kg (223 lb)   07/06/22 101.7 kg (224 lb 3.2 oz)   06/22/22 101.6 kg (224 lb)     Body mass index is 32.69 kg/m .  Goal BMI under 30, best is under 25.      -- Trying to exercise daily (goal at least 20 min/day) with moderate aerobic activity   -- Eat healthy (resources from ADA at http://www.diabetes.org/)   -- Taking good care of my feet. Consider seeing the Podiatrist   -- Check blood sugars as directed, record in log book and bring to every appointment    Insurance companies are grading you and I on your blood sugar control -- Goal is to get your A1c down to 7.9% or lower and NO Smoking!  -- Medicare and most insurance companies, will only cover Hemoglobin A1c labs to be rechecked every 91+ days.      Return for Diabetes labs and clinic follow-up appointment every 3 to 4  months.    Schedule lab only appointment --- A few days AFTER: 10/19/22   Schedule clinic appointment with Dr. Roberts -- Same day as labs, or 1-2 days later.

## 2022-07-21 NOTE — NURSING NOTE
"Chief Complaint   Patient presents with     Diabetes         Initial /66 (BP Location: Right arm, Patient Position: Sitting, Cuff Size: Adult Large)   Pulse 68   Temp (!) 96.6  F (35.9  C) (Temporal)   Resp 18   Ht 1.759 m (5' 9.25\")   Wt 101.2 kg (223 lb)   SpO2 98%   BMI 32.69 kg/m   Estimated body mass index is 32.69 kg/m  as calculated from the following:    Height as of this encounter: 1.759 m (5' 9.25\").    Weight as of this encounter: 101.2 kg (223 lb).       FOOD SECURITY SCREENING QUESTIONS:    The next two questions are to help us understand your food security.  If you are feeling you need any assistance in this area, we have resources available to support you today.    Hunger Vital Signs:  Within the past 12 months we worried whether our food would run out before we got money to buy more. Never  Within the past 12 months the food we bought just didn't last and we didn't have money to get more. Never    Advance Care Directive on file? no      Medication reconciliation complete.      Pollo Michaels,on 7/21/2022 at 7:55 AM        "

## 2022-07-22 LAB
CREAT UR-MCNC: 69 MG/DL
MICROALBUMIN UR-MCNC: <12 MG/L
MICROALBUMIN/CREAT UR: NORMAL MG/G{CREAT}

## 2022-08-01 ENCOUNTER — TELEPHONE (OUTPATIENT)
Dept: FAMILY MEDICINE | Facility: OTHER | Age: 77
End: 2022-08-01

## 2022-08-01 DIAGNOSIS — R93.0 MASS IN REGION OF SELLA TURCICA PRESENT ON MAGNETIC RESONANCE IMAGING: Primary | ICD-10-CM

## 2022-08-01 NOTE — TELEPHONE ENCOUNTER
Souleymane from [facility] called and said to let Dr. Avilez know they are declining the referral and recommend she send a referral to endocrinology for medical management.     Geneva Hanks on 8/1/2022 at 3:16 PM

## 2022-08-08 ENCOUNTER — TELEPHONE (OUTPATIENT)
Dept: INTERNAL MEDICINE | Facility: OTHER | Age: 77
End: 2022-08-08

## 2022-08-08 NOTE — TELEPHONE ENCOUNTER
I called the patient regarding a referral put in by Dr. Avilez for endocrinology. He had no idea what the referral was for and why. He would like his primary, Dr. Roberts or his nurse to call him and give him info regarding this so he can decide whether he would like to proceed with the referral. He gave permission to leave a detailed message. The number to [facility] referrals is [phone] if you would like to give it to him to call back to do referral.     Thanks,    Ekta Zamora on 8/8/2022 at 9:42 AM

## 2022-08-09 ENCOUNTER — OFFICE VISIT (OUTPATIENT)
Dept: CARDIOLOGY | Facility: OTHER | Age: 77
End: 2022-08-09
Attending: FAMILY MEDICINE
Payer: COMMERCIAL

## 2022-08-09 VITALS
TEMPERATURE: 96.9 F | BODY MASS INDEX: 32.61 KG/M2 | OXYGEN SATURATION: 96 % | WEIGHT: 220.2 LBS | HEIGHT: 69 IN | RESPIRATION RATE: 16 BRPM | DIASTOLIC BLOOD PRESSURE: 68 MMHG | SYSTOLIC BLOOD PRESSURE: 120 MMHG | HEART RATE: 74 BPM

## 2022-08-09 DIAGNOSIS — I50.22 CHRONIC SYSTOLIC HEART FAILURE (H): Primary | ICD-10-CM

## 2022-08-09 DIAGNOSIS — I50.32 CHRONIC DIASTOLIC CONGESTIVE HEART FAILURE (H): ICD-10-CM

## 2022-08-09 DIAGNOSIS — I25.10 CORONARY ARTERY DISEASE INVOLVING NATIVE CORONARY ARTERY OF NATIVE HEART WITHOUT ANGINA PECTORIS: ICD-10-CM

## 2022-08-09 DIAGNOSIS — Z95.5 STATUS POST CORONARY ARTERY STENT PLACEMENT: ICD-10-CM

## 2022-08-09 PROCEDURE — 93005 ELECTROCARDIOGRAM TRACING: CPT | Performed by: INTERNAL MEDICINE

## 2022-08-09 PROCEDURE — 93010 ELECTROCARDIOGRAM REPORT: CPT | Performed by: INTERNAL MEDICINE

## 2022-08-09 PROCEDURE — 99205 OFFICE O/P NEW HI 60 MIN: CPT | Performed by: INTERNAL MEDICINE

## 2022-08-09 PROCEDURE — G0463 HOSPITAL OUTPT CLINIC VISIT: HCPCS

## 2022-08-09 RX ORDER — ATORVASTATIN CALCIUM 40 MG/1
40 TABLET, FILM COATED ORAL
COMMUNITY
Start: 2022-06-24 | End: 2022-10-28

## 2022-08-09 RX ORDER — LEVOTHYROXINE SODIUM 100 UG/1
100 TABLET ORAL
COMMUNITY
End: 2022-09-16

## 2022-08-09 RX ORDER — GLIPIZIDE 10 MG/1
10 TABLET ORAL
COMMUNITY
End: 2022-09-16

## 2022-08-09 RX ORDER — LEVOTHYROXINE SODIUM 75 UG/1
TABLET ORAL
COMMUNITY
Start: 2022-04-19 | End: 2022-09-16

## 2022-08-09 RX ORDER — ACETAMINOPHEN 325 MG/1
650 TABLET ORAL
COMMUNITY
Start: 2022-06-24 | End: 2022-10-28

## 2022-08-09 RX ORDER — LISINOPRIL 2.5 MG/1
2.5 TABLET ORAL
COMMUNITY
End: 2023-02-14

## 2022-08-09 RX ORDER — SACUBITRIL AND VALSARTAN 24; 26 MG/1; MG/1
1 TABLET, FILM COATED ORAL 2 TIMES DAILY
Qty: 180 TABLET | Refills: 3 | Status: SHIPPED | OUTPATIENT
Start: 2022-08-09 | End: 2022-10-28

## 2022-08-09 RX ORDER — METOPROLOL SUCCINATE 50 MG/1
50 TABLET, EXTENDED RELEASE ORAL DAILY
Qty: 90 TABLET | Refills: 3 | Status: SHIPPED | OUTPATIENT
Start: 2022-08-09 | End: 2023-02-14

## 2022-08-09 RX ORDER — SPIRONOLACTONE 25 MG/1
12.5 TABLET ORAL DAILY
Qty: 45 TABLET | Refills: 3 | Status: CANCELLED | OUTPATIENT
Start: 2022-08-09 | End: 2022-11-07

## 2022-08-09 RX ORDER — CLOPIDOGREL BISULFATE 75 MG/1
75 TABLET ORAL
COMMUNITY
End: 2022-10-28

## 2022-08-09 RX ORDER — PANTOPRAZOLE SODIUM 40 MG/1
40 FOR SUSPENSION ORAL
COMMUNITY
End: 2022-09-16

## 2022-08-09 RX ORDER — NITROGLYCERIN 0.4 MG/1
0.4 TABLET SUBLINGUAL
COMMUNITY
End: 2023-05-10

## 2022-08-09 RX ORDER — ALLOPURINOL 100 MG/1
100 TABLET ORAL
COMMUNITY
End: 2022-10-28

## 2022-08-09 ASSESSMENT — PAIN SCALES - GENERAL: PAINLEVEL: NO PAIN (0)

## 2022-08-09 NOTE — TELEPHONE ENCOUNTER
Date of birth and last name verified.    Message relayed to patient..  He states he does not want to go to [place]  But he will.  When I asked, he has no further questions at this time.    Pollo Michaels .......  8/9/2022  5:15 PM

## 2022-08-09 NOTE — PROGRESS NOTES
Cardiology Clinic Note    HPI    Dear colleagues,     I had the pleasure of seeing Mr. Andrez Olivier in the Cardiology clinic.  As you know, Mr. Andrez Olivier is a pleasant 76 year old male who was recently diagnosed with reduced ejection fraction heart failure during evaluation for TIA and stroke.  This my first time clinic visit with them August 9, 2022.    Patient has pertinent history of ischemic cardiomyopathy and coronary disease with 3 prior PCI's.  One of them was for sudden cardiac arrest where he received stents to his LAD at the age of 49. his last PCI was in 2013 to his RCA at [facility].  His other history is notable for hypertension, type 2 diabetes, dyslipidemia, obesity, colon cancer requiring chemotherapy complicated by neuropathy secondary to the chemotherapy.  The rest of his history as noted below.       June 22, 2022 he was watching the [employer] game, went to stand up, then had loss vision and felt limp and fell down to the floor.  He called his daughter to help him and he crawled over to the door to let his daughter in and she called EMS.  He was taken to the ER here for CT scan of his head then was transferred to [facility].  His symptoms resolved within a day at being at [facility].  During his evaluation he underwent an echocardiogram that showed an ejection fraction of 25 to 30%, and thus is recommended that he follow-up with a cardiologist locally.  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  His CTA head and neck did show a 5 mm area thrombus in the distal left vertebral artery, thus when neurology was consulted they initially placed him on heparin infusion and transition to rivaroxaban.  His aspirin was changed to Plavix.  They also changed his simvastatin to atorvastatin 40 mg daily.  He also tested positive for COVID during this hospitalization.  He was discharged with a MCOT monitor and just  recently returned the device this last week and.    Regarding his reduced ejection fraction, they thought he is largely asymptomatic but did have some orthopnea some dyspnea on exertion but no lower extremity edema and able to do his activities of daily living without much difficulty.  He had already been on beta-blocker, ACE inhibitor, SGLT2 inhibitor    Patient presents alone.  He does live alone by himself.  His daughter and son-in-law are local in the area.  He says he currently feels well right now.  He did have COVID-19 leading up to the stroke, about 2 weeks worth of symptoms but did not see a provider for this.  He does not have chest discomfort nor dyspnea on exertion.  He says he is more limited from his peripheral neuropathy and has to stop due to leg weakness rather than shortness of breath.  He is able to do a flight of stairs.  He goes fishing 2-3 times a week.  He does not sleep on his back due to breathing however.  He does not have PND or lower extremity edema.  He does get lightheaded upon standing occasionally but no prolonged dizziness.  He has no bleeding symptoms.    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)     Morbid obesity (H)     Diverticulosis of colon     Vitamin B12 deficiency     Hypothyroidism due to acquired atrophy of thyroid     Chronic kidney disease, stage 3a (H)     History of TIA (transient ischemic attack) and stroke     Chronic systolic heart failure (H)  - ECHO 2022 at [facility] -- EF 25-30%     Mass in region of sella turcica present on magnetic resonance imaging - ?Adenoma - MRI 2022 - [facility]     Infection due to 2019 novel coronavirus - 2022        FAMILY HISTORY:  Family History   Problem Relation Age of Onset     Other - See Comments Father         PE     Other - See Comments Mother         Old Age     Diabetes Sister         Diabetes,? Sisters-DM     Arthritis Brother         Arthritis,? Brothers- Gout       SOCIAL HISTORY:  Social History     Tobacco Use     Smoking status: Former Smoker     Packs/day: 1.00     Years: 30.00     Pack years: 30.00     Types: Cigarettes     Quit date: 1985     Years since quittin.6     Smokeless tobacco: Never Used   Vaping Use     Vaping Use: Never used   Substance Use Topics     Alcohol use: No     Alcohol/week: 0.0 standard drinks     Drug use: No        ALLERGIES:  Allergies   Allergen Reactions     Aspirin      Other reaction(s): Other - Describe In Comment Field  ---- Has been 4 years since stent - as of 2017 - Hold Aspirin while on Plavix for now     Canagliflozin      Other reaction(s): Dizziness     Morphine      Other reaction(s): Other - Describe In Comment Field  Pt had a bad experience at age 49 and would like to avoid.       CURRENT MEDICATIONS:  Current Outpatient Medications   Medication Sig Dispense Refill     allopurinol (ZYLOPRIM) 100 MG tablet Take 1 tablet (100 mg) by mouth 2 times daily 180 tablet 1     atorvastatin (LIPITOR) 40 MG tablet Take 1 tablet (40 mg) by mouth daily 90 tablet 1     Cholecalciferol (VITAMIN D3) 50 MCG (2000 UT) CAPS Take 1 capsule by mouth daily       clopidogrel (PLAVIX) 75 MG tablet TAKE 1 TABLET BY MOUTH DAILY 90 tablet 4     cyanocobalamin (VITAMIN B-12) 1000 MCG tablet Take 1,000 mcg by mouth daily       empagliflozin (JARDIANCE) 10 MG TABS tablet Take 1 tablet (10 mg) by mouth daily - for diabetes 90 tablet 1     glipiZIDE (GLUCOTROL)  10 MG tablet Take 1 tablet with breakfast and 2 tablet with supper -- total of 3 daily 270 tablet 1     levothyroxine (SYNTHROID/LEVOTHROID) 100 MCG tablet Take 1 tablet (100 mcg) by mouth daily -- 1st thing in AM on empty stomach -- Dose Increase 4/19/2022 90 tablet 1     lisinopril (ZESTRIL) 2.5 MG tablet Take 1 tablet (2.5 mg) by mouth daily 90 tablet 1     metFORMIN (GLUCOPHAGE) 1000 MG tablet Take 1 tablet (1,000 mg) by mouth 2 times daily (with meals) 180 tablet 1     metoprolol tartrate (LOPRESSOR) 50 MG tablet Take 1 tablet (50 mg) by mouth 2 times daily 180 tablet 1     nitroGLYcerin (NITROSTAT) 0.4 MG sublingual tablet Place 1 tablet under the tongue every 5 minutes if needed for Chest Pain.       pantoprazole (PROTONIX) 40 MG EC tablet Take 1 tablet (40 mg) by mouth daily 90 tablet 1     rivaroxaban ANTICOAGULANT (XARELTO) 20 MG TABS tablet Take 1 tablet (20 mg) by mouth daily 90 tablet 0     Semaglutide 7 MG TABS Take 7 mg by mouth daily - for diabetes (didn't tolerate 14 mg tablet daily) 90 tablet 1     vitamin B-12 (CYANOCOBALAMIN) 2500 MCG sublingual tablet Take 1 tablet (2,500 mcg) by mouth daily - for low B12 -- START: 1/7/2022 90 tablet 3     vitamin B6 (PYRIDOXINE) 100 MG tablet Take 100 mg by mouth daily         ROS:   Constitutional: No fever, chills, or sweats.   ENT: No visual disturbance, ear ache, epistaxis, sore throat.   Allergies/Immunologic: Negative.   Respiratory: No cough, hemoptysis.   Cardiovascular: As per HPI.   GI: No nausea, vomiting, hematemesis, melena, or hematochezia.   : No urinary frequency, dysuria, or hematuria.   Integument: Negative.   Psychiatric: Pleasant, no major depression noted  Neuro: No focal neurological deficits noted  Endocrinology: Negative.   Musculoskeletal: No new joint pains    EXAM:  /68 (BP Location: Right arm, Patient Position: Sitting, Cuff Size: Adult Regular)   Pulse 74   Temp 96.9  F (36.1  C) (Temporal)   Resp 16   Ht 1.759 m (5'  "9.25\")   Wt 99.9 kg (220 lb 3.2 oz)   SpO2 96%   BMI 32.28 kg/m      General: appears comfortable, alert and articulate  Head: normocephalic, atraumatic  Eyes: anicteric sclera, EOMI  Heart: regular, S1/S2, no murmur, gallop, rub, estimated JVP 8 cm  Lungs: clear, no rales or wheezing  Abdomen: soft  Extremities: no edema  Neurological: normal speech and affect, no gross motor deficits    Weight  Wt Readings from Last 10 Encounters:   07/21/22 101.2 kg (223 lb)   07/06/22 101.7 kg (224 lb 3.2 oz)   06/22/22 101.6 kg (224 lb)   04/19/22 101.9 kg (224 lb 9.6 oz)   03/16/22 101.6 kg (224 lb)   01/07/22 101.6 kg (224 lb)   09/13/21 101.6 kg (224 lb)   08/12/21 102.2 kg (225 lb 6.4 oz)   07/19/21 100.2 kg (220 lb 12.8 oz)   05/05/21 106.7 kg (235 lb 3.2 oz)       Labs:    CBC RESULTS:  Lab Results   Component Value Date    WBC 6.6 07/21/2022    WBC 7.8 04/19/2021    RBC 4.62 07/21/2022    RBC 4.43 04/19/2021    HGB 13.6 07/21/2022    HGB 13.2 (L) 04/19/2021    HCT 42.6 07/21/2022    HCT 40.3 04/19/2021    MCV 92 07/21/2022    MCV 91 04/19/2021    MCH 29.4 07/21/2022    MCH 29.8 04/19/2021    MCHC 31.9 07/21/2022    MCHC 32.8 04/19/2021    RDW 15.2 (H) 07/21/2022    RDW 14.3 04/19/2021     07/21/2022     04/19/2021       CMP RESULTS:  Lab Results   Component Value Date     07/21/2022     04/19/2021    POTASSIUM 4.0 07/21/2022    POTASSIUM 4.4 04/19/2021    CHLORIDE 104 07/21/2022    CHLORIDE 103 04/19/2021    CO2 27 07/21/2022    CO2 26 04/19/2021    ANIONGAP 10 07/21/2022    ANIONGAP 10 04/19/2021     (H) 07/21/2022     (H) 04/19/2021    BUN 15 07/21/2022    BUN 17 04/19/2021    CR 1.09 07/21/2022    CR 1.27 04/19/2021    GFRESTIMATED 70 07/21/2022    GFRESTIMATED 55 (L) 04/19/2021    GFRESTBLACK 67 04/19/2021    FABIO 9.5 07/21/2022    FABIO 9.1 04/19/2021    BILITOTAL 0.6 07/21/2022    BILITOTAL 0.5 04/19/2021    ALBUMIN 4.1 07/21/2022    ALBUMIN 3.9 04/19/2021    ALKPHOS 86 " 07/21/2022    ALKPHOS 79 04/19/2021    ALT 9 07/21/2022    ALT 21 04/19/2021    AST 11 (L) 07/21/2022    AST 23 04/19/2021        Testing/Procedures:  I personally visualized and interpreted:  EKG 8/9/2022: Normal sinus rhythm    Outside results of note:  Outside [facility] from [facility] were obtained, and relevant results/notes have been incorporated into HPI.    Echocardiogram [facility] 6/22/22:  Severe diffuse hypokinesis of the left ventricle 25 to 30%  Mild left atrial enlargement  Mild to trace mitral and pulmonic regurgitation  Bubble study insufficient due to poor image quality    Coronary angiogram February 1, 2013 [facility]:  PRESENTATION / INDICATIONS     Increasing exertional angina, inferior ischemia on stress testing, prior PCI.   DIAGNOSTIC - CORONARY     Calcified coronary arteries.     The left main artery has minimal disease.     The LAD has mild disease, patent stents.     The circumflex artery has mild disease.     The RCA is dominant with severe mid disease and LYNETTE 2 flow, early left to right collaterals seen.   HEMODYNAMICS     The LVEDP is at the upper limits of normal.   LEFT VENTRICULAR FUNCTION     Left ventricular function is preserved with EF of 55%   INTERVENTION     Successful 2.5mm x 12mm Balloon, 3mm x 12mm Balloon, 4mm x 16mm Promus BRYON and 4mm x 8mm Balloon to Mid RCA, post stenosis 0%, LYNETTE grade 3 flow.       Assessment and Plan:     In summary, is a very pleasant 76-year-old male who I am seeing as a first-time consultation for newly reduced ejection fraction heart failure secondary to ischemic cardiomyopathy.  Pertinent history of recent stroke that required transfer to [facility].  Other history notable for coronary artery disease status post PCI, obesity, type 2 diabetes, hypertension.    Reduced ejection heart failure: ACC/AHA stage C, NYHA class II-III  We will optimize his guideline directed medical therapy.  Reassess with an echocardiogram the  next 3 to 6 months and if his ejection fraction is still low consider angiogram to work-up new ischemic disease.  Because he does not have symptoms right now I am going to try medical therapy first prior to catheterization.  We will transition his metoprolol to tartrate to succinate 50 mg daily  Transition lisinopril to low-dose Entresto 24-26 mg twice daily  Continue empagliflozin which was started for diabetes.  If he is able to tolerate the above changes of the next 2 weeks, then will start spironolactone 12.5 mg daily.  We will get a BMP in the interim to ensure that his kidney function remains normal.    Will need to get imaging records from [facility].  EKG shows normal sinus rhythm with normal QRS.  Ideally with medical therapy his ejection fraction improves and he will not require an ICD for primary prevention.    Regarding his recent stroke, he is on high-dose atorvastatin 40 mg daily, Plavix 75 mg daily, as well as Xarelto 20 mg a day for possible embolic phenomenon.    We will need to get records of his MCOT monitor to assess for atrial arrhythmias    I will see him back in 4 months.    The patient states understanding and is agreeable with plan.   Feel free to contact myself regarding questions or concerns.  It was a pleasure to see this patient today.    Sherrell Aguirre MD   of Medicine  Advanced Heart Failure and Transplant Cardiology    [facility]    Today's clinic visit entailed:  75 minutes spent on the date of the encounter doing chart review, history and exam, documentation and further activities per the note  Provider  Link to [facility] Help Grid     The level of medical decision making during this visit was of high complexity.

## 2022-08-09 NOTE — TELEPHONE ENCOUNTER
Would be reasonable to see endocrinology --- if he was told to by his neurologist, due to the sella turcica mass in his brain.    Otherwise we are working to manage his blood sugar levels.    Electronically signed by:  Bk Roberts MD  on August 9, 2022 4:02 PM

## 2022-08-09 NOTE — NURSING NOTE
"Chief Complaint   Patient presents with     Consult       Initial /68 (BP Location: Right arm, Patient Position: Sitting, Cuff Size: Adult Regular)   Pulse 74   Temp 96.9  F (36.1  C) (Temporal)   Resp 16   Ht 1.759 m (5' 9.25\")   Wt 99.9 kg (220 lb 3.2 oz)   SpO2 96%   BMI 32.28 kg/m   Estimated body mass index is 32.28 kg/m  as calculated from the following:    Height as of this encounter: 1.759 m (5' 9.25\").    Weight as of this encounter: 99.9 kg (220 lb 3.2 oz).     Medication Reconciliation: complete      Advance care plan reviewed      Lisa Krueger LPN on 8/9/2022 at 9:48 AM      "

## 2022-08-14 LAB
ATRIAL RATE - MUSE: 71 BPM
DIASTOLIC BLOOD PRESSURE - MUSE: NORMAL MMHG
INTERPRETATION ECG - MUSE: NORMAL
P AXIS - MUSE: -3 DEGREES
PR INTERVAL - MUSE: 160 MS
QRS DURATION - MUSE: 90 MS
QT - MUSE: 422 MS
QTC - MUSE: 458 MS
R AXIS - MUSE: 20 DEGREES
SYSTOLIC BLOOD PRESSURE - MUSE: NORMAL MMHG
T AXIS - MUSE: 74 DEGREES
VENTRICULAR RATE- MUSE: 71 BPM

## 2022-08-24 ENCOUNTER — LAB (OUTPATIENT)
Dept: LAB | Facility: OTHER | Age: 77
End: 2022-08-24
Attending: INTERNAL MEDICINE
Payer: MEDICARE

## 2022-08-24 DIAGNOSIS — I50.22 CHRONIC SYSTOLIC HEART FAILURE (H): ICD-10-CM

## 2022-08-24 DIAGNOSIS — I50.32 CHRONIC DIASTOLIC CONGESTIVE HEART FAILURE (H): Primary | ICD-10-CM

## 2022-08-24 LAB
ANION GAP SERPL CALCULATED.3IONS-SCNC: 10 MMOL/L (ref 3–14)
BUN SERPL-MCNC: 19 MG/DL (ref 7–25)
CALCIUM SERPL-MCNC: 9.2 MG/DL (ref 8.6–10.3)
CHLORIDE BLD-SCNC: 105 MMOL/L (ref 98–107)
CO2 SERPL-SCNC: 24 MMOL/L (ref 21–31)
CREAT SERPL-MCNC: 1.12 MG/DL (ref 0.7–1.3)
GFR SERPL CREATININE-BSD FRML MDRD: 68 ML/MIN/1.73M2
GLUCOSE BLD-MCNC: 214 MG/DL (ref 70–105)
POTASSIUM BLD-SCNC: 4.4 MMOL/L (ref 3.5–5.1)
SODIUM SERPL-SCNC: 139 MMOL/L (ref 134–144)

## 2022-08-24 PROCEDURE — 36415 COLL VENOUS BLD VENIPUNCTURE: CPT | Mod: ZL

## 2022-08-24 PROCEDURE — 82310 ASSAY OF CALCIUM: CPT | Mod: ZL

## 2022-08-24 RX ORDER — SPIRONOLACTONE 25 MG/1
25 TABLET ORAL DAILY
Qty: 90 TABLET | Refills: 3 | Status: SHIPPED | OUTPATIENT
Start: 2022-08-24 | End: 2023-02-14

## 2022-08-30 ENCOUNTER — TELEPHONE (OUTPATIENT)
Dept: LAB | Facility: OTHER | Age: 77
End: 2022-08-30

## 2022-08-30 DIAGNOSIS — C18.7 CANCER OF SIGMOID COLON (H): Primary | ICD-10-CM

## 2022-09-06 ENCOUNTER — LAB (OUTPATIENT)
Dept: LAB | Facility: OTHER | Age: 77
End: 2022-09-06
Attending: INTERNAL MEDICINE
Payer: MEDICARE

## 2022-09-06 DIAGNOSIS — C18.7 CANCER OF SIGMOID COLON (H): ICD-10-CM

## 2022-09-06 LAB
ALBUMIN SERPL-MCNC: 4 G/DL (ref 3.5–5.7)
ALP SERPL-CCNC: 83 U/L (ref 34–104)
ALT SERPL W P-5'-P-CCNC: 10 U/L (ref 7–52)
ANION GAP SERPL CALCULATED.3IONS-SCNC: 9 MMOL/L (ref 3–14)
AST SERPL W P-5'-P-CCNC: 10 U/L (ref 13–39)
BASOPHILS # BLD AUTO: 0 10E3/UL (ref 0–0.2)
BASOPHILS NFR BLD AUTO: 0 %
BILIRUB SERPL-MCNC: 0.5 MG/DL (ref 0.3–1)
BUN SERPL-MCNC: 16 MG/DL (ref 7–25)
CALCIUM SERPL-MCNC: 9.6 MG/DL (ref 8.6–10.3)
CHLORIDE BLD-SCNC: 103 MMOL/L (ref 98–107)
CO2 SERPL-SCNC: 27 MMOL/L (ref 21–31)
CREAT SERPL-MCNC: 1.08 MG/DL (ref 0.7–1.3)
EOSINOPHIL # BLD AUTO: 0.2 10E3/UL (ref 0–0.7)
EOSINOPHIL NFR BLD AUTO: 2 %
ERYTHROCYTE [DISTWIDTH] IN BLOOD BY AUTOMATED COUNT: 14.9 % (ref 10–15)
GFR SERPL CREATININE-BSD FRML MDRD: 71 ML/MIN/1.73M2
GLUCOSE BLD-MCNC: 214 MG/DL (ref 70–105)
HCT VFR BLD AUTO: 43.4 % (ref 40–53)
HGB BLD-MCNC: 14 G/DL (ref 13.3–17.7)
HOLD SPECIMEN: NORMAL
IMM GRANULOCYTES # BLD: 0 10E3/UL
IMM GRANULOCYTES NFR BLD: 0 %
LDH SERPL L TO P-CCNC: 127 U/L (ref 140–271)
LYMPHOCYTES # BLD AUTO: 1.5 10E3/UL (ref 0.8–5.3)
LYMPHOCYTES NFR BLD AUTO: 20 %
MCH RBC QN AUTO: 29.7 PG (ref 26.5–33)
MCHC RBC AUTO-ENTMCNC: 32.3 G/DL (ref 31.5–36.5)
MCV RBC AUTO: 92 FL (ref 78–100)
MONOCYTES # BLD AUTO: 0.9 10E3/UL (ref 0–1.3)
MONOCYTES NFR BLD AUTO: 11 %
NEUTROPHILS # BLD AUTO: 5.1 10E3/UL (ref 1.6–8.3)
NEUTROPHILS NFR BLD AUTO: 67 %
NRBC # BLD AUTO: 0 10E3/UL
NRBC BLD AUTO-RTO: 0 /100
PLATELET # BLD AUTO: 232 10E3/UL (ref 150–450)
POTASSIUM BLD-SCNC: 4.5 MMOL/L (ref 3.5–5.1)
PROT SERPL-MCNC: 6.7 G/DL (ref 6.4–8.9)
RBC # BLD AUTO: 4.71 10E6/UL (ref 4.4–5.9)
SODIUM SERPL-SCNC: 139 MMOL/L (ref 134–144)
WBC # BLD AUTO: 7.7 10E3/UL (ref 4–11)

## 2022-09-06 PROCEDURE — 36415 COLL VENOUS BLD VENIPUNCTURE: CPT | Mod: ZL

## 2022-09-06 PROCEDURE — 80053 COMPREHEN METABOLIC PANEL: CPT | Mod: ZL

## 2022-09-06 PROCEDURE — 82040 ASSAY OF SERUM ALBUMIN: CPT | Mod: ZL

## 2022-09-06 PROCEDURE — 85025 COMPLETE CBC W/AUTO DIFF WBC: CPT | Mod: ZL

## 2022-09-06 PROCEDURE — 83615 LACTATE (LD) (LDH) ENZYME: CPT | Mod: ZL

## 2022-09-06 PROCEDURE — 82378 CARCINOEMBRYONIC ANTIGEN: CPT | Mod: ZL

## 2022-09-08 LAB — CEA SERPL-MCNC: 0.7 NG/ML

## 2022-09-16 ENCOUNTER — TRANSFERRED RECORDS (OUTPATIENT)
Dept: HEALTH INFORMATION MANAGEMENT | Facility: OTHER | Age: 77
End: 2022-09-16

## 2022-09-16 ENCOUNTER — ONCOLOGY VISIT (OUTPATIENT)
Dept: ONCOLOGY | Facility: OTHER | Age: 77
End: 2022-09-16
Attending: NURSE PRACTITIONER
Payer: COMMERCIAL

## 2022-09-16 VITALS
BODY MASS INDEX: 31.99 KG/M2 | DIASTOLIC BLOOD PRESSURE: 60 MMHG | HEART RATE: 80 BPM | WEIGHT: 218.2 LBS | RESPIRATION RATE: 15 BRPM | TEMPERATURE: 97.2 F | SYSTOLIC BLOOD PRESSURE: 120 MMHG | OXYGEN SATURATION: 94 %

## 2022-09-16 DIAGNOSIS — C18.7 CANCER OF SIGMOID COLON (H): Primary | ICD-10-CM

## 2022-09-16 PROCEDURE — 99214 OFFICE O/P EST MOD 30 MIN: CPT | Performed by: NURSE PRACTITIONER

## 2022-09-16 PROCEDURE — G0463 HOSPITAL OUTPT CLINIC VISIT: HCPCS

## 2022-09-16 RX ORDER — LEVOTHYROXINE SODIUM 50 UG/1
50 TABLET ORAL DAILY
COMMUNITY
End: 2022-10-28

## 2022-09-16 ASSESSMENT — PAIN SCALES - GENERAL: PAINLEVEL: NO PAIN (0)

## 2022-09-16 NOTE — NURSING NOTE
"Chief Complaint   Patient presents with     Oncology Clinic Visit       Initial /60 (BP Location: Right arm, Patient Position: Sitting, Cuff Size: Adult Regular)   Pulse 80   Temp 97.2  F (36.2  C) (Tympanic)   Resp 15   Wt 99 kg (218 lb 3.2 oz)   SpO2 94%   BMI 31.99 kg/m   Estimated body mass index is 31.99 kg/m  as calculated from the following:    Height as of 8/9/22: 1.759 m (5' 9.25\").    Weight as of this encounter: 99 kg (218 lb 3.2 oz).  Medication Reconciliation: complete      Christophe Holilday RN  ....................  9/16/2022   9:18 AM        "

## 2022-09-16 NOTE — PROGRESS NOTES
Oncology Follow-up Visit:  September 16, 2022  Diagnosis:Colon cancer    History Of Present Illness:  Patient presents to the clinic today for followup of colon cancer. Patient was seen in consultation on June 1, 2016. Patient presented to the emergency room on April 25, 2016, with complaints of chest pain radiating down his arms, which was primarily worse with exertion. In the emergency department, he was found to have a hemoglobin of 7.1, and he subsequently was admitted. CBC revealed microcytic indices with an MCV of 62. He was noted to be iron deficient. He was transfused 2 units of packed red cells and was ruled out for MI. He was seen by Dr. Solano, who performed colonoscopy and was noted to have a mass in the sigmoid colon that was consistent with adenocarcinoma. He also had multiple adenomatous polyps. The patient was admitted on May 11, 2016, for a sigmoid colectomy. This was performed on May 17, 2016. Pathology revealed in the sigmoid colon a mass consistent with moderately differentiated adenocarcinoma. The tumor size was 2 x 1 x 0.7 cm. The tumor invaded the muscularis propria, 2/8 lymph nodes were positive for metastatic carcinoma. Margins were negative for tumor. The grade of the tumor was ruled as moderately differentiated. The patient was staged pathologic stage T2N1b as part of the postop evaluation. The patient had a CT abdomen and pelvis on May 12, 2016. This revealed that there were 2 nonspecific low-attenuation lesions in the liver. Metastasis could not be excluded. There was no lymphadenopathy or additional findings to suggest metastases. The patient had a preop CEA, which was less than 3. PET scan was performed on Lluvia 15, 2016, and was essentially negative for metastatic disease. Lesions seen on prior PET in the liver were not hypermetabolic. We felt that the patient had stage III disease and he would be a candidate for adjuvant chemotherapy. When patient was seen on June 28, 2016, the plan was  to start FOLFOX x12 cycles.  When he was seen on November 17, 2016,  he did note some cold-induced neuropathic symptoms. He completed 12 cycles of FOLFOX. His last chemotherapy was administered on December 7, 2016.  He did have a PET scan done after 12 cycles of chemotherapy, and this came back essentially negative for metastatic disease. He had staging studies on April 6, 2017 including CT abdomen and pelvis, which was essentially negative. CT of the chest was negative. The patient also underwent a colonoscopy in May 2017, which revealed a tubular adenoma at the hepatic flexure of the colon. Patient had a colonoscopy done performed by Dr. Solano on 06/01/2020 and was found to have 6 polyps.  All of them were revealing tubular adenoma, consistent with advanced adenoma.  One was at the 30 cm, and the other was in the mid transverse colon. This was based on polyp size being larger than 10 mm. The patient had a colonoscopy in June 2021, which revealed multiple tubular adenomas with a 3 year follow up recommended.  CT chest, abdomen, pelvis was done on 3/7/22 and was stable. Patient has been feeling well. Labs from 9/6/22 showed a normal tumor marker.  All other labs are stable. Patient had a stroke in June 2022. He was taken to [place] by ambulance. He has been following with cardiology and will also be seeing neurosurgery. He is no an anticoagulant. Patient states he continues to have some generalized weakness and gets fatigued easily since having the stroke. He is otherwise feeling and has no other lasting deficits. Patient has been working on diet, weight loss. He has no other new concerns at this time.      Review Of Systems:  Review Of Systems  Eyes/Ears/Nose/Throat: denies new vision changes  Respiratory: No shortness of breath, dyspnea on exertion, cough  Cardiovascular: denies chest pain or palpitations  Gastrointestinal: reports ongoing loose stools, stable. No abdominal pain  Genitourinary: denies dysuria or  "hematuria  Musculoskeletal: reports some generalized weakness, denies new bone   Neurologic: reports some ongoing dizziness, denies headaches  Hematologic/Lymphatic/Immunologic: denies fevers, no recent illness      Nursing Notes:   Christophe Holliday RN  9/16/2022  9:19 AM  Signed  Chief Complaint   Patient presents with     Oncology Clinic Visit       Initial /60 (BP Location: Right arm, Patient Position: Sitting, Cuff Size: Adult Regular)   Pulse 80   Temp 97.2  F (36.2  C) (Tympanic)   Resp 15   Wt 99 kg (218 lb 3.2 oz)   SpO2 94%   BMI 31.99 kg/m   Estimated body mass index is 31.99 kg/m  as calculated from the following:    Height as of 8/9/22: 1.759 m (5' 9.25\").    Weight as of this encounter: 99 kg (218 lb 3.2 oz).  Medication Reconciliation: complete      Christophe Holliday RN  ....................  9/16/2022   9:18 AM            Past medical, social, surgical, and family histories reviewed.    Allergies:  Allergies as of 09/16/2022 - Reviewed 09/16/2022   Allergen Reaction Noted     Aspirin  05/26/2017     Canagliflozin  05/18/2016     Morphine  05/18/2016       Current Medications:  Current Outpatient Medications   Medication Sig Dispense Refill     acetaminophen (TYLENOL) 325 MG tablet Take 650 mg by mouth       allopurinol (ZYLOPRIM) 100 MG tablet Take 1 tablet (100 mg) by mouth 2 times daily 180 tablet 1     atorvastatin (LIPITOR) 40 MG tablet Take 1 tablet (40 mg) by mouth daily 90 tablet 1     Cholecalciferol (VITAMIN D3) 50 MCG (2000 UT) CAPS Take 1 capsule by mouth daily       Cholecalciferol 50 MCG (2000 UT) CHEW        clopidogrel (PLAVIX) 75 MG tablet TAKE 1 TABLET BY MOUTH DAILY 90 tablet 4     Cyanocobalamin 1000 MCG CAPS        empagliflozin (JARDIANCE) 10 MG TABS tablet Take 1 tablet (10 mg) by mouth daily - for diabetes 90 tablet 1     glipiZIDE (GLUCOTROL) 10 MG tablet Take 1 tablet with breakfast and 2 tablet with supper -- total of 3 daily 270 tablet 1     levothyroxine " (SYNTHROID/LEVOTHROID) 50 MCG tablet Take 50 mcg by mouth daily       lisinopril (ZESTRIL) 2.5 MG tablet Take 2.5 mg by mouth       metFORMIN (GLUCOPHAGE) 1000 MG tablet Take 1 tablet (1,000 mg) by mouth 2 times daily (with meals) 180 tablet 1     nitroGLYcerin (NITROSTAT) 0.4 MG sublingual tablet Place 0.4 mg under the tongue       pantoprazole (PROTONIX) 40 MG EC tablet Take 1 tablet (40 mg) by mouth daily 90 tablet 1     rivaroxaban ANTICOAGULANT (XARELTO) 20 MG TABS tablet Take 1 tablet (20 mg) by mouth daily 90 tablet 0     sacubitril-valsartan (ENTRESTO) 24-26 MG per tablet Take 1 tablet by mouth 2 times daily for 90 days 180 tablet 3     spironolactone (ALDACTONE) 25 MG tablet Take 1 tablet (25 mg) by mouth daily for 90 days 90 tablet 3     vitamin B-12 (CYANOCOBALAMIN) 2500 MCG sublingual tablet Take 1 tablet (2,500 mcg) by mouth daily - for low B12 -- START: 1/7/2022 90 tablet 3     vitamin B6 (PYRIDOXINE) 100 MG tablet Take 100 mg by mouth daily       allopurinol (ZYLOPRIM) 100 MG tablet Take 100 mg by mouth (Patient not taking: No sig reported)       atorvastatin (LIPITOR) 40 MG tablet Take 40 mg by mouth (Patient not taking: No sig reported)       clopidogrel (PLAVIX) 75 MG tablet Take 75 mg by mouth (Patient not taking: Reported on 9/16/2022)       cyanocobalamin (VITAMIN B-12) 1000 MCG tablet Take 1,000 mcg by mouth daily (Patient not taking: Reported on 9/16/2022)       metoprolol succinate ER (TOPROL XL) 50 MG 24 hr tablet Take 1 tablet (50 mg) by mouth daily for 90 days 90 tablet 3     Semaglutide 7 MG TABS Take 7 mg by mouth (Patient not taking: Reported on 9/16/2022)       Semaglutide 7 MG TABS Take 7 mg by mouth daily - for diabetes (didn't tolerate 14 mg tablet daily) (Patient not taking: Reported on 9/16/2022) 90 tablet 1        Physical Exam:  /60 (BP Location: Right arm, Patient Position: Sitting, Cuff Size: Adult Regular)   Pulse 80   Temp 97.2  F (36.2  C) (Tympanic)   Resp 15    Wt 99 kg (218 lb 3.2 oz)   SpO2 94%   BMI 31.99 kg/m      GENERAL APPEARANCE: 76 year old male, alert and no distress     NECK: no adenopathy, no asymmetry or masses     LYMPHATICS: No cervical, supraclavicular, axillary lymphadenopathy     RESP: lungs clear to auscultation - no rales, rhonchi or wheezes     CARDIOVASCULAR: regular rates and rhythm, normal S1 S2     ABDOMEN:  soft, nontender, bowel sounds normal     MUSCULOSKELETAL: extremities normal- no gross deformities noted, No edema b/l LE.     SKIN: no suspicious lesions or rashes on exposed skin     PSYCHIATRIC: mentation appears normal and affect normal    Laboratory/Imaging Studies  Lab on 09/06/2022   Component Date Value Ref Range Status     CEA 09/06/2022 0.7  ng/mL Final    Nonsmoker (past/never) <=5.0 ng/mL  Current smoker <=6.5 ng/mL    This result is obtained using the Roche GigaMediasys CEA method on the lili e801 immunoassay analyzer. Results obtained with different assay methods or kits cannot be used interchangeably.     Lactate Dehydrogenase 09/06/2022 127 (A) 140 - 271 U/L Final     Sodium 09/06/2022 139  134 - 144 mmol/L Final     Potassium 09/06/2022 4.5  3.5 - 5.1 mmol/L Final     Chloride 09/06/2022 103  98 - 107 mmol/L Final     Carbon Dioxide (CO2) 09/06/2022 27  21 - 31 mmol/L Final     Anion Gap 09/06/2022 9  3 - 14 mmol/L Final     Urea Nitrogen 09/06/2022 16  7 - 25 mg/dL Final     Creatinine 09/06/2022 1.08  0.70 - 1.30 mg/dL Final     Calcium 09/06/2022 9.6  8.6 - 10.3 mg/dL Final     Glucose 09/06/2022 214 (A) 70 - 105 mg/dL Final     Alkaline Phosphatase 09/06/2022 83  34 - 104 U/L Final     AST 09/06/2022 10 (A) 13 - 39 U/L Final     ALT 09/06/2022 10  7 - 52 U/L Final     Protein Total 09/06/2022 6.7  6.4 - 8.9 g/dL Final     Albumin 09/06/2022 4.0  3.5 - 5.7 g/dL Final     Bilirubin Total 09/06/2022 0.5  0.3 - 1.0 mg/dL Final     GFR Estimate 09/06/2022 71  >60 mL/min/1.73m2 Final    Effective December 21, 2021 eGFRcr in adults  is calculated using the 2021 CKD-EPI creatinine equation which includes age and gender (Katie et al., [place], DOI: 10.1056/BUUGlp4525543)     WBC Count 09/06/2022 7.7  4.0 - 11.0 10e3/uL Final     RBC Count 09/06/2022 4.71  4.40 - 5.90 10e6/uL Final     Hemoglobin 09/06/2022 14.0  13.3 - 17.7 g/dL Final     Hematocrit 09/06/2022 43.4  40.0 - 53.0 % Final     MCV 09/06/2022 92  78 - 100 fL Final     MCH 09/06/2022 29.7  26.5 - 33.0 pg Final     MCHC 09/06/2022 32.3  31.5 - 36.5 g/dL Final     RDW 09/06/2022 14.9  10.0 - 15.0 % Final     Platelet Count 09/06/2022 232  150 - 450 10e3/uL Final     % Neutrophils 09/06/2022 67  % Final     % Lymphocytes 09/06/2022 20  % Final     % Monocytes 09/06/2022 11  % Final     % Eosinophils 09/06/2022 2  % Final     % Basophils 09/06/2022 0  % Final     % Immature Granulocytes 09/06/2022 0  % Final     NRBCs per 100 WBC 09/06/2022 0  <1 /100 Final     Absolute Neutrophils 09/06/2022 5.1  1.6 - 8.3 10e3/uL Final     Absolute Lymphocytes 09/06/2022 1.5  0.8 - 5.3 10e3/uL Final     Absolute Monocytes 09/06/2022 0.9  0.0 - 1.3 10e3/uL Final     Absolute Eosinophils 09/06/2022 0.2  0.0 - 0.7 10e3/uL Final     Absolute Basophils 09/06/2022 0.0  0.0 - 0.2 10e3/uL Final     Absolute Immature Granulocytes 09/06/2022 0.0  <=0.4 10e3/uL Final     Absolute NRBCs 09/06/2022 0.0  10e3/uL Final        ASSESSMENT/PLAN:  Stage III, moderately differentiated adenocarcinoma of the sigmoid colon with 2 out of 11 lymph nodes positive for metastatic disease consistent with pathologic T2 N1b M0 colon cancer.  PET scan was negative for metastatic disease. The patient was started on adjuvant FOLFOX chemotherapy and completed 12 cycles. PET scan did not reveal any evidence of metastatic disease.The patient had a colonoscopy in June 2021, which revealed multiple tubular adenomas with a 3 year follow up recommended. CT of the chest, abdomen and pelvis done on 3/7/22 was negative for metastatic disease. Labs  from 9/6/22 show a normal tumor marker. All other labs are stable. Patient had a recent CVA and has recovered nicely, he will continue follow up with primary care as well as cardiology. We will see the patient in 6 months with CBC, CMP, LDH, CEA and iron studies and CT chest, abdomen and pelvis     Thirty minutes spent with this encounter with time spent reviewing patient records, counseling patient regarding disease process, interpretation and review of labs with patient, discussing recent CVA, obtaining a review of systems, performing a physical exam, discussing plan for follow up, documenting in EHR and coordination of care

## 2022-09-19 DIAGNOSIS — E78.2 MIXED HYPERLIPIDEMIA: ICD-10-CM

## 2022-09-19 RX ORDER — SIMVASTATIN 40 MG
40 TABLET ORAL AT BEDTIME
Qty: 90 TABLET | Refills: 3 | OUTPATIENT
Start: 2022-09-19

## 2022-09-19 NOTE — TELEPHONE ENCOUNTER
"Patient is no longer taking simvastatin per Dr. Avilez (office visit 7/6/22) and is now taking atorvastatin. Call was placed to patient and after verifying name and date of birth, patient did verify he is taking atorvastatin. Refill request was denied Corinne R Thayer, RN on 9/19/2022 at 2:10 PM       Requested Prescriptions   Pending Prescriptions Disp Refills     simvastatin (ZOCOR) 40 MG tablet [Pharmacy Med Name: SIMVASTATIN 40MG TABLET] 90 tablet 3     Sig: TAKE 1 TABLET (40 MG) BY MOUTH AT BEDTIME       Statins Protocol Failed - 9/19/2022  9:34 AM        Failed - Medication is active on med list        Passed - LDL on file in past 12 months     Recent Labs   Lab Test 07/21/22  0705   LDL 46             Passed - No abnormal creatine kinase in past 12 months     No lab results found.             Passed - Recent (12 mo) or future (30 days) visit within the authorizing provider's specialty     Patient has had an office visit with the authorizing provider or a provider within the authorizing providers department within the previous 12 mos or has a future within next 30 days. See \"Patient Info\" tab in inbasket, or \"Choose Columns\" in Meds & Orders section of the refill encounter.              Passed - Patient is age 18 or older             "

## 2022-10-28 ENCOUNTER — OFFICE VISIT (OUTPATIENT)
Dept: INTERNAL MEDICINE | Facility: OTHER | Age: 77
End: 2022-10-28
Attending: INTERNAL MEDICINE
Payer: MEDICARE

## 2022-10-28 ENCOUNTER — LAB (OUTPATIENT)
Dept: LAB | Facility: OTHER | Age: 77
End: 2022-10-28
Attending: INTERNAL MEDICINE
Payer: MEDICARE

## 2022-10-28 VITALS
DIASTOLIC BLOOD PRESSURE: 62 MMHG | RESPIRATION RATE: 16 BRPM | HEART RATE: 90 BPM | WEIGHT: 223.8 LBS | SYSTOLIC BLOOD PRESSURE: 120 MMHG | OXYGEN SATURATION: 97 % | BODY MASS INDEX: 32.81 KG/M2 | TEMPERATURE: 96.9 F

## 2022-10-28 DIAGNOSIS — Z86.73 HISTORY OF TIA (TRANSIENT ISCHEMIC ATTACK) AND STROKE: ICD-10-CM

## 2022-10-28 DIAGNOSIS — I25.10 CORONARY ARTERY DISEASE INVOLVING NATIVE CORONARY ARTERY OF NATIVE HEART WITHOUT ANGINA PECTORIS: ICD-10-CM

## 2022-10-28 DIAGNOSIS — G62.0 NEUROPATHY DUE TO CHEMOTHERAPEUTIC DRUG (H): ICD-10-CM

## 2022-10-28 DIAGNOSIS — E11.65 UNCONTROLLED TYPE 2 DIABETES MELLITUS WITH HYPERGLYCEMIA (H): Primary | ICD-10-CM

## 2022-10-28 DIAGNOSIS — E53.8 VITAMIN B12 DEFICIENCY: ICD-10-CM

## 2022-10-28 DIAGNOSIS — N18.31 CHRONIC KIDNEY DISEASE, STAGE 3A (H): ICD-10-CM

## 2022-10-28 DIAGNOSIS — I10 BENIGN ESSENTIAL HYPERTENSION: ICD-10-CM

## 2022-10-28 DIAGNOSIS — T45.1X5A NEUROPATHY DUE TO CHEMOTHERAPEUTIC DRUG (H): ICD-10-CM

## 2022-10-28 DIAGNOSIS — I50.22 CHRONIC SYSTOLIC HEART FAILURE (H): ICD-10-CM

## 2022-10-28 DIAGNOSIS — E78.2 MIXED HYPERLIPIDEMIA: ICD-10-CM

## 2022-10-28 DIAGNOSIS — Z23 NEEDS FLU SHOT: ICD-10-CM

## 2022-10-28 DIAGNOSIS — E03.4 HYPOTHYROIDISM DUE TO ACQUIRED ATROPHY OF THYROID: ICD-10-CM

## 2022-10-28 DIAGNOSIS — R73.9 ELEVATED RANDOM BLOOD GLUCOSE LEVEL: ICD-10-CM

## 2022-10-28 DIAGNOSIS — Z87.39 HISTORY OF GOUT: ICD-10-CM

## 2022-10-28 DIAGNOSIS — K21.00 GASTROESOPHAGEAL REFLUX DISEASE WITH ESOPHAGITIS, UNSPECIFIED WHETHER HEMORRHAGE: ICD-10-CM

## 2022-10-28 LAB
ALBUMIN SERPL-MCNC: 4.1 G/DL (ref 3.5–5.7)
ALBUMIN UR-MCNC: NEGATIVE MG/DL
ALP SERPL-CCNC: 92 U/L (ref 34–104)
ALT SERPL W P-5'-P-CCNC: 13 U/L (ref 7–52)
ANION GAP SERPL CALCULATED.3IONS-SCNC: 9 MMOL/L (ref 3–14)
APPEARANCE UR: CLEAR
AST SERPL W P-5'-P-CCNC: 11 U/L (ref 13–39)
BILIRUB SERPL-MCNC: 0.5 MG/DL (ref 0.3–1)
BILIRUB UR QL STRIP: NEGATIVE
BUN SERPL-MCNC: 24 MG/DL (ref 7–25)
CALCIUM SERPL-MCNC: 9.5 MG/DL (ref 8.6–10.3)
CHLORIDE BLD-SCNC: 102 MMOL/L (ref 98–107)
CHOLEST SERPL-MCNC: 331 MG/DL
CO2 SERPL-SCNC: 25 MMOL/L (ref 21–31)
COLOR UR AUTO: ABNORMAL
CREAT SERPL-MCNC: 1.18 MG/DL (ref 0.7–1.3)
ERYTHROCYTE [DISTWIDTH] IN BLOOD BY AUTOMATED COUNT: 15.1 % (ref 10–15)
FASTING STATUS PATIENT QL REPORTED: YES
GFR SERPL CREATININE-BSD FRML MDRD: 64 ML/MIN/1.73M2
GLUCOSE BLD-MCNC: 161 MG/DL (ref 70–105)
GLUCOSE UR STRIP-MCNC: >1000 MG/DL
HBA1C MFR BLD: 9.4 % (ref 4–6.2)
HCT VFR BLD AUTO: 43.1 % (ref 40–53)
HDLC SERPL-MCNC: 34 MG/DL (ref 23–92)
HGB BLD-MCNC: 14.1 G/DL (ref 13.3–17.7)
HGB UR QL STRIP: NEGATIVE
KETONES UR STRIP-MCNC: NEGATIVE MG/DL
LDLC SERPL CALC-MCNC: ABNORMAL MG/DL
LEUKOCYTE ESTERASE UR QL STRIP: NEGATIVE
MCH RBC QN AUTO: 29.7 PG (ref 26.5–33)
MCHC RBC AUTO-ENTMCNC: 32.7 G/DL (ref 31.5–36.5)
MCV RBC AUTO: 91 FL (ref 78–100)
NITRATE UR QL: NEGATIVE
NONHDLC SERPL-MCNC: 297 MG/DL
PH UR STRIP: 5 [PH] (ref 5–9)
PLATELET # BLD AUTO: 246 10E3/UL (ref 150–450)
POTASSIUM BLD-SCNC: 4.6 MMOL/L (ref 3.5–5.1)
PROT SERPL-MCNC: 7 G/DL (ref 6.4–8.9)
RBC # BLD AUTO: 4.74 10E6/UL (ref 4.4–5.9)
SODIUM SERPL-SCNC: 136 MMOL/L (ref 134–144)
SP GR UR STRIP: 1.02 (ref 1–1.03)
T4 FREE SERPL-MCNC: 0.79 NG/DL (ref 0.6–1.6)
TRIGL SERPL-MCNC: 507 MG/DL
TSH SERPL DL<=0.005 MIU/L-ACNC: 5.49 MU/L (ref 0.4–4)
UROBILINOGEN UR STRIP-MCNC: NORMAL MG/DL
WBC # BLD AUTO: 7.4 10E3/UL (ref 4–11)

## 2022-10-28 PROCEDURE — 83036 HEMOGLOBIN GLYCOSYLATED A1C: CPT | Mod: ZL

## 2022-10-28 PROCEDURE — G0463 HOSPITAL OUTPT CLINIC VISIT: HCPCS

## 2022-10-28 PROCEDURE — 82043 UR ALBUMIN QUANTITATIVE: CPT | Mod: ZL

## 2022-10-28 PROCEDURE — 36415 COLL VENOUS BLD VENIPUNCTURE: CPT | Mod: ZL

## 2022-10-28 PROCEDURE — 81003 URINALYSIS AUTO W/O SCOPE: CPT | Mod: ZL

## 2022-10-28 PROCEDURE — 84439 ASSAY OF FREE THYROXINE: CPT | Mod: ZL

## 2022-10-28 PROCEDURE — G0008 ADMIN INFLUENZA VIRUS VAC: HCPCS

## 2022-10-28 PROCEDURE — 85027 COMPLETE CBC AUTOMATED: CPT | Mod: ZL

## 2022-10-28 PROCEDURE — 84443 ASSAY THYROID STIM HORMONE: CPT | Mod: ZL

## 2022-10-28 PROCEDURE — G0463 HOSPITAL OUTPT CLINIC VISIT: HCPCS | Mod: 25

## 2022-10-28 PROCEDURE — 82374 ASSAY BLOOD CARBON DIOXIDE: CPT | Mod: ZL

## 2022-10-28 PROCEDURE — 99214 OFFICE O/P EST MOD 30 MIN: CPT | Performed by: INTERNAL MEDICINE

## 2022-10-28 PROCEDURE — 82435 ASSAY OF BLOOD CHLORIDE: CPT | Mod: ZL

## 2022-10-28 PROCEDURE — 84478 ASSAY OF TRIGLYCERIDES: CPT | Mod: ZL

## 2022-10-28 RX ORDER — SACUBITRIL AND VALSARTAN 24; 26 MG/1; MG/1
1 TABLET, FILM COATED ORAL 2 TIMES DAILY
Qty: 180 TABLET | Refills: 1 | Status: SHIPPED | OUTPATIENT
Start: 2022-10-28 | End: 2023-02-03

## 2022-10-28 RX ORDER — METFORMIN HCL 500 MG
TABLET, EXTENDED RELEASE 24 HR ORAL
Qty: 360 TABLET | Refills: 1 | Status: SHIPPED | OUTPATIENT
Start: 2022-10-28 | End: 2023-02-03

## 2022-10-28 RX ORDER — ATORVASTATIN CALCIUM 40 MG/1
40 TABLET, FILM COATED ORAL DAILY
Qty: 90 TABLET | Refills: 1 | Status: SHIPPED | OUTPATIENT
Start: 2022-10-28 | End: 2023-02-03

## 2022-10-28 RX ORDER — PANTOPRAZOLE SODIUM 40 MG/1
40 TABLET, DELAYED RELEASE ORAL DAILY
Qty: 90 TABLET | Refills: 1 | Status: SHIPPED | OUTPATIENT
Start: 2022-10-28 | End: 2023-02-03

## 2022-10-28 RX ORDER — LEVOTHYROXINE SODIUM 88 UG/1
88 TABLET ORAL DAILY
Qty: 90 TABLET | Refills: 1 | Status: SHIPPED | OUTPATIENT
Start: 2022-10-28 | End: 2023-02-03

## 2022-10-28 RX ORDER — LEVOTHYROXINE SODIUM 100 UG/1
50 TABLET ORAL
COMMUNITY
End: 2022-10-28

## 2022-10-28 RX ORDER — GLIPIZIDE 10 MG/1
20 TABLET ORAL
Qty: 360 TABLET | Refills: 1 | Status: SHIPPED | OUTPATIENT
Start: 2022-10-28 | End: 2023-02-03

## 2022-10-28 RX ORDER — ALLOPURINOL 100 MG/1
100 TABLET ORAL 2 TIMES DAILY
Qty: 180 TABLET | Refills: 1 | Status: SHIPPED | OUTPATIENT
Start: 2022-10-28 | End: 2023-02-03

## 2022-10-28 ASSESSMENT — ENCOUNTER SYMPTOMS
WOUND: 0
HEMATURIA: 0
FEVER: 0
ADENOPATHY: 0
BACK PAIN: 0
ENDOCRINE COMMENTS: + HYPERGLYCEMIA
SHORTNESS OF BREATH: 0
CHEST TIGHTNESS: 0
BRUISES/BLEEDS EASILY: 0
NUMBNESS: 1
ABDOMINAL PAIN: 0
DIARRHEA: 1
CONFUSION: 0
CHILLS: 0

## 2022-10-28 ASSESSMENT — PAIN SCALES - GENERAL: PAINLEVEL: NO PAIN (0)

## 2022-10-28 NOTE — PROGRESS NOTES
Assessment & Plan   Andrez Olivier is a 76 year old, presenting for the following health issues:      ICD-10-CM    1. Uncontrolled type 2 diabetes mellitus with hyperglycemia (H)  E11.65 empagliflozin (JARDIANCE) 10 MG TABS tablet     glipiZIDE (GLUCOTROL) 10 MG tablet     Semaglutide 7 MG TABS     metFORMIN (GLUCOPHAGE XR) 500 MG 24 hr tablet      2. Coronary artery disease involving native coronary artery of native heart without angina pectoris  I25.10 atorvastatin (LIPITOR) 40 MG tablet      3. Needs flu shot  Z23 FLU SHOT 65+ (FLUZONE HD)      4. Mixed hyperlipidemia  E78.2       5. Hypothyroidism due to acquired atrophy of thyroid  E03.4 levothyroxine (SYNTHROID/LEVOTHROID) 88 MCG tablet      6. Chronic kidney disease, stage 3a (H)  N18.31       7. Chronic systolic heart failure (H) - ECHO 6/22/2022 at [facility] -- EF 25-30%  I50.22 empagliflozin (JARDIANCE) 10 MG TABS tablet     sacubitril-valsartan (ENTRESTO) 24-26 MG per tablet     Semaglutide 7 MG TABS      8. Vitamin B12 deficiency  E53.8       9. 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 History of gout  Z87.39 allopurinol (ZYLOPRIM) 100 MG tablet      12. Gastroesophageal reflux disease with esophagitis, unspecified whether hemorrhage  K21.00 pantoprazole (PROTONIX) 40 MG EC tablet      Patient presents for follow-up multiple issues.    Type 2 diabetes, uncontrolled with hyperglycemia.  Has stage IIIa chronic kidney disease, elevated urine protein levels, neuropathy.  Increase glipizide dosing, twice daily.  Continue semaglutide 7 mg tablet daily.  Continue metformin but we will switch to extended release metformin rather than immediate release due to having some loose stools and diarrhea issues.  Start Jardiance given  his systolic heart failure and type 2 diabetes, poorly controlled.  Plan to recheck lab work again in 3 to 4 months.    Coronary artery disease, denies exertional angina.  Continues on Lipitor, Xarelto.  No changes for now.  Refill sent to pharmacy.    Flu shot today.    Mixed hyperlipidemia.  Currently taking Lipitor 40 mg daily.  Cholesterol levels are at goal other than triglyceride levels are high.  Hopefully with lifestyle modifications, improvement with blood sugars, triglycerides will improve otherwise, we will need to consider additional medication changes.    Hypothyroidism, TSH is high.  Increase Synthroid dosing from 50 mcg daily up to 88 mcg daily.  Recheck in about 3 months.    Stage IIIa chronic kidney disease.  Kidney function has been slowly declining.  Encourage NSAID avoidance.    Chronic systolic heart failure.  Ejection fraction back in June showed EF of 25-30%.  Continues on Entresto, semaglutide, start Jardiance.  Consider repeat echo in about 6 months.    Vitamin B12 deficiency.  Ongoing.  Continues with replacement.  Encouraged ongoing dosing.    History of stroke, left vertebral artery thrombosis.  Currently on Lipitor, Xarelto.    Neuropathy following chemotherapy drugs.  Had chemotherapy due to cancer treatment.  Still following with oncology.    History of gout, doing well with allopurinol.  No recent gout attacks.  Needs refills.    Heartburn and reflux, ongoing but improved with Protonix.  Needs refills.    Encouraged weight loss and regular exercise.     Return in about 3 months (around 1/28/2023) for - Labs every 91+ days, with DM Follow-up, Same Day or 1-2 days later with Dr. Roberts.    Bk Roberts MD  [facility] AND [facility]   Andrez is a 76 year old , presenting for the following health issues:  Diabetes (3 month DM check)      History of Present Illness       Reason for visit:  Follow up    He eats 0-1 servings of fruits and vegetables daily.He consumes  1 sweetened beverage(s) daily.He exercises with enough effort to increase his heart rate 9 or less minutes per day.  He exercises with enough effort to increase his heart rate 3 or less days per week. He is missing 2 dose(s) of medications per week.     Review of Systems   Constitutional: Negative for chills and fever.   HENT: Negative for congestion.    Eyes: Negative for visual disturbance.   Respiratory: Negative for chest tightness and shortness of breath.    Cardiovascular: Negative for chest pain.   Gastrointestinal: Positive for diarrhea (intermittently). Negative for abdominal pain.   Endocrine:        + Hyperglycemia   Genitourinary: Negative for hematuria.   Musculoskeletal: Negative for back pain.   Skin: Negative for rash and wound.   Allergic/Immunologic: Negative for immunocompromised state.   Neurological: Positive for numbness (hands and feet from Agent Orange exposure + Hx of chemotherapy ). Negative for syncope.        Hx recent TIA / stroke   Hematological: Negative for adenopathy. Does not bruise/bleed easily.   Psychiatric/Behavioral: Negative for confusion.          Objective    /62 (BP Location: Right arm, Patient Position: Sitting, Cuff Size: Adult Large)   Pulse 90   Temp 96.9  F (36.1  C) (Temporal)   Resp 16   Wt 101.5 kg (223 lb 12.8 oz)   SpO2 97%   BMI 32.81 kg/m    Body mass index is 32.81 kg/m .   Wt Readings from Last 4 Encounters:   10/28/22 101.5 kg (223 lb 12.8 oz)   09/16/22 99 kg (218 lb 3.2 oz)   08/09/22 99.9 kg (220 lb 3.2 oz)   07/21/22 101.2 kg (223 lb)      Physical Exam  Constitutional:       General: He is not in acute distress.     Appearance: He is well-developed. He is obese. He is not diaphoretic.   HENT:      Head: Normocephalic and atraumatic.   Eyes:      General: No scleral icterus.     Conjunctiva/sclera: Conjunctivae normal.   Neck:      Vascular: No carotid bruit.   Cardiovascular:      Rate and Rhythm: Normal rate and regular rhythm.      Pulses:  Normal pulses.   Pulmonary:      Effort: Pulmonary effort is normal.      Breath sounds: Normal breath sounds.   Abdominal:      Palpations: Abdomen is soft.      Tenderness: There is no abdominal tenderness.   Musculoskeletal:         General: No deformity.      Cervical back: Neck supple.      Right lower leg: No edema.      Left lower leg: No edema.   Lymphadenopathy:      Cervical: No cervical adenopathy.   Skin:     General: Skin is warm and dry.      Findings: No rash.   Neurological:      Mental Status: He is alert and oriented to person, place, and time. Mental status is at baseline.   Psychiatric:         Mood and Affect: Mood normal.         Behavior: Behavior normal.          Recent Labs   Lab Test 10/28/22  0729 09/06/22  0824 08/24/22  0747 07/21/22  0705 06/22/22  0019 04/19/22  0953 03/07/22  0751 01/06/22  0707   A1C 9.4*  --   --  8.7*  --  9.6*  --  9.5*   LDL  --   --   --  46  --  100  --  96   HDL 34  --   --  30  --  34  --  34   TRIG 507*  --   --  298*  --  310*  --  309*   ALT 13 10  --  9  --  11  10   < >  --    CR 1.18 1.08   < > 1.09   < > 1.06  1.04   < >  --    GFRESTIMATED 64 71   < > 70   < > 73  74   < >  --    POTASSIUM 4.6 4.5   < > 4.0   < > 4.5  4.5   < >  --    TSH 5.49*  --   --  3.56  --  4.97*  --  5.71*   T4 0.79  --   --   --   --  0.76  --  0.77   WBC 7.4 7.7  --  6.6   < > 8.9   < >  --    HGB 14.1 14.0  --  13.6   < > 14.6   < >  --     232  --  218   < > 245   < >  --    ALBUMIN 4.1 4.0  --  4.1  --  4.1  4.1   < >  --     < > = values in this interval not displayed.     Hemoglobin A1c is high and not at goal.  LDL previously at goal.  HDL at goal.  Triglycerides are high and not at goal.  ALT normal.  Creatinine has worsened slightly.  TSH is high.  Free T4 within normal range.  CBC is normal.  Albumin is normal.

## 2022-10-28 NOTE — PATIENT INSTRUCTIONS
Blood pressure is well controlled.   Diabetes needs some help. A1c is high.     See new medication changes.     Medications refilled.   Labs are stable.     Results for orders placed or performed in visit on 10/28/22   Comprehensive metabolic panel     Status: Abnormal   Result Value Ref Range    Sodium 136 134 - 144 mmol/L    Potassium 4.6 3.5 - 5.1 mmol/L    Chloride 102 98 - 107 mmol/L    Carbon Dioxide (CO2) 25 21 - 31 mmol/L    Anion Gap 9 3 - 14 mmol/L    Urea Nitrogen 24 7 - 25 mg/dL    Creatinine 1.18 0.70 - 1.30 mg/dL    Calcium 9.5 8.6 - 10.3 mg/dL    Glucose 161 (H) 70 - 105 mg/dL    Alkaline Phosphatase 92 34 - 104 U/L    AST 11 (L) 13 - 39 U/L    ALT 13 7 - 52 U/L    Protein Total 7.0 6.4 - 8.9 g/dL    Albumin 4.1 3.5 - 5.7 g/dL    Bilirubin Total 0.5 0.3 - 1.0 mg/dL    GFR Estimate 64 >60 mL/min/1.73m2   Lipid Profile     Status: Abnormal   Result Value Ref Range    Cholesterol 331 (H) <200 mg/dL    Triglycerides 507 (H) <150 mg/dL    Direct Measure HDL 34 23 - 92 mg/dL    LDL Cholesterol Calculated      Non HDL Cholesterol 297 (H) <130 mg/dL    Patient Fasting > 8hrs? Yes     Narrative    Cholesterol  Desirable:  <200 mg/dL    Triglycerides  Normal:  Less than 150 mg/dL  Borderline High:  150-199 mg/dL  High:  200-499 mg/dL  Very High:  Greater than or equal to 500 mg/dL    Direct Measure HDL  Female:  Greater than or equal to 50 mg/dL   Male:  Greater than or equal to 40 mg/dL    LDL Cholesterol  Desirable:  <100mg/dL  Above Desirable:  100-129 mg/dL   Borderline High:  130-159 mg/dL   High:  160-189 mg/dL   Very High:  >= 190 mg/dL    Non HDL Cholesterol  Desirable:  130 mg/dL  Above Desirable:  130-159 mg/dL  Borderline High:  160-189 mg/dL  High:  190-219 mg/dL  Very High:  Greater than or equal to 220 mg/dL   CBC with platelets     Status: Abnormal   Result Value Ref Range    WBC Count 7.4 4.0 - 11.0 10e3/uL    RBC Count 4.74 4.40 - 5.90 10e6/uL    Hemoglobin 14.1 13.3 - 17.7 g/dL    Hematocrit  43.1 40.0 - 53.0 %    MCV 91 78 - 100 fL    MCH 29.7 26.5 - 33.0 pg    MCHC 32.7 31.5 - 36.5 g/dL    RDW 15.1 (H) 10.0 - 15.0 %    Platelet Count 246 150 - 450 10e3/uL   Hemoglobin A1c     Status: Abnormal   Result Value Ref Range    Hemoglobin A1C 9.4 (H) 4.0 - 6.2 %   TSH with free T4 reflex     Status: Abnormal   Result Value Ref Range    TSH 5.49 (H) 0.40 - 4.00 mU/L   UA reflex to Microscopic     Status: Abnormal   Result Value Ref Range    Color Urine Light Yellow Colorless, Straw, Light Yellow, Yellow    Appearance Urine Clear Clear    Glucose Urine >1000 (A) Negative mg/dL    Bilirubin Urine Negative Negative    Ketones Urine Negative Negative mg/dL    Specific Gravity Urine 1.023 1.000 - 1.030    Blood Urine Negative Negative    pH Urine 5.0 5.0 - 9.0    Protein Albumin Urine Negative Negative mg/dL    Urobilinogen Urine Normal Normal, 2.0 mg/dL    Nitrite Urine Negative Negative    Leukocyte Esterase Urine Negative Negative    Narrative    Microscopic not indicated   T4 free     Status: Normal   Result Value Ref Range    Free T4 0.79 0.60 - 1.60 ng/dL      Aspects of Diabetes:   Recent Labs   Lab Test 10/28/22  0729 09/06/22  0824 08/24/22  0747 07/21/22  0705 06/22/22  0019 04/19/22  0953 03/07/22  0751 01/06/22  0707   A1C 9.4*  --   --  8.7*  --  9.6*  --  9.5*   LDL  --   --   --  46  --  100  --  96   HDL 34  --   --  30  --  34  --  34   TRIG 507*  --   --  298*  --  310*  --  309*   ALT 13 10  --  9  --  11  10   < >  --    CR 1.18 1.08   < > 1.09   < > 1.06  1.04   < >  --    GFRESTIMATED 64 71   < > 70   < > 73  74   < >  --    POTASSIUM 4.6 4.5   < > 4.0   < > 4.5  4.5   < >  --    TSH 5.49*  --   --  3.56  --  4.97*  --  5.71*   T4 0.79  --   --   --   --  0.76  --  0.77   WBC 7.4 7.7  --  6.6   < > 8.9   < >  --    HGB 14.1 14.0  --  13.6   < > 14.6   < >  --     232  --  218   < > 245   < >  --    ALBUMIN 4.1 4.0  --  4.1  --  4.1  4.1   < >  --     < > = values in this interval  not displayed.      Hemoglobin A1c  Goal range is under 8%. Best is 6.5 to 7   Blood Pressure 120/62 Goal to keep less than 140/90   Tobacco  reports that he quit smoking about 37 years ago. His smoking use included cigarettes. He has a 30.00 pack-year smoking history. He has never used smokeless tobacco. Goal to abstain from tobacco   Aspirin or Plavix Anti-platelet therapy Aspirin or Plavix reduces risk of heart disease and stroke  -- sometimes used with other blood thinners, depending on bleeding risk and risk factors.    ACE/ARB Specific blood pressure meds These medications can reduce risk of kidney disease   Cholesterol Statins (Lipitor, Crestor, vs others) Statins reduce risk of heart disease and stroke   Eye Exam -- Do Yearly -- Annual diabetic eye exam   Healthy weight Wt Readings from Last 4 Encounters:   10/28/22 101.5 kg (223 lb 12.8 oz)   09/16/22 99 kg (218 lb 3.2 oz)   08/09/22 99.9 kg (220 lb 3.2 oz)   07/21/22 101.2 kg (223 lb)      Body mass index is 32.81 kg/m .  Goal BMI under 30, best is under 25.      -- Trying to exercise daily (goal at least 20 min/day) with moderate aerobic activity   -- Eat healthy (resources from ADA at http://www.diabetes.org/)   -- Taking good care of my feet. Consider seeing the Podiatrist   -- Check blood sugars as directed, record in log book and bring to every appointment    Insurance companies are grading you and I on your blood sugar control -- Goal is to get your A1c down to 7.9% or lower and NO Smoking!  -- Medicare and most insurance companies, will only cover Hemoglobin A1c labs to be rechecked every 91+ days.      Return for Diabetes labs and clinic follow-up appointment every 3 to 4 months.    Schedule lab only appointment --- A few days AFTER: 1/26/23   Schedule clinic appointment with Dr. Roberts -- Same day as labs, or 1-2 days later.

## 2022-10-28 NOTE — NURSING NOTE
"Chief Complaint   Patient presents with     Diabetes     3 month DM check       Initial /62 (BP Location: Right arm, Patient Position: Sitting, Cuff Size: Adult Large)   Pulse 90   Temp 96.9  F (36.1  C) (Temporal)   Resp 16   Wt 101.5 kg (223 lb 12.8 oz)   SpO2 97%   BMI 32.81 kg/m   Estimated body mass index is 32.81 kg/m  as calculated from the following:    Height as of 8/9/22: 1.759 m (5' 9.25\").    Weight as of this encounter: 101.5 kg (223 lb 12.8 oz).     Medication Reconciliation: complete      Previous A1C is not at goal of <8  Lab Results   Component Value Date    A1C 9.4 10/28/2022    A1C 8.7 07/21/2022    A1C 9.6 04/19/2022    A1C 9.5 01/06/2022    A1C 8.5 08/10/2021    A1C 9.6 05/03/2021    A1C 10.9 01/22/2021    A1C 10.5 04/09/2019    A1C 11.1 09/06/2018     Urine microalbumin:creatine: 10/28/22  Foot exam :  4/19/22  Eye exam :   Due    Tobacco User : no  Patient is not on a daily aspirin  Patient is on a Statin.  Blood pressure today of:     BP Readings from Last 1 Encounters:   10/28/22 120/62      is at the goal of <139/89 for diabetics.    Lisa Krueger LPN on 10/28/2022 at 8:37 AM        FOOD SECURITY SCREENING QUESTIONS:    The next two questions are to help us understand your food security.  If you are feeling you need any assistance in this area, we have resources available to support you today.    Hunger Vital Signs:  Within the past 12 months we worried whether our food would run out before we got money to buy more. Never  Within the past 12 months the food we bought just didn't last and we didn't have money to get more. Never        Advance care plan reviewed      Lisa Krueger LPN on 10/28/2022 at 8:37 AM      "

## 2022-10-30 LAB
CREAT UR-MCNC: 66.5 MG/DL
MICROALBUMIN UR-MCNC: <12 MG/L
MICROALBUMIN/CREAT UR: NORMAL MG/G{CREAT}

## 2022-12-09 ENCOUNTER — HOSPITAL ENCOUNTER (OUTPATIENT)
Dept: CARDIOLOGY | Facility: OTHER | Age: 77
Discharge: HOME | End: 2022-12-09
Attending: INTERNAL MEDICINE | Admitting: INTERNAL MEDICINE
Payer: MEDICARE

## 2022-12-09 DIAGNOSIS — I50.22 CHRONIC SYSTOLIC HEART FAILURE (H): ICD-10-CM

## 2022-12-09 LAB — LVEF ECHO: NORMAL

## 2022-12-09 PROCEDURE — 93306 TTE W/DOPPLER COMPLETE: CPT | Mod: 26 | Performed by: INTERNAL MEDICINE

## 2022-12-09 PROCEDURE — 255N000002 HC RX 255 OP 636: Performed by: INTERNAL MEDICINE

## 2022-12-09 PROCEDURE — 999N000208 ECHOCARDIOGRAM COMPLETE

## 2022-12-09 RX ADMIN — PERFLUTREN 4 ML: 6.52 INJECTION, SUSPENSION INTRAVENOUS at 09:22
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
## 2023-02-03 ENCOUNTER — OFFICE VISIT (OUTPATIENT)
Dept: INTERNAL MEDICINE | Facility: OTHER | Age: 78
End: 2023-02-03
Payer: MEDICARE

## 2023-02-03 ENCOUNTER — LAB (OUTPATIENT)
Dept: LAB | Facility: OTHER | Age: 78
End: 2023-02-03
Payer: COMMERCIAL

## 2023-02-03 VITALS
DIASTOLIC BLOOD PRESSURE: 68 MMHG | RESPIRATION RATE: 20 BRPM | BODY MASS INDEX: 34.13 KG/M2 | WEIGHT: 230.4 LBS | HEART RATE: 78 BPM | HEIGHT: 69 IN | SYSTOLIC BLOOD PRESSURE: 122 MMHG | OXYGEN SATURATION: 99 % | TEMPERATURE: 98 F

## 2023-02-03 DIAGNOSIS — N18.31 CHRONIC KIDNEY DISEASE, STAGE 3A (H): ICD-10-CM

## 2023-02-03 DIAGNOSIS — E66.01 MORBID OBESITY (H): ICD-10-CM

## 2023-02-03 DIAGNOSIS — E78.2 MIXED HYPERLIPIDEMIA: ICD-10-CM

## 2023-02-03 DIAGNOSIS — E11.65 UNCONTROLLED TYPE 2 DIABETES MELLITUS WITH HYPERGLYCEMIA (H): Primary | ICD-10-CM

## 2023-02-03 DIAGNOSIS — K21.00 GASTROESOPHAGEAL REFLUX DISEASE WITH ESOPHAGITIS, UNSPECIFIED WHETHER HEMORRHAGE: ICD-10-CM

## 2023-02-03 DIAGNOSIS — G62.0 NEUROPATHY DUE TO CHEMOTHERAPEUTIC DRUG (H): ICD-10-CM

## 2023-02-03 DIAGNOSIS — C77.9 SECONDARY AND UNSPECIFIED MALIGNANT NEOPLASM OF LYMPH NODE, UNSPECIFIED (H): ICD-10-CM

## 2023-02-03 DIAGNOSIS — Z71.85 VACCINE COUNSELING: ICD-10-CM

## 2023-02-03 DIAGNOSIS — I50.22 CHRONIC SYSTOLIC HEART FAILURE (H): ICD-10-CM

## 2023-02-03 DIAGNOSIS — E03.4 HYPOTHYROIDISM DUE TO ACQUIRED ATROPHY OF THYROID: ICD-10-CM

## 2023-02-03 DIAGNOSIS — I10 BENIGN ESSENTIAL HYPERTENSION: ICD-10-CM

## 2023-02-03 DIAGNOSIS — Z87.39 HISTORY OF GOUT: ICD-10-CM

## 2023-02-03 DIAGNOSIS — E53.8 VITAMIN B12 DEFICIENCY: ICD-10-CM

## 2023-02-03 DIAGNOSIS — Z00.00 ENCOUNTER FOR MEDICARE ANNUAL WELLNESS EXAM: ICD-10-CM

## 2023-02-03 DIAGNOSIS — Z85.038 HISTORY OF COLON CANCER: ICD-10-CM

## 2023-02-03 DIAGNOSIS — R73.9 ELEVATED RANDOM BLOOD GLUCOSE LEVEL: ICD-10-CM

## 2023-02-03 DIAGNOSIS — I25.10 CORONARY ARTERY DISEASE INVOLVING NATIVE CORONARY ARTERY OF NATIVE HEART WITHOUT ANGINA PECTORIS: ICD-10-CM

## 2023-02-03 DIAGNOSIS — T45.1X5A NEUROPATHY DUE TO CHEMOTHERAPEUTIC DRUG (H): ICD-10-CM

## 2023-02-03 DIAGNOSIS — Z86.73 HISTORY OF TIA (TRANSIENT ISCHEMIC ATTACK) AND STROKE: ICD-10-CM

## 2023-02-03 LAB
ALBUMIN SERPL BCG-MCNC: 4.1 G/DL (ref 3.5–5.2)
ALBUMIN UR-MCNC: 30 MG/DL
ALP SERPL-CCNC: 118 U/L (ref 40–129)
ALT SERPL W P-5'-P-CCNC: 13 U/L (ref 10–50)
ANION GAP SERPL CALCULATED.3IONS-SCNC: 9 MMOL/L (ref 7–15)
APPEARANCE UR: CLEAR
AST SERPL W P-5'-P-CCNC: 13 U/L (ref 10–50)
BILIRUB SERPL-MCNC: 0.5 MG/DL
BILIRUB UR QL STRIP: NEGATIVE
BUN SERPL-MCNC: 18.9 MG/DL (ref 8–23)
CALCIUM SERPL-MCNC: 9.4 MG/DL (ref 8.8–10.2)
CHLORIDE SERPL-SCNC: 100 MMOL/L (ref 98–107)
CHOLEST SERPL-MCNC: 238 MG/DL
COLOR UR AUTO: ABNORMAL
CREAT SERPL-MCNC: 1.05 MG/DL (ref 0.67–1.17)
CREAT UR-MCNC: 155.1 MG/DL
DEPRECATED HCO3 PLAS-SCNC: 28 MMOL/L (ref 22–29)
ERYTHROCYTE [DISTWIDTH] IN BLOOD BY AUTOMATED COUNT: 14.9 % (ref 10–15)
GFR SERPL CREATININE-BSD FRML MDRD: 73 ML/MIN/1.73M2
GLUCOSE SERPL-MCNC: 253 MG/DL (ref 70–99)
GLUCOSE UR STRIP-MCNC: 500 MG/DL
HBA1C MFR BLD: 9.3 % (ref 4–6.2)
HCT VFR BLD AUTO: 45.6 % (ref 40–53)
HDLC SERPL-MCNC: 37 MG/DL
HGB BLD-MCNC: 14.9 G/DL (ref 13.3–17.7)
HGB UR QL STRIP: NEGATIVE
HYALINE CASTS: 5 /LPF
KETONES UR STRIP-MCNC: NEGATIVE MG/DL
LDLC SERPL CALC-MCNC: 130 MG/DL
LEUKOCYTE ESTERASE UR QL STRIP: NEGATIVE
MCH RBC QN AUTO: 30 PG (ref 26.5–33)
MCHC RBC AUTO-ENTMCNC: 32.7 G/DL (ref 31.5–36.5)
MCV RBC AUTO: 92 FL (ref 78–100)
MICROALBUMIN UR-MCNC: 62.5 MG/L
MICROALBUMIN/CREAT UR: 40.3 MG/G CR (ref 0–17)
MUCOUS THREADS #/AREA URNS LPF: PRESENT /LPF
NITRATE UR QL: NEGATIVE
NONHDLC SERPL-MCNC: 201 MG/DL
PH UR STRIP: 5 [PH] (ref 5–9)
PLATELET # BLD AUTO: 213 10E3/UL (ref 150–450)
POTASSIUM SERPL-SCNC: 5.2 MMOL/L (ref 3.4–5.3)
PROT SERPL-MCNC: 6.8 G/DL (ref 6.4–8.3)
RBC # BLD AUTO: 4.96 10E6/UL (ref 4.4–5.9)
RBC URINE: 1 /HPF
SODIUM SERPL-SCNC: 137 MMOL/L (ref 136–145)
SP GR UR STRIP: 1.02 (ref 1–1.03)
T4 FREE SERPL-MCNC: 1.05 NG/DL (ref 0.9–1.7)
TRIGL SERPL-MCNC: 357 MG/DL
TSH SERPL DL<=0.005 MIU/L-ACNC: 8.27 UIU/ML (ref 0.3–4.2)
UROBILINOGEN UR STRIP-MCNC: NORMAL MG/DL
WBC # BLD AUTO: 7.8 10E3/UL (ref 4–11)
WBC URINE: 1 /HPF

## 2023-02-03 PROCEDURE — 99214 OFFICE O/P EST MOD 30 MIN: CPT | Mod: 25 | Performed by: INTERNAL MEDICINE

## 2023-02-03 PROCEDURE — 80053 COMPREHEN METABOLIC PANEL: CPT | Mod: ZL

## 2023-02-03 PROCEDURE — 82570 ASSAY OF URINE CREATININE: CPT | Mod: ZL

## 2023-02-03 PROCEDURE — 84439 ASSAY OF FREE THYROXINE: CPT | Mod: ZL

## 2023-02-03 PROCEDURE — G0463 HOSPITAL OUTPT CLINIC VISIT: HCPCS

## 2023-02-03 PROCEDURE — 83036 HEMOGLOBIN GLYCOSYLATED A1C: CPT | Mod: ZL

## 2023-02-03 PROCEDURE — 80061 LIPID PANEL: CPT | Mod: ZL

## 2023-02-03 PROCEDURE — 85027 COMPLETE CBC AUTOMATED: CPT | Mod: ZL

## 2023-02-03 PROCEDURE — 84443 ASSAY THYROID STIM HORMONE: CPT | Mod: ZL

## 2023-02-03 PROCEDURE — G0439 PPPS, SUBSEQ VISIT: HCPCS | Performed by: INTERNAL MEDICINE

## 2023-02-03 PROCEDURE — 81001 URINALYSIS AUTO W/SCOPE: CPT | Mod: ZL

## 2023-02-03 PROCEDURE — 36415 COLL VENOUS BLD VENIPUNCTURE: CPT | Mod: ZL

## 2023-02-03 RX ORDER — METFORMIN HCL 500 MG
TABLET, EXTENDED RELEASE 24 HR ORAL
Qty: 360 TABLET | Refills: 1 | Status: SHIPPED | OUTPATIENT
Start: 2023-02-03 | End: 2023-05-10

## 2023-02-03 RX ORDER — PANTOPRAZOLE SODIUM 40 MG/1
40 TABLET, DELAYED RELEASE ORAL DAILY
Qty: 90 TABLET | Refills: 1 | Status: SHIPPED | OUTPATIENT
Start: 2023-02-03 | End: 2023-05-10

## 2023-02-03 RX ORDER — GLIPIZIDE 10 MG/1
20 TABLET ORAL
Qty: 360 TABLET | Refills: 1 | Status: SHIPPED | OUTPATIENT
Start: 2023-02-03 | End: 2023-05-10

## 2023-02-03 RX ORDER — LEVOTHYROXINE SODIUM 88 UG/1
88 TABLET ORAL DAILY
Qty: 90 TABLET | Refills: 1 | Status: SHIPPED | OUTPATIENT
Start: 2023-02-03 | End: 2023-02-03

## 2023-02-03 RX ORDER — ALLOPURINOL 100 MG/1
100 TABLET ORAL 2 TIMES DAILY
Qty: 180 TABLET | Refills: 1 | Status: SHIPPED | OUTPATIENT
Start: 2023-02-03 | End: 2023-05-10

## 2023-02-03 RX ORDER — SACUBITRIL AND VALSARTAN 24; 26 MG/1; MG/1
1 TABLET, FILM COATED ORAL 2 TIMES DAILY
Qty: 180 TABLET | Refills: 1 | Status: SHIPPED | OUTPATIENT
Start: 2023-02-03 | End: 2023-05-10

## 2023-02-03 RX ORDER — LEVOTHYROXINE SODIUM 100 UG/1
100 TABLET ORAL DAILY
Qty: 90 TABLET | Refills: 1 | Status: SHIPPED | OUTPATIENT
Start: 2023-02-03 | End: 2023-05-10

## 2023-02-03 RX ORDER — ATORVASTATIN CALCIUM 40 MG/1
40 TABLET, FILM COATED ORAL DAILY
Qty: 90 TABLET | Refills: 1 | Status: SHIPPED | OUTPATIENT
Start: 2023-02-03 | End: 2023-05-10

## 2023-02-03 ASSESSMENT — ENCOUNTER SYMPTOMS
CHILLS: 0
PALPITATIONS: 0
FEVER: 0
HEARTBURN: 0
EYE PAIN: 0
DYSURIA: 0
HEMATURIA: 0
BRUISES/BLEEDS EASILY: 0
COUGH: 0
SORE THROAT: 0
MYALGIAS: 0
PARESTHESIAS: 0
DIZZINESS: 0
WEAKNESS: 1
HEADACHES: 0
SHORTNESS OF BREATH: 0
FREQUENCY: 0
NERVOUS/ANXIOUS: 0
HEMATOCHEZIA: 0
ARTHRALGIAS: 0
DIARRHEA: 1
NAUSEA: 1
ABDOMINAL PAIN: 0
JOINT SWELLING: 0
CONSTIPATION: 0

## 2023-02-03 ASSESSMENT — PAIN SCALES - GENERAL: PAINLEVEL: NO PAIN (0)

## 2023-02-03 ASSESSMENT — ACTIVITIES OF DAILY LIVING (ADL)
CURRENT_FUNCTION: HOUSEWORK REQUIRES ASSISTANCE
CURRENT_FUNCTION: NO ASSISTANCE NEEDED

## 2023-02-03 NOTE — PATIENT INSTRUCTIONS
Blood pressure is well controlled.     Diabetes still needs a little help... A1c is higher than goal of 7.9% or lower.     - Increase Synthroid up to 100 mcg daily.     Medications refilled.     Labs are otherwise relatively stable.     Results for orders placed or performed in visit on 02/03/23   Comprehensive metabolic panel     Status: Abnormal   Result Value Ref Range    Sodium 137 136 - 145 mmol/L    Potassium 5.2 3.4 - 5.3 mmol/L    Chloride 100 98 - 107 mmol/L    Carbon Dioxide (CO2) 28 22 - 29 mmol/L    Anion Gap 9 7 - 15 mmol/L    Urea Nitrogen 18.9 8.0 - 23.0 mg/dL    Creatinine 1.05 0.67 - 1.17 mg/dL    Calcium 9.4 8.8 - 10.2 mg/dL    Glucose 253 (H) 70 - 99 mg/dL    Alkaline Phosphatase 118 40 - 129 U/L    AST 13 10 - 50 U/L    ALT 13 10 - 50 U/L    Protein Total 6.8 6.4 - 8.3 g/dL    Albumin 4.1 3.5 - 5.2 g/dL    Bilirubin Total 0.5 <=1.2 mg/dL    GFR Estimate 73 >60 mL/min/1.73m2   Lipid Profile     Status: Abnormal   Result Value Ref Range    Cholesterol 238 (H) <200 mg/dL    Triglycerides 357 (H) <150 mg/dL    Direct Measure HDL 37 (L) >=40 mg/dL    LDL Cholesterol Calculated 130 (H) <=100 mg/dL    Non HDL Cholesterol 201 (H) <130 mg/dL    Narrative    Cholesterol  Desirable:  <200 mg/dL    Triglycerides  Normal:  Less than 150 mg/dL  Borderline High:  150-199 mg/dL  High:  200-499 mg/dL  Very High:  Greater than or equal to 500 mg/dL    Direct Measure HDL  Female:  Greater than or equal to 50 mg/dL   Male:  Greater than or equal to 40 mg/dL    LDL Cholesterol  Desirable:  <100mg/dL  Above Desirable:  100-129 mg/dL   Borderline High:  130-159 mg/dL   High:  160-189 mg/dL   Very High:  >= 190 mg/dL    Non HDL Cholesterol  Desirable:  130 mg/dL  Above Desirable:  130-159 mg/dL  Borderline High:  160-189 mg/dL  High:  190-219 mg/dL  Very High:  Greater than or equal to 220 mg/dL   Albumin Random Urine Quantitative with Creat Ratio     Status: Abnormal   Result Value Ref Range    Creatinine Urine mg/dL  Pelvic rest until symptoms resolve.  Alternate Tylenol and Ibuprofen as needed for pain.   Follow up with GYN within 2-3 days.     155.1 mg/dL    Albumin Urine mg/L 62.5 mg/L    Albumin Urine mg/g Cr 40.30 (H) 0.00 - 17.00 mg/g Cr   CBC with platelets     Status: Normal   Result Value Ref Range    WBC Count 7.8 4.0 - 11.0 10e3/uL    RBC Count 4.96 4.40 - 5.90 10e6/uL    Hemoglobin 14.9 13.3 - 17.7 g/dL    Hematocrit 45.6 40.0 - 53.0 %    MCV 92 78 - 100 fL    MCH 30.0 26.5 - 33.0 pg    MCHC 32.7 31.5 - 36.5 g/dL    RDW 14.9 10.0 - 15.0 %    Platelet Count 213 150 - 450 10e3/uL   Hemoglobin A1c     Status: Abnormal   Result Value Ref Range    Hemoglobin A1C 9.3 (H) 4.0 - 6.2 %   TSH with free T4 reflex     Status: Abnormal   Result Value Ref Range    TSH 8.27 (H) 0.30 - 4.20 uIU/mL   UA reflex to Microscopic     Status: Abnormal   Result Value Ref Range    Color Urine Light Yellow Colorless, Straw, Light Yellow, Yellow    Appearance Urine Clear Clear    Glucose Urine 500 (A) Negative mg/dL    Bilirubin Urine Negative Negative    Ketones Urine Negative Negative mg/dL    Specific Gravity Urine 1.024 1.000 - 1.030    Blood Urine Negative Negative    pH Urine 5.0 5.0 - 9.0    Protein Albumin Urine 30 (A) Negative mg/dL    Urobilinogen Urine Normal Normal, 2.0 mg/dL    Nitrite Urine Negative Negative    Leukocyte Esterase Urine Negative Negative    RBC Urine 1 <=2 /HPF    WBC Urine 1 <=5 /HPF    Mucus Urine Present (A) None Seen /LPF    Hyaline Casts Urine 5 (H) <=2 /LPF      Aspects of Diabetes:   Recent Labs   Lab Test 02/03/23  0801 10/28/22  0729 09/06/22  0824 08/24/22  0747 07/21/22  0705 06/22/22  0019 04/19/22  0953   A1C 9.3* 9.4*  --   --  8.7*  --  9.6*   *  --   --   --  46  --  100   HDL 37* 34  --   --  30  --  34   TRIG 357* 507*  --   --  298*  --  310*   ALT 13 13 10  --  9  --  11  10   CR 1.05 1.18 1.08   < > 1.09   < > 1.06  1.04   GFRESTIMATED 73 64 71   < > 70   < > 73  74   POTASSIUM 5.2 4.6 4.5   < > 4.0   < > 4.5  4.5   TSH 8.27* 5.49*  --   --  3.56  --  4.97*   T4  --  0.79  --   --   --   --  0.76   WBC 7.8 7.4  7.7  --  6.6   < > 8.9   HGB 14.9 14.1 14.0  --  13.6   < > 14.6    246 232  --  218   < > 245   ALBUMIN 4.1 4.1 4.0  --  4.1  --  4.1  4.1    < > = values in this interval not displayed.      Hemoglobin A1c  Goal range is under 8%. Best is 6.5 to 7   Blood Pressure 122/68 Goal to keep less than 140/90   Tobacco  reports that he quit smoking about 38 years ago. His smoking use included cigarettes. He has a 30.00 pack-year smoking history. He has never used smokeless tobacco. Goal to abstain from tobacco   Aspirin or Plavix Anti-platelet therapy Aspirin or Plavix reduces risk of heart disease and stroke  -- sometimes used with other blood thinners, depending on bleeding risk and risk factors.    ACE/ARB Specific blood pressure meds These medications can reduce risk of kidney disease   Cholesterol Statins (Lipitor, Crestor, vs others) Statins reduce risk of heart disease and stroke   Eye Exam -- Do Yearly -- Annual diabetic eye exam   Healthy weight Wt Readings from Last 4 Encounters:   02/03/23 104.5 kg (230 lb 6.4 oz)   10/28/22 101.5 kg (223 lb 12.8 oz)   09/16/22 99 kg (218 lb 3.2 oz)   08/09/22 99.9 kg (220 lb 3.2 oz)      Body mass index is 34.27 kg/m .  Goal BMI under 30, best is under 25.      -- Trying to exercise daily (goal at least 20 min/day) with moderate aerobic activity   -- Eat healthy (resources from ADA at http://www.diabetes.org/)   -- Taking good care of my feet. Consider seeing the Podiatrist   -- Check blood sugars as directed, record in log book and bring to every appointment    Insurance companies are grading you and I on your blood sugar control -- Goal is to get your A1c down to 7.9% or lower and NO Smoking!  -- Medicare and most insurance companies, will only cover Hemoglobin A1c labs to be rechecked every 91+ days.      Return for Diabetes labs and clinic follow-up appointment every 3 to 4 months.    Schedule lab only appointment --- A few days AFTER: 5/4/23   Schedule clinic  appointment with Dr. Roberts -- Same day as labs, or 1-2 days later.        Patient Education   Personalized Prevention Plan  You are due for the preventive services outlined below.  Your care team is available to assist you in scheduling these services.  If you have already completed any of these items, please share that information with your care team to update in your medical record.  Health Maintenance Due   Topic Date Due    Heart Failure Action Plan  Never done    Eye Exam  04/30/2022     Preventive Health Recommendations  See your health care provider every year to  Review health changes.   Discuss preventive care.    Review your medicines if your doctor has prescribed any.  Talk with your health care provider about whether you should have a test to screen for prostate cancer (PSA).  Every 3 years, have a diabetes test (fasting glucose). If you are at risk for diabetes, you should have this test more often.  Every 5 years, have a cholesterol test. Have this test more often if you are at risk for high cholesterol or heart disease.   Every 10 years, have a colonoscopy. Or, have a yearly FIT test (stool test). These exams will check for colon cancer.  Talk to with your health care provider about screening for Abdominal Aortic Aneurysm if you have a family history of AAA or have a history of smoking.    Shots:   Get a flu shot each year.   Get a tetanus shot every 10 years.   Talk to your doctor about your pneumonia vaccines. There are now two you should receive - Pneumovax (PPSV 23) and Prevnar (PCV 13).  Talk to your pharmacist about a shingles vaccine.   Talk to your doctor about the hepatitis B vaccine.    Nutrition:   Eat at least 5 servings of fruits and vegetables each day.   Eat whole-grain bread, whole-wheat pasta and brown rice instead of white grains and rice.   Get adequate Calcium and Vitamin D.     Lifestyle  Exercise for at least 150 minutes a week (30 minutes a day, 5 days a week). This will help  you control your weight and prevent disease.   Limit alcohol to one drink per day.   No smoking.   Wear sunscreen to prevent skin cancer.   See your dentist every six months for an exam and cleaning.   See your eye doctor every 1 to 2 years to screen for conditions such as glaucoma, macular degeneration and cataracts.    Personalized Prevention Plan  You are due for the preventive services outlined below.  Your care team is available to assist you in scheduling these services.  If you have already completed any of these items, please share that information with your care team to update in your medical record.  Health Maintenance   Topic Date Due    HF ACTION PLAN  Never done    EYE EXAM  04/30/2022    DIABETIC FOOT EXAM  04/19/2023    A1C  08/03/2023    BMP  08/03/2023    MEDICARE ANNUAL WELLNESS VISIT  02/03/2024    ALT  02/03/2024    LIPID  02/03/2024    MICROALBUMIN  02/03/2024    TSH W/FREE T4 REFLEX  02/03/2024    FALL RISK ASSESSMENT  02/03/2024    CBC  02/03/2024    HEMOGLOBIN  02/03/2024    COLORECTAL CANCER SCREENING  07/19/2024    ADVANCE CARE PLANNING  02/03/2028    DTAP/TDAP/TD IMMUNIZATION (3 - Td or Tdap) 01/02/2030    PHQ-2 (once per calendar year)  Completed    INFLUENZA VACCINE  Completed    URINALYSIS  Completed    ZOSTER IMMUNIZATION  Completed    COVID-19 Vaccine  Completed    HEPATITIS C SCREENING  Addressed    IPV IMMUNIZATION  Aged Out    MENINGITIS IMMUNIZATION  Aged Out    Pneumococcal Vaccine: 65+ Years  Discontinued       Exercise for a Healthier Heart  You may wonder how you can improve the health of your heart. If you re thinking about exercise, you re on the right track. You don t need to become an athlete. But you do need a certain amount of brisk exercise to help strengthen your heart. If you have been diagnosed with a heart condition, your healthcare provider may advise exercise to help stabilize your condition. To help make exercise a habit, choose safe, fun activities.       Exercise with a friend. When activity is fun, you're more likely to stick with it.   Before you start  Check with your healthcare provider before starting an exercise program. This is especially important if you have not been active for a while. It's also important if you have a long-term (chronic) health problem such as heart disease, diabetes, or obesity. Or if you are at high risk for having these problems.   Why exercise?  Exercising regularly offers many healthy rewards. It can help you do all of the following:   Improve your blood cholesterol level to help prevent further heart trouble  Lower your blood pressure to help prevent a stroke or heart attack  Control diabetes, or reduce your risk of getting this disease  Improve your heart and lung function  Reach and stay at a healthy weight  Make your muscles stronger so you can stay active  Prevent falls and fractures by slowing the loss of bone mass (osteoporosis)  Manage stress better  Reduce your blood pressure  Improve your sense of self and your body image  Exercise tips    Ease into your routine. Set small goals. Then build on them. If you are not sure what your activity level should be, talk with your healthcare provider first before starting an exercise routine.  Exercise on most days. Aim for a total of 150 minutes (2 hours and 30 minutes) or more of moderate-intensity aerobic activity each week. Or 75 minutes (1 hour and 15 minutes) or more of vigorous-intensity aerobic activity each week. Or try for a combination of both. Moderate activity means that you breathe heavier and your heart rate increases but you can still talk. Think about doing 40 minutes of moderate exercise, 3 to 4 times a week. For best results, activity should last for about 40 minutes to lower blood pressure and cholesterol. It's OK to work up to the 40-minute period over time. Examples of moderate-intensity activity are walking 1 mile in 15 minutes. Or doing 30 to 45 minutes of  yard work.  Step up your daily activity level.  Along with your exercise program, try being more active the whole day. Walk instead of drive. Or park further away so that you take more steps each day. Do more household tasks or yard work. You may not be able to meet the advised mount of physical activity. But doing some moderate- or vigorous-intensity aerobic activity can help reduce your risk for heart disease. Your healthcare provider can help you figure out what is best for you.  Choose 1 or more activities you enjoy.  Walking is one of the easiest things you can do. You can also try swimming, riding a bike, dancing, or taking an exercise class.    When to call your healthcare provider  Call your healthcare provider if you have any of these:   Chest pain or feel dizzy or lightheaded  Burning, tightness, pressure, or heaviness in your chest, neck, shoulders, back, or arms  Abnormal shortness of breath  More joint or muscle pain  A very fast or irregular heartbeat (palpitations)  [employer] last reviewed this educational content on 7/1/2019 2000-2021 [employer]. All rights reserved. This information is not intended as a substitute for professional medical care. Always follow your healthcare professional's instructions.        Activities of Daily Living    Your Health Risk Assessment indicates you have difficulties with activities of daily living such as housework, bathing, preparing meals, taking medication, etc. Please make a follow up appointment for us to address this issue in more detail.    Signs of Hearing Loss      Hearing much better with one ear can be a sign of hearing loss.   Hearing loss is a problem shared by many people. In fact, it is one of the most common health problems, particularly as people age. Most people age 65 and older have some hearing loss. By age 80, almost everyone does. Hearing loss often occurs slowly over the years. So you may not realize your hearing has gotten  worse.  Have your hearing checked  Call your healthcare provider if you:  Have to strain to hear normal conversation  Have to watch other people s faces very carefully to follow what they re saying  Need to ask people to repeat what they ve said  Often misunderstand what people are saying  Turn the volume of the television or radio up so high that others complain  Feel that people are mumbling when they re talking to you  Find that the effort to hear leaves you feeling tired and irritated  Notice, when using the phone, that you hear better with one ear than the other  [employer] last reviewed this educational content on 1/1/2020 2000-2021 [employer]. All rights reserved. This information is not intended as a substitute for professional medical care. Always follow your healthcare professional's instructions.

## 2023-02-03 NOTE — PROGRESS NOTES
"SUBJECTIVE:   Andrez is a 77 year old who presents for Preventive Visit.    Patient has been advised of split billing requirements and indicates understanding: Yes  Are you in the first 12 months of your Medicare coverage?  No    Healthy Habits:     In general, how would you rate your overall health?  Good    Frequency of exercise:  None    Duration of exercise:  Less than 15 minutes    Do you usually eat at least 4 servings of fruit and vegetables a day, include whole grains    & fiber and avoid regularly eating high fat or \"junk\" foods?  Yes    Taking medications regularly:  Yes    Barriers to taking medications:  Not applicable    Medication side effects:  Not applicable    Ability to successfully perform activities of daily living:  Housework requires assistance    Home Safety:  No safety concerns identified    Hearing Impairment:  Difficult to understand a speaker at a public meeting or Bahai service and difficulty understanding soft or whispered speech    In the past 6 months, have you been bothered by leaking of urine?  No    In general, how would you rate your overall mental or emotional health?  Very good      PHQ-2 Total Score: 0    Additional concerns today:  Yes    Have you ever done Advance Care Planning? (For example, a Health Directive, POLST, or a discussion with a medical provider or your loved ones about your wishes): No, advance care planning information given to patient to review.  Patient plans to discuss their wishes with loved ones or provider.      Fall risk  Fallen 2 or more times in the past year?: No  Any fall with injury in the past year?: No    Cognitive Screening   1) Repeat 3 items (Leader, Season, Table)    2) Clock draw: NORMAL  3) 3 item recall: Recalls 1 object   Results: NORMAL clock, 1-2 items recalled: COGNITIVE IMPAIRMENT LESS LIKELY    Mini-CogTM Copyright DIANE Green. Licensed by the author for use in [facility]; reprinted with permission (prabhakar@.[facility]). All " rights reserved.      Do you have sleep apnea, excessive snoring or daytime drowsiness?: no    Reviewed and updated as needed this visit by clinical staff   Tobacco  Allergies  Meds  Problems  Med Hx  Surg Hx  Fam Hx  Soc   Hx        Reviewed and updated as needed this visit by Provider   Tobacco  Allergies  Meds  Problems  Med Hx  Surg Hx  Fam Hx         Social History     Tobacco Use     Smoking status: Former     Packs/day: 1.00     Years: 30.00     Pack years: 30.00     Types: Cigarettes     Quit date: 1985     Years since quittin.1     Smokeless tobacco: Never   Substance Use Topics     Alcohol use: No     Alcohol/week: 0.0 standard drinks     Review current opioid prescriptions    For a patient with a current opioid prescription:    Reviewed potential Opioid Use Disorder (OUD) risk factors: Yes     Evaluate their pain severity and current treatment plan:     Provide information on non-opioid treatment options:    Refer to a specialist, as appropriate:    Get more information on pain management in the [facility] Pain Management Best Practices Inter-agency Task Force Report    Screen for potential Substance Use Disorders (SUDs)    Reviewed the patient s potential risk factors for SUDs: Yes     Refer to treatment or specialist, as appropriate:     A screening tool isn t required but you may use one:  Find more information in the National [place] on Drug Abuse Screening and Assessment Tools Chart      Alcohol Use 2/3/2023   Prescreen: >3 drinks/day or >7 drinks/week? Not Applicable       Current providers sharing in care for this patient include:   Patient Care Team:  Bk Roberts MD as PCP - General (Internal Medicine)  Bk Roberts MD as Assigned PCP  Radha Adamson APRN CNP as Assigned Cancer Care Provider  Sherrell Aguirre MD as Assigned Heart and Vascular Provider    The following health maintenance items are reviewed in Epic and correct as of today:  Health Maintenance    Topic Date Due     HF ACTION PLAN  Never done     EYE EXAM  04/30/2022     DIABETIC FOOT EXAM  04/19/2023     A1C  08/03/2023     BMP  08/03/2023     MEDICARE ANNUAL WELLNESS VISIT  02/03/2024     ALT  02/03/2024     LIPID  02/03/2024     MICROALBUMIN  02/03/2024     TSH W/FREE T4 REFLEX  02/03/2024     FALL RISK ASSESSMENT  02/03/2024     CBC  02/03/2024     HEMOGLOBIN  02/03/2024     COLORECTAL CANCER SCREENING  07/19/2024     ADVANCE CARE PLANNING  02/03/2028     DTAP/TDAP/TD IMMUNIZATION (3 - Td or Tdap) 01/02/2030     PHQ-2 (once per calendar year)  Completed     INFLUENZA VACCINE  Completed     URINALYSIS  Completed     ZOSTER IMMUNIZATION  Completed     COVID-19 Vaccine  Completed     HEPATITIS C SCREENING  Addressed     IPV IMMUNIZATION  Aged Out     MENINGITIS IMMUNIZATION  Aged Out     Pneumococcal Vaccine: 65+ Years  Discontinued     Review of Systems   Constitutional: Negative for chills and fever.   HENT: Positive for hearing loss. Negative for congestion, ear pain and sore throat.    Eyes: Negative for pain and visual disturbance.   Respiratory: Negative for cough and shortness of breath.    Cardiovascular: Negative for chest pain, palpitations and peripheral edema.   Gastrointestinal: Positive for diarrhea and nausea. Negative for abdominal pain, constipation, heartburn and hematochezia.   Genitourinary: Positive for impotence. Negative for dysuria, frequency, genital sores, hematuria, penile discharge and urgency.   Musculoskeletal: Negative for arthralgias, joint swelling and myalgias.   Skin: Negative for rash.   Allergic/Immunologic: Negative for immunocompromised state.   Neurological: Positive for weakness. Negative for dizziness, headaches and paresthesias.   Hematological: Does not bruise/bleed easily.   Psychiatric/Behavioral: Negative for mood changes. The patient is not nervous/anxious.        OBJECTIVE:   /68 (BP Location: Right arm, Patient Position: Sitting, Cuff Size: Adult  "Regular)   Pulse 78   Temp 98  F (36.7  C) (Temporal)   Resp 20   Ht 1.746 m (5' 8.75\")   Wt 104.5 kg (230 lb 6.4 oz)   SpO2 99%   BMI 34.27 kg/m   Estimated body mass index is 34.27 kg/m  as calculated from the following:    Height as of this encounter: 1.746 m (5' 8.75\").    Weight as of this encounter: 104.5 kg (230 lb 6.4 oz).  Physical Exam  Constitutional:       General: He is not in acute distress.     Appearance: He is well-developed. He is obese. He is not diaphoretic.   HENT:      Head: Normocephalic and atraumatic.   Eyes:      General: No scleral icterus.     Conjunctiva/sclera: Conjunctivae normal.   Neck:      Vascular: No carotid bruit.   Cardiovascular:      Rate and Rhythm: Normal rate and regular rhythm.      Pulses: Normal pulses.   Pulmonary:      Effort: Pulmonary effort is normal.      Breath sounds: Normal breath sounds.   Abdominal:      Palpations: Abdomen is soft.      Tenderness: There is no abdominal tenderness.   Musculoskeletal:         General: No deformity.      Cervical back: Neck supple.      Right lower leg: No edema.      Left lower leg: No edema.   Lymphadenopathy:      Cervical: No cervical adenopathy.   Skin:     General: Skin is warm and dry.      Findings: No rash.   Neurological:      Mental Status: He is alert and oriented to person, place, and time. Mental status is at baseline.   Psychiatric:         Mood and Affect: Mood normal.         Behavior: Behavior normal.       Diagnostic Test Results:  Labs reviewed in [facility]  Recent Labs   Lab Test 02/03/23  0801 10/28/22  0729 09/06/22  0824 08/24/22  0747 07/21/22  0705 06/22/22  0019 04/19/22  0953   A1C 9.3* 9.4*  --   --  8.7*  --  9.6*   *  --   --   --  46  --  100   HDL 37* 34  --   --  30  --  34   TRIG 357* 507*  --   --  298*  --  310*   ALT 13 13 10  --  9  --  11  10   CR 1.05 1.18 1.08   < > 1.09   < > 1.06  1.04   GFRESTIMATED 73 64 71   < > 70   < > 73  74   POTASSIUM 5.2 4.6 4.5   < > 4.0   < > " 4.5  4.5   TSH 8.27* 5.49*  --   --  3.56  --  4.97*   T4 1.05 0.79  --   --   --   --  0.76   WBC 7.8 7.4 7.7  --  6.6   < > 8.9   HGB 14.9 14.1 14.0  --  13.6   < > 14.6    246 232  --  218   < > 245   ALBUMIN 4.1 4.1 4.0  --  4.1  --  4.1  4.1    < > = values in this interval not displayed.   Hemoglobin A1c is high and not at goal.  LDL and triglycerides are high and not at goal.  HDL is normal.  ALT is normal.  Creatinine is at baseline.  Potassium normal.  TSH is elevated.  Free T4 is within normal range.  CBC normal.  Albumin normal.    ASSESSMENT / PLAN:       ICD-10-CM    1. Uncontrolled type 2 diabetes mellitus with hyperglycemia (H)  E11.65 empagliflozin (JARDIANCE) 10 MG TABS tablet     glipiZIDE (GLUCOTROL) 10 MG tablet     metFORMIN (GLUCOPHAGE XR) 500 MG 24 hr tablet     Semaglutide 7 MG TABS      2. Chronic systolic heart failure (H) - ECHO 6/22/2022 at [facility] -- EF 25-30%  I50.22 empagliflozin (JARDIANCE) 10 MG TABS tablet     sacubitril-valsartan (ENTRESTO) 24-26 MG per tablet     Semaglutide 7 MG TABS      3. Chronic kidney disease, stage 3a (H)  N18.31       4. Coronary artery disease involving native coronary artery of native heart without angina pectoris  I25.10 atorvastatin (LIPITOR) 40 MG tablet      5. Hypothyroidism due to acquired atrophy of thyroid  E03.4 levothyroxine (SYNTHROID/LEVOTHROID) 100 MCG tablet     DISCONTINUED: levothyroxine (SYNTHROID/LEVOTHROID) 88 MCG tablet      6. 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 Mixed hyperlipidemia  E78.2       8. Vitamin B12 deficiency  E53.8       9. 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 History of colon cancer  Z85.038       11. Secondary and unspecified malignant neoplasm of lymph node, unspecified (H)  C77.9       12. Morbid obesity  (H)  E66.01       13. History of gout  Z87.39 allopurinol (ZYLOPRIM) 100 MG tablet      14. Gastroesophageal reflux disease with esophagitis, unspecified whether hemorrhage  K21.00 pantoprazole (PROTONIX) 40 MG EC tablet      15. Vaccine counseling  Z71.85       16. Encounter for Medicare annual wellness exam  Z00.00       Patient presents for Medicare annual wellness visit as well as follow-up multiple issues.    Uncontrolled type 2 diabetes with hyperglycemia.  Continues on Jardiance, glipizide, metformin, semaglutide tablets.  Denies hypoglycemia.  Seems to be doing well with current dosing.  Encouraged reduce oral carbohydrate intake, sugar intake.  May need to consider starting insulin therapy if oral medications are not adequate to improve diabetes levels.    Chronic systolic heart failure.  Ejection fraction was 25-30% back in June.  Currently on Jardiance, Entresto, semaglutide.  Needs refills.  Seems to be tolerating well.  No signs of fluid retention issues.    Stage IIIa chronic kidney disease.  Kidney function is currently at baseline, overall slowly trending down.  Encouraged NSAID avoidance.    Hypothyroidism, taking oral replacement.  Doing well with current dosing TSH is elevated and Synthroid dosing needs to be increased.  Currently taking 88 mcg daily, we will increase this up to 100 mcg daily.  Recheck lab work in 2 to 3 months.    History of TIA.  With mixed hyperlipidemia.  Cholesterol levels are high and not at goal.  Increase Lipitor.  Continue Xarelto.    Vitamin B12 deficiency, continue on oral replacement.    Neuropathy due to chemotherapy, history of colon cancer.  This is an ongoing issue.  Continue to monitor.    Obesity, discussed need for reduced oral caloric intake.  Regular exercise.  Reduce carbohydrate intake.  Information printed and reviewed.    History of gout, no recent gout attacks.  Doing well with allopurinol.  Needs refills.    Heartburn and reflux, ongoing.  Gets bad  "reflux symptoms without his Protonix.  Needs refills.    Vaccine counseling completed.  Encouraged annual vaccinations.    Patient has been advised of split billing requirements and indicates understanding: Yes    COUNSELING:  Reviewed preventive health counseling, as reflected in patient instructions  Special attention given to:       Regular exercise       Healthy diet/nutrition       Vision screening       Hearing screening       Dental care       Bladder control       Fall risk prevention       Immunizations    BMI:   Estimated body mass index is 34.27 kg/m  as calculated from the following:    Height as of this encounter: 1.746 m (5' 8.75\").    Weight as of this encounter: 104.5 kg (230 lb 6.4 oz).   Weight management plan: Discussed healthy diet and exercise guidelines      He reports that he quit smoking about 38 years ago. His smoking use included cigarettes. He has a 30.00 pack-year smoking history. He has never used smokeless tobacco.      Appropriate preventive services were discussed with this patient, including applicable screening as appropriate for cardiovascular disease, diabetes, osteopenia/osteoporosis, and glaucoma.  As appropriate for age/gender, discussed screening for colorectal cancer, prostate cancer, breast cancer, and cervical cancer. Checklist reviewing preventive services available has been given to the patient.    Reviewed patients plan of care and provided an AVS. The Complex Care Plan (for patients with higher acuity and needing more deliberate coordination of services) for Andrez meets the Care Plan requirement. This Care Plan has been established and reviewed with the Patient.          Bk Roberts MD  [facility]    Identified Health Risks:    He is at risk for lack of exercise and has been provided with information to increase physical activity for the benefit of his well-being.  The patient reports that he has difficulty with activities of daily living. I " have asked that the patient make a follow up appointment in 12+ weeks where this issue will be further evaluated and addressed.  The patient was provided with written information regarding signs of hearing loss.

## 2023-02-03 NOTE — NURSING NOTE
"Chief Complaint   Patient presents with     Diabetes     Medicare Wellness Visit         Initial /68 (BP Location: Right arm, Patient Position: Sitting, Cuff Size: Adult Regular)   Pulse 78   Temp 98  F (36.7  C) (Temporal)   Resp 20   Ht 1.746 m (5' 8.75\")   Wt 104.5 kg (230 lb 6.4 oz)   SpO2 99%   BMI 34.27 kg/m   Estimated body mass index is 34.27 kg/m  as calculated from the following:    Height as of this encounter: 1.746 m (5' 8.75\").    Weight as of this encounter: 104.5 kg (230 lb 6.4 oz).       FOOD SECURITY SCREENING QUESTIONS:    The next two questions are to help us understand your food security.  If you are feeling you need any assistance in this area, we have resources available to support you today.    Hunger Vital Signs:  Within the past 12 months we worried whether our food would run out before we got money to buy more. Never  Within the past 12 months the food we bought just didn't last and we didn't have money to get more. Never    Advance Care Directive on file? no      Medication reconciliation complete.      Pollo Michaels,on 2/3/2023 at 8:22 AM        "

## 2023-02-14 ENCOUNTER — APPOINTMENT (OUTPATIENT)
Dept: CARDIOLOGY | Facility: CLINIC | Age: 78
End: 2023-02-14
Attending: INTERNAL MEDICINE
Payer: MEDICARE

## 2023-02-14 ENCOUNTER — VIRTUAL VISIT (OUTPATIENT)
Dept: CARDIOLOGY | Facility: OTHER | Age: 78
End: 2023-02-14
Attending: OBSTETRICS & GYNECOLOGY
Payer: COMMERCIAL

## 2023-02-14 VITALS
TEMPERATURE: 98.4 F | BODY MASS INDEX: 34.24 KG/M2 | HEART RATE: 84 BPM | SYSTOLIC BLOOD PRESSURE: 118 MMHG | RESPIRATION RATE: 17 BRPM | OXYGEN SATURATION: 95 % | WEIGHT: 230.2 LBS | DIASTOLIC BLOOD PRESSURE: 60 MMHG

## 2023-02-14 DIAGNOSIS — I50.32 CHRONIC DIASTOLIC CONGESTIVE HEART FAILURE (H): ICD-10-CM

## 2023-02-14 DIAGNOSIS — I50.22 CHRONIC SYSTOLIC HEART FAILURE (H): Primary | ICD-10-CM

## 2023-02-14 PROCEDURE — G0463 HOSPITAL OUTPT CLINIC VISIT: HCPCS | Mod: GT

## 2023-02-14 RX ORDER — LEVOTHYROXINE SODIUM 88 UG/1
1 TABLET ORAL DAILY
COMMUNITY
Start: 2022-11-18 | End: 2023-05-10

## 2023-02-14 RX ORDER — LEVOTHYROXINE SODIUM 88 UG/1
88 TABLET ORAL DAILY
COMMUNITY
Start: 2023-02-03 | End: 2023-05-10

## 2023-02-14 RX ORDER — METFORMIN HCL 500 MG
1000 TABLET, EXTENDED RELEASE 24 HR ORAL
COMMUNITY
Start: 2022-11-18 | End: 2023-05-10

## 2023-02-14 RX ORDER — SPIRONOLACTONE 25 MG/1
25 TABLET ORAL DAILY
Qty: 90 TABLET | Refills: 4 | Status: SHIPPED | OUTPATIENT
Start: 2023-02-14 | End: 2023-05-10

## 2023-02-14 RX ORDER — METOPROLOL SUCCINATE 50 MG/1
50 TABLET, EXTENDED RELEASE ORAL DAILY
Qty: 90 TABLET | Refills: 4 | Status: SHIPPED | OUTPATIENT
Start: 2023-02-14 | End: 2023-05-10

## 2023-02-14 ASSESSMENT — PAIN SCALES - GENERAL: PAINLEVEL: NO PAIN (0)

## 2023-02-14 NOTE — NURSING NOTE
"Chief Complaint   Patient presents with     Follow Up       FOOD SECURITY SCREENING QUESTIONS  Hunger Vital Signs:  Within the past 12 months we worried whether our food would run out before we got money to buy more. Never  Within the past 12 months the food we bought just didn't last and we didn't have money to get more. Never  Melba Auguste LPN 2/14/2023 10:45 AM      Initial /60 (BP Location: Right arm, Patient Position: Sitting, Cuff Size: Adult Large)   Pulse 84   Temp 98.4  F (36.9  C) (Tympanic)   Resp 17   Wt 104.4 kg (230 lb 3.2 oz)   SpO2 95%   BMI 34.24 kg/m   Estimated body mass index is 34.24 kg/m  as calculated from the following:    Height as of 2/3/23: 1.746 m (5' 8.75\").    Weight as of this encounter: 104.4 kg (230 lb 3.2 oz).  Medication Reconciliation: complete    Melba Auguste LPN    "
Adequate: hears normal conversation without difficulty

## 2023-02-14 NOTE — PATIENT INSTRUCTIONS
You were seen by  Dr. Aguirre    1. Stop taking Rivaroxaban (Xarelto)      You will follow up with [facility] in 8 months, sooner if needed.       Please call the cardiology office with problems, questions, or concerns at [phone].    If you experience chest pain, chest pressure, chest tightness, shortness of breath, fainting, lightheadedness, nausea, vomiting, or other concerning symptoms, please report to the Emergency Department or call 911. These symptoms may be emergent, and best treated in the Emergency Department.     Cardiology Nurses  YURI Palma, YURI Hood, LPN  Serena, LPN  [facility] ([address])  [phone]

## 2023-02-14 NOTE — PROGRESS NOTES
Cardiology Clinic Note    HPI    Dear colleagues,     I had the pleasure of seeing Mr. Andrez Olivier in the Cardiology clinic.  As you know, Mr. Andrez Olivier is a pleasant 76 year old male who was diagnosed with improved ejection fraction heart failure during evaluation for TIA and stroke.  I first met him August 9, 2022.    Patient has pertinent history of ischemic cardiomyopathy and coronary disease with 3 prior PCI's.  One of them was for sudden cardiac arrest where he received stents to his LAD at the age of 49. his last PCI was in 2013 to his RCA at [facility].  His other history is notable for hypertension, type 2 diabetes, dyslipidemia, obesity, colon cancer requiring chemotherapy complicated by neuropathy secondary to the chemotherapy.  The rest of his history as noted below.       June 22, 2022 he was watching the [employer] game, went to stand up, then had loss vision and felt limp and fell down to the floor.  He called his daughter to help him and he crawled over to the door to let his daughter in and she called EMS.  He was taken to the ER here for CT scan of his head then was transferred to [facility].  His symptoms resolved within a day at being at [facility].  During his evaluation he underwent an echocardiogram that showed an ejection fraction of 25 to 30%, and thus is recommended that he follow-up with a cardiologist locally.  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  His CTA head and neck did show a 5 mm area thrombus in the distal left vertebral artery, thus when neurology was consulted they initially placed him on heparin infusion and transition to rivaroxaban.  His aspirin was changed to Plavix.  They also changed his simvastatin to atorvastatin 40 mg daily.  He also tested positive for COVID during this hospitalization.  He was discharged with a MCOT monitor which showed no arrhythmias sustained.    He  was previously symptomatic but then we adjusted his medications and he feels quite improved on all 4 pillars GDMT.    Patient presents alone.  He does live alone by himself.  His daughter and son-in-law are local in the area.  He says he currently feels well right now.   He does not have chest discomfort nor dyspnea on exertion.  He says he is more limited from his peripheral neuropathy and has to stop due to leg weakness rather than shortness of breath.  He is able to do a flight of stairs.  He goes fishing 2-3 times a week.  He does not sleep on his back due to breathing however.  He does not have PND or lower extremity edema.  He does get lightheaded upon standing occasionally but no prolonged dizziness.  He has no bleeding symptoms.    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)     Morbid obesity (H)     Diverticulosis of colon     Vitamin B12 deficiency     Hypothyroidism due to acquired atrophy of thyroid     Chronic kidney disease, stage 3a (H)     History of TIA (transient ischemic attack) and stroke - left vertebral artery thrombosis     Chronic systolic heart failure (H) - ECHO 6/22/2022 at [facility] -- EF 25-30%     Mass in region of sella turcica present on magnetic resonance imaging - ?Adenoma - MRI June 2022 - [facility]     Infection due to 2019 novel coronavirus - 6/22/2022        FAMILY HISTORY:  Family History   Problem Relation Age of Onset     Other - See Comments  Father         PE     Other - See Comments Mother         Old Age     Diabetes Sister         Diabetes,? Sisters-DM     Arthritis Brother         Arthritis,? Brothers- Gout       SOCIAL HISTORY:  Social History     Tobacco Use     Smoking status: Former     Packs/day: 1.00     Years: 30.00     Pack years: 30.00     Types: Cigarettes     Quit date: 1985     Years since quittin.1     Smokeless tobacco: Never   Vaping Use     Vaping Use: Never used   Substance Use Topics     Alcohol use: No     Alcohol/week: 0.0 standard drinks     Drug use: No        ALLERGIES:  Allergies   Allergen Reactions     Aspirin      Other reaction(s): Other - Describe In Comment Field  ---- Has been 4 years since stent - as of 2017 - Hold Aspirin while on Plavix for now     Canagliflozin      Other reaction(s): Dizziness     Morphine      Other reaction(s): Other - Describe In Comment Field  Pt had a bad experience at age 49 and would like to avoid.       CURRENT MEDICATIONS:  Current Outpatient Medications   Medication Sig Dispense Refill     allopurinol (ZYLOPRIM) 100 MG tablet Take 1 tablet (100 mg) by mouth 2 times daily 180 tablet 1     atorvastatin (LIPITOR) 40 MG tablet Take 1 tablet (40 mg) by mouth daily 90 tablet 1     Cholecalciferol (VITAMIN D3) 50 MCG (2000 UT) CAPS Take 1 capsule by mouth daily       clopidogrel (PLAVIX) 75 MG tablet TAKE 1 TABLET BY MOUTH DAILY 90 tablet 4     Cyanocobalamin 1000 MCG CAPS Take 2,500 mcg by mouth 2023 Patient states he is taking 2,000 mcg once daily  rp/lpn       empagliflozin (JARDIANCE) 10 MG TABS tablet Take 1 tablet (10 mg) by mouth daily - for diabetes 90 tablet 1     glipiZIDE (GLUCOTROL) 10 MG tablet Take 2 tablets (20 mg) by mouth 2 times daily (before meals) 360 tablet 1     levothyroxine (SYNTHROID/LEVOTHROID) 100 MCG tablet Take 1 tablet (100 mcg) by mouth daily -- Dose Increase 2/3/2023  -- Stop / Cancel 88 mcg tablet -- 90 tablet 1     lisinopril (ZESTRIL) 2.5 MG  tablet Take 2.5 mg by mouth       metFORMIN (GLUCOPHAGE XR) 500 MG 24 hr tablet Take 2-4 tablets (1,000-2,000 mg) by mouth daily (with dinner) -- Substitute for cheap, generic, preferred 24 hr Metformin 360 tablet 1     metoprolol succinate ER (TOPROL XL) 50 MG 24 hr tablet Take 1 tablet (50 mg) by mouth daily for 90 days 90 tablet 3     nitroGLYcerin (NITROSTAT) 0.4 MG sublingual tablet Place 0.4 mg under the tongue       pantoprazole (PROTONIX) 40 MG EC tablet Take 1 tablet (40 mg) by mouth daily 90 tablet 1     rivaroxaban ANTICOAGULANT (XARELTO) 20 MG TABS tablet Take 1 tablet (20 mg) by mouth daily 90 tablet 1     sacubitril-valsartan (ENTRESTO) 24-26 MG per tablet Take 1 tablet by mouth 2 times daily 180 tablet 1     Semaglutide 7 MG TABS Take 1 tablet (7 mg) by mouth daily - for diabetes (didn't tolerate 14 mg tablet daily) 90 tablet 1     spironolactone (ALDACTONE) 25 MG tablet Take 1 tablet (25 mg) by mouth daily for 90 days 90 tablet 3     vitamin B-12 (CYANOCOBALAMIN) 2500 MCG sublingual tablet Take 1 tablet (2,500 mcg) by mouth daily - for low B12 -- START: 1/7/2022 (Patient not taking: Reported on 2/3/2023) 90 tablet 3     vitamin B6 (PYRIDOXINE) 100 MG tablet Take 100 mg by mouth daily       EXAM:  /60 (BP Location: Right arm, Patient Position: Sitting, Cuff Size: Adult Large)   Pulse 84   Temp 98.4  F (36.9  C) (Tympanic)   Resp 17   Wt 104.4 kg (230 lb 3.2 oz)   SpO2 95%   BMI 34.24 kg/m      General: appears comfortable, alert and articulate  Heart: euvolemic  Lungs: clear, no rales or wheezing  Abdomen: soft  Extremities: no edema  Neurological: normal speech and affect, no gross motor deficits    Weight  Wt Readings from Last 10 Encounters:   02/14/23 104.4 kg (230 lb 3.2 oz)   02/03/23 104.5 kg (230 lb 6.4 oz)   10/28/22 101.5 kg (223 lb 12.8 oz)   09/16/22 99 kg (218 lb 3.2 oz)   08/09/22 99.9 kg (220 lb 3.2 oz)   07/21/22 101.2 kg (223 lb)   07/06/22 101.7 kg (224 lb 3.2 oz)    06/22/22 101.6 kg (224 lb)   04/19/22 101.9 kg (224 lb 9.6 oz)   03/16/22 101.6 kg (224 lb)       Labs:  Reviewed    Testing/Procedures:  I personally visualized and interpreted:  EKG 8/9/2022: Normal sinus rhythm  Echocardiogram 12/9/22: Left ventricular size is normal, ejection fraction is 45-50%, normal RV, mild AS    Outside results of note:  Outside records from [facility] were obtained, and relevant results/notes have been incorporated into HPI.    GENERAL RHYTHM INFORMATION: MCT/RAINER monitor was performed. Total   acquired time: 29 days 9 hours 37 minutes.   Conclusion:     1. Monitor showed predominately: normal sinus rhythm and sinus   tachycardia   2. Tachycardia: 9 % of the time   3. Bradycardia: 0 % of the time   4. Ventricular ectopy: rare (<1%) PVC present.   5. Supraventricular ectopy: occasional (1-5%) with 1 % PAC   present.   6. Atrial fibrillation: none   7. Patient triggered events: accidental push was associated with   sinus rhythm; 1 episodes.   8. Auto triggered events: sinus rhythm; 4 episodes, PVCs; 3   episodes, non sustained VT; 3 episodes, sinus pauses of 3.2 and   4.8 seconds; asymptomatic; 2 episodes   9. Critical Alert: None       Echocardiogram [facility] 6/22/22:  Severe diffuse hypokinesis of the left ventricle 25 to 30%  Mild left atrial enlargement  Mild to trace mitral and pulmonic regurgitation  Bubble study insufficient due to poor image quality    Coronary angiogram February 1, 2013 [facility]:  PRESENTATION / INDICATIONS     Increasing exertional angina, inferior ischemia on stress testing, prior PCI.   DIAGNOSTIC - CORONARY     Calcified coronary arteries.     The left main artery has minimal disease.     The LAD has mild disease, patent stents.     The circumflex artery has mild disease.     The RCA is dominant with severe mid disease and LYNETTE 2 flow, early left to right collaterals seen.   HEMODYNAMICS     The LVEDP is at the upper limits of normal.    LEFT VENTRICULAR FUNCTION     Left ventricular function is preserved with EF of 55%   INTERVENTION     Successful 2.5mm x 12mm Balloon, 3mm x 12mm Balloon, 4mm x 16mm Promus BRYON and 4mm x 8mm Balloon to Mid RCA, post stenosis 0%, LYNETTE grade 3 flow.       Assessment and Plan:     In summary, is a very pleasant 77-year-old male who I am seeing improved ejection fraction heart failure secondary to ischemic cardiomyopathy.  Pertinent history of recent stroke that required transfer to [facility].  Other history notable for coronary artery disease status post PCI, obesity, type 2 diabetes, hypertension.    Improved ejection heart failure: ACC/AHA stage C, NYHA class II, EF 25 to 45%  metoprolol succinate 50 mg daily  Entresto 24-26 mg twice daily  Empagliflozin 10mg daily  spironolactone 25 mg daily.      Secondary prevention ASCVD, he is on high-dose atorvastatin 40 mg daily, Plavix 75 mg daily  Will stop xarelto as no evidence for embolic phenomenon    I will see him back in 8 months.    The patient states understanding and is agreeable with plan.   Feel free to contact myself regarding questions or concerns.  It was a pleasure to see this patient today.    Sherrell Aguirre MD   of Medicine  Advanced Heart Failure and Transplant Cardiology    CC  TR[facility]NAMOR[facility]    Today's clinic visit entailed:  30 minutes spent on the date of the encounter doing chart review, history and exam, documentation and further activities per the note  Provider  Link to [facility] Help Grid     The level of medical decision making during this visit was of high complexity.

## 2023-03-13 ENCOUNTER — LAB (OUTPATIENT)
Dept: LAB | Facility: OTHER | Age: 78
End: 2023-03-13
Attending: NURSE PRACTITIONER
Payer: MEDICARE

## 2023-03-13 DIAGNOSIS — C18.7 CANCER OF SIGMOID COLON (H): ICD-10-CM

## 2023-03-13 LAB
ALBUMIN SERPL BCG-MCNC: 3.9 G/DL (ref 3.5–5.2)
ALP SERPL-CCNC: 118 U/L (ref 40–129)
ALT SERPL W P-5'-P-CCNC: 11 U/L (ref 10–50)
ANION GAP SERPL CALCULATED.3IONS-SCNC: 10 MMOL/L (ref 7–15)
AST SERPL W P-5'-P-CCNC: 9 U/L (ref 10–50)
BASOPHILS # BLD AUTO: 0 10E3/UL (ref 0–0.2)
BASOPHILS NFR BLD AUTO: 0 %
BILIRUB SERPL-MCNC: 0.6 MG/DL
BUN SERPL-MCNC: 15.2 MG/DL (ref 8–23)
CALCIUM SERPL-MCNC: 9.2 MG/DL (ref 8.8–10.2)
CEA SERPL-MCNC: 1 NG/ML
CHLORIDE SERPL-SCNC: 102 MMOL/L (ref 98–107)
CREAT SERPL-MCNC: 0.82 MG/DL (ref 0.67–1.17)
DEPRECATED HCO3 PLAS-SCNC: 26 MMOL/L (ref 22–29)
EOSINOPHIL # BLD AUTO: 0.2 10E3/UL (ref 0–0.7)
EOSINOPHIL NFR BLD AUTO: 2 %
ERYTHROCYTE [DISTWIDTH] IN BLOOD BY AUTOMATED COUNT: 14.6 % (ref 10–15)
GFR SERPL CREATININE-BSD FRML MDRD: 90 ML/MIN/1.73M2
GLUCOSE SERPL-MCNC: 290 MG/DL (ref 70–99)
HCT VFR BLD AUTO: 41.3 % (ref 40–53)
HGB BLD-MCNC: 13.8 G/DL (ref 13.3–17.7)
IMM GRANULOCYTES # BLD: 0 10E3/UL
IMM GRANULOCYTES NFR BLD: 0 %
LDH SERPL L TO P-CCNC: 153 U/L (ref 0–250)
LYMPHOCYTES # BLD AUTO: 1.6 10E3/UL (ref 0.8–5.3)
LYMPHOCYTES NFR BLD AUTO: 22 %
MCH RBC QN AUTO: 30 PG (ref 26.5–33)
MCHC RBC AUTO-ENTMCNC: 33.4 G/DL (ref 31.5–36.5)
MCV RBC AUTO: 90 FL (ref 78–100)
MONOCYTES # BLD AUTO: 0.7 10E3/UL (ref 0–1.3)
MONOCYTES NFR BLD AUTO: 10 %
NEUTROPHILS # BLD AUTO: 4.6 10E3/UL (ref 1.6–8.3)
NEUTROPHILS NFR BLD AUTO: 66 %
NRBC # BLD AUTO: 0 10E3/UL
NRBC BLD AUTO-RTO: 0 /100
PLATELET # BLD AUTO: 205 10E3/UL (ref 150–450)
POTASSIUM SERPL-SCNC: 4.6 MMOL/L (ref 3.4–5.3)
PROT SERPL-MCNC: 6.5 G/DL (ref 6.4–8.3)
RBC # BLD AUTO: 4.6 10E6/UL (ref 4.4–5.9)
SODIUM SERPL-SCNC: 138 MMOL/L (ref 136–145)
WBC # BLD AUTO: 7.1 10E3/UL (ref 4–11)

## 2023-03-13 PROCEDURE — 82378 CARCINOEMBRYONIC ANTIGEN: CPT | Mod: ZL

## 2023-03-13 PROCEDURE — 85025 COMPLETE CBC W/AUTO DIFF WBC: CPT | Mod: ZL

## 2023-03-13 PROCEDURE — 80053 COMPREHEN METABOLIC PANEL: CPT | Mod: ZL

## 2023-03-13 PROCEDURE — 36415 COLL VENOUS BLD VENIPUNCTURE: CPT | Mod: ZL

## 2023-03-13 PROCEDURE — 83615 LACTATE (LD) (LDH) ENZYME: CPT | Mod: ZL

## 2023-03-14 ENCOUNTER — HOSPITAL ENCOUNTER (OUTPATIENT)
Dept: CT IMAGING | Facility: OTHER | Age: 78
Discharge: HOME | End: 2023-03-14
Attending: NURSE PRACTITIONER
Payer: MEDICARE

## 2023-03-14 DIAGNOSIS — C18.7 CANCER OF SIGMOID COLON (H): ICD-10-CM

## 2023-03-14 PROCEDURE — G1010 CDSM STANSON: HCPCS

## 2023-03-14 PROCEDURE — 250N000011 HC RX IP 250 OP 636: Performed by: NURSE PRACTITIONER

## 2023-03-14 PROCEDURE — 74177 CT ABD & PELVIS W/CONTRAST: CPT | Mod: MG

## 2023-03-14 RX ORDER — IOPAMIDOL 755 MG/ML
132 INJECTION, SOLUTION INTRAVASCULAR ONCE
Status: COMPLETED | OUTPATIENT
Start: 2023-03-14 | End: 2023-03-14

## 2023-03-14 RX ADMIN — IOPAMIDOL 132 ML: 755 INJECTION, SOLUTION INTRAVENOUS at 08:26

## 2023-03-14 NOTE — PROGRESS NOTES
10-15mL Isovue 370 extravasion on left arm. Contrast stopped immediately, cold pack applied. Patient given paper and verbal instructions on care - and reminded to call his doctor or myself if he has any questions or concerns. Slight swelling at site with no pain reported by patient.

## 2023-03-17 ENCOUNTER — ONCOLOGY VISIT (OUTPATIENT)
Dept: ONCOLOGY | Facility: OTHER | Age: 78
End: 2023-03-17
Attending: NURSE PRACTITIONER
Payer: COMMERCIAL

## 2023-03-17 VITALS
BODY MASS INDEX: 32.93 KG/M2 | WEIGHT: 230 LBS | RESPIRATION RATE: 18 BRPM | SYSTOLIC BLOOD PRESSURE: 128 MMHG | HEART RATE: 85 BPM | OXYGEN SATURATION: 96 % | TEMPERATURE: 97.5 F | HEIGHT: 70 IN | DIASTOLIC BLOOD PRESSURE: 82 MMHG

## 2023-03-17 DIAGNOSIS — C18.7 CANCER OF SIGMOID COLON (H): Primary | ICD-10-CM

## 2023-03-17 PROCEDURE — G0463 HOSPITAL OUTPT CLINIC VISIT: HCPCS

## 2023-03-17 PROCEDURE — 99214 OFFICE O/P EST MOD 30 MIN: CPT | Performed by: NURSE PRACTITIONER

## 2023-03-17 ASSESSMENT — ANXIETY QUESTIONNAIRES
6. BECOMING EASILY ANNOYED OR IRRITABLE: NOT AT ALL
3. WORRYING TOO MUCH ABOUT DIFFERENT THINGS: NOT AT ALL
1. FEELING NERVOUS, ANXIOUS, OR ON EDGE: NOT AT ALL
GAD7 TOTAL SCORE: 0
2. NOT BEING ABLE TO STOP OR CONTROL WORRYING: NOT AT ALL
GAD7 TOTAL SCORE: 0
5. BEING SO RESTLESS THAT IT IS HARD TO SIT STILL: NOT AT ALL
7. FEELING AFRAID AS IF SOMETHING AWFUL MIGHT HAPPEN: NOT AT ALL

## 2023-03-17 ASSESSMENT — PAIN SCALES - GENERAL: PAINLEVEL: NO PAIN (0)

## 2023-03-17 ASSESSMENT — PATIENT HEALTH QUESTIONNAIRE - PHQ9: 5. POOR APPETITE OR OVEREATING: NOT AT ALL

## 2023-03-17 NOTE — NURSING NOTE
"Chief Complaint   Patient presents with     6 mo follow-up Colon Cancer       Initial /82 (BP Location: Right arm, Patient Position: Sitting, Cuff Size: Adult Regular)   Pulse 85   Temp 97.5  F (36.4  C) (Tympanic)   Resp 18   Ht 1.778 m (5' 10\")   Wt 104.3 kg (230 lb)   SpO2 96%   BMI 33.00 kg/m   Estimated body mass index is 33 kg/m  as calculated from the following:    Height as of this encounter: 1.778 m (5' 10\").    Weight as of this encounter: 104.3 kg (230 lb).  Medication Reconciliation: complete    Gricelda Ervin RN    "

## 2023-03-17 NOTE — PROGRESS NOTES
Oncology Follow-up Visit:  March 17, 2023  Diagnosis:Colon cancer    History Of Present Illness:  Patient presents to the clinic today for followup of colon cancer. Patient was seen in consultation on June 1, 2016. Patient presented to the emergency room on April 25, 2016, with complaints of chest pain radiating down his arms, which was primarily worse with exertion. In the emergency department, he was found to have a hemoglobin of 7.1, and he subsequently was admitted. CBC revealed microcytic indices with an MCV of 62. He was noted to be iron deficient. He was transfused 2 units of packed red cells and was ruled out for MI. He was seen by Dr. Solano, who performed colonoscopy and was noted to have a mass in the sigmoid colon that was consistent with adenocarcinoma. He also had multiple adenomatous polyps. The patient was admitted on May 11, 2016, for a sigmoid colectomy. This was performed on May 17, 2016. Pathology revealed in the sigmoid colon a mass consistent with moderately differentiated adenocarcinoma. The tumor size was 2 x 1 x 0.7 cm. The tumor invaded the muscularis propria, 2/8 lymph nodes were positive for metastatic carcinoma. Margins were negative for tumor. The grade of the tumor was ruled as moderately differentiated. The patient was staged pathologic stage T2N1b as part of the postop evaluation. The patient had a CT abdomen and pelvis on May 12, 2016. This revealed that there were 2 nonspecific low-attenuation lesions in the liver. Metastasis could not be excluded. There was no lymphadenopathy or additional findings to suggest metastases. The patient had a preop CEA, which was less than 3. PET scan was performed on Lluvia 15, 2016, and was essentially negative for metastatic disease. Lesions seen on prior PET in the liver were not hypermetabolic. We felt that the patient had stage III disease and he would be a candidate for adjuvant chemotherapy. When patient was seen on June 28, 2016, the plan was to  start FOLFOX x12 cycles.  When he was seen on November 17, 2016,  he did note some cold-induced neuropathic symptoms. He completed 12 cycles of FOLFOX. His last chemotherapy was administered on December 7, 2016.  He did have a PET scan done after 12 cycles of chemotherapy, and this came back essentially negative for metastatic disease. He had staging studies on April 6, 2017 including CT abdomen and pelvis, which was essentially negative. CT of the chest was negative. The patient also underwent a colonoscopy in May 2017, which revealed a tubular adenoma at the hepatic flexure of the colon. Patient had a colonoscopy done performed by Dr. Solano on 06/01/2020 and was found to have 6 polyps.  All of them were revealing tubular adenoma, consistent with advanced adenoma.  One was at the 30 cm, and the other was in the mid transverse colon. This was based on polyp size being larger than 10 mm. The patient had a colonoscopy in June 2021, which revealed multiple tubular adenomas with a 3 year follow up recommended.  CT chest, abdomen, pelvis was done on 3/7/22 and was stable.  Patient had a stroke in June 2022. He has been following with cardiology. CT scans from 3/14/23 are unchanged. Patient had labs done on 3/13/23 showing a normal tumor marker. All other labs are stable. Patient has been feeling well. He has no new concerns at this time.    Review Of Systems:  Review Of Systems  Eyes/Ears/Nose/Throat: denies new vision changes  Respiratory: No shortness of breath, dyspnea on exertion, cough  Cardiovascular: denies chest pain  Gastrointestinal: reports ongoing loose stools, denies abdominal pain  Genitourinary: denies dysuria or hematuria  Musculoskeletal: denies new bone pain  Neurologic: reports neuropathy in fingers and feet which is stable, denies headaches, no dizziness  Hematologic/Lymphatic/Immunologic: denies fevers, no dizziness      Nursing Notes:   Gricelda Ervin RN  3/17/2023  8:24 AM  Signed  Chief Complaint  "  Patient presents with     6 mo follow-up Colon Cancer       Initial /82 (BP Location: Right arm, Patient Position: Sitting, Cuff Size: Adult Regular)   Pulse 85   Temp 97.5  F (36.4  C) (Tympanic)   Resp 18   Ht 1.778 m (5' 10\")   Wt 104.3 kg (230 lb)   SpO2 96%   BMI 33.00 kg/m   Estimated body mass index is 33 kg/m  as calculated from the following:    Height as of this encounter: 1.778 m (5' 10\").    Weight as of this encounter: 104.3 kg (230 lb).  Medication Reconciliation: complete    Gricelda Ervin RN        Past medical, social, surgical, and family histories reviewed.    Allergies:  Allergies as of 03/17/2023 - Reviewed 03/17/2023   Allergen Reaction Noted     Aspirin  05/26/2017     Canagliflozin  05/18/2016     Morphine  05/18/2016       Current Medications:  Current Outpatient Medications   Medication Sig Dispense Refill     allopurinol (ZYLOPRIM) 100 MG tablet Take 1 tablet (100 mg) by mouth 2 times daily 180 tablet 1     atorvastatin (LIPITOR) 40 MG tablet Take 1 tablet (40 mg) by mouth daily 90 tablet 1     Cholecalciferol (VITAMIN D3) 50 MCG (2000 UT) CAPS Take 1 capsule by mouth daily       clopidogrel (PLAVIX) 75 MG tablet TAKE 1 TABLET BY MOUTH DAILY 90 tablet 4     empagliflozin (JARDIANCE) 10 MG TABS tablet Take 1 tablet (10 mg) by mouth daily - for diabetes 90 tablet 1     glipiZIDE (GLUCOTROL) 10 MG tablet Take 2 tablets (20 mg) by mouth 2 times daily (before meals) 360 tablet 1     levothyroxine (SYNTHROID/LEVOTHROID) 100 MCG tablet Take 1 tablet (100 mcg) by mouth daily -- Dose Increase 2/3/2023  -- Stop / Cancel 88 mcg tablet -- 90 tablet 1     metFORMIN (GLUCOPHAGE XR) 500 MG 24 hr tablet Take 2-4 tablets (1,000-2,000 mg) by mouth daily (with dinner) -- Substitute for cheap, generic, preferred 24 hr Metformin 360 tablet 1     metoprolol succinate ER (TOPROL XL) 50 MG 24 hr tablet Take 1 tablet (50 mg) by mouth daily 90 tablet 4     pantoprazole (PROTONIX) 40 MG EC tablet Take " "1 tablet (40 mg) by mouth daily 90 tablet 1     sacubitril-valsartan (ENTRESTO) 24-26 MG per tablet Take 1 tablet by mouth 2 times daily 180 tablet 1     Semaglutide 7 MG TABS Take 1 tablet (7 mg) by mouth daily - for diabetes (didn't tolerate 14 mg tablet daily) 90 tablet 1     spironolactone (ALDACTONE) 25 MG tablet Take 1 tablet (25 mg) by mouth daily 90 tablet 4     vitamin B-12 (CYANOCOBALAMIN) 2500 MCG sublingual tablet Take 1 tablet (2,500 mcg) by mouth daily - for low B12 -- START: 1/7/2022 90 tablet 3     vitamin B6 (PYRIDOXINE) 100 MG tablet Take 100 mg by mouth daily       Cyanocobalamin 1000 MCG CAPS Take 2,500 mcg by mouth 02/03/2023 Patient states he is taking 2,000 mcg once daily  rp/lpn (Patient not taking: Reported on 2/14/2023)       levothyroxine (SYNTHROID/LEVOTHROID) 88 MCG tablet Take 1 tablet by mouth daily (Patient not taking: Reported on 2/14/2023)       levothyroxine (SYNTHROID/LEVOTHROID) 88 MCG tablet Take 88 mcg by mouth daily (Patient not taking: Reported on 2/14/2023)       metFORMIN (GLUCOPHAGE XR) 500 MG 24 hr tablet Take 1,000 mg by mouth (Patient not taking: Reported on 2/14/2023)       nitroGLYcerin (NITROSTAT) 0.4 MG sublingual tablet Place 0.4 mg under the tongue (Patient not taking: Reported on 3/17/2023)          Physical Exam:  /82 (BP Location: Right arm, Patient Position: Sitting, Cuff Size: Adult Regular)   Pulse 85   Temp 97.5  F (36.4  C) (Tympanic)   Resp 18   Ht 1.778 m (5' 10\")   Wt 104.3 kg (230 lb)   SpO2 96%   BMI 33.00 kg/m      GENERAL APPEARANCE: 77 year old male, alert and no distress     NECK: no adenopathy, no asymmetry or masses     LYMPHATICS: No cervical, supraclavicular, axillary lymphadenopathy     RESP: lungs clear to auscultation - no rales, rhonchi or wheezes     CARDIOVASCULAR: regular rates and rhythm, normal S1 S2     ABDOMEN:  soft, nontender,  bowel sounds normal     MUSCULOSKELETAL: extremities normal- no gross deformities noted, No " edema b/l LE.     SKIN: no suspicious lesions or rashes on exposed skin     PSYCHIATRIC: mentation appears normal and affect normal    Laboratory/Imaging Studies  Lab on 03/13/2023   Component Date Value Ref Range Status     CEA 03/13/2023 1.0  ng/mL Final    Nonsmoker (past/never) <=5.0 ng/mL   Current smoker <=6.5 ng/mL     This result is obtained using the Roche Elecsys CEA method on the lili [id number] immunoassay analyzer. Results obtained with different assay methods or kits cannot be used interchangeably.     Lactate Dehydrogenase 03/13/2023 153  0 - 250 U/L Final     Sodium 03/13/2023 138  136 - 145 mmol/L Final     Potassium 03/13/2023 4.6  3.4 - 5.3 mmol/L Final     Chloride 03/13/2023 102  98 - 107 mmol/L Final     Carbon Dioxide (CO2) 03/13/2023 26  22 - 29 mmol/L Final     Anion Gap 03/13/2023 10  7 - 15 mmol/L Final     Urea Nitrogen 03/13/2023 15.2  8.0 - 23.0 mg/dL Final     Creatinine 03/13/2023 0.82  0.67 - 1.17 mg/dL Final     Calcium 03/13/2023 9.2  8.8 - 10.2 mg/dL Final     Glucose 03/13/2023 290 (H)  70 - 99 mg/dL Final     Alkaline Phosphatase 03/13/2023 118  40 - 129 U/L Final     AST 03/13/2023 9 (L)  10 - 50 U/L Final     ALT 03/13/2023 11  10 - 50 U/L Final     Protein Total 03/13/2023 6.5  6.4 - 8.3 g/dL Final     Albumin 03/13/2023 3.9  3.5 - 5.2 g/dL Final     Bilirubin Total 03/13/2023 0.6  <=1.2 mg/dL Final     GFR Estimate 03/13/2023 90  >60 mL/min/1.73m2 Final    eGFR calculated using 2021 CKD-EPI equation.     WBC Count 03/13/2023 7.1  4.0 - 11.0 10e3/uL Final     RBC Count 03/13/2023 4.60  4.40 - 5.90 10e6/uL Final     Hemoglobin 03/13/2023 13.8  13.3 - 17.7 g/dL Final     Hematocrit 03/13/2023 41.3  40.0 - 53.0 % Final     MCV 03/13/2023 90  78 - 100 fL Final     MCH 03/13/2023 30.0  26.5 - 33.0 pg Final     MCHC 03/13/2023 33.4  31.5 - 36.5 g/dL Final     RDW 03/13/2023 14.6  10.0 - 15.0 % Final     Platelet Count 03/13/2023 205  150 - 450 10e3/uL Final     % Neutrophils 03/13/2023  66  % Final     % Lymphocytes 03/13/2023 22  % Final     % Monocytes 03/13/2023 10  % Final     % Eosinophils 03/13/2023 2  % Final     % Basophils 03/13/2023 0  % Final     % Immature Granulocytes 03/13/2023 0  % Final     NRBCs per 100 WBC 03/13/2023 0  <1 /100 Final     Absolute Neutrophils 03/13/2023 4.6  1.6 - 8.3 10e3/uL Final     Absolute Lymphocytes 03/13/2023 1.6  0.8 - 5.3 10e3/uL Final     Absolute Monocytes 03/13/2023 0.7  0.0 - 1.3 10e3/uL Final     Absolute Eosinophils 03/13/2023 0.2  0.0 - 0.7 10e3/uL Final     Absolute Basophils 03/13/2023 0.0  0.0 - 0.2 10e3/uL Final     Absolute Immature Granulocytes 03/13/2023 0.0  <=0.4 10e3/uL Final     Absolute NRBCs 03/13/2023 0.0  10e3/uL Final        ASSESSMENT/PLAN:  Stage III, moderately differentiated adenocarcinoma of the sigmoid colon with 2 out of 11 lymph nodes positive for metastatic disease consistent with pathologic T2 N1b M0 colon cancer.  PET scan was negative for metastatic disease. The patient was started on adjuvant FOLFOX chemotherapy and completed 12 cycles. PET scan did not reveal any evidence of metastatic disease.The patient had a colonoscopy in June 2021, which revealed multiple tubular adenomas with a 3 year follow up recommended. CT of the chest, abdomen and pelvis done on 3/14/23 was negative for metastatic disease. Labs from 3/13/23 show a normal tumor marker. All other labs are stable. We will see the patient in 6 months with CBC, CMP, LDH, CEA      Thirty two minutes spent with this encounter with time spent reviewing patient records, counseling patient regarding disease process, interpretation and review of labs with patient, obtaining a review of systems, performing a physical exam, discussing plan for follow up, ordering tests, documenting in EHR and coordination of care

## 2023-05-01 ENCOUNTER — TELEPHONE (OUTPATIENT)
Dept: LAB | Facility: OTHER | Age: 78
End: 2023-05-01
Payer: COMMERCIAL

## 2023-05-01 ENCOUNTER — TELEPHONE (OUTPATIENT)
Dept: INTERNAL MEDICINE | Facility: OTHER | Age: 78
End: 2023-05-01
Payer: COMMERCIAL

## 2023-05-01 DIAGNOSIS — E78.2 MIXED HYPERLIPIDEMIA: ICD-10-CM

## 2023-05-01 DIAGNOSIS — E11.65 UNCONTROLLED TYPE 2 DIABETES MELLITUS WITH HYPERGLYCEMIA (H): Primary | ICD-10-CM

## 2023-05-10 ENCOUNTER — LAB (OUTPATIENT)
Dept: LAB | Facility: OTHER | Age: 78
End: 2023-05-10
Attending: INTERNAL MEDICINE
Payer: MEDICARE

## 2023-05-10 ENCOUNTER — OFFICE VISIT (OUTPATIENT)
Dept: INTERNAL MEDICINE | Facility: OTHER | Age: 78
End: 2023-05-10
Attending: INTERNAL MEDICINE
Payer: MEDICARE

## 2023-05-10 VITALS
HEIGHT: 69 IN | RESPIRATION RATE: 16 BRPM | BODY MASS INDEX: 34.39 KG/M2 | HEART RATE: 88 BPM | SYSTOLIC BLOOD PRESSURE: 132 MMHG | OXYGEN SATURATION: 95 % | DIASTOLIC BLOOD PRESSURE: 68 MMHG | TEMPERATURE: 98.5 F | WEIGHT: 232.2 LBS

## 2023-05-10 DIAGNOSIS — I50.22 CHRONIC SYSTOLIC HEART FAILURE (H): ICD-10-CM

## 2023-05-10 DIAGNOSIS — E03.4 HYPOTHYROIDISM DUE TO ACQUIRED ATROPHY OF THYROID: ICD-10-CM

## 2023-05-10 DIAGNOSIS — E53.8 VITAMIN B12 DEFICIENCY: ICD-10-CM

## 2023-05-10 DIAGNOSIS — K21.00 GASTROESOPHAGEAL REFLUX DISEASE WITH ESOPHAGITIS, UNSPECIFIED WHETHER HEMORRHAGE: ICD-10-CM

## 2023-05-10 DIAGNOSIS — E11.65 UNCONTROLLED TYPE 2 DIABETES MELLITUS WITH HYPERGLYCEMIA (H): Primary | ICD-10-CM

## 2023-05-10 DIAGNOSIS — N18.31 CHRONIC KIDNEY DISEASE, STAGE 3A (H): ICD-10-CM

## 2023-05-10 DIAGNOSIS — Z86.73 HISTORY OF TIA (TRANSIENT ISCHEMIC ATTACK) AND STROKE: ICD-10-CM

## 2023-05-10 DIAGNOSIS — I25.10 CORONARY ARTERY DISEASE INVOLVING NATIVE CORONARY ARTERY OF NATIVE HEART WITHOUT ANGINA PECTORIS: ICD-10-CM

## 2023-05-10 DIAGNOSIS — Z87.39 HISTORY OF GOUT: ICD-10-CM

## 2023-05-10 DIAGNOSIS — E66.01 MORBID OBESITY (H): ICD-10-CM

## 2023-05-10 DIAGNOSIS — I50.32 CHRONIC DIASTOLIC CONGESTIVE HEART FAILURE (H): ICD-10-CM

## 2023-05-10 DIAGNOSIS — E78.2 MIXED HYPERLIPIDEMIA: ICD-10-CM

## 2023-05-10 DIAGNOSIS — E11.65 UNCONTROLLED TYPE 2 DIABETES MELLITUS WITH HYPERGLYCEMIA (H): ICD-10-CM

## 2023-05-10 LAB
ALBUMIN SERPL BCG-MCNC: 4.1 G/DL (ref 3.5–5.2)
ALBUMIN UR-MCNC: 10 MG/DL
ALP SERPL-CCNC: 106 U/L (ref 40–129)
ALT SERPL W P-5'-P-CCNC: 10 U/L (ref 10–50)
ANION GAP SERPL CALCULATED.3IONS-SCNC: 11 MMOL/L (ref 7–15)
APPEARANCE UR: CLEAR
AST SERPL W P-5'-P-CCNC: 13 U/L (ref 10–50)
BACTERIA #/AREA URNS HPF: ABNORMAL /HPF
BILIRUB SERPL-MCNC: 0.7 MG/DL
BILIRUB UR QL STRIP: NEGATIVE
BUN SERPL-MCNC: 17.2 MG/DL (ref 8–23)
CALCIUM SERPL-MCNC: 9.4 MG/DL (ref 8.8–10.2)
CHLORIDE SERPL-SCNC: 99 MMOL/L (ref 98–107)
CHOLEST SERPL-MCNC: 169 MG/DL
COLOR UR AUTO: ABNORMAL
CREAT SERPL-MCNC: 0.86 MG/DL (ref 0.67–1.17)
CREAT UR-MCNC: 140 MG/DL
DEPRECATED HCO3 PLAS-SCNC: 26 MMOL/L (ref 22–29)
ERYTHROCYTE [DISTWIDTH] IN BLOOD BY AUTOMATED COUNT: 14.4 % (ref 10–15)
GFR SERPL CREATININE-BSD FRML MDRD: 89 ML/MIN/1.73M2
GLUCOSE SERPL-MCNC: 292 MG/DL (ref 70–99)
GLUCOSE UR STRIP-MCNC: >1000 MG/DL
HBA1C MFR BLD: 11.5 % (ref 4–6.2)
HCT VFR BLD AUTO: 42.3 % (ref 40–53)
HDLC SERPL-MCNC: 35 MG/DL
HGB BLD-MCNC: 14.1 G/DL (ref 13.3–17.7)
HGB UR QL STRIP: NEGATIVE
HOLD SPECIMEN: NORMAL
HYALINE CASTS: 10 /LPF
KETONES UR STRIP-MCNC: NEGATIVE MG/DL
LDLC SERPL CALC-MCNC: 81 MG/DL
LEUKOCYTE ESTERASE UR QL STRIP: NEGATIVE
MCH RBC QN AUTO: 30.1 PG (ref 26.5–33)
MCHC RBC AUTO-ENTMCNC: 33.3 G/DL (ref 31.5–36.5)
MCV RBC AUTO: 90 FL (ref 78–100)
MICROALBUMIN UR-MCNC: 22.6 MG/L
MICROALBUMIN/CREAT UR: 16.14 MG/G CR (ref 0–17)
MUCOUS THREADS #/AREA URNS LPF: PRESENT /LPF
NITRATE UR QL: NEGATIVE
NONHDLC SERPL-MCNC: 134 MG/DL
PH UR STRIP: 5 [PH] (ref 5–9)
PLATELET # BLD AUTO: 215 10E3/UL (ref 150–450)
POTASSIUM SERPL-SCNC: 4.6 MMOL/L (ref 3.4–5.3)
PROT SERPL-MCNC: 6.7 G/DL (ref 6.4–8.3)
RBC # BLD AUTO: 4.68 10E6/UL (ref 4.4–5.9)
RBC URINE: 1 /HPF
SODIUM SERPL-SCNC: 136 MMOL/L (ref 136–145)
SP GR UR STRIP: 1.02 (ref 1–1.03)
T4 FREE SERPL-MCNC: 0.89 NG/DL (ref 0.9–1.7)
TRIGL SERPL-MCNC: 266 MG/DL
TSH SERPL DL<=0.005 MIU/L-ACNC: 7.49 UIU/ML (ref 0.3–4.2)
UROBILINOGEN UR STRIP-MCNC: NORMAL MG/DL
WBC # BLD AUTO: 7.1 10E3/UL (ref 4–11)
WBC URINE: 1 /HPF

## 2023-05-10 PROCEDURE — 83036 HEMOGLOBIN GLYCOSYLATED A1C: CPT | Mod: ZL

## 2023-05-10 PROCEDURE — 99214 OFFICE O/P EST MOD 30 MIN: CPT | Performed by: INTERNAL MEDICINE

## 2023-05-10 PROCEDURE — 85027 COMPLETE CBC AUTOMATED: CPT | Mod: ZL

## 2023-05-10 PROCEDURE — 82570 ASSAY OF URINE CREATININE: CPT | Mod: ZL

## 2023-05-10 PROCEDURE — 84439 ASSAY OF FREE THYROXINE: CPT | Mod: ZL

## 2023-05-10 PROCEDURE — G0463 HOSPITAL OUTPT CLINIC VISIT: HCPCS

## 2023-05-10 PROCEDURE — 84443 ASSAY THYROID STIM HORMONE: CPT | Mod: ZL

## 2023-05-10 PROCEDURE — 36415 COLL VENOUS BLD VENIPUNCTURE: CPT | Mod: ZL

## 2023-05-10 PROCEDURE — 81001 URINALYSIS AUTO W/SCOPE: CPT | Mod: ZL

## 2023-05-10 PROCEDURE — 80061 LIPID PANEL: CPT | Mod: ZL

## 2023-05-10 PROCEDURE — 80053 COMPREHEN METABOLIC PANEL: CPT | Mod: ZL

## 2023-05-10 RX ORDER — ATORVASTATIN CALCIUM 40 MG/1
40 TABLET, FILM COATED ORAL DAILY
Qty: 90 TABLET | Refills: 1 | Status: SHIPPED | OUTPATIENT
Start: 2023-05-10 | End: 2023-08-16

## 2023-05-10 RX ORDER — SPIRONOLACTONE 25 MG/1
25 TABLET ORAL DAILY
Qty: 90 TABLET | Refills: 1 | Status: SHIPPED | OUTPATIENT
Start: 2023-05-10 | End: 2023-08-16

## 2023-05-10 RX ORDER — CLOPIDOGREL BISULFATE 75 MG/1
75 TABLET ORAL DAILY
Qty: 90 TABLET | Refills: 4 | Status: SHIPPED | OUTPATIENT
Start: 2023-05-10 | End: 2024-02-25

## 2023-05-10 RX ORDER — SACUBITRIL AND VALSARTAN 24; 26 MG/1; MG/1
1 TABLET, FILM COATED ORAL 2 TIMES DAILY
Qty: 180 TABLET | Refills: 1 | Status: SHIPPED | OUTPATIENT
Start: 2023-05-10 | End: 2023-08-16

## 2023-05-10 RX ORDER — PANTOPRAZOLE SODIUM 40 MG/1
40 TABLET, DELAYED RELEASE ORAL DAILY
Qty: 90 TABLET | Refills: 1 | Status: SHIPPED | OUTPATIENT
Start: 2023-05-10 | End: 2023-08-16

## 2023-05-10 RX ORDER — GLIPIZIDE 10 MG/1
20 TABLET ORAL
Qty: 360 TABLET | Refills: 1 | Status: SHIPPED | OUTPATIENT
Start: 2023-05-10 | End: 2023-08-16

## 2023-05-10 RX ORDER — METFORMIN HCL 500 MG
TABLET, EXTENDED RELEASE 24 HR ORAL
Qty: 360 TABLET | Refills: 1 | Status: SHIPPED | OUTPATIENT
Start: 2023-05-10 | End: 2023-08-16

## 2023-05-10 RX ORDER — METOPROLOL SUCCINATE 50 MG/1
50 TABLET, EXTENDED RELEASE ORAL DAILY
Qty: 90 TABLET | Refills: 1 | Status: SHIPPED | OUTPATIENT
Start: 2023-05-10 | End: 2023-08-16

## 2023-05-10 RX ORDER — ALLOPURINOL 100 MG/1
100 TABLET ORAL 2 TIMES DAILY
Qty: 180 TABLET | Refills: 1 | Status: SHIPPED | OUTPATIENT
Start: 2023-05-10 | End: 2023-08-16

## 2023-05-10 RX ORDER — LEVOTHYROXINE SODIUM 100 UG/1
100 TABLET ORAL DAILY
Qty: 90 TABLET | Refills: 1 | Status: SHIPPED | OUTPATIENT
Start: 2023-05-10 | End: 2023-08-16

## 2023-05-10 RX ORDER — CYANOCOBALAMIN (VITAMIN B-12) 2500 MCG
2500 TABLET, SUBLINGUAL SUBLINGUAL DAILY
Qty: 90 TABLET | Refills: 4 | Status: SHIPPED | OUTPATIENT
Start: 2023-05-10 | End: 2024-02-25

## 2023-05-10 ASSESSMENT — ENCOUNTER SYMPTOMS
EYE PAIN: 0
BRUISES/BLEEDS EASILY: 0
DYSURIA: 0
DIARRHEA: 1
WEAKNESS: 1
NAUSEA: 1
FATIGUE: 1
DIZZINESS: 0
COUGH: 0
CHILLS: 0
ABDOMINAL PAIN: 0
MYALGIAS: 0
PALPITATIONS: 0
SORE THROAT: 0
HEADACHES: 0
SHORTNESS OF BREATH: 0
JOINT SWELLING: 0
HEMATURIA: 0
PARESTHESIAS: 0
FEVER: 0
HEMATOCHEZIA: 0
NERVOUS/ANXIOUS: 0
FREQUENCY: 0
CONSTIPATION: 0
HEARTBURN: 0
ARTHRALGIAS: 0

## 2023-05-10 ASSESSMENT — PAIN SCALES - GENERAL: PAINLEVEL: NO PAIN (0)

## 2023-05-10 NOTE — NURSING NOTE
"Chief Complaint   Patient presents with     Diabetes         Initial /68 (BP Location: Right arm, Patient Position: Sitting, Cuff Size: Adult Regular)   Pulse 88   Temp 98.5  F (36.9  C) (Temporal)   Resp 16   Ht 1.753 m (5' 9\")   Wt 105.3 kg (232 lb 3.2 oz)   SpO2 95%   BMI 34.29 kg/m   Estimated body mass index is 34.29 kg/m  as calculated from the following:    Height as of this encounter: 1.753 m (5' 9\").    Weight as of this encounter: 105.3 kg (232 lb 3.2 oz).       FOOD SECURITY SCREENING QUESTIONS:    The next two questions are to help us understand your food security.  If you are feeling you need any assistance in this area, we have resources available to support you today.    Hunger Vital Signs:  Within the past 12 months we worried whether our food would run out before we got money to buy more. Never  Within the past 12 months the food we bought just didn't last and we didn't have money to get more. Never    Advance Care Directive on file? no      Medication reconciliation complete.      Pollo Carroll,on 5/10/2023 at 7:58 AM        "

## 2023-05-10 NOTE — PATIENT INSTRUCTIONS
Blood pressure is well controlled.   Diabetes needs help.  A1c is very high.     To help with weight loss and improve blood sugar control....    -- Try to avoid Carbohydrates as much as possible -- breads, pasta, baked goods, cakes, oatmeal, cold cereal, potatoes.   -- Eat more lean meats, proteins, eggs, nuts, vegetables.    -- Start or Continue regular daily exercise.     Get out and exercise, bike ride, walk for 10 to 15 minutes after each meal -- this can significantly lowers the spike in blood sugar after eating.     Medications refilled.   Labs are otherwise relatively stable.     Results for orders placed or performed in visit on 05/10/23   Comprehensive metabolic panel     Status: Abnormal   Result Value Ref Range    Sodium 136 136 - 145 mmol/L    Potassium 4.6 3.4 - 5.3 mmol/L    Chloride 99 98 - 107 mmol/L    Carbon Dioxide (CO2) 26 22 - 29 mmol/L    Anion Gap 11 7 - 15 mmol/L    Urea Nitrogen 17.2 8.0 - 23.0 mg/dL    Creatinine 0.86 0.67 - 1.17 mg/dL    Calcium 9.4 8.8 - 10.2 mg/dL    Glucose 292 (H) 70 - 99 mg/dL    Alkaline Phosphatase 106 40 - 129 U/L    AST 13 10 - 50 U/L    ALT 10 10 - 50 U/L    Protein Total 6.7 6.4 - 8.3 g/dL    Albumin 4.1 3.5 - 5.2 g/dL    Bilirubin Total 0.7 <=1.2 mg/dL    GFR Estimate 89 >60 mL/min/1.73m2   Lipid Profile     Status: Abnormal   Result Value Ref Range    Cholesterol 169 <200 mg/dL    Triglycerides 266 (H) <150 mg/dL    Direct Measure HDL 35 (L) >=40 mg/dL    LDL Cholesterol Calculated 81 <=100 mg/dL    Non HDL Cholesterol 134 (H) <130 mg/dL    Narrative    Cholesterol  Desirable:  <200 mg/dL    Triglycerides  Normal:  Less than 150 mg/dL  Borderline High:  150-199 mg/dL  High:  200-499 mg/dL  Very High:  Greater than or equal to 500 mg/dL    Direct Measure HDL  Female:  Greater than or equal to 50 mg/dL   Male:  Greater than or equal to 40 mg/dL    LDL Cholesterol  Desirable:  <100mg/dL  Above Desirable:  100-129 mg/dL   Borderline High:  130-159 mg/dL   High:   160-189 mg/dL   Very High:  >= 190 mg/dL    Non HDL Cholesterol  Desirable:  130 mg/dL  Above Desirable:  130-159 mg/dL  Borderline High:  160-189 mg/dL  High:  190-219 mg/dL  Very High:  Greater than or equal to 220 mg/dL   Albumin Random Urine Quantitative with Creat Ratio     Status: None   Result Value Ref Range    Creatinine Urine mg/dL 140.0 mg/dL    Albumin Urine mg/L 22.6 mg/L    Albumin Urine mg/g Cr 16.14 0.00 - 17.00 mg/g Cr   CBC with platelets     Status: Normal   Result Value Ref Range    WBC Count 7.1 4.0 - 11.0 10e3/uL    RBC Count 4.68 4.40 - 5.90 10e6/uL    Hemoglobin 14.1 13.3 - 17.7 g/dL    Hematocrit 42.3 40.0 - 53.0 %    MCV 90 78 - 100 fL    MCH 30.1 26.5 - 33.0 pg    MCHC 33.3 31.5 - 36.5 g/dL    RDW 14.4 10.0 - 15.0 %    Platelet Count 215 150 - 450 10e3/uL   Hemoglobin A1c     Status: Abnormal   Result Value Ref Range    Hemoglobin A1C 11.5 (H) 4.0 - 6.2 %   TSH with free T4 reflex     Status: Abnormal   Result Value Ref Range    TSH 7.49 (H) 0.30 - 4.20 uIU/mL   UA reflex to Microscopic     Status: Abnormal   Result Value Ref Range    Color Urine Light Yellow Colorless, Straw, Light Yellow, Yellow    Appearance Urine Clear Clear    Glucose Urine >1000 (A) Negative mg/dL    Bilirubin Urine Negative Negative    Ketones Urine Negative Negative mg/dL    Specific Gravity Urine 1.019 1.000 - 1.030    Blood Urine Negative Negative    pH Urine 5.0 5.0 - 9.0    Protein Albumin Urine 10 (A) Negative mg/dL    Urobilinogen Urine Normal Normal, 2.0 mg/dL    Nitrite Urine Negative Negative    Leukocyte Esterase Urine Negative Negative    Bacteria Urine Few (A) None Seen /HPF    RBC Urine 1 <=2 /HPF    WBC Urine 1 <=5 /HPF    Mucus Urine Present (A) None Seen /LPF    Hyaline Casts Urine 10 (H) <=2 /LPF   Extra Tube     Status: None (In process)    Narrative    The following orders were created for panel order Extra Tube.  Procedure                               Abnormality         Status                      ---------                               -----------         ------                     Extra Serum Separator Tu...[[id number]]                      In process                   Please view results for these tests on the individual orders.      Aspects of Diabetes:   Recent Labs   Lab Test 05/10/23  0701 03/13/23  0806 02/03/23  0801 10/28/22  0729 10/28/22  0729 08/24/22  0747 07/21/22  0705   A1C 11.5*  --  9.3*  --  9.4*  --  8.7*   LDL 81  --  130*  --   --   --  46   HDL 35*  --  37*  --  34  --  30   TRIG 266*  --  357*  --  507*  --  298*   ALT 10 11 13  --  13   < > 9   CR 0.86 0.82 1.05  --  1.18   < > 1.09   GFRESTIMATED 89 90 73  --  64   < > 70   POTASSIUM 4.6 4.6 5.2   < > 4.6   < > 4.0   TSH 7.49*  --  8.27*  --  5.49*  --  3.56   T4  --   --  1.05  --  0.79  --   --    WBC 7.1 7.1 7.8  --  7.4   < > 6.6   HGB 14.1 13.8 14.9  --  14.1   < > 13.6    205 213  --  246   < > 218   ALBUMIN 4.1 3.9 4.1   < > 4.1   < > 4.1    < > = values in this interval not displayed.      Hemoglobin A1c  Goal range is under 8%. Best is 6.5 to 7   Blood Pressure 132/68 Goal to keep less than 140/90   Tobacco  reports that he quit smoking about 38 years ago. His smoking use included cigarettes. He has a 30.00 pack-year smoking history. He has never used smokeless tobacco. Goal to abstain from tobacco   Aspirin or Plavix Anti-platelet therapy Aspirin or Plavix reduces risk of heart disease and stroke  -- sometimes used with other blood thinners, depending on bleeding risk and risk factors.    ACE/ARB Specific blood pressure meds These medications can reduce risk of kidney disease   Cholesterol Statins (Lipitor, Crestor, vs others) Statins reduce risk of heart disease and stroke   Eye Exam -- Do Yearly -- Annual diabetic eye exam   Healthy weight Wt Readings from Last 4 Encounters:   05/10/23 105.3 kg (232 lb 3.2 oz)   03/17/23 104.3 kg (230 lb)   02/14/23 104.4 kg (230 lb 3.2 oz)   02/03/23 104.5 kg (230 lb 6.4 oz)       Body mass index is 34.29 kg/m .  Goal BMI under 30, best is under 25.      -- Trying to exercise daily (goal at least 20 min/day) with moderate aerobic activity   -- Eat healthy (resources from ADA at http://www.diabetes.org/)   -- Taking good care of my feet. Consider seeing the Podiatrist   -- Check blood sugars as directed, record in log book and bring to every appointment    Insurance companies are grading you and I on your blood sugar control -- Goal is to get your A1c down to 7.9% or lower and NO Smoking!  -- Medicare and most insurance companies, will only cover Hemoglobin A1c labs to be rechecked every 91+ days.      Return for Diabetes labs and clinic follow-up appointment every 3 to 4 months.    Schedule lab only appointment --- A few days AFTER: 8/8/23   Schedule clinic appointment with Dr. Roberts -- Same day as labs, or 1-2 days later.

## 2023-05-10 NOTE — PROGRESS NOTES
Assessment & Plan     ICD-10-CM    1. Uncontrolled type 2 diabetes mellitus with hyperglycemia (H)  E11.65 empagliflozin (JARDIANCE) 10 MG TABS tablet     glipiZIDE (GLUCOTROL) 10 MG tablet     metFORMIN (GLUCOPHAGE XR) 500 MG 24 hr tablet     Semaglutide (RYBELSUS) 7 MG tablet      2. Chronic systolic heart failure (H) - ECHO 6/22/2022 at  -- EF 25-30%  I50.22 empagliflozin (JARDIANCE) 10 MG TABS tablet     metoprolol succinate ER (TOPROL XL) 50 MG 24 hr tablet     sacubitril-valsartan (ENTRESTO) 24-26 MG per tablet     Semaglutide (RYBELSUS) 7 MG tablet      3. Coronary artery disease involving native coronary artery of native heart without angina pectoris  I25.10 atorvastatin (LIPITOR) 40 MG tablet     clopidogrel (PLAVIX) 75 MG tablet      4. Chronic kidney disease, stage 3a (H)  N18.31       5. Hypothyroidism due to acquired atrophy of thyroid  E03.4 levothyroxine (SYNTHROID/LEVOTHROID) 100 MCG tablet      6. Morbid obesity (H)  E66.01       7. Mixed hyperlipidemia  E78.2       8. History of gout  Z87.39 allopurinol (ZYLOPRIM) 100 MG tablet      9. 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 Gastroesophageal reflux disease with esophagitis, unspecified whether hemorrhage  K21.00 pantoprazole (PROTONIX) 40 MG EC tablet      11. Chronic diastolic congestive heart failure (H)  I50.32 spironolactone (ALDACTONE) 25 MG tablet      12. Vitamin B12 deficiency  E53.8 vitamin B-12 (CYANOCOBALAMIN) 2500 MCG sublingual tablet      Patient presents for follow multiple issues.    Uncontrolled type 2 diabetes with hyperglycemia.  States that he has not been eating a very low carbohydrate diet.  He bought a large cake recently and ate the whole thing.  He likes his cookies.  He plans to continue to work on improving his blood sugar control.  Does not check his blood sugars at home.  Takes Jardiance, glipizide,  metformin, Rybelsus.  Declines insulin therapy.  Needs medication refills.  Encouraged lifestyle modifications.    Chronic combined systolic and diastolic heart failure.  Using spironolactone, Jardiance, Entresto, metoprolol, Rybelsus.  Feels that things are doing quite well overall.  No excessive fluid retention issues.  Denies orthopnea.  No changes for now.  Needs refills.    Coronary artery disease, appears stable.  Denies exertional angina.  Continues with Lipitor, Plavix.  Needs refills.    Stage IIIa chronic kidney disease.  Kidney function has been slowly declining.  Encourage NSAID avoidance.    History of gout, no recent gout attacks.  Doing well with allopurinol.  Needs refills.    History of stroke, has diagnosis of left vertebral artery thrombosis.  Currently on Lipitor, Plavix.  Has not had any new or recent issues.  No changes for now.  Lipitor refilled.    Heartburn and reflux.  Ongoing.  Reports Protonix is actually doing quite well for his heartburn symptoms.  Needs refills.    Vitamin B12 deficiency.  Ongoing.  Continues with B12 supplement.  Needs refills.    MIXED HYPERLIPIDEMIA.  Ongoing. LDL is at goal: No. Triglycerides are at goal: No.  Hopefully lifestyle modifications will improve cholesterol levels, otherwise we will need to consider additional medication dose adjustments or medication changes.  Medication side effects reported: None.   Continue current medications for now - Lipitor. Medication list reviewed/updated. Refills completed as needed.  Recent Labs   Lab Test 05/10/23  0701 02/03/23  0801   CHOL 169 238*   HDL 35* 37*   LDL 81 130*   TRIG 266* 357*      HYPOTHYROIDISM - Patient has a longstanding history of chronic hypothyroidism. Patient has been doing reasonably well. Reports: denies fatigue, weight changes, heat/cold intolerance, bowel/skin changes or CVS symptoms.  Continue: Synthroid 100 mcg daily oral thyroid replacement, denies adverse medication reactions or side  effects.                       TSH   Date Value Ref Range Status   05/10/2023 7.49 (H) 0.30 - 4.20 uIU/mL Final   10/28/2022 5.49 (H) 0.40 - 4.00 mU/L Final       Obesity, Ongoing. Discussed need for reduced oral caloric intake, weight loss, regular exercise and reduce carbohydrate/sugar intake.     Return in about 3 months (around 8/10/2023) for - Labs every 91+ days, with DM Follow-up, Same Day or 1-2 days later with Dr. Roberts.    Bk Roberts MD  [facility]   Andrez is a 77 year old, presenting for the following health issues:  Diabetes        5/10/2023     7:53 AM   Additional Questions   Roomed by Pollo NETTLES / DOROTHY   Accompanied by n/a     History of Present Illness       Diabetes:   He presents for follow up of diabetes.  He is not checking blood glucose. Blood glucose is sometimes over 200 and He is aware of hypoglycemia symptoms including dizziness. He has no concerns regarding his diabetes at this time.  He is having blurry vision. The patient has not had a diabetic eye exam in the last 12 months.         Hyperlipidemia:  He presents for follow up of hyperlipidemia.  He is taking medication to lower cholesterol. He is not having myalgia or other side effects to statin medications.    He eats 0-1 servings of fruits and vegetables daily.He consumes 2 sweetened beverage(s) daily.He exercises with enough effort to increase his heart rate 9 or less minutes per day.  He exercises with enough effort to increase his heart rate 3 or less days per week. He is missing 2 dose(s) of medications per week.     Review of Systems   Constitutional: Positive for fatigue. Negative for chills and fever.   HENT: Positive for hearing loss. Negative for congestion, ear pain and sore throat.    Eyes: Negative for pain and visual disturbance.   Respiratory: Negative for cough and shortness of breath.    Cardiovascular: Negative for chest pain, palpitations and peripheral edema.  "  Gastrointestinal: Positive for diarrhea and nausea. Negative for abdominal pain, constipation, heartburn and hematochezia.   Genitourinary: Positive for impotence. Negative for dysuria, frequency, genital sores, hematuria, penile discharge and urgency.   Musculoskeletal: Negative for arthralgias, joint swelling and myalgias.   Skin: Negative for rash.   Allergic/Immunologic: Negative for immunocompromised state.   Neurological: Positive for weakness. Negative for dizziness, headaches and paresthesias.   Hematological: Does not bruise/bleed easily.   Psychiatric/Behavioral: Negative for mood changes. The patient is not nervous/anxious.           Objective    /68 (BP Location: Right arm, Patient Position: Sitting, Cuff Size: Adult Regular)   Pulse 88   Temp 98.5  F (36.9  C) (Temporal)   Resp 16   Ht 1.753 m (5' 9\")   Wt 105.3 kg (232 lb 3.2 oz)   SpO2 95%   BMI 34.29 kg/m    Body mass index is 34.29 kg/m .  Physical Exam  Constitutional:       General: He is not in acute distress.     Appearance: He is well-developed. He is obese. He is not diaphoretic.   HENT:      Head: Normocephalic and atraumatic.   Eyes:      General: No scleral icterus.     Conjunctiva/sclera: Conjunctivae normal.   Neck:      Vascular: No carotid bruit.   Cardiovascular:      Rate and Rhythm: Normal rate and regular rhythm.      Pulses: Normal pulses.   Pulmonary:      Effort: Pulmonary effort is normal.      Breath sounds: Normal breath sounds.   Abdominal:      Palpations: Abdomen is soft.      Tenderness: There is no abdominal tenderness.   Musculoskeletal:         General: No deformity.      Cervical back: Neck supple.      Right lower leg: No edema.      Left lower leg: No edema.   Lymphadenopathy:      Cervical: No cervical adenopathy.   Skin:     General: Skin is warm and dry.      Findings: No rash.   Neurological:      Mental Status: He is alert. Mental status is at baseline.   Psychiatric:         Mood and Affect: Mood " normal.         Behavior: Behavior normal.          Recent Labs   Lab Test 05/10/23  0701 03/13/23  0806 02/03/23  0801 10/28/22  0729 10/28/22  0729 08/24/22  0747 07/21/22  0705   A1C 11.5*  --  9.3*  --  9.4*  --  8.7*   LDL 81  --  130*  --   --   --  46   HDL 35*  --  37*  --  34  --  30   TRIG 266*  --  357*  --  507*  --  298*   ALT 10 11 13  --  13   < > 9   CR 0.86 0.82 1.05  --  1.18   < > 1.09   GFRESTIMATED 89 90 73  --  64   < > 70   POTASSIUM 4.6 4.6 5.2   < > 4.6   < > 4.0   TSH 7.49*  --  8.27*  --  5.49*  --  3.56   T4  --   --  1.05  --  0.79  --   --    WBC 7.1 7.1 7.8  --  7.4   < > 6.6   HGB 14.1 13.8 14.9  --  14.1   < > 13.6    205 213  --  246   < > 218   ALBUMIN 4.1 3.9 4.1   < > 4.1   < > 4.1    < > = values in this interval not displayed.     Hemoglobin A1c is high and not at goal.  LDL has improved but is not at goal.  HDL is normal.  Triglycerides are high and not at goal.  ALT normal.  Creatinine is at baseline.  Potassium normal.  TSH high but has improved.  Recheck in 3 months.  CBC normal.  Albumin normal.

## 2023-06-26 NOTE — OR ANESTHESIA
Patient Information     Patient Name MRN Sex Andrez Tinajero [id number] Male 1945      OR Anesthesia by Suzy Iglesias CRNA at 5/15/2017  9:47 AM     Author:  Suzy Iglesias CRNA Service:  (none) Author Type:  NURS- Nurse Anesthetist     Filed:  5/15/2017  9:47 AM Date of Service:  5/15/2017  9:47 AM Status:  Signed     :  Suzy Iglesias CRNA (NURS- Nurse Anesthetist)            Anesthesia Post Operative Care Note    Name: Andrez Olivier  MRN:   [id number]  :    1945       Procedure Done:  See Surgeon Note        Anesthesia Technique    Anesthetic Type:  MAC       MAC Type:  NC     Oral Trauma:  No    Intraoperative Course   Hemodynamics:  Stable    Ventilation Normal:  Yes Lung Sounds:  Normal      PACU Course    Airway Status:  Extubated     Nondepolarizer Used:       Reversed: N/A   Hemodynamics:  Stable      Hydration: Euvolemic   Temperature:  36.1 - 38.3      Mental Status:  Awake, alert, follows commands   Pain Management:  Adequate   Regional Block:  No   Anesthesia Complications:  None      Vital Signs:  Temp: 97.2  F (36.2  C)  Pulse: 83  BP: 124/78  Resp: 18  SpO2: 94 %                       Active Lines:  Patient Lines/Drains/Airways Status    Active Line     Name: Placement date: Placement time: Site: Days:    IMPLANTED PORT 1 LUMEN Left Power 16   1000   Left   320                Intake & Output:       Labs:  No results for input(s): PH3ZSLHALQD, [id number], PHARTERIAL, [id number], K6EBSLDKAOLD in the last 24 hours.    No results for input(s): MAGNESIUM in the last 24 hours.    No results for input(s): GLUCOSEMETER in the last 720 hours.        Suzy Iglesias CRNA ....................  5/15/2017   9:47 AM           dclined

## 2023-08-16 ENCOUNTER — OFFICE VISIT (OUTPATIENT)
Dept: INTERNAL MEDICINE | Facility: OTHER | Age: 78
End: 2023-08-16
Attending: INTERNAL MEDICINE
Payer: MEDICARE

## 2023-08-16 ENCOUNTER — LAB (OUTPATIENT)
Dept: LAB | Facility: OTHER | Age: 78
End: 2023-08-16
Attending: INTERNAL MEDICINE
Payer: MEDICARE

## 2023-08-16 VITALS
OXYGEN SATURATION: 95 % | BODY MASS INDEX: 34.21 KG/M2 | SYSTOLIC BLOOD PRESSURE: 106 MMHG | TEMPERATURE: 97.8 F | WEIGHT: 231 LBS | DIASTOLIC BLOOD PRESSURE: 63 MMHG | HEIGHT: 69 IN | HEART RATE: 82 BPM | RESPIRATION RATE: 17 BRPM

## 2023-08-16 DIAGNOSIS — E11.65 UNCONTROLLED TYPE 2 DIABETES MELLITUS WITH HYPERGLYCEMIA (H): ICD-10-CM

## 2023-08-16 DIAGNOSIS — I50.32 CHRONIC DIASTOLIC CONGESTIVE HEART FAILURE (H): ICD-10-CM

## 2023-08-16 DIAGNOSIS — I50.22 CHRONIC SYSTOLIC HEART FAILURE (H): ICD-10-CM

## 2023-08-16 DIAGNOSIS — I25.10 CORONARY ARTERY DISEASE INVOLVING NATIVE CORONARY ARTERY OF NATIVE HEART WITHOUT ANGINA PECTORIS: ICD-10-CM

## 2023-08-16 DIAGNOSIS — E78.2 MIXED HYPERLIPIDEMIA: ICD-10-CM

## 2023-08-16 DIAGNOSIS — E78.2 MIXED HYPERLIPIDEMIA: Primary | ICD-10-CM

## 2023-08-16 DIAGNOSIS — K21.00 GASTROESOPHAGEAL REFLUX DISEASE WITH ESOPHAGITIS, UNSPECIFIED WHETHER HEMORRHAGE: ICD-10-CM

## 2023-08-16 DIAGNOSIS — E03.4 HYPOTHYROIDISM DUE TO ACQUIRED ATROPHY OF THYROID: ICD-10-CM

## 2023-08-16 DIAGNOSIS — Z86.73 HISTORY OF TIA (TRANSIENT ISCHEMIC ATTACK) AND STROKE: ICD-10-CM

## 2023-08-16 DIAGNOSIS — Z87.39 HISTORY OF GOUT: ICD-10-CM

## 2023-08-16 LAB
ALBUMIN SERPL BCG-MCNC: 3.9 G/DL (ref 3.5–5.2)
ALBUMIN UR-MCNC: NEGATIVE MG/DL
ALP SERPL-CCNC: 104 U/L (ref 40–129)
ALT SERPL W P-5'-P-CCNC: 10 U/L (ref 0–70)
ANION GAP SERPL CALCULATED.3IONS-SCNC: 10 MMOL/L (ref 7–15)
APPEARANCE UR: CLEAR
AST SERPL W P-5'-P-CCNC: 9 U/L (ref 0–45)
BILIRUB SERPL-MCNC: 0.7 MG/DL
BILIRUB UR QL STRIP: NEGATIVE
BUN SERPL-MCNC: 17.4 MG/DL (ref 8–23)
CALCIUM SERPL-MCNC: 9.2 MG/DL (ref 8.8–10.2)
CHLORIDE SERPL-SCNC: 101 MMOL/L (ref 98–107)
CHOLEST SERPL-MCNC: 174 MG/DL
COLOR UR AUTO: ABNORMAL
CREAT SERPL-MCNC: 0.95 MG/DL (ref 0.67–1.17)
CREAT UR-MCNC: 119.9 MG/DL
DEPRECATED HCO3 PLAS-SCNC: 25 MMOL/L (ref 22–29)
ERYTHROCYTE [DISTWIDTH] IN BLOOD BY AUTOMATED COUNT: 14.2 % (ref 10–15)
GFR SERPL CREATININE-BSD FRML MDRD: 82 ML/MIN/1.73M2
GLUCOSE SERPL-MCNC: 277 MG/DL (ref 70–99)
GLUCOSE UR STRIP-MCNC: 300 MG/DL
HBA1C MFR BLD: 11.6 % (ref 4–6.2)
HCT VFR BLD AUTO: 40.5 % (ref 40–53)
HDLC SERPL-MCNC: 31 MG/DL
HGB BLD-MCNC: 13.5 G/DL (ref 13.3–17.7)
HGB UR QL STRIP: NEGATIVE
HOLD SPECIMEN: NORMAL
KETONES UR STRIP-MCNC: NEGATIVE MG/DL
LDLC SERPL CALC-MCNC: 82 MG/DL
LEUKOCYTE ESTERASE UR QL STRIP: NEGATIVE
MCH RBC QN AUTO: 30.3 PG (ref 26.5–33)
MCHC RBC AUTO-ENTMCNC: 33.3 G/DL (ref 31.5–36.5)
MCV RBC AUTO: 91 FL (ref 78–100)
MICROALBUMIN UR-MCNC: 12.4 MG/L
MICROALBUMIN/CREAT UR: 10.34 MG/G CR (ref 0–17)
NITRATE UR QL: NEGATIVE
NONHDLC SERPL-MCNC: 143 MG/DL
PH UR STRIP: 5 [PH] (ref 5–9)
PLATELET # BLD AUTO: 192 10E3/UL (ref 150–450)
POTASSIUM SERPL-SCNC: 4.7 MMOL/L (ref 3.4–5.3)
PROT SERPL-MCNC: 6.5 G/DL (ref 6.4–8.3)
RBC # BLD AUTO: 4.45 10E6/UL (ref 4.4–5.9)
SODIUM SERPL-SCNC: 136 MMOL/L (ref 136–145)
SP GR UR STRIP: 1.02 (ref 1–1.03)
T4 FREE SERPL-MCNC: 0.94 NG/DL (ref 0.9–1.7)
TRIGL SERPL-MCNC: 304 MG/DL
TSH SERPL DL<=0.005 MIU/L-ACNC: 6.68 UIU/ML (ref 0.3–4.2)
UROBILINOGEN UR STRIP-MCNC: NORMAL MG/DL
WBC # BLD AUTO: 6.7 10E3/UL (ref 4–11)

## 2023-08-16 PROCEDURE — 83036 HEMOGLOBIN GLYCOSYLATED A1C: CPT | Mod: ZL

## 2023-08-16 PROCEDURE — 81003 URINALYSIS AUTO W/O SCOPE: CPT | Mod: ZL

## 2023-08-16 PROCEDURE — 82570 ASSAY OF URINE CREATININE: CPT | Mod: ZL

## 2023-08-16 PROCEDURE — 36415 COLL VENOUS BLD VENIPUNCTURE: CPT | Mod: ZL

## 2023-08-16 PROCEDURE — 99214 OFFICE O/P EST MOD 30 MIN: CPT | Performed by: INTERNAL MEDICINE

## 2023-08-16 PROCEDURE — 84439 ASSAY OF FREE THYROXINE: CPT | Mod: ZL

## 2023-08-16 PROCEDURE — 85027 COMPLETE CBC AUTOMATED: CPT | Mod: ZL

## 2023-08-16 PROCEDURE — 80061 LIPID PANEL: CPT | Mod: ZL

## 2023-08-16 PROCEDURE — 84443 ASSAY THYROID STIM HORMONE: CPT | Mod: ZL

## 2023-08-16 PROCEDURE — 80053 COMPREHEN METABOLIC PANEL: CPT | Mod: ZL

## 2023-08-16 PROCEDURE — G0463 HOSPITAL OUTPT CLINIC VISIT: HCPCS

## 2023-08-16 RX ORDER — SPIRONOLACTONE 25 MG/1
25 TABLET ORAL DAILY
Qty: 90 TABLET | Refills: 1 | Status: SHIPPED | OUTPATIENT
Start: 2023-08-16 | End: 2023-11-21

## 2023-08-16 RX ORDER — LEVOTHYROXINE SODIUM 100 UG/1
100 TABLET ORAL DAILY
Qty: 90 TABLET | Refills: 1 | Status: SHIPPED | OUTPATIENT
Start: 2023-08-16 | End: 2023-11-21

## 2023-08-16 RX ORDER — SACUBITRIL AND VALSARTAN 24; 26 MG/1; MG/1
1 TABLET, FILM COATED ORAL 2 TIMES DAILY
Qty: 180 TABLET | Refills: 1 | Status: SHIPPED | OUTPATIENT
Start: 2023-08-16 | End: 2023-11-21

## 2023-08-16 RX ORDER — METFORMIN HCL 500 MG
TABLET, EXTENDED RELEASE 24 HR ORAL
Qty: 360 TABLET | Refills: 1 | Status: SHIPPED | OUTPATIENT
Start: 2023-08-16 | End: 2023-11-21

## 2023-08-16 RX ORDER — METOPROLOL SUCCINATE 50 MG/1
50 TABLET, EXTENDED RELEASE ORAL DAILY
Qty: 90 TABLET | Refills: 1 | Status: SHIPPED | OUTPATIENT
Start: 2023-08-16 | End: 2023-11-21

## 2023-08-16 RX ORDER — ATORVASTATIN CALCIUM 40 MG/1
40 TABLET, FILM COATED ORAL DAILY
Qty: 90 TABLET | Refills: 1 | Status: SHIPPED | OUTPATIENT
Start: 2023-08-16 | End: 2023-11-21

## 2023-08-16 RX ORDER — ALLOPURINOL 100 MG/1
100 TABLET ORAL 2 TIMES DAILY
Qty: 180 TABLET | Refills: 1 | Status: SHIPPED | OUTPATIENT
Start: 2023-08-16 | End: 2023-11-21

## 2023-08-16 RX ORDER — PANTOPRAZOLE SODIUM 40 MG/1
40 TABLET, DELAYED RELEASE ORAL DAILY
Qty: 90 TABLET | Refills: 1 | Status: SHIPPED | OUTPATIENT
Start: 2023-08-16 | End: 2023-11-21

## 2023-08-16 RX ORDER — GLIPIZIDE 10 MG/1
20 TABLET ORAL
Qty: 360 TABLET | Refills: 1 | Status: SHIPPED | OUTPATIENT
Start: 2023-08-16 | End: 2023-11-21

## 2023-08-16 ASSESSMENT — ENCOUNTER SYMPTOMS
COUGH: 0
DIZZINESS: 0
FATIGUE: 1
ABDOMINAL PAIN: 0
EYE PAIN: 0
HEMATURIA: 0
SHORTNESS OF BREATH: 0
WEAKNESS: 1
CONSTIPATION: 0
FEVER: 0
DYSURIA: 0
HEMATOCHEZIA: 0
NAUSEA: 1
NERVOUS/ANXIOUS: 0
MYALGIAS: 0
POLYDIPSIA: 1
PARESTHESIAS: 0
JOINT SWELLING: 0
HEADACHES: 0
CHILLS: 0
PALPITATIONS: 0
BRUISES/BLEEDS EASILY: 1
SORE THROAT: 0
FREQUENCY: 0
HEARTBURN: 0
DIARRHEA: 1
ARTHRALGIAS: 0

## 2023-08-16 ASSESSMENT — PAIN SCALES - GENERAL: PAINLEVEL: SEVERE PAIN (6)

## 2023-08-16 NOTE — PATIENT INSTRUCTIONS
Blood pressure is well controlled.   Diabetes needs help, A1c is very high.     For Annual Eye Exam -- Annual Diabetes Eye Exam  Consider:  [facility]  [facility] [address].  [phone]     Medications refilled.   Labs are otherwise stable.     Results for orders placed or performed in visit on 08/16/23   Comprehensive metabolic panel     Status: Abnormal   Result Value Ref Range    Sodium 136 136 - 145 mmol/L    Potassium 4.7 3.4 - 5.3 mmol/L    Chloride 101 98 - 107 mmol/L    Carbon Dioxide (CO2) 25 22 - 29 mmol/L    Anion Gap 10 7 - 15 mmol/L    Urea Nitrogen 17.4 8.0 - 23.0 mg/dL    Creatinine 0.95 0.67 - 1.17 mg/dL    Calcium 9.2 8.8 - 10.2 mg/dL    Glucose 277 (H) 70 - 99 mg/dL    Alkaline Phosphatase 104 40 - 129 U/L    AST 9 0 - 45 U/L    ALT 10 0 - 70 U/L    Protein Total 6.5 6.4 - 8.3 g/dL    Albumin 3.9 3.5 - 5.2 g/dL    Bilirubin Total 0.7 <=1.2 mg/dL    GFR Estimate 82 >60 mL/min/1.73m2   Lipid Profile     Status: Abnormal   Result Value Ref Range    Cholesterol 174 <200 mg/dL    Triglycerides 304 (H) <150 mg/dL    Direct Measure HDL 31 (L) >=40 mg/dL    LDL Cholesterol Calculated 82 <=100 mg/dL    Non HDL Cholesterol 143 (H) <130 mg/dL    Narrative    Cholesterol  Desirable:  <200 mg/dL    Triglycerides  Normal:  Less than 150 mg/dL  Borderline High:  150-199 mg/dL  High:  200-499 mg/dL  Very High:  Greater than or equal to 500 mg/dL    Direct Measure HDL  Female:  Greater than or equal to 50 mg/dL   Male:  Greater than or equal to 40 mg/dL    LDL Cholesterol  Desirable:  <100mg/dL  Above Desirable:  100-129 mg/dL   Borderline High:  130-159 mg/dL   High:  160-189 mg/dL   Very High:  >= 190 mg/dL    Non HDL Cholesterol  Desirable:  130 mg/dL  Above Desirable:  130-159 mg/dL  Borderline High:  160-189 mg/dL  High:  190-219 mg/dL  Very High:  Greater than or equal to 220 mg/dL   Albumin Random Urine Quantitative with Creat Ratio     Status: None   Result Value Ref Range     Creatinine Urine mg/dL 119.9 mg/dL    Albumin Urine mg/L 12.4 mg/L    Albumin Urine mg/g Cr 10.34 0.00 - 17.00 mg/g Cr   CBC with platelets     Status: Normal   Result Value Ref Range    WBC Count 6.7 4.0 - 11.0 10e3/uL    RBC Count 4.45 4.40 - 5.90 10e6/uL    Hemoglobin 13.5 13.3 - 17.7 g/dL    Hematocrit 40.5 40.0 - 53.0 %    MCV 91 78 - 100 fL    MCH 30.3 26.5 - 33.0 pg    MCHC 33.3 31.5 - 36.5 g/dL    RDW 14.2 10.0 - 15.0 %    Platelet Count 192 150 - 450 10e3/uL   Hemoglobin A1c     Status: Abnormal   Result Value Ref Range    Hemoglobin A1C 11.6 (H) 4.0 - 6.2 %   TSH with free T4 reflex     Status: Abnormal   Result Value Ref Range    TSH 6.68 (H) 0.30 - 4.20 uIU/mL   UA reflex to Microscopic     Status: Abnormal   Result Value Ref Range    Color Urine Light Yellow Colorless, Straw, Light Yellow, Yellow    Appearance Urine Clear Clear    Glucose Urine 300 (A) Negative mg/dL    Bilirubin Urine Negative Negative    Ketones Urine Negative Negative mg/dL    Specific Gravity Urine 1.018 1.000 - 1.030    Blood Urine Negative Negative    pH Urine 5.0 5.0 - 9.0    Protein Albumin Urine Negative Negative mg/dL    Urobilinogen Urine Normal Normal, 2.0 mg/dL    Nitrite Urine Negative Negative    Leukocyte Esterase Urine Negative Negative    Narrative    Microscopic not indicated   Extra Tube     Status: None (In process)    Narrative    The following orders were created for panel order Extra Tube.  Procedure                               Abnormality         Status                     ---------                               -----------         ------                     Extra Serum Separator Tu...[[id number]]                      In process                   Please view results for these tests on the individual orders.   T4 free     Status: Normal   Result Value Ref Range    Free T4 0.94 0.90 - 1.70 ng/dL      Aspects of Diabetes:   Recent Labs   Lab Test 08/16/23  0753 05/10/23  0701 03/13/23  0806 02/03/23  0801   A1C  11.6* 11.5*  --  9.3*   LDL 82 81  --  130*   HDL 31* 35*  --  37*   TRIG 304* 266*  --  357*   ALT 10 10 11 13   CR 0.95 0.86 0.82 1.05   GFRESTIMATED 82 89 90 73   POTASSIUM 4.7 4.6 4.6 5.2   TSH 6.68* 7.49*  --  8.27*   T4 0.94 0.89*  --  1.05   WBC 6.7 7.1 7.1 7.8   HGB 13.5 14.1 13.8 14.9    215 205 213   ALBUMIN 3.9 4.1 3.9 4.1      Hemoglobin A1c  Goal range is under 8%. Best is 6.5 to 7   Blood Pressure 106/63 Goal to keep less than 140/90   Tobacco  reports that he quit smoking about 38 years ago. His smoking use included cigarettes. He has a 30.00 pack-year smoking history. He has never used smokeless tobacco. Goal to abstain from tobacco   Aspirin or Plavix Anti-platelet therapy Aspirin or Plavix reduces risk of heart disease and stroke  -- sometimes used with other blood thinners, depending on bleeding risk and risk factors.    ACE/ARB Specific blood pressure meds These medications can reduce risk of kidney disease   Cholesterol Statins (Lipitor, Crestor, vs others) Statins reduce risk of heart disease and stroke   Eye Exam -- Do Yearly -- Annual diabetic eye exam   Healthy weight Wt Readings from Last 4 Encounters:   08/16/23 104.8 kg (231 lb)   05/10/23 105.3 kg (232 lb 3.2 oz)   03/17/23 104.3 kg (230 lb)   02/14/23 104.4 kg (230 lb 3.2 oz)      Body mass index is 34.11 kg/m .  Goal BMI under 30, best is under 25.      -- Trying to exercise daily (goal at least 20 min/day) with moderate aerobic activity   -- Eat healthy (resources from ADA at http://www.diabetes.org/)   -- Taking good care of my feet. Consider seeing the Podiatrist   -- Check blood sugars as directed, record in log book and bring to every appointment    Insurance companies are grading you and I on your blood sugar control -- Goal is to get your A1c down to 7.9% or lower and NO Smoking!  -- Medicare and most insurance companies, will only cover Hemoglobin A1c labs to be rechecked every 91+ days.      Return for Diabetes labs and  clinic follow-up appointment every 3 to 4 months.    Schedule lab only appointment --- A few days AFTER: 11/14/23   Schedule clinic appointment with Dr. Roberts -- Same day as labs, or 1-2 days later.

## 2023-08-16 NOTE — PROGRESS NOTES
Assessment & Plan     ICD-10-CM    1. Mixed hyperlipidemia  E78.2       2. Uncontrolled type 2 diabetes mellitus with hyperglycemia (H)  E11.65 empagliflozin (JARDIANCE) 10 MG TABS tablet     glipiZIDE (GLUCOTROL) 10 MG tablet     metFORMIN (GLUCOPHAGE XR) 500 MG 24 hr tablet     Semaglutide (RYBELSUS) 7 MG tablet      3. 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 Chronic systolic heart failure (H) - ECHO 6/22/2022 at [facility] -- EF 25-30%  I50.22 empagliflozin (JARDIANCE) 10 MG TABS tablet     metoprolol succinate ER (TOPROL XL) 50 MG 24 hr tablet     sacubitril-valsartan (ENTRESTO) 24-26 MG per tablet     Semaglutide (RYBELSUS) 7 MG tablet      5. Coronary artery disease involving native coronary artery of native heart without angina pectoris  I25.10 atorvastatin (LIPITOR) 40 MG tablet      6. Chronic diastolic congestive heart failure (H)  I50.32 spironolactone (ALDACTONE) 25 MG tablet      7. Hypothyroidism due to acquired atrophy of thyroid  E03.4 levothyroxine (SYNTHROID/LEVOTHROID) 100 MCG tablet      8. Gastroesophageal reflux disease with esophagitis, unspecified whether hemorrhage  K21.00 pantoprazole (PROTONIX) 40 MG EC tablet      9. History of gout  Z87.39 allopurinol (ZYLOPRIM) 100 MG tablet        Patient presents for follow-up multiple issues.    Chronic systolic heart failure.  Last ejection fraction on echocardiogram was 25-30%.  Continues with Entresto, semaglutide, Toprol-XL, Jardiance.  Seems to be tolerating well.  No medication side effects reported.  No excessive fluid retention issues.  Needs refills.    History of stroke, left vertebral artery thrombosis.  Continues with Lipitor 40 mg daily.  Also taking Plavix 75 mg daily.  Needs refills.  No new or recent events reported.    Coronary artery disease, denies exertional angina.  Also has chronic diastolic heart failure.  Currently taking  Lipitor, spironolactone.  See above.    Heartburn and reflux, ongoing but controlled with Protonix.  Needs to take regularly due to ongoing heartburn symptoms.  Needs refills.    History of gout, doing well recently.  No recent gout attacks while taking allopurinol.  Needs refills.  Otherwise tolerating well.    + difficulty with mobility. Disability Parking pass forms completed.     HYPERTENSION - Ongoing. Blood pressure is currently well controlled.  Medication side effects: Yes - Relatively frequent lightheadedness . Denies syncope.   No changes for now. Continue - Entresto, Toprol XL, Spironolactone.   Medication list reviewed/updated. Refills completed as needed.      MIXED HYPERLIPIDEMIA.  Ongoing. LDL is at goal: No. Triglycerides are at goal: No.  Hopefully lifestyle modifications will improve cholesterol levels, otherwise we will need to consider additional medication dose adjustments or medication changes.  Medication side effects reported: None.   Continue current medications for now - Lipitor 40 mg daily. Medication list reviewed/updated. Refills completed as needed.  Recent Labs   Lab Test 08/16/23  0753 05/10/23  0701   CHOL 174 169   HDL 31* 35*   LDL 82 81   TRIG 304* 266*        HYPOTHYROIDISM - Patient has a longstanding history of chronic hypothyroidism. Patient has been doing reasonably well. Reports: denies fatigue, weight changes, heat/cold intolerance, bowel/skin changes or CVS symptoms.  Continue: Synthroid 100 mcg daily oral thyroid replacement, denies adverse medication reactions or side effects.                       TSH   Date Value Ref Range Status   08/16/2023 6.68 (H) 0.30 - 4.20 uIU/mL Final   10/28/2022 5.49 (H) 0.40 - 4.00 mU/L Final        OBESITY - Ongoing. Discussed need for reduced oral caloric intake, weight loss, regular exercise and reduce carbohydrate/sugar intake.     Chronic Kidney Disease, Stage 3a (GFR 45-59), chronic, ongoing.   Kidney function has been slowly  declining.  Encouraged NSAID avoidance.       Type 2 Diabetes Mellitus, with neuropathy, Reports ongoing/previous: polydipsia, polyuria, numbness, and burning.  Blood sugar control has been poor with moderate hyperglycemia. Doing well with oral agents - Metformin, Glipizide, Jardiance, Rybelsus.  Medication list reviewed/updated. Refills completed as needed.    Complicating factors include but are not limited to: hypertension, hyperlipidemia, retinopathy, neuropathy, chronic kidney disease, CAD/PVD, and morbid obesity .         Return in about 3 months (around 11/16/2023) for - Labs every 91+ days, with DM Follow-up, Same Day or 1-2 days later with Dr. Roberts.    Bk Roberts MD  [facility] 77 [facility], presenting for the following health issues:  Diabetes (3 month check)        8/16/2023     8:41 AM   Additional Questions   Roomed by Laura MALDONADO CNA/SHAUNNA       History of Present Illness       Diabetes:   He presents for follow up of diabetes.    He is not checking blood glucose.             The patient has not had a diabetic eye exam in the last 12 months.          Symptom onset:  More than a month    He eats 0-1 servings of fruits and vegetables daily.He consumes 2 sweetened beverage(s) daily.He exercises with enough effort to increase his heart rate 9 or less minutes per day.  He exercises with enough effort to increase his heart rate 3 or less days per week. He is missing 1 dose(s) of medications per week.       Diabetes Follow-up    How often are you checking your blood sugar? Not at all  What concerns do you have today about your diabetes? None   Do you have any of these symptoms? (Select all that apply)  Numbness in feet and Blurry vision  Have you had a diabetic eye exam in the last 12 months? No        BP Readings from Last 2 Encounters:   08/16/23 106/63   05/10/23 132/68     Hemoglobin A1C (%)   Date Value   08/16/2023 11.6 (H)   05/10/2023 11.5 (H)  "  05/03/2021 9.6 (H)   01/22/2021 10.9 (H)     LDL Cholesterol Calculated (mg/dL)   Date Value   08/16/2023 82   05/10/2023 81   05/03/2021 78   12/18/2017 106 (H)               Review of Systems   Constitutional:  Positive for fatigue. Negative for chills and fever.   HENT:  Positive for hearing loss. Negative for congestion, ear pain and sore throat.    Eyes:  Negative for pain and visual disturbance.   Respiratory:  Negative for cough and shortness of breath.    Cardiovascular:  Negative for chest pain, palpitations and peripheral edema.   Gastrointestinal:  Positive for diarrhea and nausea. Negative for abdominal pain, constipation, heartburn and hematochezia.   Endocrine: Positive for polydipsia and polyuria.   Genitourinary:  Positive for impotence. Negative for dysuria, frequency, genital sores, hematuria, penile discharge and urgency.   Musculoskeletal:  Negative for arthralgias, joint swelling and myalgias.   Skin:  Negative for rash.   Allergic/Immunologic: Negative for immunocompromised state.   Neurological:  Positive for weakness. Negative for dizziness, headaches and paresthesias.   Hematological:  Bruises/bleeds easily.   Psychiatric/Behavioral:  Negative for mood changes. The patient is not nervous/anxious.           Objective    /63 (BP Location: Right arm, Patient Position: Sitting, Cuff Size: Adult Regular)   Pulse 82   Temp 97.8  F (36.6  C) (Tympanic)   Resp 17   Ht 1.753 m (5' 9\")   Wt 104.8 kg (231 lb)   SpO2 95%   BMI 34.11 kg/m    Body mass index is 34.11 kg/m .  Physical Exam  Constitutional:       General: He is not in acute distress.     Appearance: He is well-developed. He is obese. He is not diaphoretic.   HENT:      Head: Normocephalic and atraumatic.   Eyes:      General: No scleral icterus.     Conjunctiva/sclera: Conjunctivae normal.   Neck:      Vascular: No carotid bruit.   Cardiovascular:      Rate and Rhythm: Normal rate and regular rhythm.      Pulses: Normal " pulses.   Pulmonary:      Effort: Pulmonary effort is normal.      Breath sounds: Normal breath sounds.   Abdominal:      Palpations: Abdomen is soft.      Tenderness: There is no abdominal tenderness.   Musculoskeletal:         General: No deformity.      Cervical back: Neck supple.      Right lower leg: No edema.      Left lower leg: No edema.   Lymphadenopathy:      Cervical: No cervical adenopathy.   Skin:     General: Skin is warm and dry.      Findings: Bruising present. No rash.   Neurological:      Mental Status: He is alert. Mental status is at baseline.   Psychiatric:         Mood and Affect: Mood normal.         Behavior: Behavior normal.          Recent Labs   Lab Test 08/16/23  0753 05/10/23  0701 03/13/23  0806 02/03/23  0801   A1C 11.6* 11.5*  --  9.3*   LDL 82 81  --  130*   HDL 31* 35*  --  37*   TRIG 304* 266*  --  357*   ALT 10 10 11 13   CR 0.95 0.86 0.82 1.05   GFRESTIMATED 82 89 90 73   POTASSIUM 4.7 4.6 4.6 5.2   TSH 6.68* 7.49*  --  8.27*   T4 0.94 0.89*  --  1.05   WBC 6.7 7.1 7.1 7.8   HGB 13.5 14.1 13.8 14.9    215 205 213   ALBUMIN 3.9 4.1 3.9 4.1     Hemoglobin A1c is high and not at goal.  LDL and triglycerides are high not at goal cardiology normal.  Creatinine is at baseline.  Potassium normal.  TSH is elevated.  Free T4 is within normal range.  TSH normal.  Albumin normal.

## 2023-08-16 NOTE — NURSING NOTE
"Chief Complaint   Patient presents with    Diabetes     3 month check         Initial BP (!) 76/45 (BP Location: Right arm, Patient Position: Sitting, Cuff Size: Adult Regular)   Pulse 82   Temp 97.8  F (36.6  C) (Tympanic)   Resp 17   Ht 1.753 m (5' 9\")   Wt 104.8 kg (231 lb)   SpO2 95%   BMI 34.11 kg/m   Estimated body mass index is 34.11 kg/m  as calculated from the following:    Height as of this encounter: 1.753 m (5' 9\").    Weight as of this encounter: 104.8 kg (231 lb).       FOOD SECURITY SCREENING QUESTIONS:    The next two questions are to help us understand your food security.  If you are feeling you need any assistance in this area, we have resources available to support you today.    Hunger Vital Signs:  Within the past 12 months we worried whether our food would run out before we got money to buy more. Never  Within the past 12 months the food we bought just didn't last and we didn't have money to get more. Never  Laura Reddy LPN on 8/16/2023 at 8:46 AM     Laura Reddy   "

## 2023-09-01 ENCOUNTER — LAB (OUTPATIENT)
Dept: LAB | Facility: OTHER | Age: 78
End: 2023-09-01
Attending: NURSE PRACTITIONER
Payer: MEDICARE

## 2023-09-01 DIAGNOSIS — C18.7 CANCER OF SIGMOID COLON (H): ICD-10-CM

## 2023-09-01 LAB
ALBUMIN SERPL BCG-MCNC: 3.8 G/DL (ref 3.5–5.2)
ALP SERPL-CCNC: 106 U/L (ref 40–129)
ALT SERPL W P-5'-P-CCNC: 9 U/L (ref 0–70)
ANION GAP SERPL CALCULATED.3IONS-SCNC: 11 MMOL/L (ref 7–15)
AST SERPL W P-5'-P-CCNC: 11 U/L (ref 0–45)
BASOPHILS # BLD AUTO: 0 10E3/UL (ref 0–0.2)
BASOPHILS NFR BLD AUTO: 0 %
BILIRUB SERPL-MCNC: 0.5 MG/DL
BUN SERPL-MCNC: 16.6 MG/DL (ref 8–23)
CALCIUM SERPL-MCNC: 9.2 MG/DL (ref 8.8–10.2)
CEA SERPL-MCNC: 0.9 NG/ML
CHLORIDE SERPL-SCNC: 101 MMOL/L (ref 98–107)
CREAT SERPL-MCNC: 0.9 MG/DL (ref 0.67–1.17)
DEPRECATED HCO3 PLAS-SCNC: 24 MMOL/L (ref 22–29)
EOSINOPHIL # BLD AUTO: 0.2 10E3/UL (ref 0–0.7)
EOSINOPHIL NFR BLD AUTO: 2 %
ERYTHROCYTE [DISTWIDTH] IN BLOOD BY AUTOMATED COUNT: 14.1 % (ref 10–15)
GFR SERPL CREATININE-BSD FRML MDRD: 88 ML/MIN/1.73M2
GLUCOSE SERPL-MCNC: 302 MG/DL (ref 70–99)
HCT VFR BLD AUTO: 40.6 % (ref 40–53)
HGB BLD-MCNC: 13.4 G/DL (ref 13.3–17.7)
IMM GRANULOCYTES # BLD: 0 10E3/UL
IMM GRANULOCYTES NFR BLD: 0 %
LDH SERPL L TO P-CCNC: 151 U/L (ref 0–250)
LYMPHOCYTES # BLD AUTO: 1.6 10E3/UL (ref 0.8–5.3)
LYMPHOCYTES NFR BLD AUTO: 22 %
MCH RBC QN AUTO: 29.9 PG (ref 26.5–33)
MCHC RBC AUTO-ENTMCNC: 33 G/DL (ref 31.5–36.5)
MCV RBC AUTO: 91 FL (ref 78–100)
MONOCYTES # BLD AUTO: 0.8 10E3/UL (ref 0–1.3)
MONOCYTES NFR BLD AUTO: 10 %
NEUTROPHILS # BLD AUTO: 4.8 10E3/UL (ref 1.6–8.3)
NEUTROPHILS NFR BLD AUTO: 66 %
NRBC # BLD AUTO: 0 10E3/UL
NRBC BLD AUTO-RTO: 0 /100
PLATELET # BLD AUTO: 219 10E3/UL (ref 150–450)
POTASSIUM SERPL-SCNC: 4.4 MMOL/L (ref 3.4–5.3)
PROT SERPL-MCNC: 6.4 G/DL (ref 6.4–8.3)
RBC # BLD AUTO: 4.48 10E6/UL (ref 4.4–5.9)
SODIUM SERPL-SCNC: 136 MMOL/L (ref 136–145)
WBC # BLD AUTO: 7.4 10E3/UL (ref 4–11)

## 2023-09-01 PROCEDURE — 80053 COMPREHEN METABOLIC PANEL: CPT | Mod: ZL

## 2023-09-01 PROCEDURE — 36415 COLL VENOUS BLD VENIPUNCTURE: CPT | Mod: ZL

## 2023-09-01 PROCEDURE — 85025 COMPLETE CBC W/AUTO DIFF WBC: CPT | Mod: ZL

## 2023-09-01 PROCEDURE — 83615 LACTATE (LD) (LDH) ENZYME: CPT | Mod: ZL

## 2023-09-01 PROCEDURE — 82378 CARCINOEMBRYONIC ANTIGEN: CPT | Mod: ZL

## 2023-09-08 ENCOUNTER — ONCOLOGY VISIT (OUTPATIENT)
Dept: ONCOLOGY | Facility: OTHER | Age: 78
End: 2023-09-08
Attending: NURSE PRACTITIONER
Payer: COMMERCIAL

## 2023-09-08 VITALS
BODY MASS INDEX: 33.67 KG/M2 | HEART RATE: 80 BPM | WEIGHT: 228 LBS | RESPIRATION RATE: 20 BRPM | SYSTOLIC BLOOD PRESSURE: 102 MMHG | OXYGEN SATURATION: 95 % | DIASTOLIC BLOOD PRESSURE: 64 MMHG | TEMPERATURE: 96.9 F

## 2023-09-08 DIAGNOSIS — C18.7 ADENOCARCINOMA OF SIGMOID COLON (H): ICD-10-CM

## 2023-09-08 DIAGNOSIS — C18.7 CANCER OF SIGMOID COLON (H): Primary | ICD-10-CM

## 2023-09-08 PROCEDURE — 99213 OFFICE O/P EST LOW 20 MIN: CPT | Performed by: NURSE PRACTITIONER

## 2023-09-08 PROCEDURE — G0463 HOSPITAL OUTPT CLINIC VISIT: HCPCS

## 2023-09-08 ASSESSMENT — PAIN SCALES - GENERAL: PAINLEVEL: NO PAIN (0)

## 2023-09-08 NOTE — PROGRESS NOTES
Oncology Follow-up Visit:  September 8, 2023  Diagnosis:Colon cancer    History Of Present Illness:  Patient presents to the clinic today for followup of colon cancer. Patient was seen in consultation on June 1, 2016. Patient presented to the emergency room on April 25, 2016, with complaints of chest pain radiating down his arms, which was primarily worse with exertion. In the emergency department, he was found to have a hemoglobin of 7.1, and he subsequently was admitted. CBC revealed microcytic indices with an MCV of 62. He was noted to be iron deficient. He was transfused 2 units of packed red cells and was ruled out for MI. He was seen by Dr. Solano, who performed colonoscopy and was noted to have a mass in the sigmoid colon that was consistent with adenocarcinoma. He also had multiple adenomatous polyps. The patient was admitted on May 11, 2016, for a sigmoid colectomy. This was performed on May 17, 2016. Pathology revealed in the sigmoid colon a mass consistent with moderately differentiated adenocarcinoma. The tumor size was 2 x 1 x 0.7 cm. The tumor invaded the muscularis propria, 2/8 lymph nodes were positive for metastatic carcinoma. Margins were negative for tumor. The grade of the tumor was ruled as moderately differentiated. The patient was staged pathologic stage T2N1b as part of the postop evaluation. The patient had a CT abdomen and pelvis on May 12, 2016. This revealed that there were 2 nonspecific low-attenuation lesions in the liver. Metastasis could not be excluded. There was no lymphadenopathy or additional findings to suggest metastases. The patient had a preop CEA, which was less than 3. PET scan was performed on Lluvia 15, 2016, and was essentially negative for metastatic disease. Lesions seen on prior PET in the liver were not hypermetabolic. We felt that the patient had stage III disease and he would be a candidate for adjuvant chemotherapy. When patient was seen on June 28, 2016, the plan was  to start FOLFOX x12 cycles.  When he was seen on November 17, 2016,  he did note some cold-induced neuropathic symptoms. He completed 12 cycles of FOLFOX. His last chemotherapy was administered on December 7, 2016.  He did have a PET scan done after 12 cycles of chemotherapy, and this came back essentially negative for metastatic disease. He had staging studies on April 6, 2017 including CT abdomen and pelvis, which was essentially negative. CT of the chest was negative. The patient also underwent a colonoscopy in May 2017, which revealed a tubular adenoma at the hepatic flexure of the colon. Patient had a colonoscopy done performed by Dr. Solano on 06/01/2020 and was found to have 6 polyps.  All of them were revealing tubular adenoma, consistent with advanced adenoma. One was at the 30 cm, and the other was in the mid transverse colon. This was based on polyp size being larger than 10 mm. The patient had a colonoscopy in June 2021, which revealed multiple tubular adenomas with a 3 year follow up recommended.  CT chest, abdomen, pelvis was done on 3/7/22 and was stable.  Patient had a stroke in June 2022. He has been following with cardiology. CT scans from 3/14/23 are unchanged. Patient had labs done on 9/1/23 showing a normal tumor marker. All other labs are stable. Patient has been feeling well. He has no new concerns at this time .    Review Of Systems:  Review Of Systems  Eyes/Ears/Nose/Throat: denies new vision changes  Respiratory: No shortness of breath, dyspnea on exertion, cough  Cardiovascular: denies chest pain  Gastrointestinal: denies abdominal pain, no bowel changes  Genitourinary: denies dysuria or hematuria  Musculoskeletal: denies new bone pain  Neurologic: denies headaches, no dizziness  Hematologic/Lymphatic/Immunologic: denies fevers      There are no exam notes on file for this visit.    Past medical, social, surgical, and family histories reviewed.    Allergies:  Allergies as of 09/08/2023 -  Reviewed 08/16/2023   Allergen Reaction Noted    Aspirin  05/26/2017    Canagliflozin  05/18/2016    Morphine  05/18/2016       Current Medications:  Current Outpatient Medications   Medication Sig Dispense Refill    allopurinol (ZYLOPRIM) 100 MG tablet Take 1 tablet (100 mg) by mouth 2 times daily 180 tablet 1    atorvastatin (LIPITOR) 40 MG tablet Take 1 tablet (40 mg) by mouth daily 90 tablet 1    Cholecalciferol (VITAMIN D3) 50 MCG (2000 UT) CAPS Take 1 capsule by mouth daily      clopidogrel (PLAVIX) 75 MG tablet Take 1 tablet (75 mg) by mouth daily 90 tablet 4    empagliflozin (JARDIANCE) 10 MG TABS tablet Take 1 tablet (10 mg) by mouth daily - for diabetes 90 tablet 1    glipiZIDE (GLUCOTROL) 10 MG tablet Take 2 tablets (20 mg) by mouth 2 times daily (before meals) 360 tablet 1    levothyroxine (SYNTHROID/LEVOTHROID) 100 MCG tablet Take 1 tablet (100 mcg) by mouth daily 90 tablet 1    metFORMIN (GLUCOPHAGE XR) 500 MG 24 hr tablet Take 2-4 tablets (1,000-2,000 mg) by mouth daily (with dinner) -- Substitute for cheap, generic, preferred 24 hr Metformin 360 tablet 1    metoprolol succinate ER (TOPROL XL) 50 MG 24 hr tablet Take 1 tablet (50 mg) by mouth daily 90 tablet 1    pantoprazole (PROTONIX) 40 MG EC tablet Take 1 tablet (40 mg) by mouth daily 90 tablet 1    sacubitril-valsartan (ENTRESTO) 24-26 MG per tablet Take 1 tablet by mouth 2 times daily 180 tablet 1    Semaglutide (RYBELSUS) 7 MG tablet Take 1 tablet (7 mg) by mouth daily - for diabetes (didn't tolerate 14 mg tablet daily) 90 tablet 1    spironolactone (ALDACTONE) 25 MG tablet Take 1 tablet (25 mg) by mouth daily 90 tablet 1    vitamin B-12 (CYANOCOBALAMIN) 2500 MCG sublingual tablet Take 1 tablet (2,500 mcg) by mouth daily - for low B12 90 tablet 4        Physical Exam:  There were no vitals taken for this visit.    GENERAL APPEARANCE: 77 year old male, alert and no distress     NECK: no adenopathy, no asymmetry or masses     LYMPHATICS: No  cervical, supraclavicular, axillary lymphadenopathy     RESP: lungs clear to auscultation - no rales, rhonchi or wheezes     CARDIOVASCULAR: regular rates and rhythm, normal S1 S2     ABDOMEN:  soft, nontender, bowel sounds normal     MUSCULOSKELETAL: extremities normal- no gross deformities noted, No edema b/l LE.     SKIN: no suspicious lesions or rashes on exposed skin     PSYCHIATRIC: mentation appears normal and affect normal    Laboratory/Imaging Studies  Lab on 09/01/2023   Component Date Value Ref Range Status    CEA 09/01/2023 0.9  ng/mL Final    Nonsmoker (past/never) <=5.0 ng/mL   Current smoker <=6.5 ng/mL     This result is obtained using the Roche [employer] CEA method on the lili [id number] immunoassay analyzer. Results obtained with different assay methods or kits cannot be used interchangeably.    Lactate Dehydrogenase 09/01/2023 151  0 - 250 U/L Final    Sodium 09/01/2023 136  136 - 145 mmol/L Final    Potassium 09/01/2023 4.4  3.4 - 5.3 mmol/L Final    Chloride 09/01/2023 101  98 - 107 mmol/L Final    Carbon Dioxide (CO2) 09/01/2023 24  22 - 29 mmol/L Final    Anion Gap 09/01/2023 11  7 - 15 mmol/L Final    Urea Nitrogen 09/01/2023 16.6  8.0 - 23.0 mg/dL Final    Creatinine 09/01/2023 0.90  0.67 - 1.17 mg/dL Final    Calcium 09/01/2023 9.2  8.8 - 10.2 mg/dL Final    Glucose 09/01/2023 302 (H)  70 - 99 mg/dL Final    Alkaline Phosphatase 09/01/2023 106  40 - 129 U/L Final    AST 09/01/2023 11  0 - 45 U/L Final    Reference intervals for this test were updated on 6/12/2023 to more accurately reflect our healthy population. There may be differences in the flagging of prior results with similar values performed with this method. Interpretation of those prior results can be made in the context of the updated reference intervals.    ALT 09/01/2023 9  0 - 70 U/L Final    Reference intervals for this test were updated on 6/12/2023 to more accurately reflect our healthy population. There may be differences in the  flagging of prior results with similar values performed with this method. Interpretation of those prior results can be made in the context of the updated reference intervals.      Protein Total 09/01/2023 6.4  6.4 - 8.3 g/dL Final    Albumin 09/01/2023 3.8  3.5 - 5.2 g/dL Final    Bilirubin Total 09/01/2023 0.5  <=1.2 mg/dL Final    GFR Estimate 09/01/2023 88  >60 mL/min/1.73m2 Final    WBC Count 09/01/2023 7.4  4.0 - 11.0 10e3/uL Final    RBC Count 09/01/2023 4.48  4.40 - 5.90 10e6/uL Final    Hemoglobin 09/01/2023 13.4  13.3 - 17.7 g/dL Final    Hematocrit 09/01/2023 40.6  40.0 - 53.0 % Final    MCV 09/01/2023 91  78 - 100 fL Final    MCH 09/01/2023 29.9  26.5 - 33.0 pg Final    MCHC 09/01/2023 33.0  31.5 - 36.5 g/dL Final    RDW 09/01/2023 14.1  10.0 - 15.0 % Final    Platelet Count 09/01/2023 219  150 - 450 10e3/uL Final    % Neutrophils 09/01/2023 66  % Final    % Lymphocytes 09/01/2023 22  % Final    % Monocytes 09/01/2023 10  % Final    % Eosinophils 09/01/2023 2  % Final    % Basophils 09/01/2023 0  % Final    % Immature Granulocytes 09/01/2023 0  % Final    NRBCs per 100 WBC 09/01/2023 0  <1 /100 Final    Absolute Neutrophils 09/01/2023 4.8  1.6 - 8.3 10e3/uL Final    Absolute Lymphocytes 09/01/2023 1.6  0.8 - 5.3 10e3/uL Final    Absolute Monocytes 09/01/2023 0.8  0.0 - 1.3 10e3/uL Final    Absolute Eosinophils 09/01/2023 0.2  0.0 - 0.7 10e3/uL Final    Absolute Basophils 09/01/2023 0.0  0.0 - 0.2 10e3/uL Final    Absolute Immature Granulocytes 09/01/2023 0.0  <=0.4 10e3/uL Final    Absolute NRBCs 09/01/2023 0.0  10e3/uL Final        ASSESSMENT/PLAN:  Stage III, moderately differentiated adenocarcinoma of the sigmoid colon with 2 out of 11 lymph nodes positive for metastatic disease consistent with pathologic T2 N1b M0 colon cancer.  PET scan was negative for metastatic disease. The patient was started on adjuvant FOLFOX chemotherapy and completed 12 cycles. PET scan did not reveal any evidence of  metastatic disease.The patient had a colonoscopy in June 2021, which revealed multiple tubular adenomas with a 3 year follow up recommended. CT of the chest, abdomen and pelvis done on 3/14/23 was negative for metastatic disease. Labs from 9/1/23 show a normal tumor marker. All other labs are stable. We will see the patient in 6 months with CBC, CMP, LDH, CEA and CT scans      Twenty eight minutes spent with this encounter with time spent reviewing patient records, counseling patient regarding disease process, interpretation and review of labs with patient, obtaining a review of systems, performing a physical exam, discussing plan for ongoing follow up, ordering tests, documenting in EHR and coordination of care

## 2023-09-08 NOTE — NURSING NOTE
Chief Complaint   Patient presents with    Oncology Clinic Visit     F/U Colon cancer     Medication Reconciliation: complete    Amaris Ray CMA ([facility])

## 2023-11-21 ENCOUNTER — OFFICE VISIT (OUTPATIENT)
Dept: INTERNAL MEDICINE | Facility: OTHER | Age: 78
End: 2023-11-21
Attending: INTERNAL MEDICINE
Payer: MEDICARE

## 2023-11-21 ENCOUNTER — LAB (OUTPATIENT)
Dept: LAB | Facility: OTHER | Age: 78
End: 2023-11-21
Attending: INTERNAL MEDICINE
Payer: MEDICARE

## 2023-11-21 VITALS
SYSTOLIC BLOOD PRESSURE: 128 MMHG | HEIGHT: 69 IN | RESPIRATION RATE: 20 BRPM | HEART RATE: 72 BPM | DIASTOLIC BLOOD PRESSURE: 74 MMHG | OXYGEN SATURATION: 98 % | BODY MASS INDEX: 33.47 KG/M2 | TEMPERATURE: 98 F | WEIGHT: 226 LBS

## 2023-11-21 DIAGNOSIS — E03.4 HYPOTHYROIDISM DUE TO ACQUIRED ATROPHY OF THYROID: ICD-10-CM

## 2023-11-21 DIAGNOSIS — E53.8 VITAMIN B12 DEFICIENCY: ICD-10-CM

## 2023-11-21 DIAGNOSIS — E66.09 CLASS 1 OBESITY DUE TO EXCESS CALORIES WITH SERIOUS COMORBIDITY AND BODY MASS INDEX (BMI) OF 33.0 TO 33.9 IN ADULT: ICD-10-CM

## 2023-11-21 DIAGNOSIS — E66.811 CLASS 1 OBESITY DUE TO EXCESS CALORIES WITH SERIOUS COMORBIDITY AND BODY MASS INDEX (BMI) OF 33.0 TO 33.9 IN ADULT: ICD-10-CM

## 2023-11-21 DIAGNOSIS — E11.65 UNCONTROLLED TYPE 2 DIABETES MELLITUS WITH HYPERGLYCEMIA (H): Primary | ICD-10-CM

## 2023-11-21 DIAGNOSIS — Z86.73 HISTORY OF TIA (TRANSIENT ISCHEMIC ATTACK) AND STROKE: ICD-10-CM

## 2023-11-21 DIAGNOSIS — E11.65 UNCONTROLLED TYPE 2 DIABETES MELLITUS WITH HYPERGLYCEMIA (H): ICD-10-CM

## 2023-11-21 DIAGNOSIS — I50.32 CHRONIC DIASTOLIC CONGESTIVE HEART FAILURE (H): ICD-10-CM

## 2023-11-21 DIAGNOSIS — Z23 NEEDS FLU SHOT: ICD-10-CM

## 2023-11-21 DIAGNOSIS — E78.1 HIGH BLOOD TRIGLYCERIDES: ICD-10-CM

## 2023-11-21 DIAGNOSIS — Z87.39 HISTORY OF GOUT: ICD-10-CM

## 2023-11-21 DIAGNOSIS — K21.00 GASTROESOPHAGEAL REFLUX DISEASE WITH ESOPHAGITIS, UNSPECIFIED WHETHER HEMORRHAGE: ICD-10-CM

## 2023-11-21 DIAGNOSIS — N18.31 CHRONIC KIDNEY DISEASE, STAGE 3A (H): ICD-10-CM

## 2023-11-21 DIAGNOSIS — E78.2 MIXED HYPERLIPIDEMIA: ICD-10-CM

## 2023-11-21 DIAGNOSIS — I25.10 CORONARY ARTERY DISEASE INVOLVING NATIVE CORONARY ARTERY OF NATIVE HEART WITHOUT ANGINA PECTORIS: ICD-10-CM

## 2023-11-21 DIAGNOSIS — I50.22 CHRONIC SYSTOLIC HEART FAILURE (H): ICD-10-CM

## 2023-11-21 LAB
ALBUMIN SERPL BCG-MCNC: 3.8 G/DL (ref 3.5–5.2)
ALBUMIN UR-MCNC: NEGATIVE MG/DL
ALP SERPL-CCNC: 109 U/L (ref 40–150)
ALT SERPL W P-5'-P-CCNC: 8 U/L (ref 0–70)
ANION GAP SERPL CALCULATED.3IONS-SCNC: 13 MMOL/L (ref 7–15)
APPEARANCE UR: CLEAR
AST SERPL W P-5'-P-CCNC: 11 U/L (ref 0–45)
BILIRUB SERPL-MCNC: 0.7 MG/DL
BILIRUB UR QL STRIP: NEGATIVE
BUN SERPL-MCNC: 17.5 MG/DL (ref 8–23)
CALCIUM SERPL-MCNC: 9.2 MG/DL (ref 8.8–10.2)
CHLORIDE SERPL-SCNC: 102 MMOL/L (ref 98–107)
CHOLEST SERPL-MCNC: 170 MG/DL
COLOR UR AUTO: ABNORMAL
CREAT SERPL-MCNC: 1.1 MG/DL (ref 0.67–1.17)
CREAT UR-MCNC: 137.5 MG/DL
DEPRECATED HCO3 PLAS-SCNC: 26 MMOL/L (ref 22–29)
EGFRCR SERPLBLD CKD-EPI 2021: 69 ML/MIN/1.73M2
ERYTHROCYTE [DISTWIDTH] IN BLOOD BY AUTOMATED COUNT: 14.1 % (ref 10–15)
GLUCOSE SERPL-MCNC: 199 MG/DL (ref 70–99)
GLUCOSE UR STRIP-MCNC: >1000 MG/DL
HBA1C MFR BLD: 11.8 % (ref 4–6.2)
HCT VFR BLD AUTO: 42.9 % (ref 40–53)
HDLC SERPL-MCNC: 30 MG/DL
HGB BLD-MCNC: 13.9 G/DL (ref 13.3–17.7)
HGB UR QL STRIP: NEGATIVE
KETONES UR STRIP-MCNC: NEGATIVE MG/DL
LDLC SERPL CALC-MCNC: 81 MG/DL
LEUKOCYTE ESTERASE UR QL STRIP: NEGATIVE
MCH RBC QN AUTO: 29.6 PG (ref 26.5–33)
MCHC RBC AUTO-ENTMCNC: 32.4 G/DL (ref 31.5–36.5)
MCV RBC AUTO: 91 FL (ref 78–100)
MICROALBUMIN UR-MCNC: 19.1 MG/L
MICROALBUMIN/CREAT UR: 13.89 MG/G CR (ref 0–17)
NITRATE UR QL: NEGATIVE
NONHDLC SERPL-MCNC: 140 MG/DL
PH UR STRIP: 5 [PH] (ref 5–9)
PLATELET # BLD AUTO: 233 10E3/UL (ref 150–450)
POTASSIUM SERPL-SCNC: 4 MMOL/L (ref 3.4–5.3)
PROT SERPL-MCNC: 6.5 G/DL (ref 6.4–8.3)
RBC # BLD AUTO: 4.7 10E6/UL (ref 4.4–5.9)
SODIUM SERPL-SCNC: 141 MMOL/L (ref 135–145)
SP GR UR STRIP: 1.03 (ref 1–1.03)
T4 FREE SERPL-MCNC: 0.94 NG/DL (ref 0.9–1.7)
TRIGL SERPL-MCNC: 295 MG/DL
TSH SERPL DL<=0.005 MIU/L-ACNC: 8.18 UIU/ML (ref 0.3–4.2)
UROBILINOGEN UR STRIP-MCNC: NORMAL MG/DL
WBC # BLD AUTO: 8.1 10E3/UL (ref 4–11)

## 2023-11-21 PROCEDURE — 84443 ASSAY THYROID STIM HORMONE: CPT | Mod: ZL

## 2023-11-21 PROCEDURE — 84439 ASSAY OF FREE THYROXINE: CPT | Mod: ZL

## 2023-11-21 PROCEDURE — 85014 HEMATOCRIT: CPT | Mod: ZL

## 2023-11-21 PROCEDURE — 80053 COMPREHEN METABOLIC PANEL: CPT | Mod: ZL

## 2023-11-21 PROCEDURE — 83036 HEMOGLOBIN GLYCOSYLATED A1C: CPT | Mod: ZL

## 2023-11-21 PROCEDURE — 99214 OFFICE O/P EST MOD 30 MIN: CPT | Performed by: INTERNAL MEDICINE

## 2023-11-21 PROCEDURE — 90662 IIV NO PRSV INCREASED AG IM: CPT

## 2023-11-21 PROCEDURE — 36415 COLL VENOUS BLD VENIPUNCTURE: CPT | Mod: ZL

## 2023-11-21 PROCEDURE — 82570 ASSAY OF URINE CREATININE: CPT | Mod: ZL

## 2023-11-21 PROCEDURE — 80061 LIPID PANEL: CPT | Mod: ZL

## 2023-11-21 PROCEDURE — 81003 URINALYSIS AUTO W/O SCOPE: CPT | Mod: ZL

## 2023-11-21 PROCEDURE — G0463 HOSPITAL OUTPT CLINIC VISIT: HCPCS | Mod: 25

## 2023-11-21 RX ORDER — PANTOPRAZOLE SODIUM 40 MG/1
40 TABLET, DELAYED RELEASE ORAL DAILY
Qty: 90 TABLET | Refills: 1 | Status: SHIPPED | OUTPATIENT
Start: 2023-11-21 | End: 2024-02-25

## 2023-11-21 RX ORDER — METFORMIN HCL 500 MG
TABLET, EXTENDED RELEASE 24 HR ORAL
Qty: 360 TABLET | Refills: 1 | Status: SHIPPED | OUTPATIENT
Start: 2023-11-21 | End: 2024-02-25

## 2023-11-21 RX ORDER — FLURBIPROFEN SODIUM 0.3 MG/ML
SOLUTION/ DROPS OPHTHALMIC
Qty: 100 EACH | Refills: 4 | Status: SHIPPED | OUTPATIENT
Start: 2023-11-21

## 2023-11-21 RX ORDER — ALLOPURINOL 100 MG/1
100 TABLET ORAL 2 TIMES DAILY
Qty: 180 TABLET | Refills: 1 | Status: SHIPPED | OUTPATIENT
Start: 2023-11-21 | End: 2024-02-25

## 2023-11-21 RX ORDER — SACUBITRIL AND VALSARTAN 24; 26 MG/1; MG/1
1 TABLET, FILM COATED ORAL 2 TIMES DAILY
Qty: 180 TABLET | Refills: 1 | Status: SHIPPED | OUTPATIENT
Start: 2023-11-21 | End: 2024-02-25

## 2023-11-21 RX ORDER — GLUCOSAMINE HCL/CHONDROITIN SU 500-400 MG
CAPSULE ORAL
Qty: 100 EACH | Refills: 3 | Status: SHIPPED | OUTPATIENT
Start: 2023-11-21

## 2023-11-21 RX ORDER — INSULIN GLARGINE 100 [IU]/ML
10 INJECTION, SOLUTION SUBCUTANEOUS EVERY MORNING
Qty: 15 ML | Refills: 4 | Status: SHIPPED | OUTPATIENT
Start: 2023-11-21 | End: 2024-02-27

## 2023-11-21 RX ORDER — LEVOTHYROXINE SODIUM 100 UG/1
100 TABLET ORAL DAILY
Qty: 90 TABLET | Refills: 1 | Status: SHIPPED | OUTPATIENT
Start: 2023-11-21 | End: 2024-02-25

## 2023-11-21 RX ORDER — SPIRONOLACTONE 25 MG/1
25 TABLET ORAL DAILY
Qty: 90 TABLET | Refills: 1 | Status: SHIPPED | OUTPATIENT
Start: 2023-11-21 | End: 2024-02-25

## 2023-11-21 RX ORDER — ATORVASTATIN CALCIUM 40 MG/1
40 TABLET, FILM COATED ORAL DAILY
Qty: 90 TABLET | Refills: 1 | Status: SHIPPED | OUTPATIENT
Start: 2023-11-21 | End: 2024-02-25

## 2023-11-21 RX ORDER — GLIPIZIDE 10 MG/1
20 TABLET ORAL
Qty: 360 TABLET | Refills: 1 | Status: SHIPPED | OUTPATIENT
Start: 2023-11-21 | End: 2024-02-25

## 2023-11-21 RX ORDER — DIMENHYDRINATE 50 MG
4-6 TABLET ORAL DAILY
Qty: 400 CAPSULE | Refills: 4 | Status: SHIPPED | OUTPATIENT
Start: 2023-11-21 | End: 2024-02-27

## 2023-11-21 RX ORDER — METOPROLOL SUCCINATE 50 MG/1
50 TABLET, EXTENDED RELEASE ORAL DAILY
Qty: 90 TABLET | Refills: 1 | Status: SHIPPED | OUTPATIENT
Start: 2023-11-21 | End: 2024-02-25

## 2023-11-21 ASSESSMENT — ENCOUNTER SYMPTOMS
DIARRHEA: 1
CHILLS: 0
MYALGIAS: 0
HEMATOCHEZIA: 0
SHORTNESS OF BREATH: 0
EYE PAIN: 0
ABDOMINAL PAIN: 0
JOINT SWELLING: 0
POLYDIPSIA: 1
CONSTIPATION: 0
HEADACHES: 0
BRUISES/BLEEDS EASILY: 1
ARTHRALGIAS: 0
WEAKNESS: 1
HEMATURIA: 0
HEARTBURN: 0
DYSURIA: 0
FATIGUE: 1
NERVOUS/ANXIOUS: 0
FREQUENCY: 0
PALPITATIONS: 0
PARESTHESIAS: 0
COUGH: 0
SORE THROAT: 0
FEVER: 0
NAUSEA: 1
DIZZINESS: 0

## 2023-11-21 ASSESSMENT — PAIN SCALES - GENERAL: PAINLEVEL: NO PAIN (0)

## 2023-11-21 NOTE — PROGRESS NOTES
Assessment & Plan     ICD-10-CM    1. Uncontrolled type 2 diabetes mellitus with hyperglycemia (H)  E11.65 insulin glargine (LANTUS SOLOSTAR) 100 UNIT/ML pen     insulin pen needle (ULTICARE MINI) 31G X 6 MM miscellaneous     alcohol swab prep pads     empagliflozin (JARDIANCE) 10 MG TABS tablet     glipiZIDE (GLUCOTROL) 10 MG tablet     metFORMIN (GLUCOPHAGE XR) 500 MG 24 hr tablet     Semaglutide (RYBELSUS) 7 MG tablet      2. Chronic systolic heart failure (H) - ECHO 6/22/2022 at [facility] -- EF 25-30%  I50.22 empagliflozin (JARDIANCE) 10 MG TABS tablet     metoprolol succinate ER (TOPROL XL) 50 MG 24 hr tablet     sacubitril-valsartan (ENTRESTO) 24-26 MG per tablet     Semaglutide (RYBELSUS) 7 MG tablet      3. Coronary artery disease involving native coronary artery of native heart without angina pectoris  I25.10 atorvastatin (LIPITOR) 40 MG tablet      4. Chronic kidney disease, stage 3a (H)  N18.31       5. Hypothyroidism due to acquired atrophy of thyroid  E03.4 levothyroxine (SYNTHROID/LEVOTHROID) 100 MCG tablet      6. Mixed hyperlipidemia  E78.2 Flaxseed, Linseed, (FLAX SEED OIL) 1000 MG capsule      7. Class 1 obesity due to excess calories with serious comorbidity and body mass index (BMI) of 33.0 to 33.9 in adult  E66.09     Z68.33       8. Vitamin B12 deficiency  E53.8       9. 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 Needs flu shot  Z23       11. History of gout  Z87.39 allopurinol (ZYLOPRIM) 100 MG tablet      12. Gastroesophageal reflux disease with esophagitis, unspecified whether hemorrhage  K21.00 pantoprazole (PROTONIX) 40 MG EC tablet      13. Chronic diastolic congestive heart failure (H)  I50.32 spironolactone (ALDACTONE) 25 MG tablet      14. High blood triglycerides  E78.1 Flaxseed, Linseed, (FLAX SEED OIL) 1000 MG capsule        Patient presents for diabetes follow-up as well as  follow-up multiple issues.    Chronic systolic heart failure, appears stable.  Last ejection fraction 25-30%.  Has coronary artery disease, history of tobacco abuse.  Denies exertional angina.  Doing well with current medication regimen.  No excessive fluid retention issues.  Needs refills of Jardiance, metoprolol, Entresto, semaglutide tablets and spironolactone.  Hemoglobin A1c is high not at goal.  LDL and triglycerides are high not at goal.  HDL    Vitamin B12 deficiency.  Ongoing.  Continues with B12 replacement.  Encouraged continued use.    History of TIA and stroke, left vertebral artery.  Continues on Lipitor, Plavix.  Has been having some vertigo type symptoms.  Suspect this is due to his previous vertebral artery thrombosis.  Encouraged him to get his blood sugar levels down, hemoglobin A1c is very high not at goal.  -- Insulin started as noted below.    History of gout, no recent gout attacks.  Doing well with allopurinol.  Needs refills    Heartburn and reflux, chronic.  Ongoing.  Continues with Protonix, tolerating well.  Needs refills.  Has recurrent symptoms if he misses a dose.    HYPERTENSION - Ongoing. Blood pressure is currently well controlled.  Medication side effects: None. Denies syncope or presyncope.    + Frequent Spinning / vertigo symptoms. Hx of vertebral artery TIA / CVA.    -- declines PT referral.   Continue current medications.   Medication list reviewed/updated. Refills completed as needed.      MIXED HYPERLIPIDEMIA.  Ongoing. LDL is at goal: No. Triglycerides are at goal: No.  Hopefully lifestyle modifications will improve cholesterol levels, otherwise we will need to consider additional medication dose adjustments or medication changes.  Medication side effects reported: None.   Continue current medications for now - Lipitor 40 mg daily. Medication list reviewed/updated. Refills completed as needed.  + Start Flax seed capsules.     Recent Labs   Lab Test 11/21/23  0842  08/16/23  0753   CHOL 170 174   HDL 30* 31*   LDL 81 82   TRIG 295* 304*        OBESITY - Ongoing.  (See Encounter Diagnosis list above for obesity class / severity).  - Counseled on diet and exercise.   - Recommend Nutrition / Dietician appointment.  - Weight loss would improve Hypertension, Cholesterol and Diabetes.      Chronic Kidney Disease, Stage 3a (GFR 45-59), chronic, ongoing.  Kidney function has been slowly declining.  Encouraged NSAID avoidance.       Vaccine counseling completed.  Encouraged routine / annual vaccinations.    Type 2 Diabetes Mellitus, with nephropathy, with neuropathy, Reports ongoing/previous: numbness.  Blood sugar control has been poor with moderate hyperglycemia. Continues with diet and oral agents - + Start Lantus 10 units every morning.     Medication list reviewed/updated. Refills completed as needed.    Complicating factors include but are not limited to: hypertension, hyperlipidemia, neuropathy, and chronic kidney disease.     Recent Labs   Lab Test 11/21/23  0803 09/01/23  0807 08/16/23  0753 05/10/23  0701   A1C 11.8*  --  11.6* 11.5*   LDL 81  --  82 81   HDL 30*  --  31* 35*   TRIG 295*  --  304* 266*   ALT 8 9 10 10   CR 1.10 0.90 0.95 0.86   GFRESTIMATED 69 88 82 89   POTASSIUM 4.0 4.4 4.7 4.6   TSH 8.18*  --  6.68* 7.49*   T4 0.94  --  0.94 0.89*   WBC 8.1 7.4 6.7 7.1   HGB 13.9 13.4 13.5 14.1    219 192 215   ALBUMIN 3.8 3.8 3.9 4.1     Hemoglobin A1c is high not at goal.  LDL and triglycerides are high not at goal.  HDL is low.  ALT normal.  Creatinine has worsened slightly.  Potassium normal.  TSH is high, free T4 is borderline low.  CBC normal.  Albumin normal.      Return in about 3 months (around 2/21/2024) for - Labs every 91+ days, with DM Follow-up, Same Day or 1-2 days later with Dr. Roberts.    Bk Roberts MD  [facility]   Andrez is a 78 year old, presenting for the following health issues:  Diabetes and  Dizziness        11/21/2023     8:29 AM   Additional Questions   Roomed by Pollo Cook LPN   Accompanied by n/a       History of Present Illness       Diabetes:   He presents for follow up of diabetes.    He is not checking blood glucose.     He is aware of hypoglycemia symptoms including dizziness.       He is not experiencing numbness or burning in feet, excessive thirst, blurry vision, weight changes or redness, sores or blisters on feet. The patient has not had a diabetic eye exam in the last 12 months.          Hyperlipidemia:  He presents for follow up of hyperlipidemia.   He is taking medication to lower cholesterol. He is not having myalgia or other side effects to statin medications.    He eats 0-1 servings of fruits and vegetables daily.He consumes 2 sweetened beverage(s) daily.He exercises with enough effort to increase his heart rate 10 to 19 minutes per day.  He exercises with enough effort to increase his heart rate 3 or less days per week. He is missing 1 dose(s) of medications per week.  Musculoskeletal Problem  Associated symptoms include fatigue, nausea and weakness. Pertinent negatives include no abdominal pain, arthralgias, chest pain, chills, congestion, coughing, fever, headaches, joint swelling, myalgias, rash or sore throat.      Review of Systems   Constitutional:  Positive for fatigue. Negative for chills and fever.   HENT:  Positive for hearing loss. Negative for congestion, ear pain and sore throat.    Eyes:  Negative for pain and visual disturbance.   Respiratory:  Negative for cough and shortness of breath.    Cardiovascular:  Negative for chest pain, palpitations and peripheral edema.   Gastrointestinal:  Positive for diarrhea and nausea. Negative for abdominal pain, constipation, heartburn and hematochezia.   Endocrine: Positive for polydipsia and polyuria.   Genitourinary:  Positive for impotence. Negative for dysuria, frequency, genital sores, hematuria, penile discharge and urgency.  "  Musculoskeletal:  Negative for arthralgias, joint swelling and myalgias.   Skin:  Negative for rash.   Allergic/Immunologic: Negative for immunocompromised state.   Neurological:  Positive for weakness. Negative for dizziness, headaches and paresthesias.   Hematological:  Bruises/bleeds easily.   Psychiatric/Behavioral:  Negative for mood changes. The patient is not nervous/anxious.           Objective    /74 (BP Location: Right arm, Patient Position: Sitting, Cuff Size: Adult Regular)   Pulse 72   Temp 98  F (36.7  C) (Temporal)   Resp 20   Ht 1.74 m (5' 8.5\")   Wt 102.5 kg (226 lb)   SpO2 98%   BMI 33.86 kg/m    Body mass index is 33.86 kg/m .  Physical Exam  Constitutional:       General: He is not in acute distress.     Appearance: He is well-developed. He is obese. He is not diaphoretic.   HENT:      Head: Normocephalic and atraumatic.   Eyes:      General: No scleral icterus.     Conjunctiva/sclera: Conjunctivae normal.   Neck:      Vascular: No carotid bruit.   Cardiovascular:      Rate and Rhythm: Normal rate and regular rhythm.      Pulses: Normal pulses.   Pulmonary:      Effort: Pulmonary effort is normal.      Breath sounds: Normal breath sounds.   Abdominal:      Palpations: Abdomen is soft.      Tenderness: There is no abdominal tenderness.   Musculoskeletal:         General: No deformity.      Cervical back: Neck supple.      Right lower leg: No edema.      Left lower leg: No edema.   Lymphadenopathy:      Cervical: No cervical adenopathy.   Skin:     General: Skin is warm and dry.      Findings: Bruising present. No rash.   Neurological:      Mental Status: He is alert. Mental status is at baseline.   Psychiatric:         Mood and Affect: Mood normal.         Behavior: Behavior normal.                    "

## 2023-11-21 NOTE — PATIENT INSTRUCTIONS
Blood pressure is well controlled.   Diabetes needs help. A1c is very high.     Goal A1c is 7.9% or lower.     Medications refilled.   Labs are otherwise relatively stable.       To help with weight loss and improve blood sugar control....    -- Try to avoid Carbohydrates as much as possible -- breads, pasta, baked goods, cakes, oatmeal, cold cereal, potatoes.   -- Eat more lean meats, proteins, eggs, nuts, vegetables.    -- Start or Continue regular daily exercise.     Get out and exercise, bike ride, walk for 10 to 15 minutes after each meal -- this can significantly lowers the spike in blood sugar after eating.         Uncontrolled type 2 diabetes mellitus with hyperglycemia (H)    START:   - insulin glargine (LANTUS SOLOSTAR) 100 UNIT/ML pen; Inject 10 Units Subcutaneous every morning  - insulin pen needle (ULTICARE MINI) 31G X 6 MM miscellaneous; Use 1 pen needles daily  - alcohol swab prep pads; Use to swab area of injection/teto as directed.        + Start Flax Seed / Omega-3 tablets or capsules -- to help lower triglycerides.         Results for orders placed or performed in visit on 11/21/23   UA reflex to Microscopic     Status: Abnormal   Result Value Ref Range    Color Urine Light Yellow Colorless, Straw, Light Yellow, Yellow    Appearance Urine Clear Clear    Glucose Urine >1000 (A) Negative mg/dL    Bilirubin Urine Negative Negative    Ketones Urine Negative Negative mg/dL    Specific Gravity Urine 1.028 1.000 - 1.030    Blood Urine Negative Negative    pH Urine 5.0 5.0 - 9.0    Protein Albumin Urine Negative Negative mg/dL    Urobilinogen Urine Normal Normal, 2.0 mg/dL    Nitrite Urine Negative Negative    Leukocyte Esterase Urine Negative Negative    Narrative    Microscopic not indicated   TSH with free T4 reflex     Status: Abnormal   Result Value Ref Range    TSH 8.18 (H) 0.30 - 4.20 uIU/mL   Hemoglobin A1c     Status: Abnormal   Result Value Ref Range    Hemoglobin A1C 11.8 (H) 4.0 - 6.2 %   CBC  with platelets     Status: Normal   Result Value Ref Range    WBC Count 8.1 4.0 - 11.0 10e3/uL    RBC Count 4.70 4.40 - 5.90 10e6/uL    Hemoglobin 13.9 13.3 - 17.7 g/dL    Hematocrit 42.9 40.0 - 53.0 %    MCV 91 78 - 100 fL    MCH 29.6 26.5 - 33.0 pg    MCHC 32.4 31.5 - 36.5 g/dL    RDW 14.1 10.0 - 15.0 %    Platelet Count 233 150 - 450 10e3/uL   Albumin Random Urine Quantitative with Creat Ratio     Status: None   Result Value Ref Range    Creatinine Urine mg/dL 137.5 mg/dL    Albumin Urine mg/L 19.1 mg/L    Albumin Urine mg/g Cr 13.89 0.00 - 17.00 mg/g Cr   Lipid Profile     Status: Abnormal   Result Value Ref Range    Cholesterol 170 <200 mg/dL    Triglycerides 295 (H) <150 mg/dL    Direct Measure HDL 30 (L) >=40 mg/dL    LDL Cholesterol Calculated 81 <=100 mg/dL    Non HDL Cholesterol 140 (H) <130 mg/dL    Narrative    Cholesterol  Desirable:  <200 mg/dL    Triglycerides  Normal:  Less than 150 mg/dL  Borderline High:  150-199 mg/dL  High:  200-499 mg/dL  Very High:  Greater than or equal to 500 mg/dL    Direct Measure HDL  Female:  Greater than or equal to 50 mg/dL   Male:  Greater than or equal to 40 mg/dL    LDL Cholesterol  Desirable:  <100mg/dL  Above Desirable:  100-129 mg/dL   Borderline High:  130-159 mg/dL   High:  160-189 mg/dL   Very High:  >= 190 mg/dL    Non HDL Cholesterol  Desirable:  130 mg/dL  Above Desirable:  130-159 mg/dL  Borderline High:  160-189 mg/dL  High:  190-219 mg/dL  Very High:  Greater than or equal to 220 mg/dL   Comprehensive metabolic panel     Status: Abnormal   Result Value Ref Range    Sodium 141 135 - 145 mmol/L    Potassium 4.0 3.4 - 5.3 mmol/L    Carbon Dioxide (CO2) 26 22 - 29 mmol/L    Anion Gap 13 7 - 15 mmol/L    Urea Nitrogen 17.5 8.0 - 23.0 mg/dL    Creatinine 1.10 0.67 - 1.17 mg/dL    GFR Estimate 69 >60 mL/min/1.73m2    Calcium 9.2 8.8 - 10.2 mg/dL    Chloride 102 98 - 107 mmol/L    Glucose 199 (H) 70 - 99 mg/dL    Alkaline Phosphatase 109 40 - 150 U/L    AST 11 0  - 45 U/L    ALT 8 0 - 70 U/L    Protein Total 6.5 6.4 - 8.3 g/dL    Albumin 3.8 3.5 - 5.2 g/dL    Bilirubin Total 0.7 <=1.2 mg/dL   T4 free     Status: Normal   Result Value Ref Range    Free T4 0.94 0.90 - 1.70 ng/dL        Aspects of Diabetes:   Recent Labs   Lab Test 11/21/23  0803 09/01/23  0807 08/16/23  0753 05/10/23  0701   A1C 11.8*  --  11.6* 11.5*   LDL 81  --  82 81   HDL 30*  --  31* 35*   TRIG 295*  --  304* 266*   ALT 8 9 10 10   CR 1.10 0.90 0.95 0.86   GFRESTIMATED 69 88 82 89   POTASSIUM 4.0 4.4 4.7 4.6   TSH 8.18*  --  6.68* 7.49*   T4 0.94  --  0.94 0.89*   WBC 8.1 7.4 6.7 7.1   HGB 13.9 13.4 13.5 14.1    219 192 215   ALBUMIN 3.8 3.8 3.9 4.1      Hemoglobin A1c  Goal range is under 8%. Best is 6.5 to 7   Blood Pressure 128/74 Goal to keep less than 140/90   Tobacco  reports that he quit smoking about 38 years ago. His smoking use included cigarettes. He has a 30.00 pack-year smoking history. He has been exposed to tobacco smoke. He has never used smokeless tobacco. Goal to abstain from tobacco   Aspirin or Plavix Anti-platelet therapy Aspirin or Plavix reduces risk of heart disease and stroke  -- sometimes used with other blood thinners, depending on bleeding risk and risk factors.    ACE/ARB Specific blood pressure meds These medications can reduce risk of kidney disease   Cholesterol Statins (Lipitor, Crestor, vs others) Statins reduce risk of heart disease and stroke   Eye Exam -- Do Yearly -- Annual diabetic eye exam   Healthy weight Wt Readings from Last 4 Encounters:   11/21/23 102.5 kg (226 lb)   09/08/23 103.4 kg (228 lb)   08/16/23 104.8 kg (231 lb)   05/10/23 105.3 kg (232 lb 3.2 oz)      Body mass index is 33.86 kg/m .  Goal BMI under 30, best is under 25.      -- Trying to exercise daily (goal at least 20 min/day) with moderate aerobic activity   -- Eat healthy (resources from ADA at http://www.diabetes.org/)   -- Taking good care of my feet. Consider seeing the Podiatrist   --  Check blood sugars as directed, record in log book and bring to every appointment    Insurance companies are grading you and I on your blood sugar control -- Goal is to get your A1c down to 7.9% or lower and NO Smoking!  -- Medicare and most insurance companies, will only cover Hemoglobin A1c labs to be rechecked every 91+ days.      Return for Diabetes labs and clinic follow-up appointment every 3 to 4 months.    Schedule lab only appointment --- A few days AFTER: 2/19/24   Schedule clinic appointment with Dr. Roberts -- Same day as labs, or 1-2 days later.

## 2023-11-21 NOTE — NURSING NOTE
"Chief Complaint   Patient presents with    Diabetes    Dizziness       Initial /74 (BP Location: Right arm, Patient Position: Sitting, Cuff Size: Adult Regular)   Pulse 72   Temp 98  F (36.7  C) (Temporal)   Resp 20   Ht 1.74 m (5' 8.5\")   Wt 102.5 kg (226 lb)   SpO2 98%   BMI 33.86 kg/m   Estimated body mass index is 33.86 kg/m  as calculated from the following:    Height as of this encounter: 1.74 m (5' 8.5\").    Weight as of this encounter: 102.5 kg (226 lb).  Medication Review: complete    The next two questions are to help us understand your food security.  If you are feeling you need any assistance in this area, we have resources available to support you today.           No data to display                  Health Care Directive:  Patient does not have a Health Care Directive or Living Will: Discussed advance care planning with patient; however, patient declined at this time.    Pollo Carroll      "

## 2024-01-01 ENCOUNTER — TRANSFERRED RECORDS (OUTPATIENT)
Dept: MULTI SPECIALTY CLINIC | Facility: CLINIC | Age: 79
End: 2024-01-01

## 2024-01-01 LAB — RETINOPATHY: NORMAL

## 2024-01-02 ENCOUNTER — TRANSFERRED RECORDS (OUTPATIENT)
Dept: HEALTH INFORMATION MANAGEMENT | Facility: OTHER | Age: 79
End: 2024-01-02

## 2024-01-02 LAB — RETINOPATHY: NEGATIVE

## 2024-02-27 ENCOUNTER — LAB (OUTPATIENT)
Dept: LAB | Facility: OTHER | Age: 79
End: 2024-02-27
Attending: INTERNAL MEDICINE
Payer: COMMERCIAL

## 2024-02-27 ENCOUNTER — OFFICE VISIT (OUTPATIENT)
Dept: INTERNAL MEDICINE | Facility: OTHER | Age: 79
End: 2024-02-27
Attending: INTERNAL MEDICINE
Payer: MEDICARE

## 2024-02-27 VITALS
DIASTOLIC BLOOD PRESSURE: 68 MMHG | OXYGEN SATURATION: 98 % | SYSTOLIC BLOOD PRESSURE: 130 MMHG | BODY MASS INDEX: 35.01 KG/M2 | HEIGHT: 68 IN | HEART RATE: 78 BPM | TEMPERATURE: 98.1 F | WEIGHT: 231 LBS | RESPIRATION RATE: 16 BRPM

## 2024-02-27 DIAGNOSIS — R93.0 MASS IN REGION OF SELLA TURCICA PRESENT ON MAGNETIC RESONANCE IMAGING: ICD-10-CM

## 2024-02-27 DIAGNOSIS — I25.10 CORONARY ARTERY DISEASE INVOLVING NATIVE CORONARY ARTERY OF NATIVE HEART WITHOUT ANGINA PECTORIS: ICD-10-CM

## 2024-02-27 DIAGNOSIS — D50.9 IRON DEFICIENCY ANEMIA, UNSPECIFIED IRON DEFICIENCY ANEMIA TYPE: ICD-10-CM

## 2024-02-27 DIAGNOSIS — E11.65 UNCONTROLLED TYPE 2 DIABETES MELLITUS WITH HYPERGLYCEMIA (H): Primary | ICD-10-CM

## 2024-02-27 DIAGNOSIS — E53.8 VITAMIN B12 DEFICIENCY: ICD-10-CM

## 2024-02-27 DIAGNOSIS — I50.32 CHRONIC DIASTOLIC CONGESTIVE HEART FAILURE (H): ICD-10-CM

## 2024-02-27 DIAGNOSIS — Z87.39 HISTORY OF GOUT: ICD-10-CM

## 2024-02-27 DIAGNOSIS — Z71.85 VACCINE COUNSELING: ICD-10-CM

## 2024-02-27 DIAGNOSIS — N18.2 CKD (CHRONIC KIDNEY DISEASE) STAGE 2, GFR 60-89 ML/MIN: ICD-10-CM

## 2024-02-27 DIAGNOSIS — E03.4 HYPOTHYROIDISM DUE TO ACQUIRED ATROPHY OF THYROID: ICD-10-CM

## 2024-02-27 DIAGNOSIS — E66.812 CLASS 2 SEVERE OBESITY DUE TO EXCESS CALORIES WITH SERIOUS COMORBIDITY AND BODY MASS INDEX (BMI) OF 35.0 TO 35.9 IN ADULT (H): ICD-10-CM

## 2024-02-27 DIAGNOSIS — I50.22 CHRONIC SYSTOLIC HEART FAILURE (H): ICD-10-CM

## 2024-02-27 DIAGNOSIS — E66.01 CLASS 2 SEVERE OBESITY DUE TO EXCESS CALORIES WITH SERIOUS COMORBIDITY AND BODY MASS INDEX (BMI) OF 35.0 TO 35.9 IN ADULT (H): ICD-10-CM

## 2024-02-27 DIAGNOSIS — Z86.73 HISTORY OF TIA (TRANSIENT ISCHEMIC ATTACK) AND STROKE: ICD-10-CM

## 2024-02-27 DIAGNOSIS — C77.9 SECONDARY AND UNSPECIFIED MALIGNANT NEOPLASM OF LYMPH NODE, UNSPECIFIED (H): ICD-10-CM

## 2024-02-27 DIAGNOSIS — Z00.00 MEDICARE ANNUAL WELLNESS VISIT, SUBSEQUENT: ICD-10-CM

## 2024-02-27 DIAGNOSIS — E11.65 UNCONTROLLED TYPE 2 DIABETES MELLITUS WITH HYPERGLYCEMIA (H): ICD-10-CM

## 2024-02-27 DIAGNOSIS — G62.0 NEUROPATHY DUE TO CHEMOTHERAPEUTIC DRUG (H): ICD-10-CM

## 2024-02-27 DIAGNOSIS — T45.1X5A NEUROPATHY DUE TO CHEMOTHERAPEUTIC DRUG (H): ICD-10-CM

## 2024-02-27 DIAGNOSIS — E78.2 MIXED HYPERLIPIDEMIA: ICD-10-CM

## 2024-02-27 DIAGNOSIS — K21.00 GASTROESOPHAGEAL REFLUX DISEASE WITH ESOPHAGITIS, UNSPECIFIED WHETHER HEMORRHAGE: ICD-10-CM

## 2024-02-27 PROBLEM — N18.31 CHRONIC KIDNEY DISEASE, STAGE 3A (H): Status: RESOLVED | Noted: 2022-01-07 | Resolved: 2024-02-27

## 2024-02-27 LAB
ALBUMIN SERPL BCG-MCNC: 3.9 G/DL (ref 3.5–5.2)
ALBUMIN UR-MCNC: 30 MG/DL
ALP SERPL-CCNC: 109 U/L (ref 40–150)
ALT SERPL W P-5'-P-CCNC: 9 U/L (ref 0–70)
ANION GAP SERPL CALCULATED.3IONS-SCNC: 10 MMOL/L (ref 7–15)
APPEARANCE UR: CLEAR
AST SERPL W P-5'-P-CCNC: 10 U/L (ref 0–45)
BILIRUB SERPL-MCNC: 0.6 MG/DL
BILIRUB UR QL STRIP: NEGATIVE
BUN SERPL-MCNC: 16.9 MG/DL (ref 8–23)
CALCIUM SERPL-MCNC: 9.3 MG/DL (ref 8.8–10.2)
CHLORIDE SERPL-SCNC: 101 MMOL/L (ref 98–107)
CHOLEST SERPL-MCNC: 169 MG/DL
COLOR UR AUTO: YELLOW
CREAT SERPL-MCNC: 0.97 MG/DL (ref 0.67–1.17)
CREAT UR-MCNC: 195.8 MG/DL
DEPRECATED HCO3 PLAS-SCNC: 26 MMOL/L (ref 22–29)
EGFRCR SERPLBLD CKD-EPI 2021: 80 ML/MIN/1.73M2
ERYTHROCYTE [DISTWIDTH] IN BLOOD BY AUTOMATED COUNT: 14.4 % (ref 10–15)
FASTING STATUS PATIENT QL REPORTED: YES
GLUCOSE SERPL-MCNC: 342 MG/DL (ref 70–99)
GLUCOSE UR STRIP-MCNC: >1000 MG/DL
HBA1C MFR BLD: 12 % (ref 4–6.2)
HCT VFR BLD AUTO: 40.6 % (ref 40–53)
HDLC SERPL-MCNC: 31 MG/DL
HGB BLD-MCNC: 13.1 G/DL (ref 13.3–17.7)
HGB UR QL STRIP: NEGATIVE
HYALINE CASTS: 4 /LPF
KETONES UR STRIP-MCNC: NEGATIVE MG/DL
LDLC SERPL CALC-MCNC: 99 MG/DL
LEUKOCYTE ESTERASE UR QL STRIP: NEGATIVE
MCH RBC QN AUTO: 29.2 PG (ref 26.5–33)
MCHC RBC AUTO-ENTMCNC: 32.3 G/DL (ref 31.5–36.5)
MCV RBC AUTO: 90 FL (ref 78–100)
MICROALBUMIN UR-MCNC: 60 MG/L
MICROALBUMIN/CREAT UR: 30.64 MG/G CR (ref 0–17)
MUCOUS THREADS #/AREA URNS LPF: PRESENT /LPF
NITRATE UR QL: NEGATIVE
NONHDLC SERPL-MCNC: 138 MG/DL
PH UR STRIP: 5 [PH] (ref 5–9)
PLATELET # BLD AUTO: 233 10E3/UL (ref 150–450)
POTASSIUM SERPL-SCNC: 4.9 MMOL/L (ref 3.4–5.3)
PROT SERPL-MCNC: 6.7 G/DL (ref 6.4–8.3)
RBC # BLD AUTO: 4.49 10E6/UL (ref 4.4–5.9)
RBC URINE: <1 /HPF
SODIUM SERPL-SCNC: 137 MMOL/L (ref 135–145)
SP GR UR STRIP: 1.02 (ref 1–1.03)
T4 FREE SERPL-MCNC: 0.91 NG/DL (ref 0.9–1.7)
TRIGL SERPL-MCNC: 194 MG/DL
TSH SERPL DL<=0.005 MIU/L-ACNC: 7.28 UIU/ML (ref 0.3–4.2)
UROBILINOGEN UR STRIP-MCNC: NORMAL MG/DL
WBC # BLD AUTO: 7.7 10E3/UL (ref 4–11)
WBC URINE: 1 /HPF

## 2024-02-27 PROCEDURE — G0463 HOSPITAL OUTPT CLINIC VISIT: HCPCS

## 2024-02-27 PROCEDURE — 84155 ASSAY OF PROTEIN SERUM: CPT | Mod: ZL

## 2024-02-27 PROCEDURE — G0439 PPPS, SUBSEQ VISIT: HCPCS | Performed by: INTERNAL MEDICINE

## 2024-02-27 PROCEDURE — 85027 COMPLETE CBC AUTOMATED: CPT | Mod: ZL

## 2024-02-27 PROCEDURE — 84443 ASSAY THYROID STIM HORMONE: CPT | Mod: ZL

## 2024-02-27 PROCEDURE — 84439 ASSAY OF FREE THYROXINE: CPT | Mod: ZL

## 2024-02-27 PROCEDURE — 82043 UR ALBUMIN QUANTITATIVE: CPT | Mod: ZL

## 2024-02-27 PROCEDURE — 99214 OFFICE O/P EST MOD 30 MIN: CPT | Mod: 25 | Performed by: INTERNAL MEDICINE

## 2024-02-27 PROCEDURE — 83036 HEMOGLOBIN GLYCOSYLATED A1C: CPT | Mod: ZL

## 2024-02-27 PROCEDURE — 36415 COLL VENOUS BLD VENIPUNCTURE: CPT | Mod: ZL

## 2024-02-27 PROCEDURE — 82040 ASSAY OF SERUM ALBUMIN: CPT | Mod: ZL

## 2024-02-27 PROCEDURE — 83718 ASSAY OF LIPOPROTEIN: CPT | Mod: ZL

## 2024-02-27 PROCEDURE — 82465 ASSAY BLD/SERUM CHOLESTEROL: CPT | Mod: ZL

## 2024-02-27 PROCEDURE — 81001 URINALYSIS AUTO W/SCOPE: CPT | Mod: ZL

## 2024-02-27 RX ORDER — ALLOPURINOL 100 MG/1
100 TABLET ORAL 2 TIMES DAILY
Qty: 180 TABLET | Refills: 1 | Status: SHIPPED | OUTPATIENT
Start: 2024-02-27 | End: 2024-05-28

## 2024-02-27 RX ORDER — CYANOCOBALAMIN (VITAMIN B-12) 2500 MCG
2500 TABLET, SUBLINGUAL SUBLINGUAL DAILY
Qty: 90 TABLET | Refills: 4 | Status: SHIPPED | OUTPATIENT
Start: 2024-02-27

## 2024-02-27 RX ORDER — METOPROLOL SUCCINATE 50 MG/1
50 TABLET, EXTENDED RELEASE ORAL DAILY
Qty: 90 TABLET | Refills: 1 | Status: SHIPPED | OUTPATIENT
Start: 2024-02-27 | End: 2024-05-28

## 2024-02-27 RX ORDER — SACUBITRIL AND VALSARTAN 24; 26 MG/1; MG/1
1 TABLET, FILM COATED ORAL 2 TIMES DAILY
Qty: 180 TABLET | Refills: 1 | Status: SHIPPED | OUTPATIENT
Start: 2024-02-27 | End: 2024-05-28

## 2024-02-27 RX ORDER — LEVOTHYROXINE SODIUM 100 UG/1
100 TABLET ORAL DAILY
Qty: 90 TABLET | Refills: 1 | Status: SHIPPED | OUTPATIENT
Start: 2024-02-27 | End: 2024-05-28

## 2024-02-27 RX ORDER — SPIRONOLACTONE 25 MG/1
25 TABLET ORAL DAILY
Qty: 90 TABLET | Refills: 1 | Status: SHIPPED | OUTPATIENT
Start: 2024-02-27 | End: 2024-05-28

## 2024-02-27 RX ORDER — CLOPIDOGREL BISULFATE 75 MG/1
75 TABLET ORAL DAILY
Qty: 90 TABLET | Refills: 4 | Status: SHIPPED | OUTPATIENT
Start: 2024-02-27

## 2024-02-27 RX ORDER — METFORMIN HCL 500 MG
TABLET, EXTENDED RELEASE 24 HR ORAL
Qty: 360 TABLET | Refills: 1 | Status: SHIPPED | OUTPATIENT
Start: 2024-02-27 | End: 2024-05-28

## 2024-02-27 RX ORDER — GLIPIZIDE 10 MG/1
20 TABLET ORAL
Qty: 360 TABLET | Refills: 1 | Status: SHIPPED | OUTPATIENT
Start: 2024-02-27 | End: 2024-05-28

## 2024-02-27 RX ORDER — INSULIN GLARGINE 100 [IU]/ML
15-25 INJECTION, SOLUTION SUBCUTANEOUS EVERY MORNING
Qty: 30 ML | Refills: 4 | Status: SHIPPED | OUTPATIENT
Start: 2024-02-27

## 2024-02-27 RX ORDER — ATORVASTATIN CALCIUM 40 MG/1
40 TABLET, FILM COATED ORAL DAILY
Qty: 90 TABLET | Refills: 1 | Status: SHIPPED | OUTPATIENT
Start: 2024-02-27 | End: 2024-05-28

## 2024-02-27 RX ORDER — PANTOPRAZOLE SODIUM 40 MG/1
40 TABLET, DELAYED RELEASE ORAL DAILY
Qty: 90 TABLET | Refills: 1 | Status: SHIPPED | OUTPATIENT
Start: 2024-02-27

## 2024-02-27 SDOH — HEALTH STABILITY: PHYSICAL HEALTH: ON AVERAGE, HOW MANY DAYS PER WEEK DO YOU ENGAGE IN MODERATE TO STRENUOUS EXERCISE (LIKE A BRISK WALK)?: 0 DAYS

## 2024-02-27 SDOH — HEALTH STABILITY: PHYSICAL HEALTH: ON AVERAGE, HOW MANY MINUTES DO YOU ENGAGE IN EXERCISE AT THIS LEVEL?: 0 MIN

## 2024-02-27 ASSESSMENT — ENCOUNTER SYMPTOMS
FREQUENCY: 0
DIARRHEA: 1
HEARTBURN: 0
ARTHRALGIAS: 0
HEADACHES: 0
DIZZINESS: 0
POLYDIPSIA: 1
HEMATOCHEZIA: 0
FEVER: 0
DYSURIA: 0
NERVOUS/ANXIOUS: 0
WEAKNESS: 1
EYE PAIN: 0
COUGH: 0
ABDOMINAL PAIN: 0
PARESTHESIAS: 0
BRUISES/BLEEDS EASILY: 1
MYALGIAS: 0
FATIGUE: 1
SLEEP DISTURBANCE: 1
CONSTIPATION: 0
PALPITATIONS: 0
NAUSEA: 1
HEMATURIA: 0
SORE THROAT: 0
JOINT SWELLING: 0
SHORTNESS OF BREATH: 0
CHILLS: 0

## 2024-02-27 ASSESSMENT — PAIN SCALES - GENERAL: PAINLEVEL: NO PAIN (0)

## 2024-02-27 ASSESSMENT — SOCIAL DETERMINANTS OF HEALTH (SDOH): HOW OFTEN DO YOU GET TOGETHER WITH FRIENDS OR RELATIVES?: THREE TIMES A WEEK

## 2024-02-27 NOTE — NURSING NOTE
"Chief Complaint   Patient presents with    Diabetes     Medicare Wellness Visit       Initial /68 (BP Location: Right arm, Patient Position: Sitting, Cuff Size: Adult Regular)   Pulse 78   Temp 98.1  F (36.7  C) (Temporal)   Resp 16   Ht 1.727 m (5' 8\")   Wt 104.8 kg (231 lb)   SpO2 98%   BMI 35.12 kg/m   Estimated body mass index is 35.12 kg/m  as calculated from the following:    Height as of this encounter: 1.727 m (5' 8\").    Weight as of this encounter: 104.8 kg (231 lb).  Medication Review: complete    The next two questions are to help us understand your food security.  If you are feeling you need any assistance in this area, we have resources available to support you today.           No data to display                  Health Care Directive:  Patient does not have a Health Care Directive or Living Will: Discussed advance care planning with patient; however, patient declined at this time.    Pollo Carroll      "

## 2024-02-27 NOTE — PATIENT INSTRUCTIONS
Blood pressure is well controlled.   Diabetes labs are pending.     Medications refilled.     Results for orders placed or performed in visit on 02/27/24   UA reflex to Microscopic     Status: Abnormal   Result Value Ref Range    Color Urine Yellow Colorless, Straw, Light Yellow, Yellow    Appearance Urine Clear Clear    Glucose Urine >1000 (A) Negative mg/dL    Bilirubin Urine Negative Negative    Ketones Urine Negative Negative mg/dL    Specific Gravity Urine 1.025 1.000 - 1.030    Blood Urine Negative Negative    pH Urine 5.0 5.0 - 9.0    Protein Albumin Urine 30 (A) Negative mg/dL    Urobilinogen Urine Normal Normal, 2.0 mg/dL    Nitrite Urine Negative Negative    Leukocyte Esterase Urine Negative Negative    RBC Urine <1 <=2 /HPF    WBC Urine 1 <=5 /HPF    Mucus Urine Present (A) None Seen /LPF    Hyaline Casts Urine 4 (H) <=2 /LPF   TSH with free T4 reflex     Status: Abnormal   Result Value Ref Range    TSH 7.28 (H) 0.30 - 4.20 uIU/mL   CBC with platelets     Status: Abnormal   Result Value Ref Range    WBC Count 7.7 4.0 - 11.0 10e3/uL    RBC Count 4.49 4.40 - 5.90 10e6/uL    Hemoglobin 13.1 (L) 13.3 - 17.7 g/dL    Hematocrit 40.6 40.0 - 53.0 %    MCV 90 78 - 100 fL    MCH 29.2 26.5 - 33.0 pg    MCHC 32.3 31.5 - 36.5 g/dL    RDW 14.4 10.0 - 15.0 %    Platelet Count 233 150 - 450 10e3/uL   Albumin Random Urine Quantitative with Creat Ratio     Status: Abnormal   Result Value Ref Range    Creatinine Urine mg/dL 195.8 mg/dL    Albumin Urine mg/L 60.0 mg/L    Albumin Urine mg/g Cr 30.64 (H) 0.00 - 17.00 mg/g Cr   Lipid Profile     Status: Abnormal   Result Value Ref Range    Cholesterol 169 <200 mg/dL    Triglycerides 194 (H) <150 mg/dL    Direct Measure HDL 31 (L) >=40 mg/dL    LDL Cholesterol Calculated 99 <=100 mg/dL    Non HDL Cholesterol 138 (H) <130 mg/dL    Patient Fasting > 8hrs? Yes     Narrative    Cholesterol  Desirable:  <200 mg/dL    Triglycerides  Normal:  Less than 150 mg/dL  Borderline High:   150-199 mg/dL  High:  200-499 mg/dL  Very High:  Greater than or equal to 500 mg/dL    Direct Measure HDL  Female:  Greater than or equal to 50 mg/dL   Male:  Greater than or equal to 40 mg/dL    LDL Cholesterol  Desirable:  <100mg/dL  Above Desirable:  100-129 mg/dL   Borderline High:  130-159 mg/dL   High:  160-189 mg/dL   Very High:  >= 190 mg/dL    Non HDL Cholesterol  Desirable:  130 mg/dL  Above Desirable:  130-159 mg/dL  Borderline High:  160-189 mg/dL  High:  190-219 mg/dL  Very High:  Greater than or equal to 220 mg/dL   Comprehensive metabolic panel     Status: Abnormal   Result Value Ref Range    Sodium 137 135 - 145 mmol/L    Potassium 4.9 3.4 - 5.3 mmol/L    Carbon Dioxide (CO2) 26 22 - 29 mmol/L    Anion Gap 10 7 - 15 mmol/L    Urea Nitrogen 16.9 8.0 - 23.0 mg/dL    Creatinine 0.97 0.67 - 1.17 mg/dL    GFR Estimate 80 >60 mL/min/1.73m2    Calcium 9.3 8.8 - 10.2 mg/dL    Chloride 101 98 - 107 mmol/L    Glucose 342 (H) 70 - 99 mg/dL    Alkaline Phosphatase 109 40 - 150 U/L    AST 10 0 - 45 U/L    ALT 9 0 - 70 U/L    Protein Total 6.7 6.4 - 8.3 g/dL    Albumin 3.9 3.5 - 5.2 g/dL    Bilirubin Total 0.6 <=1.2 mg/dL        Aspects of Diabetes:   Recent Labs   Lab Test 02/27/24  0820 11/21/23  0803 09/01/23  0807 08/16/23  0753 05/10/23  0701   A1C  --  11.8*  --  11.6* 11.5*   LDL 99 81  --  82 81   HDL 31* 30*  --  31* 35*   TRIG 194* 295*  --  304* 266*   ALT 9 8 9 10 10   CR 0.97 1.10 0.90 0.95 0.86   GFRESTIMATED 80 69 88 82 89   POTASSIUM 4.9 4.0 4.4 4.7 4.6   TSH 7.28* 8.18*  --  6.68* 7.49*   T4  --  0.94  --  0.94 0.89*   WBC 7.7 8.1 7.4 6.7 7.1   HGB 13.1* 13.9 13.4 13.5 14.1    233 219 192 215   ALBUMIN 3.9 3.8 3.8 3.9 4.1      Hemoglobin A1c  Goal range is under 8%. Best is 6.5 to 7   Blood Pressure 130/68 Goal to keep less than 140/90   Tobacco  reports that he quit smoking about 39 years ago. His smoking use included cigarettes. He started smoking about 65 years ago. He has a 30 pack-year  smoking history. He has been exposed to tobacco smoke. He has never used smokeless tobacco. Goal to abstain from tobacco   Aspirin or Plavix Anti-platelet therapy Aspirin or Plavix reduces risk of heart disease and stroke  -- sometimes used with other blood thinners, depending on bleeding risk and risk factors.    ACE/ARB Specific blood pressure meds These medications can reduce risk of kidney disease   Cholesterol Statins (Lipitor, Crestor, vs others) Statins reduce risk of heart disease and stroke   Eye Exam -- Do Yearly -- Annual diabetic eye exam   Healthy weight Wt Readings from Last 4 Encounters:   02/27/24 104.8 kg (231 lb)   11/21/23 102.5 kg (226 lb)   09/08/23 103.4 kg (228 lb)   08/16/23 104.8 kg (231 lb)      Body mass index is 35.12 kg/m .  Goal BMI under 30, best is under 25.      -- Trying to exercise daily (goal at least 20 min/day) with moderate aerobic activity   -- Eat healthy (resources from ADA at http://www.diabetes.org/)   -- Taking good care of my feet. Consider seeing the Podiatrist   -- Check blood sugars as directed, record in log book and bring to every appointment    Insurance companies are grading you and I on your blood sugar control -- Goal is to get your A1c down to 7.9% or lower and NO Smoking!  -- Medicare and most insurance companies, will only cover Hemoglobin A1c labs to be rechecked every 91+ days.      Return for Diabetes labs and clinic follow-up appointment every 3 to 4 months.    Schedule lab only appointment --- A few days AFTER: 5/27/24   Schedule clinic appointment with Dr. Roberts -- Same day as labs, or 1-2 days later.

## 2024-02-27 NOTE — COMMUNITY RESOURCES LIST (ENGLISH)
02/27/2024    [employer]  N/A  For questions about this resource list or additional care needs, please contact your primary care clinic or care manager.  Phone: [phone]   Email: N/A   Address: [address] [place] [id number]   Hours: N/A        Exercise and Recreation       Gym or workout facility  1  [facility] Distance: 4.83 miles      In-Person   [address] [place] [address]  Language: English  Hours: Mon - Fri 5:00 AM - 9:00 PM , Sat 7:00 AM - 7:00 PM , Sun 10:00 AM - 6:00 PM  Fees: Self Pay, Sliding Fee   Phone: [phone] Email: [email or web address] Website: [email or web address]          Important Numbers & Websites       Emergency Services   911  [facility]   311  Poison Control   (294) 809-4857  Suicide Prevention Lifeline   (476) 386-3089 (TALK)  Child Abuse Hotline   (537) 207-3103 (4-A-Child)  Sexual Assault Hotline   (274) 487-7775 (HOPE)  National Runaway Safeline   (962) 909-4056 (RUNAWAY)  All-Options Talkline   (191) 996-5337  Substance Abuse Referral   (733) 899-7934 (HELP)

## 2024-02-27 NOTE — PROGRESS NOTES
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)       Digestive    Vitamin B12 deficiency    Relevant Medications    vitamin B-12 (CYANOCOBALAMIN) 2500 MCG sublingual tablet    Class 2 severe obesity due to excess calories with serious comorbidity and body mass index (BMI) of 35.0 to 35.9 in adult (H)    Relevant Medications    empagliflozin (JARDIANCE) 10 MG TABS tablet    glipiZIDE (GLUCOTROL) 10 MG tablet    metFORMIN (GLUCOPHAGE XR) 500 MG 24 hr tablet    Semaglutide (RYBELSUS) 7 MG tablet    insulin glargine (LANTUS SOLOSTAR) 100 UNIT/ML pen       Endocrine    Uncontrolled type 2 diabetes mellitus with hyperglycemia (H) - Primary    Relevant Medications    empagliflozin (JARDIANCE) 10 MG TABS tablet    glipiZIDE (GLUCOTROL) 10 MG tablet    levothyroxine (SYNTHROID/LEVOTHROID) 100 MCG tablet    metFORMIN (GLUCOPHAGE XR) 500 MG 24 hr tablet    Semaglutide (RYBELSUS) 7 MG tablet    insulin glargine (LANTUS SOLOSTAR) 100 UNIT/ML pen    Other Relevant Orders    Urine Macroscopic with reflex to Microscopic    TSH with free T4 reflex    Hemoglobin A1c    CBC with platelets    Albumin Random Urine Quantitative with Creat Ratio    Comprehensive metabolic panel    Mixed hyperlipidemia    Relevant Medications    atorvastatin (LIPITOR) 40 MG tablet    Other Relevant Orders    Lipid Profile    Hypothyroidism due to acquired atrophy of thyroid    Relevant Medications    levothyroxine (SYNTHROID/LEVOTHROID) 100 MCG tablet       Circulatory    Coronary artery disease involving native coronary artery of native heart without angina pectoris    Relevant Medications    atorvastatin (LIPITOR) 40 MG tablet    clopidogrel (PLAVIX) 75 MG tablet    Chronic systolic heart failure (H) - ECHO 6/22/2022 at [facility] -- EF 25-30%    Relevant Medications    empagliflozin (JARDIANCE) 10 MG TABS tablet    metoprolol succinate ER (TOPROL XL) 50 MG  24 hr tablet    sacubitril-valsartan (ENTRESTO) 24-26 MG per tablet    Semaglutide (RYBELSUS) 7 MG tablet       Immune    Secondary and unspecified malignant neoplasm of lymph node, unspecified (H)       Urinary    CKD (chronic kidney disease) stage 2, GFR 60-89 ml/min       Hematologic    Iron deficiency anemia    Relevant Medications    vitamin B-12 (CYANOCOBALAMIN) 2500 MCG sublingual tablet       Other    History of gout    Relevant Medications    allopurinol (ZYLOPRIM) 100 MG tablet    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     Other Visit Diagnoses       Gastroesophageal reflux disease with esophagitis, unspecified whether hemorrhage        Relevant Medications    pantoprazole (PROTONIX) 40 MG EC tablet    Chronic diastolic congestive heart failure (H)        Relevant Medications    spironolactone (ALDACTONE) 25 MG tablet    Vaccine counseling        Medicare annual wellness visit, subsequent               Patient presents for Medicare annual wellness visit as well as follow-up multiple issues.    Uncontrolled type 2 diabetes with hyperglycemia.  Reports he does not ever get low blood sugar levels.  Was taking Lantus 10 units daily in the morning we will increase this up to 15 units and slowly titrate up to 25 units every morning.  Hemoglobin A1c has actually worsened and is now higher than previous.  States that he does eat a lot of sugar and sweets.    Chronic systolic heart failure, last ejection fraction was 25-30%.  Has coronary artery disease, denies exertional angina at this time.  Otherwise seems to be doing well with current medication regimen.  Also has chronic diastolic congestive heart failure.  No excessive fluid retention issues at this time.  Continue current  medications per refill sent to pharmacy.    History of TIA and stroke, involving left vertebral artery.  Had mass in the sella turcica region of the brain back in June 2022 MRI.  Continues with Lipitor Plavix, needs refills.    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  Still dealing with numbness and burning from this.    History of gout, doing well with allopurinol.  No recent gout attacks.  Tolerating well.  Needs refills.    Heartburn and reflux, currently doing well with Protonix.  Needs to take regularly or gets breakthrough symptoms.  Needs refills.    Vitamin B12 deficiency.  Ongoing.  Continues with oral replacement.  Prescription sent to pharmacy.    HYPERTENSION - Ongoing. Blood pressure is currently well controlled.  Medication side effects: None. Denies syncope or presyncope.  Continue current medications.   Medication list reviewed/updated. Refills completed as needed.      MIXED HYPERLIPIDEMIA.  Ongoing. LDL is at goal: No. Triglycerides are at goal: No.  Hopefully lifestyle modifications will improve cholesterol levels, otherwise will consider additional medication dose adjustments or medication changes.  Medication side effects reported: None.   Continue current medications for now. Medication list reviewed/updated. Refills completed as needed.  Recent Labs   Lab Test 02/27/24  0820 11/21/23  0803   CHOL 169 170   HDL 31* 30*   LDL 99 81   TRIG 194* 295*        HYPOTHYROIDISM - Patient has a longstanding history of hypothyroidism. Reports doing reasonably well. Reports: fatigue.  Continue: Synthroid oral thyroid replacement.  Denies adverse medication reactions or side effects.                       TSH   Date Value Ref Range Status   02/27/2024 7.28 (H) 0.30 - 4.20 uIU/mL Final   10/28/2022 5.49 (H) 0.40 - 4.00 mU/L Final        OBESITY - Ongoing.  (See Encounter Diagnosis list above for obesity class / severity).  - Encourage continued maintenance / improvement in  "diet and exercise.   - Consider Nutrition / Dietician appointment.  - Weight loss would improve Hypertension, Cholesterol and Diabetes.      Chronic Kidney Disease, Stage 2 (GFR 60-89), chronic, ongoing.  Kidney function had been slowly declining.  Encourage NSAID avoidance.       Vaccine counseling completed.  Encourage routine / annual vaccinations.    Type 2 Diabetes Mellitus, with nephropathy, with neuropathy, Reports ongoing/previous: numbness and burning.  Blood sugar control has been poor with moderate hyperglycemia. Doing well with diet, oral agents, and insulin injections.  Medication list reviewed/updated. Refills completed as needed.    Complicating factors include but are not limited to: hypertension, hyperlipidemia, neuropathy, chronic kidney disease, CAD/PVD, CVA, and morbid obesity .     Recent Labs   Lab Test 02/27/24  0820 11/21/23  0803 09/01/23  0807 08/16/23  0753   A1C 12.0* 11.8*  --  11.6*   LDL 99 81  --  82   HDL 31* 30*  --  31*   TRIG 194* 295*  --  304*   ALT 9 8 9 10   CR 0.97 1.10 0.90 0.95   GFRESTIMATED 80 69 88 82   POTASSIUM 4.9 4.0 4.4 4.7   TSH 7.28* 8.18*  --  6.68*   T4 0.91 0.94  --  0.94   WBC 7.7 8.1 7.4 6.7   HGB 13.1* 13.9 13.4 13.5    233 219 192   ALBUMIN 3.9 3.8 3.8 3.9     Hemoglobin A1c is high.  LDL and triglycerides are high and not at goal.  HDL is low.  ALT normal.  Creatinine is at baseline per potassium normal.  TSH elevated.  Free T4 within normal range.  CBC shows mild anemia.  White blood cell count and platelet count normal.  Albumin normal.             BMI  Estimated body mass index is 35.12 kg/m  as calculated from the following:    Height as of this encounter: 1.727 m (5' 8\").    Weight as of this encounter: 104.8 kg (231 lb).       Counseling  Appropriate preventive services were discussed with this patient, including applicable screening as appropriate for fall prevention, nutrition, physical activity, Tobacco-use cessation, weight loss and " cognition.  Checklist reviewing preventive services available has been given to the patient.  Reviewed patient's diet, addressing concerns and/or questions.         Return for - Labs every 91+ days, with DM Follow-up, Same Day or 1-2 days later with Dr. Roberts.      Bk Roberts MD  [facility]    Review of Systems   Constitutional:  Positive for fatigue. Negative for chills and fever.   HENT:  Positive for hearing loss. Negative for congestion, ear pain and sore throat.    Eyes:  Negative for pain and visual disturbance.   Respiratory:  Negative for cough and shortness of breath.    Cardiovascular:  Negative for chest pain, palpitations and peripheral edema.   Gastrointestinal:  Positive for diarrhea and nausea. Negative for abdominal pain, constipation, heartburn and hematochezia.   Endocrine: Positive for polydipsia and polyuria.   Genitourinary:  Positive for impotence. Negative for dysuria, frequency, genital sores, hematuria, penile discharge and urgency.   Musculoskeletal:  Negative for arthralgias, joint swelling and myalgias.   Skin:  Negative for rash.   Allergic/Immunologic: Negative for immunocompromised state.   Neurological:  Positive for weakness. Negative for dizziness, headaches and paresthesias.   Hematological:  Bruises/bleeds easily.   Psychiatric/Behavioral:  Positive for sleep disturbance. Negative for mood changes. The patient is not nervous/anxious.             Preventive Care Visit  [facility]  Bk Roberts MD, Internal Medicine  Feb 27, 2024      Aristides Maguire is a 78 year old, presenting for the following:  Diabetes (Medicare Wellness Visit)        2/27/2024     8:34 AM   Additional Questions   Roomed by Pollo NETTLES / DOROTHY   Accompanied by n/a         Health Care Directive  Patient does not have a Health Care Directive or Living Will: Discussed advance care planning with patient; however, patient declined at this time.    History of Present  Illness       Diabetes:   He presents for follow up of diabetes.    He is not checking blood glucose.     He is aware of hypoglycemia symptoms including shakiness and weakness.    He has no concerns regarding his diabetes at this time.  He is having numbness in feet and blurry vision.  The patient has had a diabetic eye exam in the last 12 months. Eye exam performed on 01/2024. Location of last eye exam [facility]                   2/27/2024   General Health   How would you rate your overall physical health? (!) FAIR   Feel stress (tense, anxious, or unable to sleep) Not at all         2/27/2024   Nutrition   Diet: Diabetic    Breakfast skipped         2/27/2024   Exercise   Days per week of moderate/strenous exercise 0 days   (!) EXERCISE CONCERN      2/27/2024   Social Factors   Frequency of gathering with friends or relatives Three times a week         2/27/2024   Fall Risk   Fallen 2 or more times in the past year? No    No   Trouble with walking or balance? Yes    No           2/3/2023   Activities of Daily Living- Home Safety   Needs help with the following daily activites NO assistance is needed   Safety concerns in the home None of the above         2/27/2024   Dental   Dentist two times every year? Yes         2/3/2023   Hearing Screening   Hearing concerns? Feel that people are mumbling or not speaking clearly    Difficult to understand a speaker at a public meeting or [employer] service    Find that men's voices are easier to understand than woman's            No data to display                       Today's PHQ-2 Score:       2/27/2024     8:47 AM   PHQ-2 ( 1999 [employer])   Q1: Little interest or pleasure in doing things 0   Q2: Feeling down, depressed or hopeless 0   PHQ-2 Score 0   Q1: Little interest or pleasure in doing things Not at all   Q2: Feeling down, depressed or hopeless Not at all   PHQ-2 Score 0         2/3/2023   Substance Use   Alcohol more than 3/day or more than 7/wk Not Applicable      Social History     Tobacco Use    Smoking status: Former     Packs/day: 1.00     Years: 30.00     Additional pack years: 0.00     Total pack years: 30.00     Types: Cigarettes     Start date:      Quit date: 1985     Years since quittin.1     Passive exposure: Past    Smokeless tobacco: Never    Tobacco comments:     Passive exposure in adult home for 6 years.   Vaping Use    Vaping Use: Never used   Substance Use Topics    Alcohol use: No     Alcohol/week: 0.0 standard drinks of alcohol    Drug use: No       ASCVD Risk   The ASCVD Risk score (Lori HUYNH, et al., 2019) failed to calculate for the following reasons:    The patient has a prior MI or stroke diagnosis            Reviewed and updated as needed this visit by Provider   Tobacco  Allergies  Meds  Problems  Med Hx  Surg Hx  Fam Hx              Current providers sharing in care for this patient include:  Patient Care Team:  Bk Roberts MD as PCP - General (Internal Medicine)  Bk Roberts MD as Assigned PCP  Radha Adamson APRN CNP as Assigned Cancer Care Provider    The following health maintenance items are reviewed in Epic and correct as of today:  Health Maintenance   Topic Date Due    HF ACTION PLAN  Never done    RSV VACCINE (Pregnancy & 60+) (1 - 1-dose 60+ series) Never done    EYE EXAM  2022    DIABETIC FOOT EXAM  2023    COVID-19 Vaccine (2023- season) 2023    COLORECTAL CANCER SCREENING  2024    A1C  2024    BMP  2024    MEDICARE ANNUAL WELLNESS VISIT  2025    ALT  2025    LIPID  2025    MICROALBUMIN  2025    FALL RISK ASSESSMENT  2025    CBC  2025    ADVANCE CARE PLANNING  2028    DTAP/TDAP/TD IMMUNIZATION (2 - Td or Tdap) 2030    TSH W/FREE T4 REFLEX  Completed    PHQ-2 (once per calendar year)  Completed    INFLUENZA VACCINE  Completed    URINALYSIS  Completed    ZOSTER IMMUNIZATION  Completed    HEPATITIS C  "SCREENING  Addressed    IPV IMMUNIZATION  Aged Out    HPV IMMUNIZATION  Aged Out    MENINGITIS IMMUNIZATION  Aged Out    RSV MONOCLONAL ANTIBODY  Aged Out    Pneumococcal Vaccine: 65+ Years  Discontinued            Objective    Exam  /68 (BP Location: Right arm, Patient Position: Sitting, Cuff Size: Adult Regular)   Pulse 78   Temp 98.1  F (36.7  C) (Temporal)   Resp 16   Ht 1.727 m (5' 8\")   Wt 104.8 kg (231 lb)   SpO2 98%   BMI 35.12 kg/m     Estimated body mass index is 35.12 kg/m  as calculated from the following:    Height as of this encounter: 1.727 m (5' 8\").    Weight as of this encounter: 104.8 kg (231 lb).    Physical Exam  Constitutional:       General: He is not in acute distress.     Appearance: He is well-developed. He is obese. He is not diaphoretic.   HENT:      Head: Normocephalic and atraumatic.   Eyes:      General: No scleral icterus.     Conjunctiva/sclera: Conjunctivae normal.   Neck:      Vascular: No carotid bruit.   Cardiovascular:      Rate and Rhythm: Normal rate and regular rhythm.      Pulses: Normal pulses.   Pulmonary:      Effort: Pulmonary effort is normal.      Breath sounds: Normal breath sounds.   Abdominal:      Palpations: Abdomen is soft.      Tenderness: There is no abdominal tenderness.   Musculoskeletal:         General: No deformity.      Cervical back: Neck supple.      Right lower leg: No edema.      Left lower leg: No edema.   Lymphadenopathy:      Cervical: No cervical adenopathy.   Skin:     General: Skin is warm and dry.      Findings: Bruising present. No rash.   Neurological:      Mental Status: He is alert. Mental status is at baseline.   Psychiatric:         Mood and Affect: Mood normal.         Behavior: Behavior normal.               2/27/2024   Mini Cog   Clock Draw Score 2 Normal   3 Item Recall 0 objects recalled   Mini Cog Total Score 2            Signed Electronically by: Bk Roberts MD    "

## 2024-03-04 ENCOUNTER — HOSPITAL ENCOUNTER (OUTPATIENT)
Dept: CT IMAGING | Facility: OTHER | Age: 79
Discharge: HOME | End: 2024-03-04
Attending: NURSE PRACTITIONER
Payer: MEDICARE

## 2024-03-04 ENCOUNTER — LAB (OUTPATIENT)
Dept: LAB | Facility: OTHER | Age: 79
End: 2024-03-04
Attending: NURSE PRACTITIONER
Payer: MEDICARE

## 2024-03-04 DIAGNOSIS — C18.7 CANCER OF SIGMOID COLON (H): ICD-10-CM

## 2024-03-04 LAB
ALBUMIN SERPL BCG-MCNC: 4 G/DL (ref 3.5–5.2)
ALP SERPL-CCNC: 106 U/L (ref 40–150)
ALT SERPL W P-5'-P-CCNC: 9 U/L (ref 0–70)
ANION GAP SERPL CALCULATED.3IONS-SCNC: 11 MMOL/L (ref 7–15)
AST SERPL W P-5'-P-CCNC: 10 U/L (ref 0–45)
BASOPHILS # BLD AUTO: 0 10E3/UL (ref 0–0.2)
BASOPHILS NFR BLD AUTO: 0 %
BILIRUB SERPL-MCNC: 0.6 MG/DL
BUN SERPL-MCNC: 13.6 MG/DL (ref 8–23)
CALCIUM SERPL-MCNC: 9.1 MG/DL (ref 8.8–10.2)
CEA SERPL-MCNC: 1.2 NG/ML
CHLORIDE SERPL-SCNC: 101 MMOL/L (ref 98–107)
CREAT SERPL-MCNC: 0.88 MG/DL (ref 0.67–1.17)
DEPRECATED HCO3 PLAS-SCNC: 25 MMOL/L (ref 22–29)
EGFRCR SERPLBLD CKD-EPI 2021: 88 ML/MIN/1.73M2
EOSINOPHIL # BLD AUTO: 0.2 10E3/UL (ref 0–0.7)
EOSINOPHIL NFR BLD AUTO: 2 %
ERYTHROCYTE [DISTWIDTH] IN BLOOD BY AUTOMATED COUNT: 14.5 % (ref 10–15)
GLUCOSE SERPL-MCNC: 319 MG/DL (ref 70–99)
HCT VFR BLD AUTO: 40.1 % (ref 40–53)
HGB BLD-MCNC: 13.4 G/DL (ref 13.3–17.7)
IMM GRANULOCYTES # BLD: 0 10E3/UL
IMM GRANULOCYTES NFR BLD: 0 %
LDH SERPL L TO P-CCNC: 182 U/L (ref 0–250)
LYMPHOCYTES # BLD AUTO: 1.6 10E3/UL (ref 0–5.3)
LYMPHOCYTES NFR BLD AUTO: 22 %
MCH RBC QN AUTO: 30 PG (ref 26.5–33)
MCHC RBC AUTO-ENTMCNC: 33.4 G/DL (ref 31.5–36.5)
MCV RBC AUTO: 90 FL (ref 78–100)
MONOCYTES # BLD AUTO: 0.8 10E3/UL (ref 0–1.3)
MONOCYTES NFR BLD AUTO: 11 %
NEUTROPHILS # BLD AUTO: 4.8 10E3/UL (ref 1.6–8.3)
NEUTROPHILS NFR BLD AUTO: 65 %
NRBC # BLD AUTO: 0 10E3/UL
NRBC BLD AUTO-RTO: 0 /100
PLATELET # BLD AUTO: 242 10E3/UL (ref 150–450)
POTASSIUM SERPL-SCNC: 4.6 MMOL/L (ref 3.4–5.3)
PROT SERPL-MCNC: 6.6 G/DL (ref 6.4–8.3)
RBC # BLD AUTO: 4.47 10E6/UL (ref 4.4–5.9)
SODIUM SERPL-SCNC: 137 MMOL/L (ref 135–145)
WBC # BLD AUTO: 7.5 10E3/UL (ref 4–11)

## 2024-03-04 PROCEDURE — 83615 LACTATE (LD) (LDH) ENZYME: CPT | Mod: ZL

## 2024-03-04 PROCEDURE — 250N000011 HC RX IP 250 OP 636: Performed by: NURSE PRACTITIONER

## 2024-03-04 PROCEDURE — 71260 CT THORAX DX C+: CPT | Mod: MG

## 2024-03-04 PROCEDURE — 74177 CT ABD & PELVIS W/CONTRAST: CPT | Mod: MG

## 2024-03-04 PROCEDURE — 36415 COLL VENOUS BLD VENIPUNCTURE: CPT | Mod: ZL

## 2024-03-04 PROCEDURE — 82378 CARCINOEMBRYONIC ANTIGEN: CPT | Mod: ZL

## 2024-03-04 PROCEDURE — 85025 COMPLETE CBC W/AUTO DIFF WBC: CPT | Mod: ZL

## 2024-03-04 PROCEDURE — 80053 COMPREHEN METABOLIC PANEL: CPT | Mod: ZL

## 2024-03-04 RX ORDER — IOPAMIDOL 755 MG/ML
133 INJECTION, SOLUTION INTRAVASCULAR ONCE
Status: COMPLETED | OUTPATIENT
Start: 2024-03-04 | End: 2024-03-04

## 2024-03-04 RX ADMIN — IOPAMIDOL 133 ML: 755 INJECTION, SOLUTION INTRAVENOUS at 09:18

## 2024-03-04 NOTE — PROGRESS NOTES
1.  Has the patient had a previous reaction to IV contrast? No    2.  Does the patient have kidney disease? Yes - CKD2 - GFR 88    3.  Is the patient on dialysis? No    If YES to any of these questions, exam will be reviewed with a Radiologist before administering contrast.  IV Contrast- Discharge Instructions After Your CT Scan      The IV contrast you received today will be filtered from your bloodstream by your kidneys during the next 24 hours and pass from the body in urine.  You will not be aware of this process and your urine will not change in color.  To help this process you should drink at least 4 additional glasses of water or juice today.  This reduces stress on your kidneys.    Most contrast reactions are immediate.  Should you develop symptoms of concern after discharge, contact the department at the number below.  After hours you should contact your personal physician.  If you develop breathing distress or wheezing, call 911.

## 2024-03-11 ENCOUNTER — ONCOLOGY VISIT (OUTPATIENT)
Dept: ONCOLOGY | Facility: OTHER | Age: 79
End: 2024-03-11
Attending: NURSE PRACTITIONER
Payer: COMMERCIAL

## 2024-03-11 VITALS
TEMPERATURE: 97.2 F | OXYGEN SATURATION: 94 % | RESPIRATION RATE: 16 BRPM | WEIGHT: 231 LBS | BODY MASS INDEX: 35.12 KG/M2 | HEART RATE: 88 BPM | SYSTOLIC BLOOD PRESSURE: 98 MMHG | DIASTOLIC BLOOD PRESSURE: 68 MMHG

## 2024-03-11 DIAGNOSIS — C18.7 ADENOCARCINOMA OF SIGMOID COLON (H): Primary | ICD-10-CM

## 2024-03-11 PROCEDURE — 99214 OFFICE O/P EST MOD 30 MIN: CPT | Performed by: NURSE PRACTITIONER

## 2024-03-11 PROCEDURE — G0463 HOSPITAL OUTPT CLINIC VISIT: HCPCS | Performed by: NURSE PRACTITIONER

## 2024-03-11 ASSESSMENT — PAIN SCALES - GENERAL: PAINLEVEL: NO PAIN (0)

## 2024-03-11 NOTE — PROGRESS NOTES
Oncology Follow-up Visit:  March 11, 2024  Diagnosis:Colon cancer    History Of Present Illness:  Patient presents to the clinic today for followup of colon cancer. Patient was seen in consultation on June 1, 2016. Patient presented to the emergency room on April 25, 2016, with complaints of chest pain radiating down his arms, which was primarily worse with exertion. In the emergency department, he was found to have a hemoglobin of 7.1, and he subsequently was admitted. CBC revealed microcytic indices with an MCV of 62. He was noted to be iron deficient. He was transfused 2 units of packed red cells and was ruled out for MI. He was seen by Dr. Solano, who performed colonoscopy and was noted to have a mass in the sigmoid colon that was consistent with adenocarcinoma. He also had multiple adenomatous polyps. The patient was admitted on May 11, 2016, for a sigmoid colectomy. This was performed on May 17, 2016. Pathology revealed in the sigmoid colon a mass consistent with moderately differentiated adenocarcinoma. The tumor size was 2 x 1 x 0.7 cm. The tumor invaded the muscularis propria, 2/8 lymph nodes were positive for metastatic carcinoma. Margins were negative for tumor. The grade of the tumor was ruled as moderately differentiated. The patient was staged pathologic stage T2N1b as part of the postop evaluation. The patient had a CT abdomen and pelvis on May 12, 2016. This revealed that there were 2 nonspecific low-attenuation lesions in the liver. Metastasis could not be excluded. There was no lymphadenopathy or additional findings to suggest metastases. The patient had a preop CEA, which was less than 3. PET scan was performed on Lluvia 15, 2016, and was essentially negative for metastatic disease. Lesions seen on prior PET in the liver were not hypermetabolic. We felt that the patient had stage III disease and he would be a candidate for adjuvant chemotherapy. When patient was seen on June 28, 2016, the plan was to  start FOLFOX x12 cycles.  When he was seen on November 17, 2016,  he did note some cold-induced neuropathic symptoms. He completed 12 cycles of FOLFOX. His last chemotherapy was administered on December 7, 2016.  He did have a PET scan done after 12 cycles of chemotherapy, and this came back essentially negative for metastatic disease. He had staging studies on April 6, 2017 including CT abdomen and pelvis, which was essentially negative. CT of the chest was negative. The patient also underwent a colonoscopy in May 2017, which revealed a tubular adenoma at the hepatic flexure of the colon. Patient had a colonoscopy done performed by Dr. Solano on 06/01/2020 and was found to have 6 polyps.  All of them were revealing tubular adenoma, consistent with advanced adenoma. One was at the 30 cm, and the other was in the mid transverse colon. This was based on polyp size being larger than 10 mm. The patient had a colonoscopy in June 2021, which revealed multiple tubular adenomas with a 3 year follow up recommended.  CT chest, abdomen, pelvis was done on 3/7/22 and was stable.  Patient had a stroke in June 2022. He has been following with cardiology. CT scans from 3/4/24 are unchanged. Patient had labs done on 3/4/24 showing a normal tumor marker. All other labs are stable. Patient has been feeling well and has no new concerns at this time.    Review Of Systems:  Review Of Systems  Eyes/Ears/Nose/Throat: denies new vision changes  Respiratory: reports occasional dyspnea on exertion, denies cough  Cardiovascular: denies chest pain  Gastrointestinal: denies abdominal pain, no bowel changes  Genitourinary: denies dysuria or hematuria  Musculoskeletal: denies new bone pain  Neurologic: reports ongoing neuropathy of fingers and feet, stable. Denies headaches  Hematologic/Lymphatic/Immunologic: denies fevers, no signs of infection      There are no exam notes on file for this visit.    Past medical, social, surgical, and family  histories reviewed.    Allergies:  Allergies as of 03/11/2024 - Reviewed 03/11/2024   Allergen Reaction Noted    Aspirin  05/26/2017    Canagliflozin  05/18/2016    Morphine  05/18/2016       Current Medications:  Current Outpatient Medications   Medication Sig Dispense Refill    allopurinol (ZYLOPRIM) 100 MG tablet Take 1 tablet (100 mg) by mouth 2 times daily 180 tablet 1    atorvastatin (LIPITOR) 40 MG tablet Take 1 tablet (40 mg) by mouth daily 90 tablet 1    clopidogrel (PLAVIX) 75 MG tablet Take 1 tablet (75 mg) by mouth daily 90 tablet 4    empagliflozin (JARDIANCE) 10 MG TABS tablet Take 1 tablet (10 mg) by mouth daily - for diabetes 90 tablet 1    glipiZIDE (GLUCOTROL) 10 MG tablet Take 2 tablets (20 mg) by mouth 2 times daily (before meals) 360 tablet 1    insulin glargine (LANTUS SOLOSTAR) 100 UNIT/ML pen Inject 15-25 Units Subcutaneous every morning Increase by 3 units every 5 days - goal AM glucose  -- Dose Increase 2/27/2024 30 mL 4    levothyroxine (SYNTHROID/LEVOTHROID) 100 MCG tablet Take 1 tablet (100 mcg) by mouth daily 90 tablet 1    metFORMIN (GLUCOPHAGE XR) 500 MG 24 hr tablet Take 2-4 tablets (1,000-2,000 mg) by mouth daily (with dinner) -- Substitute for cheap, generic, preferred 24 hr Metformin 360 tablet 1    metoprolol succinate ER (TOPROL XL) 50 MG 24 hr tablet Take 1 tablet (50 mg) by mouth daily 90 tablet 1    pantoprazole (PROTONIX) 40 MG EC tablet Take 1 tablet (40 mg) by mouth daily 90 tablet 1    sacubitril-valsartan (ENTRESTO) 24-26 MG per tablet Take 1 tablet by mouth 2 times daily 180 tablet 1    Semaglutide (RYBELSUS) 7 MG tablet Take 1 tablet (7 mg) by mouth daily - for diabetes (didn't tolerate 14 mg tablet daily) 90 tablet 1    spironolactone (ALDACTONE) 25 MG tablet Take 1 tablet (25 mg) by mouth daily 90 tablet 1    vitamin B-12 (CYANOCOBALAMIN) 2500 MCG sublingual tablet Take 1 tablet (2,500 mcg) by mouth daily - for low B12 90 tablet 4    alcohol swab prep pads Use  to swab area of injection/teto as directed. 100 each 3    Cholecalciferol (VITAMIN D3) 50 MCG (2000 UT) CAPS Take 1 capsule by mouth daily      insulin pen needle (ULTICARE MINI) 31G X 6 MM miscellaneous Use 1 pen needles daily 100 each 4        Physical Exam:  There were no vitals taken for this visit.    GENERAL APPEARANCE: 78 year old male, alert and no distress     NECK: no adenopathy, no asymmetry or masses     LYMPHATICS: No cervical, supraclavicular, axillary lymphadenopathy     RESP: lungs clear to auscultation - no rales, rhonchi or wheezes     CARDIOVASCULAR: regular rates and rhythm, normal S1 S2     ABDOMEN:  soft, nontender, bowel sounds normal     MUSCULOSKELETAL: extremities normal- no gross deformities noted     SKIN: no suspicious lesions or rashes on exposed skin     PSYCHIATRIC: mentation appears normal and affect normal    Laboratory/Imaging Studies  Lab on 03/04/2024   Component Date Value Ref Range Status    Sodium 03/04/2024 137  135 - 145 mmol/L Final    Reference intervals for this test were updated on 09/26/2023 to more accurately reflect our healthy population. There may be differences in the flagging of prior results with similar values performed with this method. Interpretation of those prior results can be made in the context of the updated reference intervals.     Potassium 03/04/2024 4.6  3.4 - 5.3 mmol/L Final    Carbon Dioxide (CO2) 03/04/2024 25  22 - 29 mmol/L Final    Anion Gap 03/04/2024 11  7 - 15 mmol/L Final    Urea Nitrogen 03/04/2024 13.6  8.0 - 23.0 mg/dL Final    Creatinine 03/04/2024 0.88  0.67 - 1.17 mg/dL Final    GFR Estimate 03/04/2024 88  >60 mL/min/1.73m2 Final    Calcium 03/04/2024 9.1  8.8 - 10.2 mg/dL Final    Chloride 03/04/2024 101  98 - 107 mmol/L Final    Glucose 03/04/2024 319 (H)  70 - 99 mg/dL Final    Alkaline Phosphatase 03/04/2024 106  40 - 150 U/L Final    Reference intervals for this test were updated on 11/14/2023 to more accurately reflect our  healthy population. There may be differences in the flagging of prior results with similar values performed with this method. Interpretation of those prior results can be made in the context of the updated reference intervals.    AST 03/04/2024 10  0 - 45 U/L Final    Reference intervals for this test were updated on 6/12/2023 to more accurately reflect our healthy population. There may be differences in the flagging of prior results with similar values performed with this method. Interpretation of those prior results can be made in the context of the updated reference intervals.    ALT 03/04/2024 9  0 - 70 U/L Final    Reference intervals for this test were updated on 6/12/2023 to more accurately reflect our healthy population. There may be differences in the flagging of prior results with similar values performed with this method. Interpretation of those prior results can be made in the context of the updated reference intervals.      Protein Total 03/04/2024 6.6  6.4 - 8.3 g/dL Final    Albumin 03/04/2024 4.0  3.5 - 5.2 g/dL Final    Bilirubin Total 03/04/2024 0.6  <=1.2 mg/dL Final    Lactate Dehydrogenase 03/04/2024 182  0 - 250 U/L Final    CEA 03/04/2024 1.2  ng/mL Final    Nonsmoker (past/never) <=5.0 ng/mL   Current smoker <=6.5 ng/mL     This result is obtained using the Roche PolySpots[employer] CEA method on the lili [id number] immunoassay analyzer. Results obtained with different assay methods or kits cannot be used interchangeably.    WBC Count 03/04/2024 7.5  4.0 - 11.0 10e3/uL Final    RBC Count 03/04/2024 4.47  4.40 - 5.90 10e6/uL Final    Hemoglobin 03/04/2024 13.4  13.3 - 17.7 g/dL Final    Hematocrit 03/04/2024 40.1  40.0 - 53.0 % Final    MCV 03/04/2024 90  78 - 100 fL Final    MCH 03/04/2024 30.0  26.5 - 33.0 pg Final    MCHC 03/04/2024 33.4  31.5 - 36.5 g/dL Final    RDW 03/04/2024 14.5  10.0 - 15.0 % Final    Platelet Count 03/04/2024 242  150 - 450 10e3/uL Final    % Neutrophils 03/04/2024 65  % Final    %  Lymphocytes 03/04/2024 22  % Final    % Monocytes 03/04/2024 11  % Final    % Eosinophils 03/04/2024 2  % Final    % Basophils 03/04/2024 0  % Final    % Immature Granulocytes 03/04/2024 0  % Final    NRBCs per 100 WBC 03/04/2024 0  <1 /100 Final    Absolute Neutrophils 03/04/2024 4.8  1.6 - 8.3 10e3/uL Final    Absolute Lymphocytes 03/04/2024 1.6  0.0 - 5.3 10e3/uL Final    Absolute Monocytes 03/04/2024 0.8  0.0 - 1.3 10e3/uL Final    Absolute Eosinophils 03/04/2024 0.2  0.0 - 0.7 10e3/uL Final    Absolute Basophils 03/04/2024 0.0  0.0 - 0.2 10e3/uL Final    Absolute Immature Granulocytes 03/04/2024 0.0  <=0.4 10e3/uL Final    Absolute NRBCs 03/04/2024 0.0  10e3/uL Final   Lab on 02/27/2024   Component Date Value Ref Range Status    Color Urine 02/27/2024 Yellow  Colorless, Straw, Light Yellow, Yellow Final    Appearance Urine 02/27/2024 Clear  Clear Final    Glucose Urine 02/27/2024 >1000 (A)  Negative mg/dL Final    Bilirubin Urine 02/27/2024 Negative  Negative Final    Ketones Urine 02/27/2024 Negative  Negative mg/dL Final    Specific Gravity Urine 02/27/2024 1.025  1.000 - 1.030 Final    Blood Urine 02/27/2024 Negative  Negative Final    pH Urine 02/27/2024 5.0  5.0 - 9.0 Final    Protein Albumin Urine 02/27/2024 30 (A)  Negative mg/dL Final    Urobilinogen Urine 02/27/2024 Normal  Normal, 2.0 mg/dL Final    Nitrite Urine 02/27/2024 Negative  Negative Final    Leukocyte Esterase Urine 02/27/2024 Negative  Negative Final    RBC Urine 02/27/2024 <1  <=2 /HPF Final    WBC Urine 02/27/2024 1  <=5 /HPF Final    Mucus Urine 02/27/2024 Present (A)  None Seen /LPF Final    Hyaline Casts Urine 02/27/2024 4 (H)  <=2 /LPF Final    TSH 02/27/2024 7.28 (H)  0.30 - 4.20 uIU/mL Final    Hemoglobin A1C 02/27/2024 12.0 (H)  4.0 - 6.2 % Final    WBC Count 02/27/2024 7.7  4.0 - 11.0 10e3/uL Final    RBC Count 02/27/2024 4.49  4.40 - 5.90 10e6/uL Final    Hemoglobin 02/27/2024 13.1 (L)  13.3 - 17.7 g/dL Final    Hematocrit  02/27/2024 40.6  40.0 - 53.0 % Final    MCV 02/27/2024 90  78 - 100 fL Final    MCH 02/27/2024 29.2  26.5 - 33.0 pg Final    MCHC 02/27/2024 32.3  31.5 - 36.5 g/dL Final    RDW 02/27/2024 14.4  10.0 - 15.0 % Final    Platelet Count 02/27/2024 233  150 - 450 10e3/uL Final    Creatinine Urine mg/dL 02/27/2024 195.8  mg/dL Final    The reference ranges have not been established in urine creatinine. The results should be integrated into the clinical context for interpretation.    Albumin Urine mg/L 02/27/2024 60.0  mg/L Final    The reference ranges have not been established in urine albumin. The results should be integrated into the clinical context for interpretation.    Albumin Urine mg/g Cr 02/27/2024 30.64 (H)  0.00 - 17.00 mg/g Cr Final    Microalbuminuria is defined as an albumin:creatinine ratio of 17 to 299 for males and 25 to 299 for females. A ratio of albumin:creatinine of 300 or higher is indicative of overt proteinuria.  Due to biologic variability, positive results should be confirmed by a second, first-morning random or 24-hour timed urine specimen. If there is discrepancy, a third specimen is recommended. When 2 out of 3 results are in the microalbuminuria range, this is evidence for incipient nephropathy and warrants increased efforts at glucose control, blood pressure control, and institution of therapy with an angiotensin-converting-enzyme (ACE) inhibitor (if the patient can tolerate it).      Cholesterol 02/27/2024 169  <200 mg/dL Final    Triglycerides 02/27/2024 194 (H)  <150 mg/dL Final    Direct Measure HDL 02/27/2024 31 (L)  >=40 mg/dL Final    LDL Cholesterol Calculated 02/27/2024 99  <=100 mg/dL Final    Non HDL Cholesterol 02/27/2024 138 (H)  <130 mg/dL Final    Patient Fasting > 8hrs? 02/27/2024 Yes   Final    Sodium 02/27/2024 137  135 - 145 mmol/L Final    Reference intervals for this test were updated on 09/26/2023 to more accurately reflect our healthy population. There may be  differences in the flagging of prior results with similar values performed with this method. Interpretation of those prior results can be made in the context of the updated reference intervals.     Potassium 02/27/2024 4.9  3.4 - 5.3 mmol/L Final    Carbon Dioxide (CO2) 02/27/2024 26  22 - 29 mmol/L Final    Anion Gap 02/27/2024 10  7 - 15 mmol/L Final    Urea Nitrogen 02/27/2024 16.9  8.0 - 23.0 mg/dL Final    Creatinine 02/27/2024 0.97  0.67 - 1.17 mg/dL Final    GFR Estimate 02/27/2024 80  >60 mL/min/1.73m2 Final    Calcium 02/27/2024 9.3  8.8 - 10.2 mg/dL Final    Chloride 02/27/2024 101  98 - 107 mmol/L Final    Glucose 02/27/2024 342 (H)  70 - 99 mg/dL Final    Alkaline Phosphatase 02/27/2024 109  40 - 150 U/L Final    Reference intervals for this test were updated on 11/14/2023 to more accurately reflect our healthy population. There may be differences in the flagging of prior results with similar values performed with this method. Interpretation of those prior results can be made in the context of the updated reference intervals.    AST 02/27/2024 10  0 - 45 U/L Final    Reference intervals for this test were updated on 6/12/2023 to more accurately reflect our healthy population. There may be differences in the flagging of prior results with similar values performed with this method. Interpretation of those prior results can be made in the context of the updated reference intervals.    ALT 02/27/2024 9  0 - 70 U/L Final    Reference intervals for this test were updated on 6/12/2023 to more accurately reflect our healthy population. There may be differences in the flagging of prior results with similar values performed with this method. Interpretation of those prior results can be made in the context of the updated reference intervals.      Protein Total 02/27/2024 6.7  6.4 - 8.3 g/dL Final    Albumin 02/27/2024 3.9  3.5 - 5.2 g/dL Final    Bilirubin Total 02/27/2024 0.6  <=1.2 mg/dL Final    Free T4  02/27/2024 0.91  0.90 - 1.70 ng/dL Final        ASSESSMENT/PLAN:  Stage III, moderately differentiated adenocarcinoma of the sigmoid colon with 2 out of 11 lymph nodes positive for metastatic disease consistent with pathologic T2 N1b M0 colon cancer.  PET scan was negative for metastatic disease. The patient was started on adjuvant FOLFOX chemotherapy and completed 12 cycles. PET scan did not reveal any evidence of metastatic disease.The patient had a colonoscopy in June 2021, which revealed multiple tubular adenomas with a 3 year follow up recommended. CT of the chest, abdomen and pelvis done on 3/4/24 was negative for metastatic disease. Labs from 3/4/24 show a normal tumor marker. All other labs are stable. We will see the patient in 6 months with CBC, CMP, LDH, CEA      Thirty one minutes spent with this encounter with time spent reviewing patient records, counseling patient regarding disease process, interpretation and review of labs with patient, discussing results of CT scans, obtaining a review of systems, performing a physical exam, discussing plan for ongoing follow up, ordering tests, documenting in EHR and coordination of care

## 2024-03-11 NOTE — NURSING NOTE
"Oncology Rooming Note    March 11, 2024 9:11 AM   Andrez Olivier is a 78 year old male who presents for:    Chief Complaint   Patient presents with    Oncology Clinic Visit     Colon CA     Initial Vitals: BP 98/68 (BP Location: Right arm, Patient Position: Sitting, Cuff Size: Adult Large)   Pulse 88   Temp 97.2  F (36.2  C) (Tympanic)   Resp 16   Wt 104.8 kg (231 lb)   SpO2 94%   BMI 35.12 kg/m   Estimated body mass index is 35.12 kg/m  as calculated from the following:    Height as of 2/27/24: 1.727 m (5' 8\").    Weight as of this encounter: 104.8 kg (231 lb). Body surface area is 2.24 meters squared.  No Pain (0) Comment: Data Unavailable   No LMP for male patient.  Allergies reviewed: Yes  Medications reviewed: Yes    Medications: Medication refills not needed today.  Pharmacy name entered into [employer] [place] [employer]: [id number] - [place], [place] - [address]    Frailty Screening:   Is the patient here for a new oncology consult visit in cancer care? 2. No      Clinical concerns: nothing specific       Katherine Anderson CMA (AAMA) 3/11/2024 9:11 AM  "

## 2024-05-29 ENCOUNTER — OFFICE VISIT (OUTPATIENT)
Dept: INTERNAL MEDICINE | Facility: OTHER | Age: 79
End: 2024-05-29
Attending: INTERNAL MEDICINE
Payer: MEDICARE

## 2024-05-29 ENCOUNTER — LAB (OUTPATIENT)
Dept: LAB | Facility: OTHER | Age: 79
End: 2024-05-29
Attending: INTERNAL MEDICINE
Payer: COMMERCIAL

## 2024-05-29 VITALS
HEART RATE: 85 BPM | DIASTOLIC BLOOD PRESSURE: 64 MMHG | HEIGHT: 68 IN | TEMPERATURE: 96.9 F | RESPIRATION RATE: 16 BRPM | OXYGEN SATURATION: 96 % | WEIGHT: 226 LBS | SYSTOLIC BLOOD PRESSURE: 84 MMHG | BODY MASS INDEX: 34.25 KG/M2

## 2024-05-29 DIAGNOSIS — Z87.39 HISTORY OF GOUT: ICD-10-CM

## 2024-05-29 DIAGNOSIS — I25.10 CORONARY ARTERY DISEASE INVOLVING NATIVE CORONARY ARTERY OF NATIVE HEART WITHOUT ANGINA PECTORIS: ICD-10-CM

## 2024-05-29 DIAGNOSIS — I95.1 ORTHOSTATIC HYPOTENSION: Primary | ICD-10-CM

## 2024-05-29 DIAGNOSIS — E66.811 CLASS 1 OBESITY DUE TO EXCESS CALORIES WITH SERIOUS COMORBIDITY AND BODY MASS INDEX (BMI) OF 34.0 TO 34.9 IN ADULT: ICD-10-CM

## 2024-05-29 DIAGNOSIS — Z71.85 VACCINE COUNSELING: ICD-10-CM

## 2024-05-29 DIAGNOSIS — E11.65 UNCONTROLLED TYPE 2 DIABETES MELLITUS WITH HYPERGLYCEMIA (H): ICD-10-CM

## 2024-05-29 DIAGNOSIS — G62.0 NEUROPATHY DUE TO CHEMOTHERAPEUTIC DRUG (H): ICD-10-CM

## 2024-05-29 DIAGNOSIS — N18.2 CKD (CHRONIC KIDNEY DISEASE) STAGE 2, GFR 60-89 ML/MIN: ICD-10-CM

## 2024-05-29 DIAGNOSIS — E78.2 MIXED HYPERLIPIDEMIA: ICD-10-CM

## 2024-05-29 DIAGNOSIS — E03.4 HYPOTHYROIDISM DUE TO ACQUIRED ATROPHY OF THYROID: ICD-10-CM

## 2024-05-29 DIAGNOSIS — T45.1X5A NEUROPATHY DUE TO CHEMOTHERAPEUTIC DRUG (H): ICD-10-CM

## 2024-05-29 DIAGNOSIS — I50.22 CHRONIC SYSTOLIC HEART FAILURE (H): ICD-10-CM

## 2024-05-29 DIAGNOSIS — E66.09 CLASS 1 OBESITY DUE TO EXCESS CALORIES WITH SERIOUS COMORBIDITY AND BODY MASS INDEX (BMI) OF 34.0 TO 34.9 IN ADULT: ICD-10-CM

## 2024-05-29 DIAGNOSIS — Z86.73 HISTORY OF TIA (TRANSIENT ISCHEMIC ATTACK) AND STROKE: ICD-10-CM

## 2024-05-29 LAB
ALBUMIN SERPL BCG-MCNC: 4.1 G/DL (ref 3.5–5.2)
ALBUMIN UR-MCNC: NEGATIVE MG/DL
ALP SERPL-CCNC: 123 U/L (ref 40–150)
ALT SERPL W P-5'-P-CCNC: 10 U/L (ref 0–70)
ANION GAP SERPL CALCULATED.3IONS-SCNC: 13 MMOL/L (ref 7–15)
APPEARANCE UR: CLEAR
AST SERPL W P-5'-P-CCNC: 10 U/L (ref 0–45)
BILIRUB SERPL-MCNC: 0.8 MG/DL
BILIRUB UR QL STRIP: NEGATIVE
BUN SERPL-MCNC: 18.3 MG/DL (ref 8–23)
CALCIUM SERPL-MCNC: 9.5 MG/DL (ref 8.8–10.2)
CHLORIDE SERPL-SCNC: 103 MMOL/L (ref 98–107)
CHOLEST SERPL-MCNC: 172 MG/DL
COLOR UR AUTO: ABNORMAL
CREAT SERPL-MCNC: 1.01 MG/DL (ref 0.67–1.17)
CREAT UR-MCNC: 143.8 MG/DL
DEPRECATED HCO3 PLAS-SCNC: 24 MMOL/L (ref 22–29)
EGFRCR SERPLBLD CKD-EPI 2021: 76 ML/MIN/1.73M2
ERYTHROCYTE [DISTWIDTH] IN BLOOD BY AUTOMATED COUNT: 14.4 % (ref 10–15)
FASTING STATUS PATIENT QL REPORTED: YES
FASTING STATUS PATIENT QL REPORTED: YES
GLUCOSE SERPL-MCNC: 264 MG/DL (ref 70–99)
GLUCOSE UR STRIP-MCNC: 70 MG/DL
HBA1C MFR BLD: 10.4 % (ref 4–6.2)
HCT VFR BLD AUTO: 42.4 % (ref 40–53)
HDLC SERPL-MCNC: 33 MG/DL
HGB BLD-MCNC: 13.7 G/DL (ref 13.3–17.7)
HGB UR QL STRIP: NEGATIVE
KETONES UR STRIP-MCNC: NEGATIVE MG/DL
LDLC SERPL CALC-MCNC: 79 MG/DL
LEUKOCYTE ESTERASE UR QL STRIP: NEGATIVE
MCH RBC QN AUTO: 30 PG (ref 26.5–33)
MCHC RBC AUTO-ENTMCNC: 32.3 G/DL (ref 31.5–36.5)
MCV RBC AUTO: 93 FL (ref 78–100)
MICROALBUMIN UR-MCNC: 28.9 MG/L
MICROALBUMIN/CREAT UR: 20.1 MG/G CR (ref 0–17)
NITRATE UR QL: NEGATIVE
NONHDLC SERPL-MCNC: 139 MG/DL
PH UR STRIP: 5 [PH] (ref 5–9)
PLATELET # BLD AUTO: 235 10E3/UL (ref 150–450)
POTASSIUM SERPL-SCNC: 4.7 MMOL/L (ref 3.4–5.3)
PROT SERPL-MCNC: 6.8 G/DL (ref 6.4–8.3)
RBC # BLD AUTO: 4.57 10E6/UL (ref 4.4–5.9)
SODIUM SERPL-SCNC: 140 MMOL/L (ref 135–145)
SP GR UR STRIP: 1.02 (ref 1–1.03)
T4 FREE SERPL-MCNC: 0.91 NG/DL (ref 0.9–1.7)
TRIGL SERPL-MCNC: 299 MG/DL
TSH SERPL DL<=0.005 MIU/L-ACNC: 7.74 UIU/ML (ref 0.3–4.2)
UROBILINOGEN UR STRIP-MCNC: NORMAL MG/DL
WBC # BLD AUTO: 10.2 10E3/UL (ref 4–11)

## 2024-05-29 PROCEDURE — 85027 COMPLETE CBC AUTOMATED: CPT | Mod: ZL

## 2024-05-29 PROCEDURE — G0463 HOSPITAL OUTPT CLINIC VISIT: HCPCS

## 2024-05-29 PROCEDURE — 82043 UR ALBUMIN QUANTITATIVE: CPT | Mod: ZL

## 2024-05-29 PROCEDURE — 83718 ASSAY OF LIPOPROTEIN: CPT | Mod: ZL

## 2024-05-29 PROCEDURE — 81003 URINALYSIS AUTO W/O SCOPE: CPT | Mod: ZL

## 2024-05-29 PROCEDURE — 83036 HEMOGLOBIN GLYCOSYLATED A1C: CPT | Mod: ZL

## 2024-05-29 PROCEDURE — 84439 ASSAY OF FREE THYROXINE: CPT | Mod: ZL

## 2024-05-29 PROCEDURE — 36415 COLL VENOUS BLD VENIPUNCTURE: CPT | Mod: ZL

## 2024-05-29 PROCEDURE — 99214 OFFICE O/P EST MOD 30 MIN: CPT | Performed by: INTERNAL MEDICINE

## 2024-05-29 PROCEDURE — G2211 COMPLEX E/M VISIT ADD ON: HCPCS | Performed by: INTERNAL MEDICINE

## 2024-05-29 PROCEDURE — 82374 ASSAY BLOOD CARBON DIOXIDE: CPT | Mod: ZL

## 2024-05-29 PROCEDURE — 84443 ASSAY THYROID STIM HORMONE: CPT | Mod: ZL

## 2024-05-29 PROCEDURE — 82465 ASSAY BLD/SERUM CHOLESTEROL: CPT | Mod: ZL

## 2024-05-29 RX ORDER — SPIRONOLACTONE 25 MG/1
12.5 TABLET ORAL EVERY OTHER DAY
Qty: 45 TABLET | Refills: 4 | Status: SHIPPED | OUTPATIENT
Start: 2024-05-29

## 2024-05-29 RX ORDER — LEVOTHYROXINE SODIUM 112 UG/1
112 TABLET ORAL DAILY
Qty: 90 TABLET | Refills: 4 | Status: SHIPPED | OUTPATIENT
Start: 2024-05-29 | End: 2024-09-05

## 2024-05-29 RX ORDER — METFORMIN HCL 500 MG
TABLET, EXTENDED RELEASE 24 HR ORAL
Qty: 360 TABLET | Refills: 1 | Status: SHIPPED | OUTPATIENT
Start: 2024-05-29

## 2024-05-29 RX ORDER — GLIPIZIDE 10 MG/1
20 TABLET ORAL
Qty: 360 TABLET | Refills: 1 | Status: SHIPPED | OUTPATIENT
Start: 2024-05-29

## 2024-05-29 RX ORDER — ATORVASTATIN CALCIUM 40 MG/1
40 TABLET, FILM COATED ORAL DAILY
Qty: 90 TABLET | Refills: 4 | Status: SHIPPED | OUTPATIENT
Start: 2024-05-29

## 2024-05-29 RX ORDER — METOPROLOL SUCCINATE 50 MG/1
50 TABLET, EXTENDED RELEASE ORAL DAILY
Qty: 90 TABLET | Refills: 4 | Status: SHIPPED | OUTPATIENT
Start: 2024-05-29

## 2024-05-29 RX ORDER — ALLOPURINOL 100 MG/1
100 TABLET ORAL 2 TIMES DAILY
Qty: 180 TABLET | Refills: 4 | Status: SHIPPED | OUTPATIENT
Start: 2024-05-29

## 2024-05-29 RX ORDER — RESPIRATORY SYNCYTIAL VIRUS VACCINE 120MCG/0.5
0.5 KIT INTRAMUSCULAR ONCE
Qty: 0.5 ML | Refills: 0 | Status: SHIPPED | OUTPATIENT
Start: 2024-05-29 | End: 2024-05-29

## 2024-05-29 RX ORDER — LEVOTHYROXINE SODIUM 100 UG/1
100 TABLET ORAL DAILY
Qty: 90 TABLET | Refills: 4 | Status: SHIPPED | OUTPATIENT
Start: 2024-05-29 | End: 2024-05-29

## 2024-05-29 RX ORDER — SACUBITRIL AND VALSARTAN 24; 26 MG/1; MG/1
0.5 TABLET, FILM COATED ORAL 2 TIMES DAILY
Qty: 90 TABLET | Refills: 4 | Status: SHIPPED | OUTPATIENT
Start: 2024-05-29

## 2024-05-29 ASSESSMENT — ENCOUNTER SYMPTOMS
SLEEP DISTURBANCE: 1
DIZZINESS: 1
SORE THROAT: 0
CONSTIPATION: 0
PALPITATIONS: 0
FEVER: 0
COUGH: 0
FREQUENCY: 0
NAUSEA: 1
WEAKNESS: 1
DYSURIA: 0
PARESTHESIAS: 0
ARTHRALGIAS: 0
POLYDIPSIA: 1
EYE PAIN: 0
BRUISES/BLEEDS EASILY: 1
DIARRHEA: 1
JOINT SWELLING: 0
MYALGIAS: 0
HEMATOCHEZIA: 0
ABDOMINAL PAIN: 0
HEADACHES: 1
HEMATURIA: 0
HEARTBURN: 0
FATIGUE: 1
NERVOUS/ANXIOUS: 0
SHORTNESS OF BREATH: 0
CHILLS: 0

## 2024-05-29 ASSESSMENT — PAIN SCALES - GENERAL: PAINLEVEL: NO PAIN (0)

## 2024-05-29 NOTE — NURSING NOTE
"Chief Complaint   Patient presents with    Diabetes     Follow up       Initial BP (!) 84/64   Pulse 85   Temp 96.9  F (36.1  C) (Temporal)   Resp 16   Ht 1.727 m (5' 8\")   Wt 102.5 kg (226 lb)   SpO2 96%   BMI 34.36 kg/m   Estimated body mass index is 34.36 kg/m  as calculated from the following:    Height as of this encounter: 1.727 m (5' 8\").    Weight as of this encounter: 102.5 kg (226 lb).  Medication Review: complete    The next two questions are to help us understand your food security.  If you are feeling you need any assistance in this area, we have resources available to support you today.          5/29/2024   SDOH- Food Insecurity   Within the past 12 months, did you worry that your food would run out before you got money to buy more? N   Within the past 12 months, did the food you bought just not last and you didn t have money to get more? N         Health Care Directive:  Patient does not have a Health Care Directive or Living Will: Discussed advance care planning with patient; however, patient declined at this time.    Anaya Shepherd LPN      "

## 2024-05-29 NOTE — PROGRESS NOTES
Assessment & Plan     ICD-10-CM    1. Orthostatic hypotension  I95.1       2. Uncontrolled type 2 diabetes mellitus with hyperglycemia (H)  E11.65 empagliflozin (JARDIANCE) 10 MG TABS tablet     glipiZIDE (GLUCOTROL) 10 MG tablet     metFORMIN (GLUCOPHAGE XR) 500 MG 24 hr tablet     Semaglutide (RYBELSUS) 7 MG tablet      3. CKD (chronic kidney disease) stage 2, GFR 60-89 ml/min  N18.2       4. Chronic systolic heart failure (H) - ECHO 6/22/2022 at [facility] -- EF 25-30%  I50.22 empagliflozin (JARDIANCE) 10 MG TABS tablet     metoprolol succinate ER (TOPROL XL) 50 MG 24 hr tablet     sacubitril-valsartan (ENTRESTO) 24-26 MG per tablet     Semaglutide (RYBELSUS) 7 MG tablet     spironolactone (ALDACTONE) 25 MG tablet      5. Coronary artery disease involving native coronary artery of native heart without angina pectoris  I25.10 atorvastatin (LIPITOR) 40 MG tablet      6. Class 1 obesity due to excess calories with serious comorbidity and body mass index (BMI) of 34.0 to 34.9 in adult  E66.09     Z68.34       7. Hypothyroidism due to acquired atrophy of thyroid  E03.4 levothyroxine (SYNTHROID/LEVOTHROID) 112 MCG tablet     DISCONTINUED: levothyroxine (SYNTHROID/LEVOTHROID) 100 MCG tablet      8. Mixed hyperlipidemia  E78.2       9. 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 History of gout  Z87.39 allopurinol (ZYLOPRIM) 100 MG tablet      11. 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 Vaccine counseling  Z71.85 respiratory syncytial virus vaccine, bivalent (ABRYSVO) injection         Patient presents for follow-up multiple issues.    Orthostatic hypotension.  Blood pressures are quite low today.  He has been having dizziness and lightheadedness.  No syncope.  Medications reduced today.  See below.    Uncontrolled type 2 diabetes with hyperglycemia.  Blood sugars remain elevated but  have improved.  Stopped titrating his insulin dosing up because he began feeling dizzy and lightheaded.  Hopefully with the medication reduction for his blood pressure and heart failure medications, will allow him to feel better and increase his insulin dosing tomorrow.  Has noted to be eating fewer cookies and sugar and carbohydrates.    Coronary disease without exertional angina.  Has chronic systolic heart failure with ejection fraction of 25 to 30%.  Echocardiogram back in June 2022.  Continues to follow with [facility].  Medications reduce today.  See above.    History of TIA/stroke, left vertebral artery thrombosis.  Continues with Lipitor 40 mg daily.  Plavix 75 mg daily.  No changes for now per refill sent to pharmacy.    History of gout, has hyperuricemia.  Doing much better with allopurinol daily.  Needs refills.  No recent gout attacks.    Chemotherapy induced neuropathy, chronic, has numbness especially in the hands and fingers.  No recent changes.    RSV shot ordered for scheduling in the fall.    HYPERTENSION - Ongoing. Blood pressure is currently well LOW. Medication side effects: Dizziness and Lightheadedness. Denies syncope.  Reduce medication dosing.     -- Drop of the spironolactone down to half tablet -take this every other day.  -- Reduce the Entresto down to half tablet twice daily.    Medication list reviewed/updated. Refills completed as needed.      MIXED HYPERLIPIDEMIA.  Ongoing. LDL is at goal: Yes. Triglycerides are at goal: No.  Hopefully lifestyle modifications will improve cholesterol levels, otherwise will consider additional medication dose adjustments or medication changes.  Medication side effects reported: None.   Continue current medications for now. Medication list reviewed/updated. Refills completed as needed.  Recent Labs   Lab Test 05/29/24  0909 02/27/24  0820   CHOL 172 169   HDL 33* 31*   LDL 79 99   TRIG 299* 194*        HYPOTHYROIDISM - Patient has a longstanding  history of hypothyroidism. Reports doing reasonably well. Reports: fatigue and weight gain.  Continue: Synthroid with dose increase from 100 mcg --> to 112 mcg daily oral thyroid replacement.  Denies adverse medication reactions or side effects.                       TSH   Date Value Ref Range Status   05/29/2024 7.74 (H) 0.30 - 4.20 uIU/mL Final   10/28/2022 5.49 (H) 0.40 - 4.00 mU/L Final        OBESITY - Ongoing.  (See Encounter Diagnosis list above for obesity class / severity).  - Encourage continued maintenance / improvement in diet and exercise.   - Consider Nutrition / Dietician appointment.  - Weight loss would improve Hypertension, Cholesterol and Diabetes.      Chronic Kidney Disease, Stage 2 (GFR 60-89), chronic, ongoing.  Kidney function had been slowly declining.  Encourage NSAID avoidance.       Vaccine counseling completed.  Encourage routine / annual vaccinations.    Type 2 Diabetes Mellitus, with nephropathy, with neuropathy, Reports ongoing/previous: numbness and burning.  Blood sugar control has been poor with moderate hyperglycemia. Doing well with diet, oral agents, exercise, and intensive insulin injection program.  Medication list reviewed/updated. Refills completed as needed.    Complicating factors include but are not limited to: hypertension, hyperlipidemia, neuropathy, chronic kidney disease, CAD/PVD, and CVA.     Recent Labs   Lab Test 05/29/24  0909 03/04/24  0839 02/27/24  0820 11/21/23  0803   A1C 10.4*  --  12.0* 11.8*   LDL 79  --  99 81   HDL 33*  --  31* 30*   TRIG 299*  --  194* 295*   ALT 10 9 9 8   CR 1.01 0.88 0.97 1.10   GFRESTIMATED 76 88 80 69   POTASSIUM 4.7 4.6 4.9 4.0   TSH 7.74*  --  7.28* 8.18*   T4 0.91  --  0.91 0.94   WBC 10.2 7.5 7.7 8.1   HGB 13.7 13.4 13.1* 13.9    242 233 233   ALBUMIN 4.1 4.0 3.9 3.8     Hemoglobin A1c is high and not at goal.  LDL is at goal.  HDL is low and and triglycerides are high and not at goal.  ALT normal.  Creatinine is  "worsened slightly.  TSH is elevated.  Free T4 barely within normal range.  Synthroid dose increased today.  CBC normal.  Albumin normal.     The longitudinal plan of care for the diagnosis(es)/condition(s) as documented were addressed during this visit. Due to the added complexity in care, I will continue to support Andrez in the subsequent management and with ongoing continuity of care.               BMI  Estimated body mass index is 34.36 kg/m  as calculated from the following:    Height as of this encounter: 1.727 m (5' 8\").    Weight as of this encounter: 102.5 kg (226 lb).           Return in about 3 months (around 8/29/2024) for - Labs every 91+ days, with DM Follow-up, Same Day or 1-2 days later with Dr. Roberts.      Bk Roberts MD  [facility] AND [facility]    Review of Systems   Constitutional:  Positive for fatigue. Negative for chills and fever.   HENT:  Positive for hearing loss. Negative for congestion, ear pain and sore throat.    Eyes:  Negative for pain and visual disturbance.   Respiratory:  Negative for cough and shortness of breath.    Cardiovascular:  Negative for chest pain, palpitations and peripheral edema.   Gastrointestinal:  Positive for diarrhea and nausea. Negative for abdominal pain, constipation, heartburn and hematochezia.   Endocrine: Positive for polydipsia and polyuria.   Genitourinary:  Positive for impotence. Negative for dysuria, frequency, genital sores, hematuria, penile discharge and urgency.   Musculoskeletal:  Negative for arthralgias, joint swelling and myalgias.   Skin:  Negative for rash.   Allergic/Immunologic: Negative for immunocompromised state.   Neurological:  Positive for dizziness, weakness and headaches. Negative for syncope and paresthesias.   Hematological:  Bruises/bleeds easily.   Psychiatric/Behavioral:  Positive for sleep disturbance. Negative for mood changes. The patient is not nervous/anxious.        Subjective   Andrez is a 78 year old, " "presenting for the following health issues:  Diabetes (Follow up)        5/29/2024     9:50 AM   Additional Questions   Roomed by Anaya Shepherd LPN     History of Present Illness       Diabetes:   He presents for follow up of diabetes.    He is not checking blood glucose.         He has no concerns regarding his diabetes at this time.  He is having numbness in feet and blurry vision.                              Objective    BP (!) 84/64   Pulse 85   Temp 96.9  F (36.1  C) (Temporal)   Resp 16   Ht 1.727 m (5' 8\")   Wt 102.5 kg (226 lb)   SpO2 96%   BMI 34.36 kg/m    Body mass index is 34.36 kg/m .  Physical Exam  Constitutional:       General: He is not in acute distress.     Appearance: He is well-developed. He is obese. He is not diaphoretic.   HENT:      Head: Normocephalic and atraumatic.   Eyes:      General: No scleral icterus.     Conjunctiva/sclera: Conjunctivae normal.   Neck:      Vascular: No carotid bruit.   Cardiovascular:      Rate and Rhythm: Normal rate and regular rhythm.      Pulses: Normal pulses.   Pulmonary:      Effort: Pulmonary effort is normal.      Breath sounds: Normal breath sounds.   Abdominal:      Palpations: Abdomen is soft.      Tenderness: There is no abdominal tenderness.   Musculoskeletal:         General: No deformity.      Cervical back: Neck supple.      Right lower leg: No edema.      Left lower leg: No edema.   Lymphadenopathy:      Cervical: No cervical adenopathy.   Skin:     General: Skin is warm and dry.      Findings: Bruising present. No rash.   Neurological:      Mental Status: He is alert. Mental status is at baseline.   Psychiatric:         Mood and Affect: Mood normal.         Behavior: Behavior normal.                    Signed Electronically by: Bk Roberts MD    "

## 2024-05-29 NOTE — PATIENT INSTRUCTIONS
Keep up the hard work with your blood sugar levels.... A1c is dropping nicely.     Continue to taper up dosing of your Insulin.   Goal Glucose of 80 - 120 in the morning.       Blood pressure is too low today.  We need to reduce your medications.    -- Drop of the spironolactone down to half tablet -take this every other day.    -- Reduce the Entresto down to half tablet twice daily.    Continue Toprol-XL, metoprolol at 50 mg once daily.  This is primarily to control your heart rate.        Thyroid levels are abnormal.  TSH is high and your free T4 is borderline low.  Recommend dose increase of your Synthroid up to 112 mcg daily.  This should hopefully help with your tiredness and low blood pressure as well.      Labs are otherwise relatively stable.       Results for orders placed or performed in visit on 05/29/24   Urine Macroscopic with reflex to Microscopic     Status: Abnormal   Result Value Ref Range    Color Urine Light Yellow Colorless, Straw, Light Yellow, Yellow    Appearance Urine Clear Clear    Glucose Urine 70 (A) Negative mg/dL    Bilirubin Urine Negative Negative    Ketones Urine Negative Negative mg/dL    Specific Gravity Urine 1.016 1.000 - 1.030    Blood Urine Negative Negative    pH Urine 5.0 5.0 - 9.0    Protein Albumin Urine Negative Negative mg/dL    Urobilinogen Urine Normal Normal, 2.0 mg/dL    Nitrite Urine Negative Negative    Leukocyte Esterase Urine Negative Negative    Narrative    Microscopic not indicated   TSH with free T4 reflex     Status: Abnormal   Result Value Ref Range    TSH 7.74 (H) 0.30 - 4.20 uIU/mL   Hemoglobin A1c     Status: Abnormal   Result Value Ref Range    Hemoglobin A1C 10.4 (H) 4.0 - 6.2 %   CBC with platelets     Status: Normal   Result Value Ref Range    WBC Count 10.2 4.0 - 11.0 10e3/uL    RBC Count 4.57 4.40 - 5.90 10e6/uL    Hemoglobin 13.7 13.3 - 17.7 g/dL    Hematocrit 42.4 40.0 - 53.0 %    MCV 93 78 - 100 fL    MCH 30.0 26.5 - 33.0 pg    MCHC 32.3 31.5 -  36.5 g/dL    RDW 14.4 10.0 - 15.0 %    Platelet Count 235 150 - 450 10e3/uL   Albumin Random Urine Quantitative with Creat Ratio     Status: Abnormal   Result Value Ref Range    Creatinine Urine mg/dL 143.8 mg/dL    Albumin Urine mg/L 28.9 mg/L    Albumin Urine mg/g Cr 20.10 (H) 0.00 - 17.00 mg/g Cr   Lipid Profile     Status: Abnormal   Result Value Ref Range    Cholesterol 172 <200 mg/dL    Triglycerides 299 (H) <150 mg/dL    Direct Measure HDL 33 (L) >=40 mg/dL    LDL Cholesterol Calculated 79 <=100 mg/dL    Non HDL Cholesterol 139 (H) <130 mg/dL    Patient Fasting > 8hrs? Yes     Narrative    Cholesterol  Desirable:  <200 mg/dL    Triglycerides  Normal:  Less than 150 mg/dL  Borderline High:  150-199 mg/dL  High:  200-499 mg/dL  Very High:  Greater than or equal to 500 mg/dL    Direct Measure HDL  Female:  Greater than or equal to 50 mg/dL   Male:  Greater than or equal to 40 mg/dL    LDL Cholesterol  Desirable:  <100mg/dL  Above Desirable:  100-129 mg/dL   Borderline High:  130-159 mg/dL   High:  160-189 mg/dL   Very High:  >= 190 mg/dL    Non HDL Cholesterol  Desirable:  130 mg/dL  Above Desirable:  130-159 mg/dL  Borderline High:  160-189 mg/dL  High:  190-219 mg/dL  Very High:  Greater than or equal to 220 mg/dL   Comprehensive metabolic panel     Status: Abnormal   Result Value Ref Range    Sodium 140 135 - 145 mmol/L    Potassium 4.7 3.4 - 5.3 mmol/L    Carbon Dioxide (CO2) 24 22 - 29 mmol/L    Anion Gap 13 7 - 15 mmol/L    Urea Nitrogen 18.3 8.0 - 23.0 mg/dL    Creatinine 1.01 0.67 - 1.17 mg/dL    GFR Estimate 76 >60 mL/min/1.73m2    Calcium 9.5 8.8 - 10.2 mg/dL    Chloride 103 98 - 107 mmol/L    Glucose 264 (H) 70 - 99 mg/dL    Alkaline Phosphatase 123 40 - 150 U/L    AST 10 0 - 45 U/L    ALT 10 0 - 70 U/L    Protein Total 6.8 6.4 - 8.3 g/dL    Albumin 4.1 3.5 - 5.2 g/dL    Bilirubin Total 0.8 <=1.2 mg/dL    Patient Fasting > 8hrs? Yes    T4 free     Status: Normal   Result Value Ref Range    Free T4  0.91 0.90 - 1.70 ng/dL      Aspects of Diabetes:   Recent Labs   Lab Test 05/29/24  0909 03/04/24  0839 02/27/24  0820 11/21/23  0803   A1C 10.4*  --  12.0* 11.8*   LDL 79  --  99 81   HDL 33*  --  31* 30*   TRIG 299*  --  194* 295*   ALT 10 9 9 8   CR 1.01 0.88 0.97 1.10   GFRESTIMATED 76 88 80 69   POTASSIUM 4.7 4.6 4.9 4.0   TSH 7.74*  --  7.28* 8.18*   T4 0.91  --  0.91 0.94   WBC 10.2 7.5 7.7 8.1   HGB 13.7 13.4 13.1* 13.9    242 233 233   ALBUMIN 4.1 4.0 3.9 3.8      Hemoglobin A1c  Goal range is under 8%. Best is 6.5 to 7   Blood Pressure 84/64 Goal to keep less than 140/90   Tobacco  reports that he quit smoking about 39 years ago. His smoking use included cigarettes. He started smoking about 65 years ago. He has a 30 pack-year smoking history. He has been exposed to tobacco smoke. He has never used smokeless tobacco. Goal to abstain from tobacco   Aspirin or Plavix Anti-platelet therapy Aspirin or Plavix reduces risk of heart disease and stroke  -- sometimes used with other blood thinners, depending on bleeding risk and risk factors.    ACE/ARB Specific blood pressure meds These medications can reduce risk of kidney disease   Cholesterol Statins (Lipitor, Crestor, vs others) Statins reduce risk of heart disease and stroke   Eye Exam -- Do Yearly -- Annual diabetic eye exam   Healthy weight Wt Readings from Last 4 Encounters:   05/29/24 102.5 kg (226 lb)   03/11/24 104.8 kg (231 lb)   02/27/24 104.8 kg (231 lb)   11/21/23 102.5 kg (226 lb)      Body mass index is 34.36 kg/m .  Goal BMI under 30, best is under 25.      -- Trying to exercise daily (goal at least 20 min/day) with moderate aerobic activity   -- Eat healthy (resources from ADA at http://www.diabetes.org/)   -- Taking good care of my feet. Consider seeing the Podiatrist   -- Check blood sugars as directed, record in log book and bring to every appointment    Insurance companies are grading you and I on your blood sugar control -- Goal is to  get your A1c down to 7.9% or lower and NO Smoking!  -- Medicare and most insurance companies, will only cover Hemoglobin A1c labs to be rechecked every 91+ days.      Return for Diabetes labs and clinic follow-up appointment every 3 to 4 months.    Schedule lab only appointment --- A few days AFTER: 8/27/24   Schedule clinic appointment with Dr. Roberts -- Same day as labs, or 1-2 days later.

## 2024-09-03 ENCOUNTER — LAB (OUTPATIENT)
Dept: LAB | Facility: OTHER | Age: 79
End: 2024-09-03
Attending: NURSE PRACTITIONER
Payer: MEDICARE

## 2024-09-03 DIAGNOSIS — C18.7 ADENOCARCINOMA OF SIGMOID COLON (H): ICD-10-CM

## 2024-09-03 LAB
ALBUMIN SERPL BCG-MCNC: 3.9 G/DL (ref 3.5–5.2)
ALP SERPL-CCNC: 106 U/L (ref 40–150)
ALT SERPL W P-5'-P-CCNC: 9 U/L (ref 0–70)
ANION GAP SERPL CALCULATED.3IONS-SCNC: 10 MMOL/L (ref 7–15)
AST SERPL W P-5'-P-CCNC: 21 U/L (ref 0–45)
BASOPHILS # BLD AUTO: 0 10E3/UL (ref 0–0.2)
BASOPHILS NFR BLD AUTO: 0 %
BILIRUB SERPL-MCNC: 0.5 MG/DL
BUN SERPL-MCNC: 13 MG/DL (ref 8–23)
CALCIUM SERPL-MCNC: 8.8 MG/DL (ref 8.8–10.4)
CEA SERPL-MCNC: 0.7 NG/ML
CHLORIDE SERPL-SCNC: 107 MMOL/L (ref 98–107)
CREAT SERPL-MCNC: 0.96 MG/DL (ref 0.67–1.17)
EGFRCR SERPLBLD CKD-EPI 2021: 81 ML/MIN/1.73M2
EOSINOPHIL # BLD AUTO: 0.2 10E3/UL (ref 0–0.7)
EOSINOPHIL NFR BLD AUTO: 2 %
ERYTHROCYTE [DISTWIDTH] IN BLOOD BY AUTOMATED COUNT: 14 % (ref 10–15)
GLUCOSE SERPL-MCNC: 199 MG/DL (ref 70–99)
HCO3 SERPL-SCNC: 25 MMOL/L (ref 22–29)
HCT VFR BLD AUTO: 39.9 % (ref 40–53)
HGB BLD-MCNC: 12.8 G/DL (ref 13.3–17.7)
IMM GRANULOCYTES # BLD: 0 10E3/UL
IMM GRANULOCYTES NFR BLD: 0 %
LDH SERPL L TO P-CCNC: 208 U/L (ref 0–250)
LYMPHOCYTES # BLD AUTO: 1.4 10E3/UL (ref 0.8–5.3)
LYMPHOCYTES NFR BLD AUTO: 19 %
MCH RBC QN AUTO: 29.6 PG (ref 26.5–33)
MCHC RBC AUTO-ENTMCNC: 32.1 G/DL (ref 31.5–36.5)
MCV RBC AUTO: 92 FL (ref 78–100)
MONOCYTES # BLD AUTO: 0.9 10E3/UL (ref 0–1.3)
MONOCYTES NFR BLD AUTO: 12 %
NEUTROPHILS # BLD AUTO: 4.8 10E3/UL (ref 1.6–8.3)
NEUTROPHILS NFR BLD AUTO: 66 %
NRBC # BLD AUTO: 0 10E3/UL
NRBC BLD AUTO-RTO: 0 /100
PLATELET # BLD AUTO: 213 10E3/UL (ref 150–450)
POTASSIUM SERPL-SCNC: 4.6 MMOL/L (ref 3.4–5.3)
PROT SERPL-MCNC: 6.6 G/DL (ref 6.4–8.3)
RBC # BLD AUTO: 4.33 10E6/UL (ref 4.4–5.9)
SODIUM SERPL-SCNC: 142 MMOL/L (ref 135–145)
WBC # BLD AUTO: 7.2 10E3/UL (ref 4–11)

## 2024-09-03 PROCEDURE — 83615 LACTATE (LD) (LDH) ENZYME: CPT | Mod: ZL

## 2024-09-03 PROCEDURE — 36415 COLL VENOUS BLD VENIPUNCTURE: CPT | Mod: ZL

## 2024-09-03 PROCEDURE — 85025 COMPLETE CBC W/AUTO DIFF WBC: CPT | Mod: ZL

## 2024-09-03 PROCEDURE — 80053 COMPREHEN METABOLIC PANEL: CPT | Mod: ZL

## 2024-09-03 PROCEDURE — 82378 CARCINOEMBRYONIC ANTIGEN: CPT | Mod: ZL

## 2024-09-05 ENCOUNTER — OFFICE VISIT (OUTPATIENT)
Dept: INTERNAL MEDICINE | Facility: OTHER | Age: 79
End: 2024-09-05
Attending: INTERNAL MEDICINE
Payer: MEDICARE

## 2024-09-05 ENCOUNTER — LAB (OUTPATIENT)
Dept: LAB | Facility: OTHER | Age: 79
End: 2024-09-05
Attending: INTERNAL MEDICINE
Payer: COMMERCIAL

## 2024-09-05 VITALS
RESPIRATION RATE: 16 BRPM | HEART RATE: 87 BPM | BODY MASS INDEX: 33.97 KG/M2 | SYSTOLIC BLOOD PRESSURE: 110 MMHG | OXYGEN SATURATION: 96 % | DIASTOLIC BLOOD PRESSURE: 62 MMHG | WEIGHT: 223.4 LBS | TEMPERATURE: 97.8 F

## 2024-09-05 DIAGNOSIS — I25.10 CORONARY ARTERY DISEASE INVOLVING NATIVE CORONARY ARTERY OF NATIVE HEART WITHOUT ANGINA PECTORIS: ICD-10-CM

## 2024-09-05 DIAGNOSIS — E03.4 HYPOTHYROIDISM DUE TO ACQUIRED ATROPHY OF THYROID: ICD-10-CM

## 2024-09-05 DIAGNOSIS — E66.811 CLASS 1 OBESITY DUE TO EXCESS CALORIES WITH SERIOUS COMORBIDITY AND BODY MASS INDEX (BMI) OF 34.0 TO 34.9 IN ADULT: ICD-10-CM

## 2024-09-05 DIAGNOSIS — E78.2 MIXED HYPERLIPIDEMIA: ICD-10-CM

## 2024-09-05 DIAGNOSIS — E11.65 UNCONTROLLED TYPE 2 DIABETES MELLITUS WITH HYPERGLYCEMIA (H): ICD-10-CM

## 2024-09-05 DIAGNOSIS — Z86.73 HISTORY OF TIA (TRANSIENT ISCHEMIC ATTACK) AND STROKE: ICD-10-CM

## 2024-09-05 DIAGNOSIS — E66.09 CLASS 1 OBESITY DUE TO EXCESS CALORIES WITH SERIOUS COMORBIDITY AND BODY MASS INDEX (BMI) OF 34.0 TO 34.9 IN ADULT: ICD-10-CM

## 2024-09-05 DIAGNOSIS — I50.22 CHRONIC SYSTOLIC HEART FAILURE (H): ICD-10-CM

## 2024-09-05 DIAGNOSIS — N18.2 CKD (CHRONIC KIDNEY DISEASE) STAGE 2, GFR 60-89 ML/MIN: ICD-10-CM

## 2024-09-05 DIAGNOSIS — G62.0 NEUROPATHY DUE TO CHEMOTHERAPEUTIC DRUG (H): ICD-10-CM

## 2024-09-05 DIAGNOSIS — E11.65 UNCONTROLLED TYPE 2 DIABETES MELLITUS WITH HYPERGLYCEMIA (H): Primary | ICD-10-CM

## 2024-09-05 DIAGNOSIS — T45.1X5A NEUROPATHY DUE TO CHEMOTHERAPEUTIC DRUG (H): ICD-10-CM

## 2024-09-05 DIAGNOSIS — Z77.098 HISTORY OF AGENT ORANGE EXPOSURE: ICD-10-CM

## 2024-09-05 LAB
ALBUMIN SERPL BCG-MCNC: 3.9 G/DL (ref 3.5–5.2)
ALBUMIN UR-MCNC: 50 MG/DL
ALP SERPL-CCNC: 108 U/L (ref 40–150)
ALT SERPL W P-5'-P-CCNC: 9 U/L (ref 0–70)
ANION GAP SERPL CALCULATED.3IONS-SCNC: 13 MMOL/L (ref 7–15)
APPEARANCE UR: CLEAR
AST SERPL W P-5'-P-CCNC: 12 U/L (ref 0–45)
BACTERIA #/AREA URNS HPF: ABNORMAL /HPF
BILIRUB SERPL-MCNC: 0.6 MG/DL
BILIRUB UR QL STRIP: NEGATIVE
BUN SERPL-MCNC: 12.7 MG/DL (ref 8–23)
CALCIUM SERPL-MCNC: 8.9 MG/DL (ref 8.8–10.4)
CHLORIDE SERPL-SCNC: 105 MMOL/L (ref 98–107)
CHOLEST SERPL-MCNC: 122 MG/DL
COLOR UR AUTO: ABNORMAL
CREAT SERPL-MCNC: 0.92 MG/DL (ref 0.67–1.17)
CREAT UR-MCNC: 183.7 MG/DL
EGFRCR SERPLBLD CKD-EPI 2021: 85 ML/MIN/1.73M2
ERYTHROCYTE [DISTWIDTH] IN BLOOD BY AUTOMATED COUNT: 13.9 % (ref 10–15)
FASTING STATUS PATIENT QL REPORTED: YES
FASTING STATUS PATIENT QL REPORTED: YES
GLUCOSE SERPL-MCNC: 172 MG/DL (ref 70–99)
GLUCOSE UR STRIP-MCNC: NEGATIVE MG/DL
GRANULAR CAST: 10 /LPF
HBA1C MFR BLD: 9.5 % (ref 4–6.2)
HCO3 SERPL-SCNC: 23 MMOL/L (ref 22–29)
HCT VFR BLD AUTO: 40.1 % (ref 40–53)
HDLC SERPL-MCNC: 31 MG/DL
HGB BLD-MCNC: 13.1 G/DL (ref 13.3–17.7)
HGB UR QL STRIP: NEGATIVE
HYALINE CASTS: 247 /LPF
KETONES UR STRIP-MCNC: NEGATIVE MG/DL
LDLC SERPL CALC-MCNC: 52 MG/DL
LEUKOCYTE ESTERASE UR QL STRIP: NEGATIVE
MCH RBC QN AUTO: 29.9 PG (ref 26.5–33)
MCHC RBC AUTO-ENTMCNC: 32.7 G/DL (ref 31.5–36.5)
MCV RBC AUTO: 92 FL (ref 78–100)
MICROALBUMIN UR-MCNC: 243.8 MG/L
MICROALBUMIN/CREAT UR: 132.72 MG/G CR (ref 0–17)
MUCOUS THREADS #/AREA URNS LPF: PRESENT /LPF
NITRATE UR QL: NEGATIVE
NONHDLC SERPL-MCNC: 91 MG/DL
PH UR STRIP: 5 [PH] (ref 5–9)
PLATELET # BLD AUTO: 233 10E3/UL (ref 150–450)
POTASSIUM SERPL-SCNC: 3.8 MMOL/L (ref 3.4–5.3)
PROT SERPL-MCNC: 6.6 G/DL (ref 6.4–8.3)
RBC # BLD AUTO: 4.38 10E6/UL (ref 4.4–5.9)
RBC URINE: <1 /HPF
SODIUM SERPL-SCNC: 141 MMOL/L (ref 135–145)
SP GR UR STRIP: 1.02 (ref 1–1.03)
T4 FREE SERPL-MCNC: 0.83 NG/DL (ref 0.9–1.7)
TRIGL SERPL-MCNC: 197 MG/DL
TSH SERPL DL<=0.005 MIU/L-ACNC: 6.86 UIU/ML (ref 0.3–4.2)
UROBILINOGEN UR STRIP-MCNC: NORMAL MG/DL
WBC # BLD AUTO: 8.9 10E3/UL (ref 4–11)
WBC URINE: 5 /HPF

## 2024-09-05 PROCEDURE — 81001 URINALYSIS AUTO W/SCOPE: CPT | Mod: ZL

## 2024-09-05 PROCEDURE — 82043 UR ALBUMIN QUANTITATIVE: CPT | Mod: ZL

## 2024-09-05 PROCEDURE — 84443 ASSAY THYROID STIM HORMONE: CPT | Mod: ZL

## 2024-09-05 PROCEDURE — 85027 COMPLETE CBC AUTOMATED: CPT | Mod: ZL

## 2024-09-05 PROCEDURE — 82465 ASSAY BLD/SERUM CHOLESTEROL: CPT | Mod: ZL

## 2024-09-05 PROCEDURE — 83718 ASSAY OF LIPOPROTEIN: CPT | Mod: ZL

## 2024-09-05 PROCEDURE — 36415 COLL VENOUS BLD VENIPUNCTURE: CPT | Mod: ZL

## 2024-09-05 PROCEDURE — G0463 HOSPITAL OUTPT CLINIC VISIT: HCPCS

## 2024-09-05 PROCEDURE — G2211 COMPLEX E/M VISIT ADD ON: HCPCS | Performed by: INTERNAL MEDICINE

## 2024-09-05 PROCEDURE — 84439 ASSAY OF FREE THYROXINE: CPT | Mod: ZL

## 2024-09-05 PROCEDURE — 83036 HEMOGLOBIN GLYCOSYLATED A1C: CPT | Mod: ZL

## 2024-09-05 PROCEDURE — 82040 ASSAY OF SERUM ALBUMIN: CPT | Mod: ZL

## 2024-09-05 PROCEDURE — 99214 OFFICE O/P EST MOD 30 MIN: CPT | Performed by: INTERNAL MEDICINE

## 2024-09-05 RX ORDER — LEVOTHYROXINE SODIUM 137 UG/1
137 TABLET ORAL DAILY
Qty: 90 TABLET | Refills: 1 | Status: SHIPPED | OUTPATIENT
Start: 2024-09-05

## 2024-09-05 ASSESSMENT — ENCOUNTER SYMPTOMS
MYALGIAS: 0
JOINT SWELLING: 0
SHORTNESS OF BREATH: 0
DYSURIA: 0
FEVER: 0
HEADACHES: 1
HEARTBURN: 0
SLEEP DISTURBANCE: 1
FREQUENCY: 0
HEMATURIA: 0
PALPITATIONS: 0
POLYDIPSIA: 1
COUGH: 0
DIZZINESS: 1
PARESTHESIAS: 0
CONSTIPATION: 0
BRUISES/BLEEDS EASILY: 1
EYE PAIN: 0
CHILLS: 0
ABDOMINAL PAIN: 0
HEMATOCHEZIA: 0
DIARRHEA: 1
ARTHRALGIAS: 0
SORE THROAT: 0
NAUSEA: 1
FATIGUE: 1
NERVOUS/ANXIOUS: 0
WEAKNESS: 1

## 2024-09-05 ASSESSMENT — PAIN SCALES - GENERAL: PAINLEVEL: NO PAIN (0)

## 2024-09-05 NOTE — PATIENT INSTRUCTIONS
Blood pressure is well controlled.     Diabetes needs a little help.... A1c is better, but still high.     -- Try increase your Lantus up to 25 units in the morning....     TSH is high and Free T4 is low... need more synthroid replacement.   -- increase dose to 137 mcg daily.       -- Check on Colonoscopy.       Medications refilled.   Labs are otherwise relatively stable.     Results for orders placed or performed in visit on 09/05/24   Comprehensive metabolic panel     Status: Abnormal   Result Value Ref Range    Sodium 141 135 - 145 mmol/L    Potassium 3.8 3.4 - 5.3 mmol/L    Carbon Dioxide (CO2) 23 22 - 29 mmol/L    Anion Gap 13 7 - 15 mmol/L    Urea Nitrogen 12.7 8.0 - 23.0 mg/dL    Creatinine 0.92 0.67 - 1.17 mg/dL    GFR Estimate 85 >60 mL/min/1.73m2    Calcium 8.9 8.8 - 10.4 mg/dL    Chloride 105 98 - 107 mmol/L    Glucose 172 (H) 70 - 99 mg/dL    Alkaline Phosphatase 108 40 - 150 U/L    AST 12 0 - 45 U/L    ALT 9 0 - 70 U/L    Protein Total 6.6 6.4 - 8.3 g/dL    Albumin 3.9 3.5 - 5.2 g/dL    Bilirubin Total 0.6 <=1.2 mg/dL    Patient Fasting > 8hrs? Yes    Lipid Profile     Status: Abnormal   Result Value Ref Range    Cholesterol 122 <200 mg/dL    Triglycerides 197 (H) <150 mg/dL    Direct Measure HDL 31 (L) >=40 mg/dL    LDL Cholesterol Calculated 52 <=100 mg/dL    Non HDL Cholesterol 91 <130 mg/dL    Patient Fasting > 8hrs? Yes     Narrative    Cholesterol  Desirable:  <200 mg/dL    Triglycerides  Normal:  Less than 150 mg/dL  Borderline High:  150-199 mg/dL  High:  200-499 mg/dL  Very High:  Greater than or equal to 500 mg/dL    Direct Measure HDL  Female:  Greater than or equal to 50 mg/dL   Male:  Greater than or equal to 40 mg/dL    LDL Cholesterol  Desirable:  <100mg/dL  Above Desirable:  100-129 mg/dL   Borderline High:  130-159 mg/dL   High:  160-189 mg/dL   Very High:  >= 190 mg/dL    Non HDL Cholesterol  Desirable:  130 mg/dL  Above Desirable:  130-159 mg/dL  Borderline High:  160-189  mg/dL  High:  190-219 mg/dL  Very High:  Greater than or equal to 220 mg/dL   Albumin Random Urine Quantitative with Creat Ratio     Status: Abnormal   Result Value Ref Range    Creatinine Urine mg/dL 183.7 mg/dL    Albumin Urine mg/L 243.8 mg/L    Albumin Urine mg/g Cr 132.72 (H) 0.00 - 17.00 mg/g Cr   CBC with platelets     Status: Abnormal   Result Value Ref Range    WBC Count 8.9 4.0 - 11.0 10e3/uL    RBC Count 4.38 (L) 4.40 - 5.90 10e6/uL    Hemoglobin 13.1 (L) 13.3 - 17.7 g/dL    Hematocrit 40.1 40.0 - 53.0 %    MCV 92 78 - 100 fL    MCH 29.9 26.5 - 33.0 pg    MCHC 32.7 31.5 - 36.5 g/dL    RDW 13.9 10.0 - 15.0 %    Platelet Count 233 150 - 450 10e3/uL   Hemoglobin A1c     Status: Abnormal   Result Value Ref Range    Hemoglobin A1C 9.5 (H) 4.0 - 6.2 %   TSH with free T4 reflex     Status: Abnormal   Result Value Ref Range    TSH 6.86 (H) 0.30 - 4.20 uIU/mL   Urine Macroscopic with reflex to Microscopic     Status: Abnormal   Result Value Ref Range    Color Urine Light Yellow Colorless, Straw, Light Yellow, Yellow    Appearance Urine Clear Clear    Glucose Urine Negative Negative mg/dL    Bilirubin Urine Negative Negative    Ketones Urine Negative Negative mg/dL    Specific Gravity Urine 1.017 1.000 - 1.030    Blood Urine Negative Negative    pH Urine 5.0 5.0 - 9.0    Protein Albumin Urine 50 (A) Negative mg/dL    Urobilinogen Urine Normal Normal, 2.0 mg/dL    Nitrite Urine Negative Negative    Leukocyte Esterase Urine Negative Negative    Bacteria Urine Few (A) None Seen /HPF    RBC Urine <1 <=2 /HPF    WBC Urine 5 <=5 /HPF    Mucus Urine Present (A) None Seen /LPF    Hyaline Casts Urine 247 (H) <=2 /LPF    Granular Casts Urine 10 (H) None Seen /LPF   T4 free     Status: Abnormal   Result Value Ref Range    Free T4 0.83 (L) 0.90 - 1.70 ng/dL      Aspects of Diabetes:   Recent Labs   Lab Test 09/05/24  0856 09/03/24  0816 05/29/24  0909 03/04/24  0839 02/27/24  0820   A1C 9.5*  --  10.4*  --  12.0*   LDL 52  --   79  --  99   HDL 31*  --  33*  --  31*   TRIG 197*  --  299*  --  194*   ALT 9 9 10   < > 9   CR 0.92 0.96 1.01   < > 0.97   GFRESTIMATED 85 81 76   < > 80   POTASSIUM 3.8 4.6 4.7   < > 4.9   TSH 6.86*  --  7.74*  --  7.28*   T4 0.83*  --  0.91  --  0.91   WBC 8.9 7.2 10.2   < > 7.7   HGB 13.1* 12.8* 13.7   < > 13.1*    213 235   < > 233   ALBUMIN 3.9 3.9 4.1   < > 3.9    < > = values in this interval not displayed.      Hemoglobin A1c  Goal range is under 8%. Best is 6.5 to 7   Blood Pressure 110/62 Goal to keep less than 140/90   Tobacco  reports that he quit smoking about 39 years ago. His smoking use included cigarettes. He started smoking about 65 years ago. He has a 30 pack-year smoking history. He has been exposed to tobacco smoke. He has never used smokeless tobacco. Goal to abstain from tobacco   Aspirin or Plavix Anti-platelet therapy Aspirin or Plavix reduces risk of heart disease and stroke  -- sometimes used with other blood thinners, depending on bleeding risk and risk factors.    ACE/ARB Specific blood pressure meds These medications can reduce risk of kidney disease   Cholesterol Statins (Lipitor, Crestor, vs others) Statins reduce risk of heart disease and stroke   Eye Exam -- Do Yearly -- Annual diabetic eye exam   Healthy weight Wt Readings from Last 4 Encounters:   09/05/24 101.3 kg (223 lb 6.4 oz)   05/29/24 102.5 kg (226 lb)   03/11/24 104.8 kg (231 lb)   02/27/24 104.8 kg (231 lb)      Body mass index is 33.97 kg/m .  Goal BMI under 30, best is under 25.      -- Trying to exercise daily (goal at least 20 min/day) with moderate aerobic activity   -- Eat healthy (resources from ADA at http://www.diabetes.org/)   -- Taking good care of my feet. Consider seeing the Podiatrist   -- Check blood sugars as directed, record in log book and bring to every appointment    [employer] are grading you and I on your blood sugar control -- Goal is to get your A1c down to 7.9% or lower and NO  Smoking!  -- Medicare and most insurance companies, will only cover Hemoglobin A1c labs to be rechecked every 91+ days.      Return for Diabetes labs and clinic follow-up appointment every 3 to 4 months.    Schedule lab only appointment --- A few days AFTER: 12/4/24   Schedule clinic appointment with Dr. Roberts -- Same day as labs, or 1-2 days later.

## 2024-09-05 NOTE — PROGRESS NOTES
Assessment & Plan     ICD-10-CM    1. Uncontrolled type 2 diabetes mellitus with hyperglycemia (H)  E11.65       2. Chronic systolic heart failure (H) - ECHO 6/22/2022 at [facility] -- EF 25-30%  I50.22       3. CKD (chronic kidney disease) stage 2, GFR 60-89 ml/min  N18.2       4. Class 1 obesity due to excess calories with serious comorbidity and body mass index (BMI) of 34.0 to 34.9 in adult  E66.09     Z68.34       5. Coronary artery disease involving native coronary artery of native heart without angina pectoris  I25.10       6. Hypothyroidism due to acquired atrophy of thyroid  E03.4 levothyroxine (SYNTHROID/LEVOTHROID) 137 MCG tablet      7. Mixed hyperlipidemia  E78.2       8. 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 Personal history of  service - [place] - 100% disability with [employer]  Z91.85       11. History of agent Orange exposure  Z77.098          Patient presents for follow-up multiple issues.    Blood sugars are high and not at goal.  Has type 2 diabetes.  Currently taking Jardiance, glipizide.  Lantus insulin.  Recommend to continue dose increase of insulin to try maintain goal morning glucose levels.  Also taking metformin and 7 mg of semaglutide daily.  Denies hypoglycemia.    Chronic systolic heart failure, last echocardiogram showed ejection fraction of 25 to 30%.  Appears stable at this time.  Has been out of disease.  Denies exertional angina.  Continue current medications.    Chemotherapy-induced neuropathy.  Likely multifactorial with diabetes as well.  Patient has history of lymph node cancer.  Continues to follow with oncology.  Also history of colon cancer.  History of left vertebral artery stroke.  No new or recent symptoms.  Now on Lipitor 40 mg daily, Plavix.    HYPERTENSION - Ongoing. Blood pressure is currently well controlled.   Medication side effects: None. Denies syncope or presyncope.  Continue current medications.   Medication list reviewed/updated. Refills completed as needed.      MIXED HYPERLIPIDEMIA.  Ongoing. LDL is at goal: Yes. Triglycerides are at goal: No.  Hopefully lifestyle modifications will improve cholesterol levels, otherwise will consider additional medication dose adjustments or medication changes.  Medication side effects reported: None.   Continue current medications for now. Medication list reviewed/updated. Refills completed as needed.  Recent Labs   Lab Test 09/05/24  0856 05/29/24  0909   CHOL 122 172   HDL 31* 33*   LDL 52 79   TRIG 197* 299*        HYPOTHYROIDISM - Patient has a longstanding history of hypothyroidism. Reports doing reasonably well. Reports: denies weight changes, heat/cold intolerance, bowel/skin changes or CVS symptoms.      + Reports fatigue    Continue: Synthroid oral thyroid replacement + Dose increase 112 --> 137 mcg daily.      Free T4 is low.     Denies adverse medication reactions or side effects.                       TSH   Date Value Ref Range Status   09/05/2024 6.86 (H) 0.30 - 4.20 uIU/mL Final   10/28/2022 5.49 (H) 0.40 - 4.00 mU/L Final        OBESITY - Ongoing.  (See Encounter Diagnosis list above for obesity class / severity).  - Encourage continued maintenance / improvement in diet and exercise.   - Consider Nutrition / Dietician appointment.  - Weight loss would improve Hypertension, Cholesterol and Diabetes.      Chronic Kidney Disease, Stage 2 (GFR 60-89), chronic, ongoing.  Kidney function had been slowly declining.  Encourage NSAID avoidance.       Vaccine counseling completed.  Encourage routine / annual vaccinations.    Type 2 Diabetes Mellitus, with nephropathy, with neuropathy, Reports ongoing/previous: numbness and burning.  Blood sugar control has been poor with severe hyperglycemia. Doing well with diet, oral agents, exercise, and insulin injections.  Medication  "list reviewed/updated. Refills completed as needed.    Complicating factors include but are not limited to: hypertension, hyperlipidemia, neuropathy, chronic kidney disease, and CAD/PVD, CVA.     Recent Labs   Lab Test 09/05/24  0856 09/03/24  0816 05/29/24  0909 03/04/24  0839 02/27/24  0820   A1C 9.5*  --  10.4*  --  12.0*   LDL 52  --  79  --  99   HDL 31*  --  33*  --  31*   TRIG 197*  --  299*  --  194*   ALT 9 9 10   < > 9   CR 0.92 0.96 1.01   < > 0.97   GFRESTIMATED 85 81 76   < > 80   POTASSIUM 3.8 4.6 4.7   < > 4.9   TSH 6.86*  --  7.74*  --  7.28*   T4 0.83*  --  0.91  --  0.91   WBC 8.9 7.2 10.2   < > 7.7   HGB 13.1* 12.8* 13.7   < > 13.1*    213 235   < > 233   ALBUMIN 3.9 3.9 4.1   < > 3.9    < > = values in this interval not displayed.     Hemoglobin A1c is high and not at goal.  LDL is at goal.  HDL is low.  Triglycerides are high and not at goal.  ALT is normal.  Creatinine is at baseline.  Potassium normal.  TSH is elevated with low free T4.  Hemoglobin is low but slightly improved.  White blood cell count over-the-counter normal.  Albumin normal.     The longitudinal plan of care for the diagnosis(es)/condition(s) as documented were addressed during this visit. Due to the added complexity in care, I will continue to support Carmendulce maria in the subsequent management and with ongoing continuity of care.                 BMI  Estimated body mass index is 33.97 kg/m  as calculated from the following:    Height as of 5/29/24: 1.727 m (5' 8\").    Weight as of this encounter: 101.3 kg (223 lb 6.4 oz).         Return in about 3 months (around 12/5/2024) for - Labs every 91+ days, with DM Follow-up, Same Day or 1-2 days later with Dr. Roberts.      Bk Roberts MD  [facility]    Review of Systems   Constitutional:  Positive for fatigue. Negative for chills and fever.   HENT:  Positive for hearing loss. Negative for congestion, ear pain and sore throat.    Eyes:  Negative for pain and " visual disturbance.   Respiratory:  Negative for cough and shortness of breath.    Cardiovascular:  Negative for chest pain, palpitations and peripheral edema.   Gastrointestinal:  Positive for diarrhea and nausea. Negative for abdominal pain, constipation, heartburn and hematochezia.   Endocrine: Positive for polydipsia and polyuria.   Genitourinary:  Positive for impotence. Negative for dysuria, frequency, genital sores, hematuria, penile discharge and urgency.   Musculoskeletal:  Negative for arthralgias, joint swelling and myalgias.   Skin:  Negative for rash.   Allergic/Immunologic: Negative for immunocompromised state.   Neurological:  Positive for dizziness, weakness and headaches. Negative for syncope and paresthesias.   Hematological:  Bruises/bleeds easily.   Psychiatric/Behavioral:  Positive for sleep disturbance. Negative for mood changes. The patient is not nervous/anxious.        Aristides Maguire is a 78 year old, presenting for the following health issues:  Diabetes        9/5/2024     9:47 AM   Additional Questions   Roomed by karina gudino lpn   Accompanied by self         9/5/2024     9:47 AM   Patient Reported Additional Medications   Patient reports taking the following new medications N/A     HPI       Diabetes Follow-up    How often are you checking your blood sugar? A few times a month  What time of day are you checking your blood sugars (select all that apply)?   N/A  Have you had any blood sugars above 200?  N/A  Have you had any blood sugars below 70?  Not applicable  What symptoms do you notice when your blood sugar is low?  Dizzy and Blurred vision  What concerns do you have today about your diabetes? None and Other: no   Do you have any of these symptoms? (Select all that apply)  No numbness or tingling in feet.  No redness, sores or blisters on feet.  No complaints of excessive thirst.  No reports of blurry vision.  No significant changes to weight.      BP Readings from Last 2  Encounters:   09/05/24 110/62   05/29/24 (!) 84/64     Hemoglobin A1C (%)   Date Value   09/05/2024 9.5 (H)   05/29/2024 10.4 (H)   05/03/2021 9.6 (H)   01/22/2021 10.9 (H)     LDL Cholesterol Calculated (mg/dL)   Date Value   09/05/2024 52   05/29/2024 79   05/03/2021 78   12/18/2017 106 (H)                     Objective    /62 (BP Location: Right arm)   Pulse 87   Temp 97.8  F (36.6  C) (Tympanic)   Resp 16   Wt 101.3 kg (223 lb 6.4 oz)   SpO2 96%   BMI 33.97 kg/m    Body mass index is 33.97 kg/m .  Physical Exam  Constitutional:       General: He is not in acute distress.     Appearance: He is well-developed. He is obese. He is not diaphoretic.   HENT:      Head: Normocephalic and atraumatic.   Eyes:      General: No scleral icterus.     Conjunctiva/sclera: Conjunctivae normal.   Neck:      Vascular: No carotid bruit.   Cardiovascular:      Rate and Rhythm: Normal rate and regular rhythm.      Pulses: Normal pulses.   Pulmonary:      Effort: Pulmonary effort is normal.      Breath sounds: Normal breath sounds.   Abdominal:      Palpations: Abdomen is soft.      Tenderness: There is no abdominal tenderness.   Musculoskeletal:         General: No deformity.      Cervical back: Neck supple.      Right lower leg: No edema.      Left lower leg: No edema.   Lymphadenopathy:      Cervical: No cervical adenopathy.   Skin:     General: Skin is warm and dry.      Findings: Bruising present. No rash.   Neurological:      Mental Status: He is alert. Mental status is at baseline.   Psychiatric:         Mood and Affect: Mood normal.         Behavior: Behavior normal.                    Signed Electronically by: Bk Roberts MD

## 2024-09-05 NOTE — NURSING NOTE
"Chief Complaint   Patient presents with    Diabetes       Initial /62 (BP Location: Right arm)   Pulse 87   Temp 97.8  F (36.6  C) (Tympanic)   Resp 16   Wt 101.3 kg (223 lb 6.4 oz)   SpO2 96%   BMI 33.97 kg/m   Estimated body mass index is 33.97 kg/m  as calculated from the following:    Height as of 5/29/24: 1.727 m (5' 8\").    Weight as of this encounter: 101.3 kg (223 lb 6.4 oz).  Medication Review: complete    The next two questions are to help us understand your food security.  If you are feeling you need any assistance in this area, we have resources available to support you today.          5/29/2024   SDOH- Food Insecurity   Within the past 12 months, did you worry that your food would run out before you got money to buy more? N   Within the past 12 months, did the food you bought just not last and you didn t have money to get more? N            Health Care Directive:  Patient does not have a Health Care Directive or Living Will: Discussed advance care planning with patient; however, patient declined at this time.    Phoebe Fischer LPN      "

## 2024-09-10 ENCOUNTER — ONCOLOGY VISIT (OUTPATIENT)
Dept: ONCOLOGY | Facility: OTHER | Age: 79
End: 2024-09-10
Attending: NURSE PRACTITIONER
Payer: COMMERCIAL

## 2024-09-10 VITALS
TEMPERATURE: 97.9 F | BODY MASS INDEX: 34.21 KG/M2 | DIASTOLIC BLOOD PRESSURE: 64 MMHG | OXYGEN SATURATION: 96 % | WEIGHT: 225 LBS | RESPIRATION RATE: 16 BRPM | HEART RATE: 90 BPM | SYSTOLIC BLOOD PRESSURE: 112 MMHG

## 2024-09-10 DIAGNOSIS — R06.01 ORTHOPNEA: ICD-10-CM

## 2024-09-10 DIAGNOSIS — R71.0 DECREASED HEMOGLOBIN: ICD-10-CM

## 2024-09-10 DIAGNOSIS — C18.7 ADENOCARCINOMA OF SIGMOID COLON (H): Primary | ICD-10-CM

## 2024-09-10 PROCEDURE — 99214 OFFICE O/P EST MOD 30 MIN: CPT | Performed by: NURSE PRACTITIONER

## 2024-09-10 PROCEDURE — G0463 HOSPITAL OUTPT CLINIC VISIT: HCPCS

## 2024-09-10 ASSESSMENT — PAIN SCALES - GENERAL: PAINLEVEL: NO PAIN (0)

## 2024-09-10 NOTE — NURSING NOTE
"Oncology Rooming Note    September 10, 2024 8:17 AM   Andrez Olivier is a 78 year old male who presents for:    Chief Complaint   Patient presents with    Oncology Clinic Visit     F/U Colon cancer     Initial Vitals: /64   Pulse 90   Temp 97.9  F (36.6  C) (Tympanic)   Resp 16   Wt 102.1 kg (225 lb)   SpO2 96%   BMI 34.21 kg/m   Estimated body mass index is 34.21 kg/m  as calculated from the following:    Height as of 5/29/24: 1.727 m (5' 8\").    Weight as of this encounter: 102.1 kg (225 lb). Body surface area is 2.21 meters squared.  No Pain (0) Comment: Data Unavailable   No LMP for male patient.  Allergies reviewed: Yes  Medications reviewed: Yes    Medications: Medication refills not needed today.  Pharmacy name entered into [employer]: [id number] - [place], [place] - [address]    Frailty Screening:   Is the patient here for a new oncology consult visit in cancer care? 2. No      Clinical concerns: None       Amaris Ray CMA ([facility])  "

## 2024-09-10 NOTE — PROGRESS NOTES
Oncology Follow-up Visit:  September 10, 2024  Diagnosis:Colon cancer    History Of Present Illness:  Patient presents to the clinic today for followup of colon cancer. Patient was seen in consultation on June 1, 2016. Patient presented to the emergency room on April 25, 2016, with complaints of chest pain radiating down his arms, which was primarily worse with exertion. In the emergency department, he was found to have a hemoglobin of 7.1, and he subsequently was admitted. CBC revealed microcytic indices with an MCV of 62. He was noted to be iron deficient. He was transfused 2 units of packed red cells and was ruled out for MI. He was seen by Dr. Solano, who performed colonoscopy and was noted to have a mass in the sigmoid colon that was consistent with adenocarcinoma. He also had multiple adenomatous polyps. The patient was admitted on May 11, 2016, for a sigmoid colectomy. This was performed on May 17, 2016. Pathology revealed in the sigmoid colon a mass consistent with moderately differentiated adenocarcinoma. The tumor size was 2 x 1 x 0.7 cm. The tumor invaded the muscularis propria, 2/8 lymph nodes were positive for metastatic carcinoma. Margins were negative for tumor. The grade of the tumor was ruled as moderately differentiated. The patient was staged pathologic stage T2N1b as part of the postop evaluation. The patient had a CT abdomen and pelvis on May 12, 2016. This revealed that there were 2 nonspecific low-attenuation lesions in the liver. Metastasis could not be excluded. There was no lymphadenopathy or additional findings to suggest metastases. The patient had a preop CEA, which was less than 3. PET scan was performed on Lluvia 15, 2016, and was essentially negative for metastatic disease. Lesions seen on prior PET in the liver were not hypermetabolic. We felt that the patient had stage III disease and he would be a candidate for adjuvant chemotherapy. When patient was seen on June 28, 2016, the plan was  to start FOLFOX x12 cycles. When he was seen on November 17, 2016, he did note some cold-induced neuropathic symptoms. He completed 12 cycles of FOLFOX. His last chemotherapy was administered on December 7, 2016.  He did have a PET scan done after 12 cycles of chemotherapy, and this came back essentially negative for metastatic disease. He had staging studies on April 6, 2017 including CT abdomen and pelvis, which was essentially negative. CT of the chest was negative. The patient also underwent a colonoscopy in May 2017, which revealed a tubular adenoma at the hepatic flexure of the colon. Patient had a colonoscopy done performed by Dr. Solano on 06/01/2020 and was found to have 6 polyps.  All of them were revealing tubular adenoma, consistent with advanced adenoma. One was at the 30 cm, and the other was in the mid transverse colon. This was based on polyp size being larger than 10 mm. The patient had a colonoscopy in June 2021, which revealed multiple tubular adenomas with a 3 year follow up recommended.  CT chest, abdomen, pelvis was done on 3/7/22 and was stable.  Patient had a stroke in June 2022. He has been following with cardiology. CT scans from 3/4/24 are unchanged. Patient had labs done on 9/3/24 showing a normal tumor marker. Hemoglobin and hematocrit are decreased. All other labs are stable.     Review Of Systems:  Review Of Systems  Eyes/Ears/Nose/Throat: denies new vision changes  Respiratory: reports occasional shortness of breath when laying flat, denies cough  Cardiovascular: reports occasional chest pain that comes and goes, no pain at this time  Gastrointestinal: reports chronic loose stools, denies melena, no rectal bleeding  Genitourinary: denies dysuria  Musculoskeletal:denies new bone pain  Neurologic: reports occasional dizziness, stable, denies headaches or balance issues  Hematologic/Lymphatic/Immunologic: denies fevers, no recent illness      There are no exam notes on file for this  visit.    Past medical, social, surgical, and family histories reviewed.    Allergies:  Allergies as of 09/10/2024 - Reviewed 09/05/2024   Allergen Reaction Noted    Aspirin  05/26/2017    Canagliflozin  05/18/2016    Morphine  05/18/2016       Current Medications:  Current Outpatient Medications   Medication Sig Dispense Refill    alcohol swab prep pads Use to swab area of injection/teto as directed. 100 each 3    allopurinol (ZYLOPRIM) 100 MG tablet Take 1 tablet (100 mg) by mouth 2 times daily 180 tablet 4    atorvastatin (LIPITOR) 40 MG tablet Take 1 tablet (40 mg) by mouth daily 90 tablet 4    Cholecalciferol (VITAMIN D3) 50 MCG (2000 UT) CAPS Take 1 capsule by mouth daily      clopidogrel (PLAVIX) 75 MG tablet Take 1 tablet (75 mg) by mouth daily 90 tablet 4    empagliflozin (JARDIANCE) 10 MG TABS tablet Take 1 tablet (10 mg) by mouth daily - for diabetes 90 tablet 4    glipiZIDE (GLUCOTROL) 10 MG tablet Take 2 tablets (20 mg) by mouth 2 times daily (before meals) 360 tablet 1    insulin glargine (LANTUS SOLOSTAR) 100 UNIT/ML pen Inject 15-25 Units Subcutaneous every morning Increase by 3 units every 5 days - goal AM glucose  -- Dose Increase 2/27/2024 30 mL 4    insulin pen needle (ULTICARE MINI) 31G X 6 MM miscellaneous Use 1 pen needles daily 100 each 4    levothyroxine (SYNTHROID/LEVOTHROID) 137 MCG tablet Take 1 tablet (137 mcg) by mouth daily. -- Dose Increase 9/5/2024 90 tablet 1    metFORMIN (GLUCOPHAGE XR) 500 MG 24 hr tablet Take 2-4 tablets (1,000-2,000 mg) by mouth daily (with dinner) -- Substitute for cheap, generic, preferred 24 hr Metformin 360 tablet 1    metoprolol succinate ER (TOPROL XL) 50 MG 24 hr tablet Take 1 tablet (50 mg) by mouth daily 90 tablet 4    pantoprazole (PROTONIX) 40 MG EC tablet Take 1 tablet (40 mg) by mouth daily 90 tablet 1    sacubitril-valsartan (ENTRESTO) 24-26 MG per tablet Take 0.5 tablets by mouth 2 times daily -- Dose Reduced 5/29/2024 90 tablet 4     Semaglutide (RYBELSUS) 7 MG tablet Take 1 tablet (7 mg) by mouth daily - for diabetes (didn't tolerate 14 mg tablet daily) 90 tablet 4    spironolactone (ALDACTONE) 25 MG tablet Take 0.5 tablets (12.5 mg) by mouth every other day -- Dose Reduced 5/29/2024 45 tablet 4    vitamin B-12 (CYANOCOBALAMIN) 2500 MCG sublingual tablet Take 1 tablet (2,500 mcg) by mouth daily - for low B12 90 tablet 4        Physical Exam:  There were no vitals taken for this visit.    GENERAL APPEARANCE: 78 year old male, alert and no distress     NECK: no adenopathy, no asymmetry or masses     LYMPHATICS: No cervical, supraclavicular, axillary lymphadenopathy     RESP: lungs clear to auscultation - no rales, rhonchi or wheezes     CARDIOVASCULAR: regular rates and rhythm, normal S1 S2     ABDOMEN:  soft, nontender, bowel sounds normal     MUSCULOSKELETAL: extremities normal- no gross deformities noted     SKIN: no suspicious lesions or rashes on exposed skin     PSYCHIATRIC: mentation appears normal and affect normal    Laboratory/Imaging Studies  Lab on 09/05/2024   Component Date Value Ref Range Status    Sodium 09/05/2024 141  135 - 145 mmol/L Final    Potassium 09/05/2024 3.8  3.4 - 5.3 mmol/L Final    Carbon Dioxide (CO2) 09/05/2024 23  22 - 29 mmol/L Final    Anion Gap 09/05/2024 13  7 - 15 mmol/L Final    Urea Nitrogen 09/05/2024 12.7  8.0 - 23.0 mg/dL Final    Creatinine 09/05/2024 0.92  0.67 - 1.17 mg/dL Final    GFR Estimate 09/05/2024 85  >60 mL/min/1.73m2 Final    eGFR calculated using 2021 CKD-EPI equation.    Calcium 09/05/2024 8.9  8.8 - 10.4 mg/dL Final    Reference intervals for this test were updated on 7/16/2024 to reflect our healthy population more accurately. There may be differences in the flagging of prior results with similar values performed with this method. Those prior results can be interpreted in the context of the updated reference intervals.    Chloride 09/05/2024 105  98 - 107 mmol/L Final    Glucose  09/05/2024 172 (H)  70 - 99 mg/dL Final    Alkaline Phosphatase 09/05/2024 108  40 - 150 U/L Final    AST 09/05/2024 12  0 - 45 U/L Final    ALT 09/05/2024 9  0 - 70 U/L Final    Protein Total 09/05/2024 6.6  6.4 - 8.3 g/dL Final    Albumin 09/05/2024 3.9  3.5 - 5.2 g/dL Final    Bilirubin Total 09/05/2024 0.6  <=1.2 mg/dL Final    Patient Fasting > 8hrs? 09/05/2024 Yes   Final    Cholesterol 09/05/2024 122  <200 mg/dL Final    Triglycerides 09/05/2024 197 (H)  <150 mg/dL Final    Direct Measure HDL 09/05/2024 31 (L)  >=40 mg/dL Final    LDL Cholesterol Calculated 09/05/2024 52  <=100 mg/dL Final    Non HDL Cholesterol 09/05/2024 91  <130 mg/dL Final    Patient Fasting > 8hrs? 09/05/2024 Yes   Final    Creatinine Urine mg/dL 09/05/2024 183.7  mg/dL Final    The reference ranges have not been established in urine creatinine. The results should be integrated into the clinical context for interpretation.    Albumin Urine mg/L 09/05/2024 243.8  mg/L Final    The reference ranges have not been established in urine albumin. The results should be integrated into the clinical context for interpretation.    Albumin Urine mg/g Cr 09/05/2024 132.72 (H)  0.00 - 17.00 mg/g Cr Final    Microalbuminuria is defined as an albumin:creatinine ratio of 17 to 299 for males and 25 to 299 for females. A ratio of albumin:creatinine of 300 or higher is indicative of overt proteinuria.  Due to biologic variability, positive results should be confirmed by a second, first-morning random or 24-hour timed urine specimen. If there is discrepancy, a third specimen is recommended. When 2 out of 3 results are in the microalbuminuria range, this is evidence for incipient nephropathy and warrants increased efforts at glucose control, blood pressure control, and institution of therapy with an angiotensin-converting-enzyme (ACE) inhibitor (if the patient can tolerate it).      WBC Count 09/05/2024 8.9  4.0 - 11.0 10e3/uL Final    RBC Count 09/05/2024  4.38 (L)  4.40 - 5.90 10e6/uL Final    Hemoglobin 09/05/2024 13.1 (L)  13.3 - 17.7 g/dL Final    Hematocrit 09/05/2024 40.1  40.0 - 53.0 % Final    MCV 09/05/2024 92  78 - 100 fL Final    MCH 09/05/2024 29.9  26.5 - 33.0 pg Final    MCHC 09/05/2024 32.7  31.5 - 36.5 g/dL Final    RDW 09/05/2024 13.9  10.0 - 15.0 % Final    Platelet Count 09/05/2024 233  150 - 450 10e3/uL Final    Hemoglobin A1C 09/05/2024 9.5 (H)  4.0 - 6.2 % Final    TSH 09/05/2024 6.86 (H)  0.30 - 4.20 uIU/mL Final    Color Urine 09/05/2024 Light Yellow  Colorless, Straw, Light Yellow, Yellow Final    Appearance Urine 09/05/2024 Clear  Clear Final    Glucose Urine 09/05/2024 Negative  Negative mg/dL Final    Bilirubin Urine 09/05/2024 Negative  Negative Final    Ketones Urine 09/05/2024 Negative  Negative mg/dL Final    Specific Gravity Urine 09/05/2024 1.017  1.000 - 1.030 Final    Blood Urine 09/05/2024 Negative  Negative Final    pH Urine 09/05/2024 5.0  5.0 - 9.0 Final    Protein Albumin Urine 09/05/2024 50 (A)  Negative mg/dL Final    Urobilinogen Urine 09/05/2024 Normal  Normal, 2.0 mg/dL Final    Nitrite Urine 09/05/2024 Negative  Negative Final    Leukocyte Esterase Urine 09/05/2024 Negative  Negative Final    Bacteria Urine 09/05/2024 Few (A)  None Seen /HPF Final    RBC Urine 09/05/2024 <1  <=2 /HPF Final    WBC Urine 09/05/2024 5  <=5 /HPF Final    Mucus Urine 09/05/2024 Present (A)  None Seen /LPF Final    Hyaline Casts Urine 09/05/2024 247 (H)  <=2 /LPF Final    Granular Casts Urine 09/05/2024 10 (H)  None Seen /LPF Final    Free T4 09/05/2024 0.83 (L)  0.90 - 1.70 ng/dL Final   Lab on 09/03/2024   Component Date Value Ref Range Status    Sodium 09/03/2024 142  135 - 145 mmol/L Final    Potassium 09/03/2024 4.6  3.4 - 5.3 mmol/L Final    Carbon Dioxide (CO2) 09/03/2024 25  22 - 29 mmol/L Final    Anion Gap 09/03/2024 10  7 - 15 mmol/L Final    Urea Nitrogen 09/03/2024 13.0  8.0 - 23.0 mg/dL Final    Creatinine 09/03/2024 0.96  0.67 -  1.17 mg/dL Final    GFR Estimate 09/03/2024 81  >60 mL/min/1.73m2 Final    eGFR calculated using 2021 CKD-EPI equation.    Calcium 09/03/2024 8.8  8.8 - 10.4 mg/dL Final    Reference intervals for this test were updated on 7/16/2024 to reflect our healthy population more accurately. There may be differences in the flagging of prior results with similar values performed with this method. Those prior results can be interpreted in the context of the updated reference intervals.    Chloride 09/03/2024 107  98 - 107 mmol/L Final    Glucose 09/03/2024 199 (H)  70 - 99 mg/dL Final    Alkaline Phosphatase 09/03/2024 106  40 - 150 U/L Final    AST 09/03/2024 21  0 - 45 U/L Final    ALT 09/03/2024 9  0 - 70 U/L Final    Protein Total 09/03/2024 6.6  6.4 - 8.3 g/dL Final    Albumin 09/03/2024 3.9  3.5 - 5.2 g/dL Final    Bilirubin Total 09/03/2024 0.5  <=1.2 mg/dL Final    Lactate Dehydrogenase 09/03/2024 208  0 - 250 U/L Final    CEA 09/03/2024 0.7  ng/mL Final    Nonsmoker (past/never) <=5.0 ng/mL   Current smoker <=6.5 ng/mL     This result is obtained using the [employer] CEA method on the lili [id number] immunoassay analyzer. Results obtained with different assay methods or kits cannot be used interchangeably.    WBC Count 09/03/2024 7.2  4.0 - 11.0 10e3/uL Final    RBC Count 09/03/2024 4.33 (L)  4.40 - 5.90 10e6/uL Final    Hemoglobin 09/03/2024 12.8 (L)  13.3 - 17.7 g/dL Final    Hematocrit 09/03/2024 39.9 (L)  40.0 - 53.0 % Final    MCV 09/03/2024 92  78 - 100 fL Final    MCH 09/03/2024 29.6  26.5 - 33.0 pg Final    MCHC 09/03/2024 32.1  31.5 - 36.5 g/dL Final    RDW 09/03/2024 14.0  10.0 - 15.0 % Final    Platelet Count 09/03/2024 213  150 - 450 10e3/uL Final    % Neutrophils 09/03/2024 66  % Final    % Lymphocytes 09/03/2024 19  % Final    % Monocytes 09/03/2024 12  % Final    % Eosinophils 09/03/2024 2  % Final    % Basophils 09/03/2024 0  % Final    % Immature Granulocytes 09/03/2024 0  % Final    NRBCs per 100 WBC  09/03/2024 0  <1 /100 Final    Absolute Neutrophils 09/03/2024 4.8  1.6 - 8.3 10e3/uL Final    Absolute Lymphocytes 09/03/2024 1.4  0.8 - 5.3 10e3/uL Final    Absolute Monocytes 09/03/2024 0.9  0.0 - 1.3 10e3/uL Final    Absolute Eosinophils 09/03/2024 0.2  0.0 - 0.7 10e3/uL Final    Absolute Basophils 09/03/2024 0.0  0.0 - 0.2 10e3/uL Final    Absolute Immature Granulocytes 09/03/2024 0.0  <=0.4 10e3/uL Final    Absolute NRBCs 09/03/2024 0.0  10e3/uL Final        ASSESSMENT/PLAN:  1.Stage III, moderately differentiated adenocarcinoma of the sigmoid colon with 2 out of 11 lymph nodes positive for metastatic disease consistent with pathologic T2 N1b M0 colon cancer.  PET scan was negative for metastatic disease. The patient was started on adjuvant FOLFOX chemotherapy and completed 12 cycles. PET scan did not reveal any evidence of metastatic disease.The patient had a colonoscopy in June 2021, which revealed multiple tubular adenomas with a 3 year follow up recommended. CT of the chest, abdomen and pelvis done on 3/4/24 was negative for metastatic disease. Labs from 9/3/24 show a normal tumor marker. Hemoglobin is decreased. All other labs are stable. We will see the patient in 6 months with CBC, CMP, LDH, CEA and CT scans. Patient is overdue for his 3 year colonoscopy, referral placed     2. Decreased hemoglobin. Labs done on 9/3/24 show a hemoglobin of 12.8. Patient denies any signs of bleeding. He is overdue for his 3 year colonoscopy. Referral placed today for colonoscopy.     3. Orthopnea. Patient reports shortness of breath when laying flat. He states this has been ongoing for a few years. We did discuss that this is something that requires further evaluation. Patient was advised to discuss this with his primary provider.        Thirty eight minutes spent with this encounter with time spent reviewing patient records, counseling patient regarding disease process, interpretation and review of labs with patient,  discussing the need for colonoscopy, discussing orthopnea and need for evaluation with primary provider, obtaining a review of systems, performing a physical exam, discussing plan for ongoing follow up, ordering tests and referral, documenting in EHR and coordination of care

## 2024-09-12 ENCOUNTER — TELEPHONE (OUTPATIENT)
Dept: SURGERY | Facility: OTHER | Age: 79
End: 2024-09-12
Payer: COMMERCIAL

## 2024-09-12 NOTE — TELEPHONE ENCOUNTER
[facility] Diagnostic Referral    Patient has a referral for a C-Scope with a diagnosis of Adenocarcinoma of sigmoid colon (H)  Decreased hemoglobin.  (He is also due for 3 year follow up.)  Referral is marked as needing to be completed in the next 1-2 weeks.  Would it be okay to add a 9th to one of Dr. Solano's Mondays?    Please advise.    Thank you,  Olga Boswell on 9/12/2024 at 2:29 PM

## 2024-09-23 DIAGNOSIS — Z12.11 ENCOUNTER FOR SCREENING COLONOSCOPY: Primary | ICD-10-CM

## 2024-09-23 RX ORDER — BISACODYL 5 MG/1
TABLET, DELAYED RELEASE ORAL
Qty: 2 TABLET | Refills: 0 | Status: SHIPPED | OUTPATIENT
Start: 2024-09-30

## 2024-09-23 RX ORDER — POLYETHYLENE GLYCOL 3350, SODIUM CHLORIDE, SODIUM BICARBONATE, POTASSIUM CHLORIDE 420; 11.2; 5.72; 1.48 G/4L; G/4L; G/4L; G/4L
4000 POWDER, FOR SOLUTION ORAL ONCE
Qty: 4000 ML | Refills: 0 | Status: SHIPPED | OUTPATIENT
Start: 2024-09-30 | End: 2024-09-30

## 2024-09-23 NOTE — TELEPHONE ENCOUNTER
Screening Questions for the Scheduling of Screening Colonoscopies   (If Colonoscopy is diagnostic, Provider should review the chart before scheduling.)  Are you younger than 45 or older than 80?  NO  Do you take aspirin or fish oil?  NO (if yes, tell patient to stop 1 week prior to Colonoscopy)  Do you take warfarin (Coumadin), clopidogrel (Plavix), apixaban (Eliquis), dabigatram (Pradaxa), rivaroxaban (Xarelto) or any blood thinner? PLAVIX  Do you take semaglutide (Ozempic or Wegovy), tirzepatide (Mounjaro or Zepbound), liraglutide (Victoza), or dulaglutide (Trulicity)? NO  Do you use oxygen or a CPAP at home?  NO  Do you have kidney disease? NO  Are you on dialysis? NO  Have you had a stroke or heart attack in the last year? NO  Have you had a stent in your heart or any blood vessel in the last year? NO  Have you had a transplant of any organ? NO  Have you had a colonoscopy or upper endoscopy (EGD) before? YES         When?  2021  Date of scheduled Colonoscopy. 10/07/2024  Provider DOUGLAS  [facility] - Patient is requesting SuPrep

## 2024-09-24 PROBLEM — I65.02: Status: RESOLVED | Noted: 2022-06-24 | Resolved: 2024-09-24

## 2024-09-24 PROBLEM — G45.9 TIA (TRANSIENT ISCHEMIC ATTACK): Status: RESOLVED | Noted: 2022-06-24 | Resolved: 2024-09-24

## 2024-09-24 RX ORDER — SODIUM, POTASSIUM,MAG SULFATES 17.5-3.13G
1 SOLUTION, RECONSTITUTED, ORAL ORAL 2 TIMES DAILY
Qty: 354 ML | Refills: 0 | Status: SHIPPED | OUTPATIENT
Start: 2024-09-24

## 2024-09-24 NOTE — TELEPHONE ENCOUNTER
Patient requesting Suprep. Wrong prep ordered. Will t'up medication and send to provider.     Nataliya Samuel RN  ....................  9/24/2024   10:33 AM

## 2024-11-19 ENCOUNTER — TELEPHONE (OUTPATIENT)
Dept: SURGERY | Facility: OTHER | Age: 79
End: 2024-11-19
Payer: COMMERCIAL

## 2024-11-19 NOTE — TELEPHONE ENCOUNTER
"Received a fax from [facility] in regards to Rx they received for PEG 3350 KCL NA Bicarb NACL solution    Note from pharmacy:  \"Hi There. The patient will not take this prep. He is requesting the low volume prep. Would you consider sending that over to us? Thank you\"    Marina Joy RN on 11/19/2024 at 1:45 PM        "

## 2024-11-19 NOTE — TELEPHONE ENCOUNTER
Called the pharmacy and they said this was taken care of. No further steps needed at this time. Nataliya Samuel RN  ....................  11/19/2024   2:13 PM

## 2024-11-22 ENCOUNTER — HOSPITAL ENCOUNTER (OUTPATIENT)
Facility: OTHER | Age: 79
Discharge: HOME | End: 2024-11-22
Attending: SURGERY | Admitting: SURGERY
Payer: MEDICARE

## 2024-11-22 VITALS
DIASTOLIC BLOOD PRESSURE: 71 MMHG | TEMPERATURE: 97 F | OXYGEN SATURATION: 97 % | HEART RATE: 105 BPM | SYSTOLIC BLOOD PRESSURE: 112 MMHG | RESPIRATION RATE: 16 BRPM

## 2024-11-22 PROBLEM — K57.30 DIVERTICULOSIS OF COLON: Status: RESOLVED | Noted: 2021-07-19 | Resolved: 2024-11-22

## 2024-11-22 LAB — GLUCOSE BLDC GLUCOMTR-MCNC: 106 MG/DL (ref 70–99)

## 2024-11-22 PROCEDURE — 45380 COLONOSCOPY AND BIOPSY: CPT | Mod: PT | Performed by: SURGERY

## 2024-11-22 PROCEDURE — 999N000010 HC STATISTIC ANES STAT CODE-CRNA PER MINUTE: Performed by: SURGERY

## 2024-11-22 PROCEDURE — 88305 TISSUE EXAM BY PATHOLOGIST: CPT

## 2024-11-22 PROCEDURE — 82962 GLUCOSE BLOOD TEST: CPT

## 2024-11-22 PROCEDURE — 45378 DIAGNOSTIC COLONOSCOPY: CPT | Performed by: SURGERY

## 2024-11-22 PROCEDURE — 258N000003 HC RX IP 258 OP 636: Performed by: SURGERY

## 2024-11-22 RX ORDER — NALOXONE HYDROCHLORIDE 0.4 MG/ML
0.4 INJECTION, SOLUTION INTRAMUSCULAR; INTRAVENOUS; SUBCUTANEOUS
Status: DISCONTINUED | OUTPATIENT
Start: 2024-11-22 | End: 2024-11-22 | Stop reason: HOSPADM

## 2024-11-22 RX ORDER — NALOXONE HYDROCHLORIDE 0.4 MG/ML
0.2 INJECTION, SOLUTION INTRAMUSCULAR; INTRAVENOUS; SUBCUTANEOUS
Status: DISCONTINUED | OUTPATIENT
Start: 2024-11-22 | End: 2024-11-22 | Stop reason: HOSPADM

## 2024-11-22 RX ORDER — FLUMAZENIL 0.1 MG/ML
0.2 INJECTION, SOLUTION INTRAVENOUS
Status: DISCONTINUED | OUTPATIENT
Start: 2024-11-22 | End: 2024-11-22 | Stop reason: HOSPADM

## 2024-11-22 RX ORDER — SODIUM CHLORIDE, SODIUM LACTATE, POTASSIUM CHLORIDE, CALCIUM CHLORIDE 600; 310; 30; 20 MG/100ML; MG/100ML; MG/100ML; MG/100ML
INJECTION, SOLUTION INTRAVENOUS CONTINUOUS
Status: DISCONTINUED | OUTPATIENT
Start: 2024-11-22 | End: 2024-11-22 | Stop reason: HOSPADM

## 2024-11-22 RX ORDER — LIDOCAINE 40 MG/G
CREAM TOPICAL
Status: DISCONTINUED | OUTPATIENT
Start: 2024-11-22 | End: 2024-11-22 | Stop reason: HOSPADM

## 2024-11-22 RX ORDER — ONDANSETRON 2 MG/ML
4 INJECTION INTRAMUSCULAR; INTRAVENOUS
Status: DISCONTINUED | OUTPATIENT
Start: 2024-11-22 | End: 2024-11-22 | Stop reason: HOSPADM

## 2024-11-22 RX ADMIN — SODIUM CHLORIDE, POTASSIUM CHLORIDE, SODIUM LACTATE AND CALCIUM CHLORIDE: 600; 310; 30; 20 INJECTION, SOLUTION INTRAVENOUS at 07:29

## 2024-11-22 ASSESSMENT — ACTIVITIES OF DAILY LIVING (ADL)
ADLS_ACUITY_SCORE: 0

## 2024-11-22 NOTE — DISCHARGE INSTRUCTIONS
Clips usually fall off within 10 days, if concerning bleeding occurs around that time, please seek medical assistance.

## 2024-11-22 NOTE — H&P
History and Physical    CHIEF COMPLAINT / REASON FOR PROCEDURE:  h/o polyps and colon cancer    PERTINENT HISTORY   Patient is a 79 year old male who presents today for screening colonoscopy for h/o polyps and colon cancer.   Last colonoscopy 2021.  Patient has no complaints.    Past Medical History:   Diagnosis Date    Acute myocardial infarction (H)     1989,MI at age 44, s/p angioplasty, [facility]; MI at age 49, s/p stent placement at [facility]    Atherosclerotic heart disease of native coronary artery without angina pectoris     2/1/2013,BRYON to,mid RCA (angina and abnormal myoview), [facility]    Diverticulosis of large intestine without perforation or abscess without bleeding     5/6/2016    Gastritis, erosive 05/06/2016    Gastro-esophageal reflux disease with esophagitis     5/10/2016    Gout     No Comments Provided    History of colonic polyps     5/6/2016    Malignant neoplasm of sigmoid colon (H)     5/6/2016    Other gastritis without bleeding     5/6/2016    Personal history of nicotine dependence     No Comments Provided    TIA (transient ischemic attack) 06/24/2022    Vertebral artery thrombosis, 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     Past Surgical History:   Procedure Laterality Date    APPENDECTOMY OPEN      at age of 12    COLON SURGERY      5/17/16,Colectomy, Sigmoid    COLONOSCOPY      5/6/16,F/U 2017; Dr Solano    COLONOSCOPY  05/15/2017    05/15/2017,F/U 2020    COLONOSCOPY  06/01/2020    F/U 2021 advanced adenomas    COLONOSCOPY N/A 07/19/2021    F/U 2024 tubular adenomas, H/O colon cancer    ESOPHAGOSCOPY, GASTROSCOPY, DUODENOSCOPY (EGD), COMBINED      5/6/16,EGD; Dr Solano    HEART CATH, ANGIOPLASTY      1989,Stenting coronary artery, presumably LAD    OTHER SURGICAL HISTORY      [id number],OTHER    OTHER SURGICAL HISTORY      2000,[id number],FLEXIBLE SIGMOIDOSCOPY    OTHER SURGICAL HISTORY      2/1/2013,[id number],CARDIAC CATHETERIZATION,BRYON to,mid RCA (angina and abnormal myoview)    OTHER SURGICAL HISTORY      6/28/16,[id number],OTHER,Left,Left  subclavian Power Port       Bleeding tendencies:  No    ALLERGIES/SENSITIVITIES:   Allergies   Allergen Reactions    Aspirin      Other reaction(s): Other - Describe In Comment Field  ---- Has been 4 years since stent - as of 5/2017 - Hold Aspirin while on Plavix for now    Canagliflozin      Other reaction(s): Dizziness    Morphine      Other reaction(s): Other - Describe In Comment Field  Pt had a bad experience at age 49 and would like to avoid.        CURRENT MEDICATIONS:    Prior to Admission medications    Medication Sig Start Date End Date Taking? Authorizing Provider   alcohol swab prep pads Use to swab area of injection/teto as directed. 11/21/23   Bk Roberts MD   allopurinol (ZYLOPRIM) 100 MG tablet Take 1 tablet (100 mg) by mouth 2 times daily 5/29/24   Bk Roberts MD   atorvastatin (LIPITOR) 40 MG tablet Take 1 tablet (40 mg) by mouth daily 5/29/24   Bk Roberts MD   bisacodyl (DULCOLAX) 5 MG EC tablet Take as directed by colonoscopy prep instructions 9/30/24   Baljeet Solano MD   Cholecalciferol (VITAMIN D3) 50 MCG (2000 UT) CAPS Take 1 capsule by mouth daily    Reported, Patient   clopidogrel (PLAVIX) 75 MG tablet Take 1 tablet (75 mg) by mouth daily 2/27/24   Bk Roberts MD   empagliflozin (JARDIANCE) 10 MG TABS tablet Take 1 tablet (10 mg) by mouth daily - for diabetes 5/29/24   Bk Roberts MD   glipiZIDE (GLUCOTROL) 10 MG tablet Take 2 tablets (20 mg) by mouth 2 times daily (before meals) 5/29/24   Bk Roberts MD   insulin glargine (LANTUS SOLOSTAR) 100 UNIT/ML pen Inject 15-25 Units Subcutaneous every morning Increase by 3 units every 5 days - goal AM glucose  -- Dose Increase 2/27/2024  Patient taking differently: Inject 15-25 Units subcutaneously every morning. Increase by 3 units every 5 days - goal AM glucose  -- Dose Increase 2/27/2024  About 22 units 2/27/24   Bk Roberts MD   insulin pen needle (ULTICARE MINI) 31G X 6 MM miscellaneous Use 1 pen  needles daily 11/21/23   Bk Roberts MD   levothyroxine (SYNTHROID/LEVOTHROID) 137 MCG tablet Take 1 tablet (137 mcg) by mouth daily. -- Dose Increase 9/5/2024 9/5/24   Bk Roberts MD   metFORMIN (GLUCOPHAGE XR) 500 MG 24 hr tablet Take 2-4 tablets (1,000-2,000 mg) by mouth daily (with dinner) -- Substitute for cheap, generic, preferred 24 hr Metformin 5/29/24   Bk Roberts MD   metoprolol succinate ER (TOPROL XL) 50 MG 24 hr tablet Take 1 tablet (50 mg) by mouth daily 5/29/24   Bk oRberts MD   Na Sulfate-K Sulfate-Mg Sulf (SUPREP BOWEL PREP KIT) solution Take 177 mLs (1 Bottle) by mouth 2 times daily. Follow colonoscopy prep instructions for Suprep. 9/24/24   Baljeet Solano MD   pantoprazole (PROTONIX) 40 MG EC tablet Take 1 tablet (40 mg) by mouth daily 2/27/24   Bk Roberts MD   sacubitril-valsartan (ENTRESTO) 24-26 MG per tablet Take 0.5 tablets by mouth 2 times daily -- Dose Reduced 5/29/2024 5/29/24   Bk Roberts MD   Semaglutide (RYBELSUS) 7 MG tablet Take 1 tablet (7 mg) by mouth daily - for diabetes (didn't tolerate 14 mg tablet daily) 5/29/24   Bk Roberts MD   spironolactone (ALDACTONE) 25 MG tablet Take 0.5 tablets (12.5 mg) by mouth every other day -- Dose Reduced 5/29/2024  Patient taking differently: Take 12.5 mg by mouth daily. -- Dose Reduced 5/29/2024 5/29/24   Bk Roberts MD   vitamin B-12 (CYANOCOBALAMIN) 2500 MCG sublingual tablet Take 1 tablet (2,500 mcg) by mouth daily - for low B12 2/27/24   Bk Roberts MD       Physical Exam:  BP (!) 78/56   Pulse 111   SpO2 96%   EXAM:  Chest/Respiratory Exam: Normal - Clear to auscultation without rales, rhonchi, or wheezing.  Cardiovascular Exam: regular rate and rhythm        PLAN: COLONOSCOPY .  Patient understands risks of bleeding, perforation, potential inability to reach cecum, aspiration and wishes to proceed. MAC needed for age, ASA III.

## 2024-11-22 NOTE — OP NOTE
PROCEDURE NOTE    DATE OF SERVICE: 11/22/2024    SURGEON: Baljeet Solano MD    PRE-OP DIAGNOSIS:    Screening  History of Colon Cancer  History of Polyps        POST-OP DIAGNOSIS:  Same  Polyps at cecum and HF    PROCEDURE:   Colonoscopy with cold biopsies      ANESTHESIA:  [employer] CRNA    INDICATION FOR THE PROCEDURE: The patient is a 79 year old male with h/o polyps and colon cancer . The patient has no other complaints  . After explaining the risks to include bleeding, perforation, potential inability toreach the cecum, the patient wished to proceed.    PROCEDURE:After adequate sedation, the patient was in the left lateral decubitus position.  Rectal exam was performed.  There was normal tone and no palpable masses and some superficial tears/bleeding.  The colonoscope was introduced into the rectum and advanced to the cecum with Mild difficulty.  The patient's prep was good.  The terminal cecum was reached.  The cecum, ascending, transverse, and descending  colon was with small 0.4 cm polyp in cecum that was removed by Jumbo forceps. A flat 1 cm oblong polyp at HF was removed with several bites of Jumbo forceps after failed attempt at snare even with saline lift. It appeared completely removed. The site was marked with ink and site clipped as well as submucosal fat seen .  The scope was retroflexed in the rectum.  The rectum was unremarkable  .  The scope was straightened and removed.  The patient tolerated the procedure well.     ESTIMATED BLOOD LOSS: none    COMPLICATIONS:  None    TISSUE REMOVED:  Yes    RECOMMEND:      Follow-up pending pathology      Baljeet Solano MD FACS

## 2024-11-22 NOTE — OR NURSING
Patient has been discharged to home at 1015 via ambulatory accompanied by his daughter    Written discharge instructions were provided to patient.  Prescriptions were n/a.  Patient states their pain is 0/10, and denies any nausea or dizziness upon discharge.    Patient and adult caring for them verbalize understanding of discharge instructions including no driving until tomorrow and no longer taking narcotic pain medications - no operating mechanical equipment and no making any important decisions.They understand reason for discharge, and necessary follow-up appointments.       Wendie Flores RN

## 2024-11-26 PROBLEM — K63.5 COLON POLYPS: Status: RESOLVED | Noted: 2020-06-01 | Resolved: 2024-11-26

## 2024-11-26 LAB
PATH REPORT.COMMENTS IMP SPEC: NORMAL
PATH REPORT.FINAL DX SPEC: NORMAL
PATH REPORT.RELEVANT HX SPEC: NORMAL
PHOTO IMAGE: NORMAL

## 2024-12-11 ENCOUNTER — OFFICE VISIT (OUTPATIENT)
Dept: INTERNAL MEDICINE | Facility: OTHER | Age: 79
End: 2024-12-11
Attending: INTERNAL MEDICINE
Payer: MEDICARE

## 2024-12-11 ENCOUNTER — LAB (OUTPATIENT)
Dept: LAB | Facility: OTHER | Age: 79
End: 2024-12-11
Attending: INTERNAL MEDICINE
Payer: MEDICARE

## 2024-12-11 VITALS
BODY MASS INDEX: 33.21 KG/M2 | HEART RATE: 85 BPM | WEIGHT: 218.4 LBS | OXYGEN SATURATION: 96 % | TEMPERATURE: 97.4 F | RESPIRATION RATE: 16 BRPM | SYSTOLIC BLOOD PRESSURE: 98 MMHG | DIASTOLIC BLOOD PRESSURE: 60 MMHG

## 2024-12-11 DIAGNOSIS — E11.65 UNCONTROLLED TYPE 2 DIABETES MELLITUS WITH HYPERGLYCEMIA (H): ICD-10-CM

## 2024-12-11 DIAGNOSIS — K21.00 GASTROESOPHAGEAL REFLUX DISEASE WITH ESOPHAGITIS, UNSPECIFIED WHETHER HEMORRHAGE: ICD-10-CM

## 2024-12-11 DIAGNOSIS — E66.811 CLASS 1 OBESITY DUE TO EXCESS CALORIES WITH SERIOUS COMORBIDITY AND BODY MASS INDEX (BMI) OF 33.0 TO 33.9 IN ADULT: ICD-10-CM

## 2024-12-11 DIAGNOSIS — E11.65 UNCONTROLLED TYPE 2 DIABETES MELLITUS WITH HYPERGLYCEMIA (H): Primary | ICD-10-CM

## 2024-12-11 DIAGNOSIS — I25.10 CORONARY ARTERY DISEASE INVOLVING NATIVE CORONARY ARTERY OF NATIVE HEART WITHOUT ANGINA PECTORIS: ICD-10-CM

## 2024-12-11 DIAGNOSIS — I50.22 CHRONIC SYSTOLIC HEART FAILURE (H): ICD-10-CM

## 2024-12-11 DIAGNOSIS — E66.09 CLASS 1 OBESITY DUE TO EXCESS CALORIES WITH SERIOUS COMORBIDITY AND BODY MASS INDEX (BMI) OF 33.0 TO 33.9 IN ADULT: ICD-10-CM

## 2024-12-11 DIAGNOSIS — Z86.73 HISTORY OF TIA (TRANSIENT ISCHEMIC ATTACK) AND STROKE: ICD-10-CM

## 2024-12-11 DIAGNOSIS — E78.2 MIXED HYPERLIPIDEMIA: ICD-10-CM

## 2024-12-11 DIAGNOSIS — E03.4 HYPOTHYROIDISM DUE TO ACQUIRED ATROPHY OF THYROID: ICD-10-CM

## 2024-12-11 DIAGNOSIS — Z23 NEED FOR INFLUENZA VACCINATION: ICD-10-CM

## 2024-12-11 DIAGNOSIS — N18.2 CKD (CHRONIC KIDNEY DISEASE) STAGE 2, GFR 60-89 ML/MIN: ICD-10-CM

## 2024-12-11 LAB
ALBUMIN SERPL BCG-MCNC: 4 G/DL (ref 3.5–5.2)
ALBUMIN UR-MCNC: ABNORMAL MG/DL
ALP SERPL-CCNC: 114 U/L (ref 40–150)
ALT SERPL W P-5'-P-CCNC: 9 U/L (ref 0–70)
ANION GAP SERPL CALCULATED.3IONS-SCNC: 6 MMOL/L (ref 7–15)
APPEARANCE UR: CLEAR
AST SERPL W P-5'-P-CCNC: 12 U/L (ref 0–45)
BILIRUB SERPL-MCNC: 0.5 MG/DL
BILIRUB UR QL STRIP: NEGATIVE
BUN SERPL-MCNC: 14.9 MG/DL (ref 8–23)
CALCIUM SERPL-MCNC: 9.2 MG/DL (ref 8.8–10.4)
CHLORIDE SERPL-SCNC: 105 MMOL/L (ref 98–107)
CHOLEST SERPL-MCNC: 165 MG/DL
COLOR UR AUTO: YELLOW
CREAT SERPL-MCNC: 0.97 MG/DL (ref 0.67–1.17)
CREAT UR-MCNC: 209 MG/DL
EGFRCR SERPLBLD CKD-EPI 2021: 79 ML/MIN/1.73M2
ERYTHROCYTE [DISTWIDTH] IN BLOOD BY AUTOMATED COUNT: 14.6 % (ref 10–15)
EST. AVERAGE GLUCOSE BLD GHB EST-MCNC: 200 MG/DL
FASTING STATUS PATIENT QL REPORTED: YES
FASTING STATUS PATIENT QL REPORTED: YES
GLUCOSE SERPL-MCNC: 184 MG/DL (ref 70–99)
GLUCOSE UR STRIP-MCNC: NEGATIVE MG/DL
HBA1C MFR BLD: 8.6 %
HCO3 SERPL-SCNC: 28 MMOL/L (ref 22–29)
HCT VFR BLD AUTO: 40.4 % (ref 40–53)
HDLC SERPL-MCNC: 36 MG/DL
HGB BLD-MCNC: 12.8 G/DL (ref 13.3–17.7)
HGB UR QL STRIP: NEGATIVE
KETONES UR STRIP-MCNC: ABNORMAL MG/DL
LDLC SERPL CALC-MCNC: 89 MG/DL
LEUKOCYTE ESTERASE UR QL STRIP: NEGATIVE
MCH RBC QN AUTO: 29 PG (ref 26.5–33)
MCHC RBC AUTO-ENTMCNC: 31.7 G/DL (ref 31.5–36.5)
MCV RBC AUTO: 92 FL (ref 78–100)
MICROALBUMIN UR-MCNC: 45 MG/L
MICROALBUMIN/CREAT UR: 21.53 MG/G CR (ref 0–17)
NITRATE UR QL: NEGATIVE
NONHDLC SERPL-MCNC: 129 MG/DL
PH UR STRIP: 5.5 [PH] (ref 5–9)
PLATELET # BLD AUTO: 242 10E3/UL (ref 150–450)
POTASSIUM SERPL-SCNC: 4.6 MMOL/L (ref 3.4–5.3)
PROT SERPL-MCNC: 6.6 G/DL (ref 6.4–8.3)
RBC # BLD AUTO: 4.41 10E6/UL (ref 4.4–5.9)
SODIUM SERPL-SCNC: 139 MMOL/L (ref 135–145)
SP GR UR STRIP: >=1.03 (ref 1–1.03)
T4 FREE SERPL-MCNC: 0.95 NG/DL (ref 0.9–1.7)
TRIGL SERPL-MCNC: 200 MG/DL
TSH SERPL DL<=0.005 MIU/L-ACNC: 6.68 UIU/ML (ref 0.3–4.2)
UROBILINOGEN UR STRIP-MCNC: NORMAL MG/DL
WBC # BLD AUTO: 7.8 10E3/UL (ref 4–11)

## 2024-12-11 PROCEDURE — G0008 ADMIN INFLUENZA VIRUS VAC: HCPCS

## 2024-12-11 PROCEDURE — 82310 ASSAY OF CALCIUM: CPT | Mod: ZL

## 2024-12-11 PROCEDURE — 80053 COMPREHEN METABOLIC PANEL: CPT | Mod: ZL

## 2024-12-11 PROCEDURE — 84443 ASSAY THYROID STIM HORMONE: CPT | Mod: ZL

## 2024-12-11 PROCEDURE — 36415 COLL VENOUS BLD VENIPUNCTURE: CPT | Mod: ZL

## 2024-12-11 PROCEDURE — 82570 ASSAY OF URINE CREATININE: CPT | Mod: ZL

## 2024-12-11 PROCEDURE — 90662 IIV NO PRSV INCREASED AG IM: CPT

## 2024-12-11 PROCEDURE — 80061 LIPID PANEL: CPT | Mod: ZL

## 2024-12-11 PROCEDURE — 83036 HEMOGLOBIN GLYCOSYLATED A1C: CPT | Mod: ZL

## 2024-12-11 PROCEDURE — 85027 COMPLETE CBC AUTOMATED: CPT | Mod: ZL

## 2024-12-11 PROCEDURE — 82043 UR ALBUMIN QUANTITATIVE: CPT | Mod: ZL

## 2024-12-11 PROCEDURE — 84439 ASSAY OF FREE THYROXINE: CPT | Mod: ZL

## 2024-12-11 PROCEDURE — 81003 URINALYSIS AUTO W/O SCOPE: CPT | Mod: ZL

## 2024-12-11 PROCEDURE — G0463 HOSPITAL OUTPT CLINIC VISIT: HCPCS | Mod: 25

## 2024-12-11 PROCEDURE — 82040 ASSAY OF SERUM ALBUMIN: CPT | Mod: ZL

## 2024-12-11 RX ORDER — GLIPIZIDE 10 MG/1
20 TABLET ORAL
Qty: 360 TABLET | Refills: 1 | Status: SHIPPED | OUTPATIENT
Start: 2024-12-11

## 2024-12-11 RX ORDER — PANTOPRAZOLE SODIUM 40 MG/1
40 TABLET, DELAYED RELEASE ORAL DAILY
Qty: 90 TABLET | Refills: 1 | Status: SHIPPED | OUTPATIENT
Start: 2024-12-11

## 2024-12-11 RX ORDER — METFORMIN HYDROCHLORIDE 500 MG/1
TABLET, EXTENDED RELEASE ORAL
Qty: 360 TABLET | Refills: 1 | Status: SHIPPED | OUTPATIENT
Start: 2024-12-11

## 2024-12-11 RX ORDER — LEVOTHYROXINE SODIUM 150 UG/1
150 TABLET ORAL DAILY
Qty: 90 TABLET | Refills: 1 | Status: SHIPPED | OUTPATIENT
Start: 2024-12-11

## 2024-12-11 RX ORDER — CLOPIDOGREL BISULFATE 75 MG/1
75 TABLET ORAL DAILY
Qty: 90 TABLET | Refills: 4 | Status: SHIPPED | OUTPATIENT
Start: 2024-12-11

## 2024-12-11 RX ORDER — INSULIN GLARGINE 100 [IU]/ML
25-30 INJECTION, SOLUTION SUBCUTANEOUS EVERY MORNING
Qty: 30 ML | Refills: 4 | Status: SHIPPED | OUTPATIENT
Start: 2024-12-11

## 2024-12-11 ASSESSMENT — ENCOUNTER SYMPTOMS
FEVER: 0
JOINT SWELLING: 0
PALPITATIONS: 0
NAUSEA: 1
DIARRHEA: 0
NERVOUS/ANXIOUS: 0
BRUISES/BLEEDS EASILY: 1
COUGH: 0
FATIGUE: 0
HEARTBURN: 0
DYSURIA: 0
SHORTNESS OF BREATH: 0
POLYDIPSIA: 1
EYE PAIN: 0
DIZZINESS: 0
HEMATURIA: 0
MYALGIAS: 0
WEAKNESS: 0
PARESTHESIAS: 0
SORE THROAT: 0
HEMATOCHEZIA: 0
FREQUENCY: 0
CHILLS: 0
LIGHT-HEADEDNESS: 1
HEADACHES: 0
ABDOMINAL PAIN: 0
ARTHRALGIAS: 0
CONSTIPATION: 0
SLEEP DISTURBANCE: 1

## 2024-12-11 ASSESSMENT — PAIN SCALES - GENERAL: PAINLEVEL_OUTOF10: NO PAIN (0)

## 2024-12-11 NOTE — NURSING NOTE
"Chief Complaint   Patient presents with    RECHECK     3 month diabetic check      Patient presents to the clinic today for a 3 month diabetic check     Initial There were no vitals taken for this visit. Estimated body mass index is 34.21 kg/m  as calculated from the following:    Height as of 5/29/24: 1.727 m (5' 8\").    Weight as of 9/10/24: 102.1 kg (225 lb).  Meds Reconciled: complete      Minerva Mcgill LPN,LPN on 12/11/2024 at 8:20 AM  Ext. [phone]        Minerva Mcgill LPN  "

## 2024-12-11 NOTE — PROGRESS NOTES
Assessment & Plan     ICD-10-CM    1. Uncontrolled type 2 diabetes mellitus with hyperglycemia (H)  E11.65 glipiZIDE (GLUCOTROL) 10 MG tablet     metFORMIN (GLUCOPHAGE XR) 500 MG 24 hr tablet     insulin glargine (LANTUS SOLOSTAR) 100 UNIT/ML pen      2. Chronic systolic heart failure (H) - ECHO 6/22/2022 at [facility] -- EF 25-30%  I50.22       3. CKD (chronic kidney disease) stage 2, GFR 60-89 ml/min  N18.2       4. Class 1 obesity due to excess calories with serious comorbidity and body mass index (BMI) of 33.0 to 33.9 in adult  E66.811     E66.09     Z68.33       5. Coronary artery disease involving native coronary artery of native heart without angina pectoris  I25.10 clopidogrel (PLAVIX) 75 MG tablet      6. History of TIA (transient ischemic attack) and stroke - left vertebral artery thrombosis  Z86.73       7. Hypothyroidism due to acquired atrophy of thyroid  E03.4 levothyroxine (SYNTHROID/LEVOTHROID) 150 MCG tablet      8. Mixed hyperlipidemia  E78.2       9. Gastroesophageal reflux disease with esophagitis, unspecified whether hemorrhage  K21.00 pantoprazole (PROTONIX) 40 MG EC tablet      10. Need for influenza vaccination  Z23 INFLUENZA HIGH DOSE, TRIVALENT, PF (FLUZONE)         Patient presents for diabetes follow-up, as well as follow-up multiple issues.    Blood sugar remains high but has improved slightly.  We did increase his Lantus at his last appointment.  Recommend dose increase once again.  Currently using about 22 units of Lantus daily.  We will increase this up to 25 units daily.  States that he has been eating quite a bit of Liudmila cookies and other carbohydrates.  Hopefully with change of season, blood sugars will continue to improve.  Otherwise he would like to continue with current medication regimen.  Glipizide and metformin refilled.    Chronic systolic heart failure, ejection fraction was only 25-30% on last echocardiogram.  Continues to follow with cardiology.  Seems to be  doing well.  No excessive fluid retention issues.  Has coronary artery disease and history of stroke in the past as well.  Continues on Plavix.  Needs refills.    Heartburn and reflux, ongoing.  Needs to continue regular dosing of Protonix or gets breakthrough heartburn symptoms.  Needs refills.  Tolerating well.  No medication side effects reported.    Flu vaccine updated today.    HYPERTENSION - Ongoing. Blood pressure is currently controlled.  Medication side effects: None.   + He is getting lightheaded intermittently...     Denies syncope.  Continue current medications.   Medication list reviewed/updated. Refills completed as needed.      MIXED HYPERLIPIDEMIA.  Ongoing. LDL is at goal: No. Triglycerides are at goal: No.  Hopefully lifestyle modifications will improve cholesterol levels, otherwise will consider additional medication dose adjustments or medication changes.  Medication side effects reported: None.   Continue current medications for now. Medication list reviewed/updated. Refills completed as needed.  Recent Labs   Lab Test 12/11/24  0807 09/05/24  0856   CHOL 165 122   HDL 36* 31*   LDL 89 52   TRIG 200* 197*        HYPOTHYROIDISM - Patient has a longstanding history of hypothyroidism. Reports doing reasonably well. Reports: denies fatigue, weight changes, heat/cold intolerance, bowel/skin changes or CVS symptoms.  Continue: Synthroid oral thyroid replacement.  Denies adverse medication reactions or side effects.                       TSH   Date Value Ref Range Status   12/11/2024 6.68 (H) 0.30 - 4.20 uIU/mL Final   10/28/2022 5.49 (H) 0.40 - 4.00 mU/L Final        OBESITY - Ongoing.  (See Encounter Diagnosis list above for obesity class / severity).  - Encourage continued maintenance / improvement in diet and exercise.   - Consider Nutrition / Dietician appointment.  - Weight loss would improve Hypertension, Cholesterol and Diabetes.      Chronic Kidney Disease, Stage 2 (GFR 60-89), chronic,  "ongoing.  Kidney function had been slowly declining.  Encourage NSAID avoidance.    Recent Labs   Lab Test 12/11/24  0807 09/05/24  0856   CR 0.97 0.92   GFRESTIMATED 79 85        Vaccine counseling completed.  Encourage routine / annual vaccinations.    Type 2 Diabetes Mellitus, with nephropathy, with neuropathy, Reports ongoing/previous: numbness and burning.  Blood sugar control has been poor with severe hyperglycemia. Doing well with diet, oral agents, and insulin injections.  Does basic house chores, no extra walking or exercise.   Medication list reviewed/updated. Refills completed as needed.    Complicating factors include but are not limited to: hypertension, hyperlipidemia, neuropathy, chronic kidney disease, CAD/PVD, and CVA.     Recent Labs   Lab Test 12/11/24  0807 09/05/24  0856 09/03/24  0816 05/29/24  0909   A1C 8.6* 9.5*  --  10.4*   LDL 89 52  --  79   HDL 36* 31*  --  33*   TRIG 200* 197*  --  299*   ALT 9 9 9 10   CR 0.97 0.92 0.96 1.01   GFRESTIMATED 79 85 81 76   POTASSIUM 4.6 3.8 4.6 4.7   TSH 6.68* 6.86*  --  7.74*   T4 0.95 0.83*  --  0.91   WBC 7.8 8.9 7.2 10.2   HGB 12.8* 13.1* 12.8* 13.7    233 213 235   ALBUMIN 4.0 3.9 3.9 4.1     Hemoglobin A1c has improved but remains elevated.  LDL and triglycerides are high and not at goal.  HDL is a bit low.  ALT normal.  Creatinine is at baseline.  Potassium normal.  TSH is slightly elevated but free T4 is within normal limits.  Hemoglobin remains slightly low but white blood cell count and platelet count are normal.  Albumin normal.     The longitudinal plan of care for the diagnosis(es)/condition(s) as documented were addressed during this visit. Due to the added complexity in care, I will continue to support Andrez in the subsequent management and with ongoing continuity of care.               BMI  Estimated body mass index is 33.21 kg/m  as calculated from the following:    Height as of 5/29/24: 1.727 m (5' 8\").    Weight as of this " encounter: 99.1 kg (218 lb 6.4 oz).           Return in about 3 months (around 3/11/2025) for - Labs every 91+ days, with DM Follow-up, Same Day or 1-2 days later with Dr. Roberts.      Bk Roberts MD  [facility]    Review of Systems   Constitutional:  Negative for chills, fatigue and fever.   HENT:  Positive for hearing loss. Negative for congestion, ear pain and sore throat.    Eyes:  Negative for pain and visual disturbance.   Respiratory:  Negative for cough and shortness of breath.    Cardiovascular:  Negative for chest pain, palpitations and peripheral edema.   Gastrointestinal:  Positive for nausea. Negative for abdominal pain, constipation, diarrhea, heartburn and hematochezia.   Endocrine: Positive for polydipsia and polyuria.   Genitourinary:  Positive for impotence. Negative for dysuria, frequency, genital sores, hematuria, penile discharge and urgency.   Musculoskeletal:  Positive for gait problem (Due to bilateral leg weakness, walks with cane which helps). Negative for arthralgias, joint swelling and myalgias.   Skin:  Negative for rash.   Allergic/Immunologic: Negative for immunocompromised state.   Neurological:  Positive for light-headedness. Negative for dizziness, syncope, weakness, headaches and paresthesias.   Hematological:  Bruises/bleeds easily.   Psychiatric/Behavioral:  Positive for sleep disturbance. Negative for mood changes. The patient is not nervous/anxious.        Aristides Maguire is a 79 year old, presenting for the following health issues:  RECHECK (3 month diabetic check /)        12/11/2024     8:19 AM   Additional Questions   Roomed by Minerva DE LA CRUZ     History of Present Illness       Reason for visit:  DM follow-up    He eats 0-1 servings of fruits and vegetables daily.He consumes 2 sweetened beverage(s) daily.He exercises with enough effort to increase his heart rate 9 or less minutes per day.  He exercises with enough effort to increase his heart rate 3  or less days per week. He is missing 1 dose(s) of medications per week.  He is not taking prescribed medications regularly due to remembering to take.                     Objective    BP 98/60 (BP Location: Right arm, Patient Position: Sitting, Cuff Size: Adult Large)   Pulse 85   Temp 97.4  F (36.3  C) (Temporal)   Resp 16   Wt 99.1 kg (218 lb 6.4 oz)   SpO2 96%   BMI 33.21 kg/m    Body mass index is 33.21 kg/m .  Physical Exam  Constitutional:       General: He is not in acute distress.     Appearance: He is well-developed. He is obese. He is not diaphoretic.   Eyes:      General: No scleral icterus.     Conjunctiva/sclera: Conjunctivae normal.   Neck:      Vascular: No carotid bruit.   Cardiovascular:      Rate and Rhythm: Normal rate and regular rhythm.      Pulses: Normal pulses.   Pulmonary:      Effort: Pulmonary effort is normal.      Breath sounds: Normal breath sounds.   Abdominal:      Palpations: Abdomen is soft.      Tenderness: There is no abdominal tenderness.   Musculoskeletal:         General: No deformity.      Cervical back: Neck supple.      Right lower leg: No edema.      Left lower leg: No edema.   Skin:     General: Skin is warm and dry.      Findings: Bruising present. No rash.   Neurological:      Mental Status: He is alert. Mental status is at baseline.   Psychiatric:         Mood and Affect: Mood normal.         Behavior: Behavior normal.                    Signed Electronically by: Bk Roberts MD

## 2024-12-11 NOTE — PATIENT INSTRUCTIONS
Blood pressure is a bit low..... try having a little more salt in your diet.     Diabetes has high A1c. -- Increase Lantus insulin to 25 units daily.     Medications refilled.   Labs are pending.       -- Increase your Synthroid to 150 mcg daily.       Results for orders placed or performed in visit on 12/11/24   Urine Macroscopic with reflex to Microscopic     Status: Abnormal   Result Value Ref Range    Color Urine Yellow Colorless, Straw, Light Yellow, Yellow    Appearance Urine Clear Clear    Glucose Urine Negative Negative mg/dL    Bilirubin Urine Negative Negative    Ketones Urine Trace (A) Negative mg/dL    Specific Gravity Urine >=1.030 1.005 - 1.030    Blood Urine Negative Negative    pH Urine 5.5 5.0 - 9.0    Protein Albumin Urine Trace (A) Negative mg/dL    Urobilinogen Urine Normal 0.2, 1.0, Normal mg/dL    Nitrite Urine Negative Negative    Leukocyte Esterase Urine Negative Negative   TSH with free T4 reflex     Status: Abnormal   Result Value Ref Range    TSH 6.68 (H) 0.30 - 4.20 uIU/mL   CBC with platelets     Status: Abnormal   Result Value Ref Range    WBC Count 7.8 4.0 - 11.0 10e3/uL    RBC Count 4.41 4.40 - 5.90 10e6/uL    Hemoglobin 12.8 (L) 13.3 - 17.7 g/dL    Hematocrit 40.4 40.0 - 53.0 %    MCV 92 78 - 100 fL    MCH 29.0 26.5 - 33.0 pg    MCHC 31.7 31.5 - 36.5 g/dL    RDW 14.6 10.0 - 15.0 %    Platelet Count 242 150 - 450 10e3/uL   Albumin Random Urine Quantitative with Creat Ratio     Status: Abnormal   Result Value Ref Range    Creatinine Urine mg/dL 209.0 mg/dL    Albumin Urine mg/L 45.0 mg/L    Albumin Urine mg/g Cr 21.53 (H) 0.00 - 17.00 mg/g Cr   Lipid Profile     Status: Abnormal   Result Value Ref Range    Cholesterol 165 <200 mg/dL    Triglycerides 200 (H) <150 mg/dL    Direct Measure HDL 36 (L) >=40 mg/dL    LDL Cholesterol Calculated 89 <100 mg/dL    Non HDL Cholesterol 129 <130 mg/dL    Patient Fasting > 8hrs? Yes     Narrative    Cholesterol  Desirable: < 200 mg/dL  Borderline  High: 200 - 239 mg/dL  High: >= 240 mg/dL    Triglycerides  Normal: < 150 mg/dL  Borderline High: 150 - 199 mg/dL  High: 200-499 mg/dL  Very High: >= 500 mg/dL    Direct Measure HDL  Female: >= 50 mg/dL   Male: >= 40 mg/dL    LDL Cholesterol  Desirable: < 100 mg/dL  Above Desirable: 100 - 129 mg/dL   Borderline High: 130 - 159 mg/dL   High:  160 - 189 mg/dL   Very High: >= 190 mg/dL    Non HDL Cholesterol  Desirable: < 130 mg/dL  Above Desirable: 130 - 159 mg/dL  Borderline High: 160 - 189 mg/dL  High: 190 - 219 mg/dL  Very High: >= 220 mg/dL   Comprehensive metabolic panel     Status: Abnormal   Result Value Ref Range    Sodium 139 135 - 145 mmol/L    Potassium 4.6 3.4 - 5.3 mmol/L    Carbon Dioxide (CO2) 28 22 - 29 mmol/L    Anion Gap 6 (L) 7 - 15 mmol/L    Urea Nitrogen 14.9 8.0 - 23.0 mg/dL    Creatinine 0.97 0.67 - 1.17 mg/dL    GFR Estimate 79 >60 mL/min/1.73m2    Calcium 9.2 8.8 - 10.4 mg/dL    Chloride 105 98 - 107 mmol/L    Glucose 184 (H) 70 - 99 mg/dL    Alkaline Phosphatase 114 40 - 150 U/L    AST 12 0 - 45 U/L    ALT 9 0 - 70 U/L    Protein Total 6.6 6.4 - 8.3 g/dL    Albumin 4.0 3.5 - 5.2 g/dL    Bilirubin Total 0.5 <=1.2 mg/dL    Patient Fasting > 8hrs? Yes         Aspects of Diabetes:   Recent Labs   Lab Test 12/11/24  0807 09/05/24  0856 09/03/24  0816 05/29/24  0909 03/04/24  0839 02/27/24  0820   A1C  --  9.5*  --  10.4*  --  12.0*   LDL 89 52  --  79  --  99   HDL 36* 31*  --  33*  --  31*   TRIG 200* 197*  --  299*  --  194*   ALT 9 9 9 10   < > 9   CR 0.97 0.92 0.96 1.01   < > 0.97   GFRESTIMATED 79 85 81 76   < > 80   POTASSIUM 4.6 3.8 4.6 4.7   < > 4.9   TSH 6.68* 6.86*  --  7.74*  --  7.28*   T4  --  0.83*  --  0.91  --  0.91   WBC 7.8 8.9 7.2 10.2   < > 7.7   HGB 12.8* 13.1* 12.8* 13.7   < > 13.1*    233 213 235   < > 233   ALBUMIN 4.0 3.9 3.9 4.1   < > 3.9    < > = values in this interval not displayed.      Hemoglobin A1c  Goal range is under 8%. Best is 6.5 to 7   Blood Pressure  98/60 Goal to keep less than 140/90   Tobacco  reports that he quit smoking about 39 years ago. His smoking use included cigarettes. He started smoking about 65 years ago. He has a 26 pack-year smoking history. He has been exposed to tobacco smoke. He has never used smokeless tobacco. Goal to abstain from tobacco   Aspirin or Plavix Anti-platelet therapy Aspirin or Plavix reduces risk of heart disease and stroke  -- sometimes used with other blood thinners, depending on bleeding risk and risk factors.    ACE/ARB Specific blood pressure meds These medications can reduce risk of kidney disease   Cholesterol Statins (Lipitor, Crestor, vs others) Statins reduce risk of heart disease and stroke   Eye Exam -- Do Yearly -- Annual diabetic eye exam   Healthy weight Wt Readings from Last 4 Encounters:   12/11/24 99.1 kg (218 lb 6.4 oz)   09/10/24 102.1 kg (225 lb)   09/05/24 101.3 kg (223 lb 6.4 oz)   05/29/24 102.5 kg (226 lb)      Body mass index is 33.21 kg/m .  Goal BMI under 30, best is under 25.      -- Trying to exercise daily (goal at least 20 min/day) with moderate aerobic activity   -- Eat healthy (resources from ADA at http://www.diabetes.org/)   -- Taking good care of my feet. Consider seeing the Podiatrist   -- Check blood sugars as directed, record in log book and bring to every appointment    Insurance companies are grading you and I on your blood sugar control -- Goal is to get your A1c down to 7.9% or lower and NO Smoking!  -- Medicare and most insurance companies, will only cover Hemoglobin A1c labs to be rechecked every 91+ days.      Return for Diabetes labs and clinic follow-up appointment every 3 to 4 months.    Schedule lab only appointment --- A few days AFTER: 3/11/25   Schedule clinic appointment with Dr. Roberts -- Same day as labs, or 1-2 days later.

## 2025-03-03 ENCOUNTER — LAB (OUTPATIENT)
Dept: LAB | Facility: OTHER | Age: 80
End: 2025-03-03
Attending: NURSE PRACTITIONER
Payer: MEDICARE

## 2025-03-03 ENCOUNTER — HOSPITAL ENCOUNTER (OUTPATIENT)
Dept: CT IMAGING | Facility: OTHER | Age: 80
Discharge: HOME | End: 2025-03-03
Attending: NURSE PRACTITIONER
Payer: MEDICARE

## 2025-03-03 DIAGNOSIS — C18.7 ADENOCARCINOMA OF SIGMOID COLON (H): ICD-10-CM

## 2025-03-03 LAB
ALBUMIN SERPL BCG-MCNC: 3.7 G/DL (ref 3.5–5.2)
ALP SERPL-CCNC: 127 U/L (ref 40–150)
ALT SERPL W P-5'-P-CCNC: 7 U/L (ref 0–70)
ANION GAP SERPL CALCULATED.3IONS-SCNC: 10 MMOL/L (ref 7–15)
AST SERPL W P-5'-P-CCNC: 9 U/L (ref 0–45)
BASOPHILS # BLD AUTO: 0 10E3/UL (ref 0–0.2)
BASOPHILS NFR BLD AUTO: 0 %
BILIRUB SERPL-MCNC: 0.5 MG/DL
BUN SERPL-MCNC: 19.8 MG/DL (ref 8–23)
CALCIUM SERPL-MCNC: 9 MG/DL (ref 8.8–10.4)
CEA SERPL-MCNC: 1 NG/ML
CHLORIDE SERPL-SCNC: 102 MMOL/L (ref 98–107)
CREAT SERPL-MCNC: 1.03 MG/DL (ref 0.67–1.17)
EGFRCR SERPLBLD CKD-EPI 2021: 74 ML/MIN/1.73M2
EOSINOPHIL # BLD AUTO: 0.2 10E3/UL (ref 0–0.7)
EOSINOPHIL NFR BLD AUTO: 2 %
ERYTHROCYTE [DISTWIDTH] IN BLOOD BY AUTOMATED COUNT: 14.6 % (ref 10–15)
GLUCOSE SERPL-MCNC: 182 MG/DL (ref 70–99)
HCO3 SERPL-SCNC: 27 MMOL/L (ref 22–29)
HCT VFR BLD AUTO: 37.2 % (ref 40–53)
HGB BLD-MCNC: 12.6 G/DL (ref 13.3–17.7)
IMM GRANULOCYTES # BLD: 0 10E3/UL
IMM GRANULOCYTES NFR BLD: 0 %
LDH SERPL L TO P-CCNC: 149 U/L (ref 0–250)
LYMPHOCYTES # BLD AUTO: 1.7 10E3/UL (ref 0.8–5.3)
LYMPHOCYTES NFR BLD AUTO: 22 %
MCH RBC QN AUTO: 30.2 PG (ref 26.5–33)
MCHC RBC AUTO-ENTMCNC: 33.9 G/DL (ref 31.5–36.5)
MCV RBC AUTO: 89 FL (ref 78–100)
MONOCYTES # BLD AUTO: 0.8 10E3/UL (ref 0–1.3)
MONOCYTES NFR BLD AUTO: 10 %
NEUTROPHILS # BLD AUTO: 4.9 10E3/UL (ref 1.6–8.3)
NEUTROPHILS NFR BLD AUTO: 65 %
NRBC # BLD AUTO: 0 10E3/UL
NRBC BLD AUTO-RTO: 0 /100
PLATELET # BLD AUTO: 255 10E3/UL (ref 150–450)
POTASSIUM SERPL-SCNC: 4.7 MMOL/L (ref 3.4–5.3)
PROT SERPL-MCNC: 6.3 G/DL (ref 6.4–8.3)
RBC # BLD AUTO: 4.17 10E6/UL (ref 4.4–5.9)
SODIUM SERPL-SCNC: 139 MMOL/L (ref 135–145)
WBC # BLD AUTO: 7.6 10E3/UL (ref 4–11)

## 2025-03-03 PROCEDURE — 36415 COLL VENOUS BLD VENIPUNCTURE: CPT | Mod: ZL

## 2025-03-03 PROCEDURE — 74177 CT ABD & PELVIS W/CONTRAST: CPT

## 2025-03-03 PROCEDURE — 250N000011 HC RX IP 250 OP 636: Performed by: NURSE PRACTITIONER

## 2025-03-03 PROCEDURE — 80053 COMPREHEN METABOLIC PANEL: CPT | Mod: ZL

## 2025-03-03 PROCEDURE — 250N000009 HC RX 250: Performed by: NURSE PRACTITIONER

## 2025-03-03 PROCEDURE — 82378 CARCINOEMBRYONIC ANTIGEN: CPT | Mod: ZL

## 2025-03-03 PROCEDURE — 85014 HEMATOCRIT: CPT | Mod: ZL

## 2025-03-03 PROCEDURE — 82435 ASSAY OF BLOOD CHLORIDE: CPT | Mod: ZL

## 2025-03-03 PROCEDURE — 83615 LACTATE (LD) (LDH) ENZYME: CPT | Mod: ZL

## 2025-03-03 PROCEDURE — 85004 AUTOMATED DIFF WBC COUNT: CPT | Mod: ZL

## 2025-03-03 RX ORDER — IOPAMIDOL 755 MG/ML
126 INJECTION, SOLUTION INTRAVASCULAR ONCE
Status: COMPLETED | OUTPATIENT
Start: 2025-03-03 | End: 2025-03-03

## 2025-03-03 RX ADMIN — IOPAMIDOL 126 ML: 755 INJECTION, SOLUTION INTRAVENOUS at 09:00

## 2025-03-03 RX ADMIN — SODIUM CHLORIDE 60 ML: 9 INJECTION, SOLUTION INTRAVENOUS at 09:01

## 2025-03-11 ENCOUNTER — ONCOLOGY VISIT (OUTPATIENT)
Dept: ONCOLOGY | Facility: OTHER | Age: 80
End: 2025-03-11
Attending: NURSE PRACTITIONER
Payer: MEDICARE

## 2025-03-11 VITALS
TEMPERATURE: 96.5 F | SYSTOLIC BLOOD PRESSURE: 100 MMHG | WEIGHT: 219 LBS | BODY MASS INDEX: 33.3 KG/M2 | HEART RATE: 84 BPM | DIASTOLIC BLOOD PRESSURE: 58 MMHG | OXYGEN SATURATION: 97 % | RESPIRATION RATE: 14 BRPM

## 2025-03-11 DIAGNOSIS — R71.0 DECREASED HEMOGLOBIN: ICD-10-CM

## 2025-03-11 DIAGNOSIS — D50.9 IRON DEFICIENCY ANEMIA, UNSPECIFIED IRON DEFICIENCY ANEMIA TYPE: ICD-10-CM

## 2025-03-11 DIAGNOSIS — C18.7 ADENOCARCINOMA OF SIGMOID COLON (H): Primary | ICD-10-CM

## 2025-03-11 PROCEDURE — G0463 HOSPITAL OUTPT CLINIC VISIT: HCPCS

## 2025-03-11 ASSESSMENT — PAIN SCALES - GENERAL: PAINLEVEL_OUTOF10: NO PAIN (0)

## 2025-03-11 NOTE — PATIENT INSTRUCTIONS
We will get a few extra tests when you come in Friday for Dr Roberts's lab and visit.   If there is anything concerning we will let you know by phone.         Follow up with Dr Stark in 6 months.       Please come prior for lab work and imaging. Radiology scheduling will contact you to arrange these scans.  If you have not received a call in the next 2 weeks, please call them at [phone].

## 2025-03-11 NOTE — NURSING NOTE
"Oncology Rooming Note    March 11, 2025 8:37 AM   Andrez Olivier is a 79 year old male who presents for:    Chief Complaint   Patient presents with    Oncology Clinic Visit     Colon cancer     Initial Vitals: /58   Pulse 84   Temp (!) 96.5  F (35.8  C) (Tympanic)   Resp 14   Wt 99.3 kg (219 lb)   SpO2 97%   BMI 33.30 kg/m   Estimated body mass index is 33.3 kg/m  as calculated from the following:    Height as of 5/29/24: 1.727 m (5' 8\").    Weight as of this encounter: 99.3 kg (219 lb). Body surface area is 2.18 meters squared.  No Pain (0) Comment: Data Unavailable   No LMP for male patient.  Allergies reviewed: Yes  Medications reviewed: Yes    Medications: Medication refills not needed today.  Pharmacy name entered into [employer]: [id number] - [place], [place] - [address]    Frailty Screening:   Is the patient here for a new oncology consult visit in cancer care? 2. No    PHQ9:  Did this patient require a PHQ9?: No      Clinical concerns: none       Angela Quick RN              "

## 2025-03-11 NOTE — PROGRESS NOTES
[facility] Hematology and Oncology Progress Note    Patient: Andrez Olivier  MRN: [id number]  Date of Service: Mar 11, 2025         Reason for Visit    Chief Complaint   Patient presents with    Oncology Clinic Visit     Colon cancer       ECOG Performance Status: 0        Encounter Diagnoses:    Colon cancer      History of Present Illness    Mr. Andrez Olivier is a follow-up for colon cancer. We had seen the patient in consultation on 06/01/2016. At that time, he was a 70-year-old white male with history of coronary artery disease, type 2 diabetes mellitus, hyperlipidemia and hypertension, who presented to the Emergency Room on 04/25/2016 with complaints of chest pain radiating down his arms. At that time, he was found to have a hemoglobin of 7.1 and subsequently was admitted. CBC revealed microcytic indices with MCV 62. He was noted to be iron deficient. He was transfused 2 units of packed red cells and ruled out for MI. Subsequently was seen by Dr. Solano who performed colonoscopy and was noted to have a mass in the sigmoid colon consistent with adenocarcinoma. He was noted to have multiple adenomatous polyps. The patient subsequently was admitted on 05/11/2016 for sigmoid colectomy. It was performed on 05/17/2016. Pathology of the sigmoid colon revealed a mass consistent with moderately differentiated adenocarcinoma, tumor size was 2 x 1 x 0.7 cm. Tumor invaded the muscularis propria with 2/11 lymph nodes were positive for metastatic carcinoma. Margins were negative for tumor. The tumor was moderately differentiated. The patient was staged pathologic T2 N1b. As part of the postoperative evaluation, the patient had a CT of the abdomen and pelvis on 05/12/2016. This revealed there were two nonspecific low attenuation lesions in the liver, metastases cannot be excluded. There was no other lymphadenopathy or additional findings suggestive of metastases. Preop CEA level was less than 3. When we saw the patient,  we wanted to rule out metastatic disease. PET scan performed on 06/15/2016 was essentially negative for metastatic disease. The lesions seen on prior PET of the liver was not hypermetabolic. We felt the patient had stage III disease and would be a candidate for adjuvant chemotherapy. When we saw the patient on 06/28/2016, plan was to start FOLFOX x12 cycles. The patient was started on and received a total of 10 cycles. On 11/17/2016, he did note some cold-induced neuropathic symptoms with cough and cold sensation when he drank cold liquids. Otherwise, he did relatively well. We elected to restage him after 12 cycles of FOLFOX. His last dose of FOLFOX was on 12/07/2016. During his last dose, he became severely ill, developed numbness in his hands and significant cold-induced neuropathy as well as a loss of sensation. He had a PET scan, which was essentially negative for metastatic disease. We started the patient on vitamin B6. His neuropathy symptoms did improve. He had a colonoscopy in 05/2017, which revealed tubular adenoma in the hepatic flexure of the colon. The patient otherwise had a colonoscopy performed by Dr. Solano, 10/01/2020, and he was found to have six polyps, all of them revealed tubular adenomas consistent with advanced adenoma. One was up to 30 cm. The other was in the mid transverse colon. This was based on polyp size being larger than 10 mm. The patient was due for repeat colonoscopy in 06/2021. When we saw the patient on 09/23/2020, we elected to restage the patient with CT chest, abdomen and pelvis, which was performed on 10/21/2020 and the findings were there was no change of previous CTs done in 04/2020. Since then, he has had serial CTs that have been essentially negative except for a small hemangioma in the liver that has been stable. He did have a colonoscopy performed by Dr. Solano on 06/01/2020, and was found to have six polyps, all of them were tubular adenomas consistent with advanced  adenoma. One was at 30 cm. The other was in the mid transverse colon. This was based on polyp size being larger than 10 mm. He had a repeat colonoscopy in 06/2021, which revealed multiple tubular adenomas, and it was recommended he have a 3-year followup colonoscopy. Otherwise, he had recent scans again, which have essentially been stable with a similar hyperenhancing focus unchanged in the medial spleen and a 1.6 x 0.2 cm hypoenhancing lesion along the posterior capsule of the liver which is unchanged compared to previous CTs..  The patient had a CVA in June 2022.  Has been following with cardiology.  CT scans performed on March 4, 2024 unchanged.  CEA levels have been normal.  I did have a colonoscopy performed on November 22, 2024 and the findings were that there was a sessile serrated adenoma involving the hepatic flexure otherwise unremarkable.  The patient is scheduled for another colonoscopy in 3 years.  Otherwise the patient is doing well denies any headaches or change in mental status.  Denies shortness of breath cough or hemoptysis.  Denies abdominal pain or change in bowel habits he denies bright red blood per rectum, hematemesis or melena.  Denies hematuria or flank pain.  He denies fevers, night sweats or weight loss.                     He did undergo imaging including CT chest and CT abdomen/pelvis on March 3 and findings were that there was no change.  There was a stable hyperenhancing focus in the medial spleen.  There is also a 1.6 x 1.2 cm hypoenhancing lesion along the posterior capsule of the liver which was also unchanged.      ______________________________________________________________________________    Past History    Past Medical History:   Diagnosis Date    Acute myocardial infarction (H)     1989,MI at age 44, s/p angioplasty, [facility]; MI at age 49, s/p stent placement at [facility]    Atherosclerotic heart disease of native coronary artery without angina pectoris     2/1/2013,BRYON to,mid RCA  (angina and abnormal myoview), ANW    Diverticulosis of colon 07/19/2021    Diverticulosis of large intestine without perforation or abscess without bleeding     5/6/2016    Gastritis, erosive 05/06/2016    Gastro-esophageal reflux disease with esophagitis     5/10/2016    Gout     No Comments Provided    History of colonic polyps     5/6/2016    Malignant neoplasm of sigmoid colon (H)     5/6/2016    Other gastritis without bleeding     5/6/2016    Personal history of nicotine dependence     No Comments Provided    TIA (transient ischemic attack) 06/24/2022    Vertebral artery thrombosis, 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       Past Surgical History:   Procedure Laterality Date    APPENDECTOMY OPEN      at age of 12    COLON SURGERY      5/17/16,Colectomy, Sigmoid    COLONOSCOPY      5/6/16,F/U 2017; Dr Solano    COLONOSCOPY  05/15/2017    05/15/2017,F/U 2020    COLONOSCOPY  06/01/2020    F/U 2021 advanced adenomas    COLONOSCOPY N/A 07/19/2021    F/U 2024 tubular adenomas, H/O colon cancer    COLONOSCOPY N/A 11/22/2024    F/U 2027 serrated adenoma, H/O colon cancer    ESOPHAGOSCOPY, GASTROSCOPY, DUODENOSCOPY (EGD), COMBINED      5/6/16,EGD; Dr Solano    HEART CATH, ANGIOPLASTY      1989,Stenting coronary artery, presumably LAD    OTHER SURGICAL HISTORY      [id number],OTHER    OTHER SURGICAL HISTORY      2000,208942,FLEXIBLE SIGMOIDOSCOPY    OTHER SURGICAL HISTORY      2/1/2013,[id number],CARDIAC CATHETERIZATION,BRYON to,mid RCA (angina and abnormal myoview)    OTHER SURGICAL HISTORY      6/28/16,[id number],OTHER,Left,Left subclavian Power Port           Review of systems.  CNS: There are no headaches, no blurred vision, no change in mental status,   ENT: There is no hearing loss.  Respiratory: There is no shortness of breath, cough or hemoptysis  Cardiac: There is no chest pain, orthopnea, PND, or ankle edema.  GI: There is no bright red blood per rectum, no hematemesis, no reflux, no diarrhea or constipation  Musculoskeletal: There are no joint  pains  : There is no urinary frequency, hematuria.  Constitutional: There is no fevers, night sweats, weight loss.  Endocrine: There is no fatigue  Neuro: There is no tingling or numbness in the hands or feet.  Hematologic: There is no gingival bleeding, epistaxis, or easy bruisability.  Dermatologic: There is no skin rash.  A 14 point review of systems is otherwise negative.          Physical Exam    /58   Pulse 84   Temp (!) 96.5  F (35.8  C) (Tympanic)   Resp 14   Wt 99.3 kg (219 lb)   SpO2 97%   BMI 33.30 kg/m        GENERAL: Alert and oriented to time place and person. Seated comfortably. In no distress.    HEAD: Atraumatic and normocephalic.    EYES: MERRY, EOMI.  No pallor.  No icterus.    Oral cavity: no mucosal lesion or tonsillar enlargement.    NECK: supple. JVP normal.  No thyroid enlargement.    LYMPH NODES: There are no palpable cervical, supraclavicular, axillary, or inguinal nodes.    LUNGS: clear to auscultation bilaterally.  Resonant to percussion throughout bilaterally.  Symmetrical breath movements bilaterally.    HEART: S1 and S2 are heard. Regular rate and rhythm.  No murmur or gallop or rub heard.  No peripheral edema.    ABDOMEN: Soft. Not tender. Not distended.  No palpable hepatomegaly or splenomegaly.  No other mass palpable.  Bowel sounds heard.    EXTREMITIES: There is no ankle edema.  SKIN: no rash, or bruising or purpura.    NEURO: Grossly non-focal.    Lab Results    Recent Results (from the past 240 hours)   Comprehensive metabolic panel    Collection Time: 03/03/25  8:05 AM   Result Value Ref Range    Sodium 139 135 - 145 mmol/L    Potassium 4.7 3.4 - 5.3 mmol/L    Carbon Dioxide (CO2) 27 22 - 29 mmol/L    Anion Gap 10 7 - 15 mmol/L    Urea Nitrogen 19.8 8.0 - 23.0 mg/dL    Creatinine 1.03 0.67 - 1.17 mg/dL    GFR Estimate 74 >60 mL/min/1.73m2    Calcium 9.0 8.8 - 10.4 mg/dL    Chloride 102 98 - 107 mmol/L    Glucose 182 (H) 70 - 99 mg/dL    Alkaline Phosphatase 127 40  - 150 U/L    AST 9 0 - 45 U/L    ALT 7 0 - 70 U/L    Protein Total 6.3 (L) 6.4 - 8.3 g/dL    Albumin 3.7 3.5 - 5.2 g/dL    Bilirubin Total 0.5 <=1.2 mg/dL   Lactate Dehydrogenase    Collection Time: 03/03/25  8:05 AM   Result Value Ref Range    Lactate Dehydrogenase 149 0 - 250 U/L   CEA    Collection Time: 03/03/25  8:05 AM   Result Value Ref Range    CEA 1.0 ng/mL   CBC with platelets and differential    Collection Time: 03/03/25  8:05 AM   Result Value Ref Range    WBC Count 7.6 4.0 - 11.0 10e3/uL    RBC Count 4.17 (L) 4.40 - 5.90 10e6/uL    Hemoglobin 12.6 (L) 13.3 - 17.7 g/dL    Hematocrit 37.2 (L) 40.0 - 53.0 %    MCV 89 78 - 100 fL    MCH 30.2 26.5 - 33.0 pg    MCHC 33.9 31.5 - 36.5 g/dL    RDW 14.6 10.0 - 15.0 %    Platelet Count 255 150 - 450 10e3/uL    % Neutrophils 65 %    % Lymphocytes 22 %    % Monocytes 10 %    % Eosinophils 2 %    % Basophils 0 %    % Immature Granulocytes 0 %    NRBCs per 100 WBC 0 <1 /100    Absolute Neutrophils 4.9 1.6 - 8.3 10e3/uL    Absolute Lymphocytes 1.7 0.8 - 5.3 10e3/uL    Absolute Monocytes 0.8 0.0 - 1.3 10e3/uL    Absolute Eosinophils 0.2 0.0 - 0.7 10e3/uL    Absolute Basophils 0.0 0.0 - 0.2 10e3/uL    Absolute Immature Granulocytes 0.0 <=0.4 10e3/uL    Absolute NRBCs 0.0 10e3/uL       Imaging    CT Chest/Abdomen/Pelvis w Contrast    Result Date: 3/3/2025  PROCEDURE:  CT CHEST/ABDOMEN/PELVIS W CONTRAST.  HISTORY:  79 years Male history colon cancer; Adenocarcinoma of sigmoid colon (H)  TECHNIQUE:  Helical CT of the chest, abdomen and pelvis was performed using non-ionic intravenous contrast. This CT exam was performed using one or more the following dose reduction techniques: automated exposure control, adjustment of the mA and/or kV according to patient size, and/or iterative reconstruction technique. COMPARISON:  Multiple priors most recently 3/4/24 MEDS/CONTRAST: 126 ml Isovue 370 FINDINGS:  The neck base is grossly symmetric. The heart is enlarged and there are  atherosclerotic calcifications of the coronary arteries.  There is no pericardial or pleural effusion. No abnormal thoracic adenopathy is identified. The central airways are patent. There is no focal consolidation or significant atelectasis. A nodule in the right upper lobe measuring 7 x 4 mm is unchanged. Multiple subcentimeter arterial hyperenhancing hepatic lesions are redemonstrated, unchanged since at least 12/10/2018. These likely reflect flash filling hemangiomas or other benign transient hepatic arterial hyperenhancement. A similar hyperenhancing focus is unchanged in the medial spleen. A 1.6 x 1.2 cm hypoenhancing lesion along the posterior capsule of the liver is also unchanged when compared back to multiple priors. No new or concerning hepatic mass is identified. The gallbladder, spleen, pancreas and adrenal glands are stable. Symmetric nephrograms are present without hydronephrosis.  No abnormal bowel dilatation is present. The appendix is normal. The aorta is normal in size with scattered atherosclerotic calcifications. No free fluid, free air or adenopathy is present. Several hemangiomas are present in the spine. No suspicious osseous lesions are identified.     IMPRESSION:  Unchanged CT appearance of the chest, abdomen and pelvis. DORI DANIEL MD   SYSTEM ID:  [id number]     Assessment and Plan: #1 :.  Stage III moderately differentiated adenocarcinoma sigmoid colon with 2 out of 11 lymph nodes positive metastatic disease consistent with T2 N1b M0 staging.  Status post adjuvant FOLFOX chemotherapy x 12 cycles.  Course complicated by peripheral neuropathy which eventually improved serial scans have been negative recent colonoscopy revealed a sessile serrated adenoma involving the hepatic flexure.  The plan is to continue surveillance we will see the patient in 6 months obtain CBC CMP LDH CEA as well as CT chest and CT abdomen/pelvis.  2.  : Anemia: Hematocrit has dropped slightly : we would like  to obtain iron TIBC ferritin B12 folate with his next blood draw.  Time spent: 58 minutes was spent on this patient visit : we spent time reviewing previous physician provider notes, reviewing colonoscopy results, reviewing pathology results, discussing pathology results and lab results and imaging results with the patient, performing history/physical, documenting history/physical, and ordering follow-up labs and imaging including CT chest and CT abdomen/pelvis as well as appointments  The longitudinal plan of care for the diagnosis(es)/condition(s) as documented were addressed during this visit. Due to the added complexity in care, I will continue to support Andrez in the subsequent management and with ongoing continuity of care.    Cancer Staging   No matching staging information was found for the patient.        Signed by: Emmy Stark MD    CC: Bk Roberts MD

## 2025-03-14 ENCOUNTER — OFFICE VISIT (OUTPATIENT)
Dept: INTERNAL MEDICINE | Facility: OTHER | Age: 80
End: 2025-03-14
Attending: INTERNAL MEDICINE
Payer: MEDICARE

## 2025-03-14 ENCOUNTER — LAB (OUTPATIENT)
Dept: LAB | Facility: OTHER | Age: 80
End: 2025-03-14
Attending: INTERNAL MEDICINE
Payer: COMMERCIAL

## 2025-03-14 VITALS
SYSTOLIC BLOOD PRESSURE: 96 MMHG | DIASTOLIC BLOOD PRESSURE: 60 MMHG | BODY MASS INDEX: 33.3 KG/M2 | WEIGHT: 219 LBS | OXYGEN SATURATION: 96 % | RESPIRATION RATE: 18 BRPM | TEMPERATURE: 97.5 F

## 2025-03-14 DIAGNOSIS — Z87.39 HISTORY OF GOUT: ICD-10-CM

## 2025-03-14 DIAGNOSIS — Z79.4 TYPE 2 DIABETES MELLITUS WITH DIABETIC NEUROPATHY, WITH LONG-TERM CURRENT USE OF INSULIN (H): ICD-10-CM

## 2025-03-14 DIAGNOSIS — Z86.73 HISTORY OF TIA (TRANSIENT ISCHEMIC ATTACK) AND STROKE: ICD-10-CM

## 2025-03-14 DIAGNOSIS — Z71.85 VACCINE COUNSELING: ICD-10-CM

## 2025-03-14 DIAGNOSIS — E11.65 UNCONTROLLED TYPE 2 DIABETES MELLITUS WITH HYPERGLYCEMIA (H): ICD-10-CM

## 2025-03-14 DIAGNOSIS — C18.7 ADENOCARCINOMA OF SIGMOID COLON (H): ICD-10-CM

## 2025-03-14 DIAGNOSIS — C18.9 ADENOCARCINOMA OF COLON (H): ICD-10-CM

## 2025-03-14 DIAGNOSIS — E11.40 TYPE 2 DIABETES MELLITUS WITH DIABETIC NEUROPATHY, WITH LONG-TERM CURRENT USE OF INSULIN (H): ICD-10-CM

## 2025-03-14 DIAGNOSIS — I50.22 CHRONIC SYSTOLIC HEART FAILURE (H): ICD-10-CM

## 2025-03-14 DIAGNOSIS — Z00.00 MEDICARE ANNUAL WELLNESS VISIT, SUBSEQUENT: Primary | ICD-10-CM

## 2025-03-14 DIAGNOSIS — N18.2 CKD (CHRONIC KIDNEY DISEASE) STAGE 2, GFR 60-89 ML/MIN: ICD-10-CM

## 2025-03-14 DIAGNOSIS — E03.4 HYPOTHYROIDISM DUE TO ACQUIRED ATROPHY OF THYROID: ICD-10-CM

## 2025-03-14 DIAGNOSIS — E66.811 CLASS 1 OBESITY DUE TO EXCESS CALORIES WITH SERIOUS COMORBIDITY AND BODY MASS INDEX (BMI) OF 34.0 TO 34.9 IN ADULT: ICD-10-CM

## 2025-03-14 DIAGNOSIS — E66.09 CLASS 1 OBESITY DUE TO EXCESS CALORIES WITH SERIOUS COMORBIDITY AND BODY MASS INDEX (BMI) OF 34.0 TO 34.9 IN ADULT: ICD-10-CM

## 2025-03-14 DIAGNOSIS — E53.8 VITAMIN B12 DEFICIENCY: ICD-10-CM

## 2025-03-14 DIAGNOSIS — C77.2: ICD-10-CM

## 2025-03-14 DIAGNOSIS — E78.2 MIXED HYPERLIPIDEMIA: ICD-10-CM

## 2025-03-14 DIAGNOSIS — I25.10 CORONARY ARTERY DISEASE INVOLVING NATIVE CORONARY ARTERY OF NATIVE HEART WITHOUT ANGINA PECTORIS: ICD-10-CM

## 2025-03-14 DIAGNOSIS — R12 HEARTBURN: ICD-10-CM

## 2025-03-14 DIAGNOSIS — D50.9 IRON DEFICIENCY ANEMIA, UNSPECIFIED IRON DEFICIENCY ANEMIA TYPE: ICD-10-CM

## 2025-03-14 LAB
ALBUMIN SERPL BCG-MCNC: 3.8 G/DL (ref 3.5–5.2)
ALBUMIN UR-MCNC: NEGATIVE MG/DL
ALP SERPL-CCNC: 125 U/L (ref 40–150)
ALT SERPL W P-5'-P-CCNC: 8 U/L (ref 0–70)
ANION GAP SERPL CALCULATED.3IONS-SCNC: 10 MMOL/L (ref 7–15)
APPEARANCE UR: CLEAR
AST SERPL W P-5'-P-CCNC: 11 U/L (ref 0–45)
BILIRUB SERPL-MCNC: 0.5 MG/DL
BILIRUB UR QL STRIP: NEGATIVE
BUN SERPL-MCNC: 15.6 MG/DL (ref 8–23)
CALCIUM SERPL-MCNC: 9 MG/DL (ref 8.8–10.4)
CHLORIDE SERPL-SCNC: 105 MMOL/L (ref 98–107)
CHOLEST SERPL-MCNC: 179 MG/DL
COLOR UR AUTO: NORMAL
CREAT SERPL-MCNC: 0.97 MG/DL (ref 0.67–1.17)
CREAT UR-MCNC: 123.3 MG/DL
EGFRCR SERPLBLD CKD-EPI 2021: 79 ML/MIN/1.73M2
ERYTHROCYTE [DISTWIDTH] IN BLOOD BY AUTOMATED COUNT: 14.5 % (ref 10–15)
EST. AVERAGE GLUCOSE BLD GHB EST-MCNC: 223 MG/DL
FASTING STATUS PATIENT QL REPORTED: YES
FASTING STATUS PATIENT QL REPORTED: YES
FOLATE SERPL-MCNC: 5.6 NG/ML (ref 4.6–34.8)
GLUCOSE SERPL-MCNC: 162 MG/DL (ref 70–99)
GLUCOSE UR STRIP-MCNC: NEGATIVE MG/DL
HBA1C MFR BLD: 9.4 %
HCO3 SERPL-SCNC: 27 MMOL/L (ref 22–29)
HCT VFR BLD AUTO: 39.8 % (ref 40–53)
HDLC SERPL-MCNC: 34 MG/DL
HGB BLD-MCNC: 13.1 G/DL (ref 13.3–17.7)
HGB UR QL STRIP: NEGATIVE
IRON BINDING CAPACITY (ROCHE): 262 UG/DL (ref 240–430)
IRON SATN MFR SERPL: 26 % (ref 15–46)
IRON SERPL-MCNC: 68 UG/DL (ref 61–157)
KETONES UR STRIP-MCNC: NEGATIVE MG/DL
LDLC SERPL CALC-MCNC: 88 MG/DL
LEUKOCYTE ESTERASE UR QL STRIP: NEGATIVE
MCH RBC QN AUTO: 29.8 PG (ref 26.5–33)
MCHC RBC AUTO-ENTMCNC: 32.9 G/DL (ref 31.5–36.5)
MCV RBC AUTO: 91 FL (ref 78–100)
MICROALBUMIN UR-MCNC: 38.9 MG/L
MICROALBUMIN/CREAT UR: 31.55 MG/G CR (ref 0–17)
NITRATE UR QL: NEGATIVE
NONHDLC SERPL-MCNC: 145 MG/DL
PH UR STRIP: 5 [PH] (ref 5–9)
PLATELET # BLD AUTO: 229 10E3/UL (ref 150–450)
POTASSIUM SERPL-SCNC: 4.5 MMOL/L (ref 3.4–5.3)
PROT SERPL-MCNC: 6.5 G/DL (ref 6.4–8.3)
RBC # BLD AUTO: 4.4 10E6/UL (ref 4.4–5.9)
SODIUM SERPL-SCNC: 142 MMOL/L (ref 135–145)
SP GR UR STRIP: 1.01 (ref 1–1.03)
T4 FREE SERPL-MCNC: 0.79 NG/DL (ref 0.9–1.7)
TRIGL SERPL-MCNC: 284 MG/DL
TSH SERPL DL<=0.005 MIU/L-ACNC: 13.65 UIU/ML (ref 0.3–4.2)
UROBILINOGEN UR STRIP-MCNC: NORMAL MG/DL
VIT B12 SERPL-MCNC: 280 PG/ML (ref 232–1245)
WBC # BLD AUTO: 8.7 10E3/UL (ref 4–11)

## 2025-03-14 PROCEDURE — G0463 HOSPITAL OUTPT CLINIC VISIT: HCPCS

## 2025-03-14 PROCEDURE — 83540 ASSAY OF IRON: CPT | Mod: ZL

## 2025-03-14 PROCEDURE — 82043 UR ALBUMIN QUANTITATIVE: CPT | Mod: ZL

## 2025-03-14 PROCEDURE — 96372 THER/PROPH/DIAG INJ SC/IM: CPT | Performed by: INTERNAL MEDICINE

## 2025-03-14 PROCEDURE — 82607 VITAMIN B-12: CPT | Mod: ZL

## 2025-03-14 PROCEDURE — 83036 HEMOGLOBIN GLYCOSYLATED A1C: CPT | Mod: ZL

## 2025-03-14 PROCEDURE — 84443 ASSAY THYROID STIM HORMONE: CPT | Mod: ZL

## 2025-03-14 PROCEDURE — G0463 HOSPITAL OUTPT CLINIC VISIT: HCPCS | Mod: 25

## 2025-03-14 PROCEDURE — 81003 URINALYSIS AUTO W/O SCOPE: CPT | Mod: ZL

## 2025-03-14 PROCEDURE — 99214 OFFICE O/P EST MOD 30 MIN: CPT | Mod: 25 | Performed by: INTERNAL MEDICINE

## 2025-03-14 PROCEDURE — 250N000011 HC RX IP 250 OP 636: Performed by: INTERNAL MEDICINE

## 2025-03-14 PROCEDURE — 82570 ASSAY OF URINE CREATININE: CPT | Mod: ZL

## 2025-03-14 PROCEDURE — 82040 ASSAY OF SERUM ALBUMIN: CPT | Mod: ZL

## 2025-03-14 PROCEDURE — 36415 COLL VENOUS BLD VENIPUNCTURE: CPT | Mod: ZL

## 2025-03-14 PROCEDURE — 84155 ASSAY OF PROTEIN SERUM: CPT | Mod: ZL

## 2025-03-14 PROCEDURE — 1126F AMNT PAIN NOTED NONE PRSNT: CPT | Performed by: INTERNAL MEDICINE

## 2025-03-14 PROCEDURE — G2211 COMPLEX E/M VISIT ADD ON: HCPCS | Performed by: INTERNAL MEDICINE

## 2025-03-14 PROCEDURE — 85014 HEMATOCRIT: CPT | Mod: ZL

## 2025-03-14 PROCEDURE — 3078F DIAST BP <80 MM HG: CPT | Performed by: INTERNAL MEDICINE

## 2025-03-14 PROCEDURE — 3074F SYST BP LT 130 MM HG: CPT | Performed by: INTERNAL MEDICINE

## 2025-03-14 PROCEDURE — 82465 ASSAY BLD/SERUM CHOLESTEROL: CPT | Mod: ZL

## 2025-03-14 PROCEDURE — 84439 ASSAY OF FREE THYROXINE: CPT | Mod: ZL

## 2025-03-14 PROCEDURE — G0439 PPPS, SUBSEQ VISIT: HCPCS | Performed by: INTERNAL MEDICINE

## 2025-03-14 PROCEDURE — 85041 AUTOMATED RBC COUNT: CPT | Mod: ZL

## 2025-03-14 PROCEDURE — 82746 ASSAY OF FOLIC ACID SERUM: CPT | Mod: ZL

## 2025-03-14 PROCEDURE — 83550 IRON BINDING TEST: CPT | Mod: ZL

## 2025-03-14 RX ORDER — CLOPIDOGREL BISULFATE 75 MG/1
75 TABLET ORAL DAILY
Qty: 90 TABLET | Refills: 4 | Status: SHIPPED | OUTPATIENT
Start: 2025-03-14

## 2025-03-14 RX ORDER — METFORMIN HYDROCHLORIDE 500 MG/1
TABLET, EXTENDED RELEASE ORAL
Qty: 360 TABLET | Refills: 1 | Status: SHIPPED | OUTPATIENT
Start: 2025-03-14

## 2025-03-14 RX ORDER — SPIRONOLACTONE 25 MG/1
12.5 TABLET ORAL EVERY OTHER DAY
Qty: 45 TABLET | Refills: 4 | Status: SHIPPED | OUTPATIENT
Start: 2025-03-14

## 2025-03-14 RX ORDER — CYANOCOBALAMIN 1000 UG/ML
1000 INJECTION, SOLUTION INTRAMUSCULAR; SUBCUTANEOUS ONCE
Status: COMPLETED | OUTPATIENT
Start: 2025-03-14 | End: 2025-03-14

## 2025-03-14 RX ORDER — SACUBITRIL AND VALSARTAN 24; 26 MG/1; MG/1
0.5 TABLET, FILM COATED ORAL 2 TIMES DAILY
Qty: 90 TABLET | Refills: 4 | Status: SHIPPED | OUTPATIENT
Start: 2025-03-14

## 2025-03-14 RX ORDER — PANTOPRAZOLE SODIUM 40 MG/1
40 TABLET, DELAYED RELEASE ORAL DAILY
Qty: 90 TABLET | Refills: 1 | Status: SHIPPED | OUTPATIENT
Start: 2025-03-14

## 2025-03-14 RX ORDER — LEVOTHYROXINE SODIUM 150 UG/1
150 TABLET ORAL DAILY
Qty: 90 TABLET | Refills: 1 | Status: SHIPPED | OUTPATIENT
Start: 2025-03-14 | End: 2025-03-15

## 2025-03-14 RX ORDER — ALLOPURINOL 100 MG/1
100 TABLET ORAL 2 TIMES DAILY
Qty: 180 TABLET | Refills: 4 | Status: SHIPPED | OUTPATIENT
Start: 2025-03-14

## 2025-03-14 RX ORDER — ATORVASTATIN CALCIUM 40 MG/1
40 TABLET, FILM COATED ORAL DAILY
Qty: 90 TABLET | Refills: 4 | Status: SHIPPED | OUTPATIENT
Start: 2025-03-14

## 2025-03-14 RX ORDER — METOPROLOL SUCCINATE 50 MG/1
50 TABLET, EXTENDED RELEASE ORAL DAILY
Qty: 90 TABLET | Refills: 4 | Status: SHIPPED | OUTPATIENT
Start: 2025-03-14

## 2025-03-14 RX ORDER — GLIPIZIDE 10 MG/1
20 TABLET ORAL
Qty: 360 TABLET | Refills: 1 | Status: SHIPPED | OUTPATIENT
Start: 2025-03-14

## 2025-03-14 RX ADMIN — CYANOCOBALAMIN 1000 MCG: 1000 INJECTION, SOLUTION INTRAMUSCULAR; SUBCUTANEOUS at 09:17

## 2025-03-14 ASSESSMENT — ENCOUNTER SYMPTOMS
EYE PAIN: 0
DIARRHEA: 0
WEAKNESS: 0
PARESTHESIAS: 0
CONSTIPATION: 0
SHORTNESS OF BREATH: 0
PALPITATIONS: 0
NERVOUS/ANXIOUS: 0
NAUSEA: 1
FATIGUE: 0
FREQUENCY: 0
HEARTBURN: 0
HEMATOCHEZIA: 0
BRUISES/BLEEDS EASILY: 1
POLYDIPSIA: 1
ARTHRALGIAS: 0
SLEEP DISTURBANCE: 1
DYSURIA: 0
DIZZINESS: 0
HEADACHES: 0
COUGH: 0
FEVER: 0
CHILLS: 0
HEMATURIA: 0
JOINT SWELLING: 0
LIGHT-HEADEDNESS: 1
MYALGIAS: 0
SORE THROAT: 0
ABDOMINAL PAIN: 0

## 2025-03-14 ASSESSMENT — PAIN SCALES - GENERAL: PAINLEVEL_OUTOF10: NO PAIN (0)

## 2025-03-14 NOTE — NURSING NOTE
"Chief Complaint   Patient presents with    Diabetes       Initial BP 96/60   Temp 97.5  F (36.4  C) (Temporal)   Resp 18   Wt 99.3 kg (219 lb)   SpO2 96%   BMI 33.30 kg/m   Estimated body mass index is 33.3 kg/m  as calculated from the following:    Height as of 5/29/24: 1.727 m (5' 8\").    Weight as of this encounter: 99.3 kg (219 lb).  Medication Reconciliation: complete        "

## 2025-03-14 NOTE — PATIENT INSTRUCTIONS
Blood pressure is well controlled.     Diabetes needs help. A1c is too high.     To help with weight loss and improve blood sugar control....    -- Try to avoid Carbohydrates as much as possible -- breads, pasta, baked goods, cakes, oatmeal, cold cereal, potatoes.   -- Eat more lean meats, proteins, eggs, nuts, vegetables.    -- Start or Continue regular daily exercise.     Get out and exercise, bike ride, walk for 10 to 15 minutes after each meal -- this can significantly lowers the spike in blood sugar after eating.        Medications refilled.     Labs are relatively stable.       Aspects of Diabetes:   Recent Labs   Lab Test 03/14/25  0804 03/03/25  0805 12/11/24  0807 09/05/24  0856   A1C 9.4*  --  8.6* 9.5*   LDL 88  --  89 52   HDL 34*  --  36* 31*   TRIG 284*  --  200* 197*   ALT 8 7 9 9   CR 0.97 1.03 0.97 0.92   GFRESTIMATED 79 74 79 85   POTASSIUM 4.5 4.7 4.6 3.8   TSH 13.65*  --  6.68* 6.86*   T4  --   --  0.95 0.83*   WBC 8.7 7.6 7.8 8.9   HGB 13.1* 12.6* 12.8* 13.1*    255 242 233   ALBUMIN 3.8 3.7 4.0 3.9      Hemoglobin A1c  Goal range is under 8%. Best is 6.5 to 7   Blood Pressure 96/60 Goal to keep less than 140/90   Tobacco  reports that he quit smoking about 40 years ago. His smoking use included cigarettes. He started smoking about 66 years ago. He has a 26 pack-year smoking history. He has been exposed to tobacco smoke. He has never used smokeless tobacco. Goal to abstain from tobacco   Aspirin or Plavix Anti-platelet therapy Aspirin or Plavix reduces risk of heart disease and stroke  -- sometimes used with other blood thinners, depending on bleeding risk and risk factors.    ACE/ARB Specific blood pressure meds These medications can reduce risk of kidney disease   Cholesterol Statins (Lipitor, Crestor, vs others) Statins reduce risk of heart disease and stroke   Eye Exam -- Do Yearly -- Annual diabetic eye exam   Healthy weight Wt Readings from Last 4 Encounters:   03/14/25 99.3 kg (219  lb)   03/11/25 99.3 kg (219 lb)   12/11/24 99.1 kg (218 lb 6.4 oz)   09/10/24 102.1 kg (225 lb)      Body mass index is 33.3 kg/m .  Goal BMI under 30, best is under 25.      -- Trying to exercise daily (goal at least 20 min/day) with moderate aerobic activity   -- Eat healthy (resources from ADA at http://www.diabetes.org/)   -- Taking good care of my feet. Consider seeing the Podiatrist   -- Check blood sugars as directed, record in log book and bring to every appointment    Insurance companies are grading you and I on your blood sugar control -- Goal is to get your A1c down to 7.9% or lower and NO Smoking!  -- Medicare and most insurance companies, will only cover Hemoglobin A1c labs to be rechecked every 91+ days.      Return for Diabetes labs and clinic follow-up appointment every 3 to 4 months.    Schedule lab only appointment --- A few days AFTER: 6/12/25   Schedule clinic appointment with Dr. Roberts -- Same day as labs, or 1-2 days later.

## 2025-03-14 NOTE — PROGRESS NOTES
Assessment & Plan     ICD-10-CM    1. Medicare annual wellness visit, subsequent  Z00.00       2. Vaccine counseling  Z71.85       3. Uncontrolled type 2 diabetes mellitus with hyperglycemia (H)  E11.65 glipiZIDE (GLUCOTROL) 10 MG tablet     metFORMIN (GLUCOPHAGE XR) 500 MG 24 hr tablet     Semaglutide (RYBELSUS) 7 MG tablet      4. Type 2 diabetes mellitus with diabetic neuropathy, with long-term current use of insulin (H)  E11.40     Z79.4       5. Chronic systolic heart failure (H) - ECHO 6/22/2022 at [facility] -- EF 25-30%  I50.22 metoprolol succinate ER (TOPROL XL) 50 MG 24 hr tablet     sacubitril-valsartan (ENTRESTO) 24-26 MG per tablet     Semaglutide (RYBELSUS) 7 MG tablet     spironolactone (ALDACTONE) 25 MG tablet      6. CKD (chronic kidney disease) stage 2, GFR 60-89 ml/min  N18.2       7. Coronary artery disease involving native coronary artery of native heart without angina pectoris  I25.10 atorvastatin (LIPITOR) 40 MG tablet     clopidogrel (PLAVIX) 75 MG tablet      8. Class 1 obesity due to excess calories with serious comorbidity and body mass index (BMI) of 34.0 to 34.9 in adult  E66.811     E66.09     Z68.34       9. Personal history of  service - [place] - 100% disability with [employer] - [employer]  Z91.85       10. Hypothyroidism due to acquired atrophy of thyroid  E03.4 levothyroxine (SYNTHROID/LEVOTHROID) 175 MCG tablet     DISCONTINUED: levothyroxine (SYNTHROID/LEVOTHROID) 150 MCG tablet      11. Mixed hyperlipidemia  E78.2       12. 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 Heartburn  R12 pantoprazole (PROTONIX) 40 MG EC tablet      14. 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 Adenocarcinoma of colon (H)  C18.9       16. History of gout  Z87.39 allopurinol (ZYLOPRIM) 100 MG tablet      17. Vitamin B12 deficiency  E53.8 cyanocobalamin injection  1,000 mcg         Patient presents for Medicare annual wellness visit as well as follow-up multiple issues.    Diabetes remains poorly controlled.  States that he eats a lot of cookies and sweets.  See below.  Hopefully with current medication regimen we can get his blood sugars down otherwise may need to consider increasing insulin or starting mealtime insulin or quick acting insulin to use with snacks.  Currently using glipizide, metformin, Rybelsus tablets.  Also on Lantus insulin.  Updated prescription sent to pharmacy.    Patient has diabetic polyneuropathy.  Ongoing.  No gabapentin use at this time.  More numbness and pain.  Continue monitoring.  Hopefully blood sugars will come down.    Chronic systolic heart failure, has coronary artery disease, history of agent orange exposure.  Currently on 100% disability through the [employer].  Continues on Entresto, Rybelsus, spironolactone, metoprolol.  Needs refills.    Agent orange exposure, served in [place].    History of TIA/stroke.  Encouraged him to continue with Lipitor.  His LDL is high not at goal.  Currently taking 40 mg daily.    Heartburn, ongoing but controlled to a reasonable extent with Protonix.  Does get breakthrough symptoms if he misses a dose.  Needs refills.    Patient has history of metastatic adenocarcinoma of the colon with metastases to the surrounding lymph nodes.  Diagnosis was about 5 years ago.  Has been doing well.  Continues to follow closely with oncology.    History of gout, no recent gout attacks.  Doing well with allopurinol.  Needs refills.    Vitamin B12 deficiency.  Continues with B12 supplemental tablet but his B12 levels remain quite low.  Recommend B12 shot today.  Orders placed.    HYPERTENSION - Ongoing. Blood pressure is currently well controlled.  Medication side effects: None. Denies syncope or presyncope.  Continue current medications.   Medication list reviewed/updated. Refills completed as needed.      MIXED HYPERLIPIDEMIA.   Ongoing. LDL is at goal: No. Triglycerides are at goal: No.  Hopefully lifestyle modifications will improve cholesterol levels, otherwise will consider additional medication dose adjustments or medication changes.  Medication side effects reported: None.   Continue current medications for now. Medication list reviewed/updated. Refills completed as needed.  Recent Labs   Lab Test 03/14/25  0804 12/11/24  0807   CHOL 179 165   HDL 34* 36*   LDL 88 89   TRIG 284* 200*        HYPOTHYROIDISM - Patient has a longstanding history of hypothyroidism. Reports doing reasonably well. Reports: denies fatigue, weight changes, heat/cold intolerance, bowel/skin changes or CVS symptoms.      Continue: Synthroid oral thyroid replacement --> increase dose, see below.      Denies adverse medication reactions or side effects.                       TSH   Date Value Ref Range Status   03/14/2025 13.65 (H) 0.30 - 4.20 uIU/mL Final   10/28/2022 5.49 (H) 0.40 - 4.00 mU/L Final        OBESITY - Ongoing.  (See Encounter Diagnosis list above for obesity class / severity).  - Encourage continued maintenance / improvement in diet and exercise.   - Consider Nutrition / Dietician appointment.  - Weight loss would improve Hypertension, Cholesterol and Diabetes.      Chronic Kidney Disease, Stage 2 (GFR 60-89), chronic, ongoing.  Kidney function had been slowly declining.  Encourage NSAID avoidance.    Recent Labs   Lab Test 03/14/25  0804 03/03/25  0805   CR 0.97 1.03   GFRESTIMATED 79 74        Vaccine counseling completed.  Encourage routine / annual vaccinations.    Type 2 Diabetes Mellitus, with nephropathy, with neuropathy, Reports ongoing/previous: numbness and burning.  Blood sugar control has been poor with moderate hyperglycemia. Doing well with diet, oral agents, exercise, and insulin injections - x1 Insulin injections per day.  Medication list reviewed/updated. Refills completed as needed.    Complicating factors include but are not  limited to: hypertension, hyperlipidemia, neuropathy, chronic kidney disease, CAD/PVD, and CVA.     Recent Labs   Lab Test 03/14/25  0804 03/03/25  0805 12/11/24  0807 09/05/24  0856   A1C 9.4*  --  8.6* 9.5*   LDL 88  --  89 52   HDL 34*  --  36* 31*   TRIG 284*  --  200* 197*   ALT 8 7 9 9   CR 0.97 1.03 0.97 0.92   GFRESTIMATED 79 74 79 85   POTASSIUM 4.5 4.7 4.6 3.8   TSH 13.65*  --  6.68* 6.86*   T4 0.79*  --  0.95 0.83*   WBC 8.7 7.6 7.8 8.9   HGB 13.1* 12.6* 12.8* 13.1*    255 242 233   ALBUMIN 3.8 3.7 4.0 3.9     Results for orders placed or performed in visit on 03/14/25   Urine Macroscopic with reflex to Microscopic     Status: Normal   Result Value Ref Range    Color Urine Light Yellow Colorless, Straw, Light Yellow, Yellow    Appearance Urine Clear Clear    Glucose Urine Negative Negative mg/dL    Bilirubin Urine Negative Negative    Ketones Urine Negative Negative mg/dL    Specific Gravity Urine 1.015 1.000 - 1.030    Blood Urine Negative Negative    pH Urine 5.0 5.0 - 9.0    Protein Albumin Urine Negative Negative mg/dL    Urobilinogen Urine Normal Normal, 2.0 mg/dL    Nitrite Urine Negative Negative    Leukocyte Esterase Urine Negative Negative    Narrative    Microscopic not indicated   TSH with free T4 reflex     Status: Abnormal   Result Value Ref Range    TSH 13.65 (H) 0.30 - 4.20 uIU/mL   Hemoglobin A1c     Status: Abnormal   Result Value Ref Range    Estimated Average Glucose 223 (H) <117 mg/dL    Hemoglobin A1C 9.4 (H) <5.7 %   CBC with platelets     Status: Abnormal   Result Value Ref Range    WBC Count 8.7 4.0 - 11.0 10e3/uL    RBC Count 4.40 4.40 - 5.90 10e6/uL    Hemoglobin 13.1 (L) 13.3 - 17.7 g/dL    Hematocrit 39.8 (L) 40.0 - 53.0 %    MCV 91 78 - 100 fL    MCH 29.8 26.5 - 33.0 pg    MCHC 32.9 31.5 - 36.5 g/dL    RDW 14.5 10.0 - 15.0 %    Platelet Count 229 150 - 450 10e3/uL   Albumin Random Urine Quantitative with Creat Ratio     Status: Abnormal   Result Value Ref Range     Creatinine Urine mg/dL 123.3 mg/dL    Albumin Urine mg/L 38.9 mg/L    Albumin Urine mg/g Cr 31.55 (H) 0.00 - 17.00 mg/g Cr   Lipid Profile     Status: Abnormal   Result Value Ref Range    Cholesterol 179 <200 mg/dL    Triglycerides 284 (H) <150 mg/dL    Direct Measure HDL 34 (L) >=40 mg/dL    LDL Cholesterol Calculated 88 <100 mg/dL    Non HDL Cholesterol 145 (H) <130 mg/dL    Patient Fasting > 8hrs? Yes     Narrative    Cholesterol  Desirable: < 200 mg/dL  Borderline High: 200 - 239 mg/dL  High: >= 240 mg/dL    Triglycerides  Normal: < 150 mg/dL  Borderline High: 150 - 199 mg/dL  High: 200-499 mg/dL  Very High: >= 500 mg/dL    Direct Measure HDL  Female: >= 50 mg/dL   Male: >= 40 mg/dL    LDL Cholesterol  Desirable: < 100 mg/dL  Above Desirable: 100 - 129 mg/dL   Borderline High: 130 - 159 mg/dL   High:  160 - 189 mg/dL   Very High: >= 190 mg/dL    Non HDL Cholesterol  Desirable: < 130 mg/dL  Above Desirable: 130 - 159 mg/dL  Borderline High: 160 - 189 mg/dL  High: 190 - 219 mg/dL  Very High: >= 220 mg/dL   Comprehensive metabolic panel     Status: Abnormal   Result Value Ref Range    Sodium 142 135 - 145 mmol/L    Potassium 4.5 3.4 - 5.3 mmol/L    Carbon Dioxide (CO2) 27 22 - 29 mmol/L    Anion Gap 10 7 - 15 mmol/L    Urea Nitrogen 15.6 8.0 - 23.0 mg/dL    Creatinine 0.97 0.67 - 1.17 mg/dL    GFR Estimate 79 >60 mL/min/1.73m2    Calcium 9.0 8.8 - 10.4 mg/dL    Chloride 105 98 - 107 mmol/L    Glucose 162 (H) 70 - 99 mg/dL    Alkaline Phosphatase 125 40 - 150 U/L    AST 11 0 - 45 U/L    ALT 8 0 - 70 U/L    Protein Total 6.5 6.4 - 8.3 g/dL    Albumin 3.8 3.5 - 5.2 g/dL    Bilirubin Total 0.5 <=1.2 mg/dL    Patient Fasting > 8hrs? Yes    Folate     Status: Normal   Result Value Ref Range    Folic Acid 5.6 4.6 - 34.8 ng/mL   Vitamin B12     Status: Normal   Result Value Ref Range    Vitamin B12 280 232 - 1,245 pg/mL   Iron & Iron Binding Capacity     Status: Normal   Result Value Ref Range    Iron 68 61 - 157 ug/dL     "Iron Binding Capacity 262 240 - 430 ug/dL    Iron Sat Index 26 15 - 46 %   T4 free     Status: Abnormal   Result Value Ref Range    Free T4 0.79 (L) 0.90 - 1.70 ng/dL      Iron levels are good.  B12 is low.  Folic acid is within normal limits.  Random glucose 162.  Liver enzymes normal.  Creatinine 0.97 with GFR 79.  Potassium normal.  Cholesterol levels show LDL high not at goal.  Triglycerides are high and not at goal.  Urine protein levels are elevated.  Mild anemia noted.  Urinalysis is negative.        TSH is elevated with low T4.  Increase Synthroid to 175 mcg daily. New rx sent to pharmacy.   - Currently taking 150 mcg daily.      The longitudinal plan of care for the diagnosis(es)/condition(s) as documented were addressed during this visit. Due to the added complexity in care, I will continue to support Andrez in the subsequent management and with ongoing continuity of care.               BMI  Estimated body mass index is 33.3 kg/m  as calculated from the following:    Height as of 5/29/24: 1.727 m (5' 8\").    Weight as of this encounter: 99.3 kg (219 lb).         Return in about 3 months (around 6/14/2025) for - Labs every 91+ days, with DM Follow-up, Same Day or 1-2 days later with Dr. Roberts.      Bk Roberts MD  [facility]    Review of Systems   Constitutional:  Negative for chills, fatigue and fever.   HENT:  Positive for hearing loss. Negative for congestion, ear pain and sore throat.    Eyes:  Negative for pain and visual disturbance.   Respiratory:  Negative for cough and shortness of breath.    Cardiovascular:  Negative for chest pain, palpitations and peripheral edema.   Gastrointestinal:  Positive for nausea. Negative for abdominal pain, constipation, diarrhea, heartburn and hematochezia.   Endocrine: Positive for polydipsia and polyuria.   Genitourinary:  Positive for impotence. Negative for dysuria, frequency, genital sores, hematuria, penile discharge and urgency. "   Musculoskeletal:  Positive for gait problem (Due to bilateral leg weakness, walks with cane which helps). Negative for arthralgias, joint swelling and myalgias.   Skin:  Negative for rash.   Allergic/Immunologic: Negative for immunocompromised state.   Neurological:  Positive for light-headedness. Negative for dizziness, syncope, weakness, headaches and paresthesias.   Hematological:  Bruises/bleeds easily.   Psychiatric/Behavioral:  Positive for sleep disturbance. Negative for mood changes. The patient is not nervous/anxious.          Aristides Maguire is a 79 year old, presenting for the following health issues:  Diabetes        3/14/2025     8:22 AM   Additional Questions   Roomed by DOROTHY Montero   Accompanied by Self         3/14/2025     8:22 AM   Patient Reported Additional Medications   Patient reports taking the following new medications N/A     History of Present Illness       He eats 0-1 servings of fruits and vegetables daily.He consumes 1 sweetened beverage(s) daily.He exercises with enough effort to increase his heart rate 9 or less minutes per day.  He exercises with enough effort to increase his heart rate 3 or less days per week. He is missing 1 dose(s) of medications per week.  He is not taking prescribed medications regularly due to remembering to take.          Diabetes Follow-up    How often are you checking your blood sugar? Not at all  What concerns do you have today about your diabetes? None   Do you have any of these symptoms? (Select all that apply)  Numbness in feet and Blurry vision  Have you had a diabetic eye exam in the last 12 months? No        BP Readings from Last 2 Encounters:   03/14/25 96/60   03/11/25 100/58     Hemoglobin A1C (%)   Date Value   03/14/2025 9.4 (H)   12/11/2024 8.6 (H)   05/03/2021 9.6 (H)   01/22/2021 10.9 (H)     LDL Cholesterol Calculated (mg/dL)   Date Value   03/14/2025 88   12/11/2024 89   05/03/2021 78   12/18/2017 106 (H)         How many servings of  fruits and vegetables do you eat daily?  0-1  On average, how many sweetened beverages do you drink each day (Examples: soda, juice, sweet tea, etc.  Do NOT count diet or artificially sweetened beverages)?   1  How many days per week do you exercise enough to make your heart beat faster? 3 or less  How many minutes a day do you exercise enough to make your heart beat faster? 9 or less  How many days per week do you miss taking your medication? 1  What makes it hard for you to take your medications?  remembering to take    Annual Wellness Visit     Patient has been advised of split billing requirements and indicates understanding: Yes          Health Care Directive  Patient does not have a Health Care Directive: Discussed advance care planning with patient; however, patient declined at this time.  In general, how would you rate your overall physical health? good  Do you have a special diet?  Low fat/cholesterol        2024   Exercise, Social Connection, Stress   Days per week of moderate/strenous exercise 0 days   Average minutes spent exercising at this level 0 min   Frequency of gathering with friends or relatives Three times   Feel stress (tense, anxious, or unable to sleep) Not at all     Do you see a dentist two times every year?  Yes  Have you been more tired than usual lately?  No  If you drink alcohol do you typically have >3 drinks per day or >7 drinks per week? No  Do you have a current opioid prescription? No  Do you use any other controlled substances or medications that are not prescribed by a provider? None  Social History     Tobacco Use    Smoking status: Former     Current packs/day: 0.00     Average packs/day: 1 pack/day for 26.0 years (26.0 ttl pk-yrs)     Types: Cigarettes     Start date:      Quit date: 1985     Years since quittin.2     Passive exposure: Past    Smokeless tobacco: Never    Tobacco comments:     Passive exposure in adult home for 6 years.   Vaping Use    Vaping  status: Never Used   Substance Use Topics    Alcohol use: No     Alcohol/week: 0.0 standard drinks of alcohol    Drug use: No       Needs assistance for the following daily activities: no assistance needed  Which of the following safety concerns are present in your home?  none identified   Do you (or your family members) have any concerns about your safety while driving?  No  Do you have any of the following hearing concerns?: (!) I FEEL THAT PEOPLE ARE MUMBLING OR NOT SPEAKING CLEARLY and (!) IT'S HARD TO FOLLOW A CONVERSATION IN A NOISY RESTAURANT OR CROWDED ROOM  In the past 6 months, have you been bothered by leaking of urine? No        5/29/2024   Social Factors   Worry food won't last until get money to buy more No   Food not last or not have enough money for food? No   Do you have housing? (Housing is defined as stable permanent housing and does not include staying ouside in a car, in a tent, in an abandoned building, in an overnight shelter, or couch-surfing.) Yes   Are you worried about losing your housing? No   Lack of transportation? No   Unable to get utilities (heat,electricity)? No          3/14/2025   Fall Risk   Fallen 2 or more times in the past year? No   Trouble with walking or balance? Yes           Today's PHQ-2 Score:       3/14/2025     8:09 AM   PHQ-2 ( 1999 [employer])   Q1: Little interest or pleasure in doing things 0   Q2: Feeling down, depressed or hopeless 0   PHQ-2 Score 0    Q1: Little interest or pleasure in doing things Not at all   Q2: Feeling down, depressed or hopeless Not at all   PHQ-2 Score 0       Patient-reported       ASCVD Risk   The ASCVD Risk score (Lori HUYNH, et al., 2019) failed to calculate for the following reasons:    Risk score cannot be calculated because patient has a medical history suggesting prior/existing ASCVD            Reviewed and updated as needed this visit by Provider   Tobacco  Allergies  Meds  Problems  Med Hx  Surg Hx  Fam Hx     Sexual  Activity              Current providers sharing in care for this patient include:  Patient Care Team:  Bk Roberts MD as PCP - General (Internal Medicine)  Bk Roberts MD as Assigned PCP  Radha Adamson APRN CNP as Assigned Cancer Care Provider    The following health maintenance items are reviewed in Epic and correct as of today:  Health Maintenance   Topic Date Due    HF ACTION PLAN  Never done    URIC ACID  05/19/2016    RSV VACCINE (1 - 1-dose 75+ series) Never done    DIABETIC FOOT EXAM  04/19/2023    EYE EXAM  01/02/2025    A1C  09/14/2025    BMP  09/14/2025    MEDICARE ANNUAL WELLNESS VISIT  03/14/2026    ALT  03/14/2026    LIPID  03/14/2026    MICROALBUMIN  03/14/2026    FALL RISK ASSESSMENT  03/14/2026    CBC  03/14/2026    COLORECTAL CANCER SCREENING  11/22/2027    DTAP/TDAP/TD IMMUNIZATION (2 - Td or Tdap) 01/02/2030    ADVANCE CARE PLANNING  03/14/2030    TSH W/FREE T4 REFLEX  Completed    PHQ-2 (once per calendar year)  Completed    INFLUENZA VACCINE  Completed    URINALYSIS  Completed    ZOSTER IMMUNIZATION  Completed    HEPATITIS C SCREENING  Addressed    HPV IMMUNIZATION  Aged Out    MENINGITIS IMMUNIZATION  Aged Out    Pneumococcal Vaccine: 50+ Years  Discontinued    COVID-19 Vaccine  Discontinued       Appropriate preventive services were discussed with this patient, including applicable screening as appropriate for fall prevention, nutrition, physical activity, Tobacco-use cessation, weight loss and cognition.  Checklist reviewing preventive services available has been given to the patient.          2/27/2024   Mini Cog   Clock Draw Score 2 Normal   3 Item Recall 0 objects recalled   Mini Cog Total Score 2                    Objective    BP 96/60   Temp 97.5  F (36.4  C) (Temporal)   Resp 18   Wt 99.3 kg (219 lb)   SpO2 96%   BMI 33.30 kg/m    Body mass index is 33.3 kg/m .  Physical Exam  Constitutional:       General: He is not in acute distress.     Appearance: He is  well-developed. He is obese. He is not diaphoretic.   Eyes:      General: No scleral icterus.     Conjunctiva/sclera: Conjunctivae normal.   Neck:      Vascular: No carotid bruit.   Cardiovascular:      Rate and Rhythm: Normal rate and regular rhythm.      Pulses: Normal pulses.   Pulmonary:      Effort: Pulmonary effort is normal.      Breath sounds: Normal breath sounds.   Abdominal:      Palpations: Abdomen is soft.      Tenderness: There is no abdominal tenderness.   Musculoskeletal:         General: No deformity.      Cervical back: Neck supple.      Right lower leg: No edema.      Left lower leg: No edema.   Skin:     General: Skin is warm and dry.      Findings: Bruising present. No rash.   Neurological:      Mental Status: He is alert. Mental status is at baseline.   Psychiatric:         Mood and Affect: Mood normal.         Behavior: Behavior normal.                    Signed Electronically by: Bk Roberts MD

## 2025-03-15 RX ORDER — LEVOTHYROXINE SODIUM 175 UG/1
175 TABLET ORAL DAILY
Qty: 90 TABLET | Refills: 1 | Status: SHIPPED | OUTPATIENT
Start: 2025-03-15

## 2025-06-24 ENCOUNTER — DOCUMENTATION ONLY (OUTPATIENT)
Dept: INTERNAL MEDICINE | Facility: OTHER | Age: 80
End: 2025-06-24
Payer: COMMERCIAL

## 2025-06-24 DIAGNOSIS — R73.03 PRE-DIABETES: Primary | ICD-10-CM

## 2025-06-24 DIAGNOSIS — E03.4 HYPOTHYROIDISM DUE TO ACQUIRED ATROPHY OF THYROID: Primary | ICD-10-CM

## 2025-06-24 NOTE — PROGRESS NOTES
Andrez Olivier has an upcoming lab appointment and there are no orders available. Please review and place future orders, as appropriate.    Sadaf Collins

## 2025-06-24 NOTE — PROGRESS NOTES
"Per letter sent to patient on 3/15/25 \"plan to recheck labs again in 3-4 months\".  Thyroid levels were off at that time.  Ramona Weathers LPN  6/24/2025  10:29 AM             "

## 2025-07-03 ENCOUNTER — OFFICE VISIT (OUTPATIENT)
Dept: INTERNAL MEDICINE | Facility: OTHER | Age: 80
End: 2025-07-03
Attending: INTERNAL MEDICINE
Payer: MEDICARE

## 2025-07-03 ENCOUNTER — LAB (OUTPATIENT)
Dept: LAB | Facility: OTHER | Age: 80
End: 2025-07-03
Attending: INTERNAL MEDICINE
Payer: MEDICARE

## 2025-07-03 VITALS
WEIGHT: 215.8 LBS | TEMPERATURE: 97.9 F | RESPIRATION RATE: 16 BRPM | BODY MASS INDEX: 32.81 KG/M2 | DIASTOLIC BLOOD PRESSURE: 58 MMHG | HEART RATE: 96 BPM | OXYGEN SATURATION: 98 % | SYSTOLIC BLOOD PRESSURE: 98 MMHG

## 2025-07-03 DIAGNOSIS — Z79.4 TYPE 2 DIABETES MELLITUS WITH DIABETIC NEUROPATHY, WITH LONG-TERM CURRENT USE OF INSULIN (H): ICD-10-CM

## 2025-07-03 DIAGNOSIS — E11.40 TYPE 2 DIABETES MELLITUS WITH DIABETIC NEUROPATHY, WITH LONG-TERM CURRENT USE OF INSULIN (H): ICD-10-CM

## 2025-07-03 DIAGNOSIS — N18.2 CKD (CHRONIC KIDNEY DISEASE) STAGE 2, GFR 60-89 ML/MIN: ICD-10-CM

## 2025-07-03 DIAGNOSIS — E11.65 UNCONTROLLED TYPE 2 DIABETES MELLITUS WITH HYPERGLYCEMIA (H): Primary | ICD-10-CM

## 2025-07-03 DIAGNOSIS — C77.2: ICD-10-CM

## 2025-07-03 DIAGNOSIS — E11.65 UNCONTROLLED TYPE 2 DIABETES MELLITUS WITH HYPERGLYCEMIA (H): ICD-10-CM

## 2025-07-03 DIAGNOSIS — E03.4 HYPOTHYROIDISM DUE TO ACQUIRED ATROPHY OF THYROID: ICD-10-CM

## 2025-07-03 DIAGNOSIS — E66.09 CLASS 1 OBESITY DUE TO EXCESS CALORIES WITH SERIOUS COMORBIDITY AND BODY MASS INDEX (BMI) OF 32.0 TO 32.9 IN ADULT: ICD-10-CM

## 2025-07-03 DIAGNOSIS — I50.22 CHRONIC SYSTOLIC HEART FAILURE (H): ICD-10-CM

## 2025-07-03 DIAGNOSIS — E66.811 CLASS 1 OBESITY DUE TO EXCESS CALORIES WITH SERIOUS COMORBIDITY AND BODY MASS INDEX (BMI) OF 32.0 TO 32.9 IN ADULT: ICD-10-CM

## 2025-07-03 DIAGNOSIS — E78.2 MIXED HYPERLIPIDEMIA: ICD-10-CM

## 2025-07-03 DIAGNOSIS — R73.03 PRE-DIABETES: ICD-10-CM

## 2025-07-03 LAB
CREAT SERPL-MCNC: 0.91 MG/DL (ref 0.67–1.17)
EGFRCR SERPLBLD CKD-EPI 2021: 86 ML/MIN/1.73M2
EST. AVERAGE GLUCOSE BLD GHB EST-MCNC: 206 MG/DL
HBA1C MFR BLD: 8.8 %
HOLD SPECIMEN: NORMAL
T4 FREE SERPL-MCNC: 2.36 NG/DL (ref 0.9–1.7)
TSH SERPL DL<=0.005 MIU/L-ACNC: 0.01 UIU/ML (ref 0.3–4.2)

## 2025-07-03 PROCEDURE — 83036 HEMOGLOBIN GLYCOSYLATED A1C: CPT | Mod: ZL

## 2025-07-03 PROCEDURE — 84443 ASSAY THYROID STIM HORMONE: CPT | Mod: ZL

## 2025-07-03 PROCEDURE — 36415 COLL VENOUS BLD VENIPUNCTURE: CPT | Mod: ZL

## 2025-07-03 PROCEDURE — 82565 ASSAY OF CREATININE: CPT | Mod: ZL

## 2025-07-03 PROCEDURE — G0463 HOSPITAL OUTPT CLINIC VISIT: HCPCS

## 2025-07-03 PROCEDURE — 84439 ASSAY OF FREE THYROXINE: CPT | Mod: ZL

## 2025-07-03 ASSESSMENT — ENCOUNTER SYMPTOMS
ABDOMINAL PAIN: 0
SLEEP DISTURBANCE: 1
HEADACHES: 0
POLYDIPSIA: 1
PARESTHESIAS: 0
DIARRHEA: 0
JOINT SWELLING: 0
EYE PAIN: 0
DYSURIA: 0
FEVER: 0
WEAKNESS: 0
FREQUENCY: 0
ARTHRALGIAS: 0
PALPITATIONS: 0
SHORTNESS OF BREATH: 0
NAUSEA: 1
MYALGIAS: 0
NERVOUS/ANXIOUS: 0
HEMATOCHEZIA: 0
COUGH: 0
BRUISES/BLEEDS EASILY: 1
CONSTIPATION: 0
DIZZINESS: 0
HEARTBURN: 0
HEMATURIA: 0
LIGHT-HEADEDNESS: 1
FATIGUE: 1
CHILLS: 0
SORE THROAT: 0

## 2025-07-03 ASSESSMENT — PAIN SCALES - GENERAL: PAINLEVEL_OUTOF10: NO PAIN (0)

## 2025-07-03 NOTE — NURSING NOTE
"Chief Complaint   Patient presents with    Follow Up    Diabetes     Patient presents for follow up on diabetes.  Denies pain at this time.      Initial BP 98/58 (BP Location: Right arm, Patient Position: Sitting, Cuff Size: Adult Large)   Pulse 96   Temp 97.9  F (36.6  C) (Temporal)   Resp 16   Wt 97.9 kg (215 lb 12.8 oz)   SpO2 98%   BMI 32.81 kg/m   Estimated body mass index is 32.81 kg/m  as calculated from the following:    Height as of 5/29/24: 1.727 m (5' 8\").    Weight as of this encounter: 97.9 kg (215 lb 12.8 oz).  Medication Review: complete    The next two questions are to help us understand your food security.  If you are feeling you need any assistance in this area, we have resources available to support you today.          5/29/2024   SDOH- Food Insecurity   Within the past 12 months, did you worry that your food would run out before you got money to buy more? N   Within the past 12 months, did the food you bought just not last and you didn t have money to get more? N        Data saved with a previous flowsheet row definition         Health Care Directive:  Patient does not have a Health Care Directive: Discussed advance care planning with patient; however, patient declined at this time.    Divya Preciado LPN on 7/3/2025 at 9:00 AM      "

## 2025-07-03 NOTE — PROGRESS NOTES
"Assessment & Plan     ICD-10-CM    1. Uncontrolled type 2 diabetes mellitus with hyperglycemia (H)  E11.65 Hemoglobin A1c      2. Type 2 diabetes mellitus with diabetic neuropathy, with long-term current use of insulin (H)  E11.40 Hemoglobin A1c    Z79.4 Urine Macroscopic with reflex to Microscopic     Hemoglobin A1c     CBC with platelets     Albumin Random Urine Quantitative with Creat Ratio     Comprehensive metabolic panel      3. Chronic systolic heart failure (H) - ECHO 6/22/2022 at [facility] -- EF 25-30%  I50.22       4. CKD (chronic kidney disease) stage 2, GFR 60-89 ml/min  N18.2       5. Hypothyroidism due to acquired atrophy of thyroid  E03.4 TSH with free T4 reflex      6. Mixed hyperlipidemia  E78.2 Lipid Profile      7. Class 1 obesity due to excess calories with serious comorbidity and body mass index (BMI) of 32.0 to 32.9 in adult  E66.811     E66.09     Z68.32       8. 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    Blood sugar remains elevated and not at goal.  Currently taking metformin, Rybelsus tablets glipizide, Lantus reports that he \"likes his sweets most \".  He understands that he needs to make some changes but advised that he try work on at least more of a balanced/limited amount of sugary treats or carbohydrates.  Standing orders updated.    Chronic systolic heart failure, appears stable.  No excessive fluid retention issues.  Following with cardiology.  Continue current medications for now.    Hypothyroidism, Synthroid dosing was adjusted at his last appointment.  Reports doing well at this time.  No changes for now.  Recheck lab work.    Lymph node cancer -patient does have agent orange exposure from his  service.  This causes diabetes and his diabetic neuropathy, suspect that this is also more likely than not associated with his lymph node malignancy.  Continues with " oncology follow-up.    MIXED HYPERLIPIDEMIA.  Ongoing. LDL is at goal: No. Triglycerides are at goal: No.  Hopefully lifestyle modifications will improve cholesterol levels, otherwise will consider additional medication dose adjustments or medication changes.  Medication side effects reported: None.   Continue current medications for now. Medication list reviewed/updated. Refills completed as needed.  Recent Labs   Lab Test 03/14/25  0804 12/11/24  0807   CHOL 179 165   HDL 34* 36*   LDL 88 89   TRIG 284* 200*        HYPOTHYROIDISM - Patient has a longstanding history of hypothyroidism. Reports doing reasonably well. Reports: denies fatigue, weight changes, heat/cold intolerance, bowel/skin changes or CVS symptoms.  Continue: Synthroid oral thyroid replacement.  Denies adverse medication reactions or side effects.                       TSH   Date Value Ref Range Status   07/03/2025 0.01 (L) 0.30 - 4.20 uIU/mL Final   10/28/2022 5.49 (H) 0.40 - 4.00 mU/L Final        OBESITY - Ongoing.  (See Encounter Diagnosis list above for obesity class / severity).  - Encourage continued maintenance / improvement in diet and exercise.   - Consider Nutrition / Dietician appointment.  - Weight loss would improve Hypertension, Cholesterol and Diabetes.      Chronic Kidney Disease, Stage 2 (GFR 60-89), chronic, ongoing.  Kidney function had been slowly declining.  Encourage NSAID avoidance.    Recent Labs   Lab Test 07/03/25  0821 03/14/25  0804   CR 0.91 0.97   GFRESTIMATED 86 79        Vaccine counseling completed.  Encourage routine / annual vaccinations.    Type 2 Diabetes Mellitus, with nephropathy, with neuropathy, Reports ongoing/previous: numbness and burning.  Blood sugar control has been poor with moderate hyperglycemia. Doing well with diet and oral agents - xNO Insulin injections per day.  Medication list reviewed/updated. Refills completed as needed.    Complicating factors include but are not limited to: hypertension,  hyperlipidemia, neuropathy, chronic kidney disease, and CAD/PVD.     Recent Labs   Lab Test 07/03/25  0823 07/03/25  0821 03/14/25  0804 03/03/25  0805 12/11/24  0807 09/05/24  0856   A1C 8.8*  --  9.4*  --  8.6* 9.5*   LDL  --   --  88  --  89 52   HDL  --   --  34*  --  36* 31*   TRIG  --   --  284*  --  200* 197*   ALT  --   --  8 7 9 9   CR  --  0.91 0.97 1.03 0.97 0.92   GFRESTIMATED  --  86 79 74 79 85   POTASSIUM  --   --  4.5 4.7 4.6 3.8   TSH  --  0.01* 13.65*  --  6.68* 6.86*   T4  --  2.36* 0.79*  --  0.95 0.83*   WBC  --   --  8.7 7.6 7.8 8.9   HGB  --   --  13.1* 12.6* 12.8* 13.1*   PLT  --   --  229 255 242 233   ALBUMIN  --   --  3.8 3.7 4.0 3.9     Hemoglobin A1c is high and not at goal but has improved.  Last creatinine checked was stable with GFR in the 80s, CKD stage II.  Recent TSH was suppressed with free T4 being slightly elevated, continue current dosing for now.  Plan to recheck in about 3 months, medication adjustment at that time based on lab results.    The longitudinal plan of care for the diagnosis(es)/condition(s) as documented were addressed during this visit. Due to the added complexity in care, I will continue to support Andrez in the subsequent management and with ongoing continuity of care.                 Return in about 3 months (around 10/3/2025) for - Labs every 91+ days, with DM Follow-up, Same Day or 1-2 days later with Dr. Roberts.      Bk Roberts MD  [facility]    Review of Systems   Constitutional:  Positive for fatigue. Negative for chills and fever.   HENT:  Positive for hearing loss. Negative for congestion, ear pain and sore throat.    Eyes:  Negative for pain and visual disturbance.   Respiratory:  Negative for cough and shortness of breath.    Cardiovascular:  Negative for chest pain, palpitations and peripheral edema.   Gastrointestinal:  Positive for nausea. Negative for abdominal pain, constipation, diarrhea, heartburn and hematochezia.    Endocrine: Positive for polydipsia and polyuria.   Genitourinary:  Positive for impotence. Negative for dysuria, frequency, genital sores, hematuria, penile discharge and urgency.   Musculoskeletal:  Positive for gait problem (Due to bilateral leg weakness, walks with cane which helps). Negative for arthralgias, joint swelling and myalgias.   Skin:  Negative for rash.   Allergic/Immunologic: Negative for immunocompromised state.   Neurological:  Positive for light-headedness. Negative for dizziness, syncope, weakness, headaches and paresthesias.   Hematological:  Bruises/bleeds easily.   Psychiatric/Behavioral:  Positive for sleep disturbance. Negative for mood changes. The patient is not nervous/anxious.        Aristides Maguire is a 79 year old, presenting for the following health issues:  Follow Up and Diabetes        7/3/2025     8:55 AM   Additional Questions   Roomed by Divya Preciado LPN   Accompanied by self     History of Present Illness       He eats 0-1 servings of fruits and vegetables daily.He consumes 2 sweetened beverage(s) daily.He exercises with enough effort to increase his heart rate 9 or less minutes per day.  He exercises with enough effort to increase his heart rate 3 or less days per week. He is missing 1 dose(s) of medications per week.                      Objective    BP 98/58 (BP Location: Right arm, Patient Position: Sitting, Cuff Size: Adult Large)   Pulse 96   Temp 97.9  F (36.6  C) (Temporal)   Resp 16   Wt 97.9 kg (215 lb 12.8 oz)   SpO2 98%   BMI 32.81 kg/m    Body mass index is 32.81 kg/m .  Physical Exam  Constitutional:       General: He is not in acute distress.     Appearance: He is well-developed. He is obese. He is not diaphoretic.   Eyes:      General: No scleral icterus.     Conjunctiva/sclera: Conjunctivae normal.   Neck:      Vascular: No carotid bruit.   Cardiovascular:      Rate and Rhythm: Normal rate and regular rhythm.      Pulses: Normal pulses.      Heart  sounds: Murmur heard.   Pulmonary:      Effort: Pulmonary effort is normal.      Breath sounds: Normal breath sounds.   Abdominal:      Palpations: Abdomen is soft.      Tenderness: There is no abdominal tenderness.   Musculoskeletal:         General: No deformity.      Cervical back: Neck supple.      Right lower leg: No edema.      Left lower leg: No edema.   Skin:     General: Skin is warm and dry.      Findings: Bruising present. No rash.   Neurological:      Mental Status: He is alert. Mental status is at baseline.   Psychiatric:         Mood and Affect: Mood normal.         Behavior: Behavior normal.                    Signed Electronically by: Bk Roberts MD

## 2025-07-03 NOTE — PATIENT INSTRUCTIONS
Blood pressure is controlled.     Diabetes labs are pending.     Medications refilled.     Labs are pending.       --> B12 has been very low..... Recommend to start Dissolving the B12 under your tongue... once daily.         Aspects of Diabetes:   Recent Labs   Lab Test 07/03/25  0821 03/14/25  0804 03/03/25  0805 12/11/24  0807 09/05/24  0856   A1C  --  9.4*  --  8.6* 9.5*   LDL  --  88  --  89 52   HDL  --  34*  --  36* 31*   TRIG  --  284*  --  200* 197*   ALT  --  8 7 9 9   CR 0.91 0.97 1.03 0.97 0.92   GFRESTIMATED 86 79 74 79 85   POTASSIUM  --  4.5 4.7 4.6 3.8   TSH 0.01* 13.65*  --  6.68* 6.86*   T4  --  0.79*  --  0.95 0.83*   WBC  --  8.7 7.6 7.8 8.9   HGB  --  13.1* 12.6* 12.8* 13.1*   PLT  --  229 255 242 233   ALBUMIN  --  3.8 3.7 4.0 3.9      Hemoglobin A1c  Goal range is under 8%. Best is 6.5 to 7   Blood Pressure 98/58 Goal to keep less than 140/90   Tobacco  reports that he quit smoking about 40 years ago. His smoking use included cigarettes. He started smoking about 66 years ago. He has a 26 pack-year smoking history. He has been exposed to tobacco smoke. He has never used smokeless tobacco. Goal to abstain from tobacco   Aspirin or Plavix Anti-platelet therapy Aspirin or Plavix reduces risk of heart disease and stroke  -- sometimes used with other blood thinners, depending on bleeding risk and risk factors.    ACE/ARB Specific blood pressure meds These medications can reduce risk of kidney disease   Cholesterol Statins (Lipitor, Crestor, vs others) Statins reduce risk of heart disease and stroke   Eye Exam -- Do Yearly -- Annual diabetic eye exam   Healthy weight Wt Readings from Last 4 Encounters:   07/03/25 97.9 kg (215 lb 12.8 oz)   03/14/25 99.3 kg (219 lb)   03/11/25 99.3 kg (219 lb)   12/11/24 99.1 kg (218 lb 6.4 oz)      Body mass index is 32.81 kg/m .  Goal BMI under 30, best is under 25.      -- Trying to exercise daily (goal at least 20 min/day) with moderate aerobic activity   -- Eat  healthy (resources from ADA at http://www.diabetes.org/)   -- Taking good care of my feet. Consider seeing the Podiatrist   -- Check blood sugars as directed, record in log book and bring to every appointment    Insurance companies are grading you and I on your blood sugar control -- Goal is to get your A1c down to 7.9% or lower and NO Smoking!  -- Medicare and most insurance companies, will only cover Hemoglobin A1c labs to be rechecked every 91+ days.      Return for Diabetes labs and clinic follow-up appointment every 3 to 4 months.    Schedule lab only appointment --- A few days AFTER: 10/1/25   Schedule clinic appointment with Dr. Roberts -- Same day as labs, or 1-2 days later.

## 2025-07-07 ENCOUNTER — RESULTS FOLLOW-UP (OUTPATIENT)
Dept: INTERNAL MEDICINE | Facility: OTHER | Age: 80
End: 2025-07-07
Payer: COMMERCIAL

## 2025-07-07 DIAGNOSIS — E03.4 HYPOTHYROIDISM DUE TO ACQUIRED ATROPHY OF THYROID: ICD-10-CM

## 2025-07-07 RX ORDER — LEVOTHYROXINE SODIUM 150 UG/1
150 TABLET ORAL DAILY
Qty: 60 TABLET | Refills: 0 | Status: SHIPPED | OUTPATIENT
Start: 2025-07-07

## 2025-07-28 ENCOUNTER — HOSPITAL ENCOUNTER (OUTPATIENT)
Dept: CT IMAGING | Facility: OTHER | Age: 80
Discharge: HOME | End: 2025-07-28
Attending: INTERNAL MEDICINE
Payer: MEDICARE

## 2025-07-28 DIAGNOSIS — C18.7 ADENOCARCINOMA OF SIGMOID COLON (H): ICD-10-CM

## 2025-07-28 PROCEDURE — 71260 CT THORAX DX C+: CPT

## 2025-07-28 PROCEDURE — 250N000011 HC RX IP 250 OP 636

## 2025-07-28 PROCEDURE — 250N000009 HC RX 250

## 2025-07-28 PROCEDURE — 74177 CT ABD & PELVIS W/CONTRAST: CPT

## 2025-07-28 PROCEDURE — 71260 CT THORAX DX C+: CPT | Mod: 26 | Performed by: RADIOLOGY

## 2025-07-28 PROCEDURE — 74177 CT ABD & PELVIS W/CONTRAST: CPT | Mod: 26 | Performed by: RADIOLOGY

## 2025-07-28 RX ORDER — IOPAMIDOL 755 MG/ML
124 INJECTION, SOLUTION INTRAVASCULAR ONCE
Status: COMPLETED | OUTPATIENT
Start: 2025-07-28 | End: 2025-07-28

## 2025-07-28 RX ADMIN — IOPAMIDOL 124 ML: 755 INJECTION, SOLUTION INTRAVENOUS at 09:44

## 2025-07-28 RX ADMIN — SODIUM CHLORIDE 60 ML: 9 INJECTION, SOLUTION INTRAVENOUS at 09:44

## (undated) DEVICE — ENDO BRUSH CHANNEL MASTER CLEANING 2-4.2MM BW-412T

## (undated) DEVICE — ENDO KIT COMPLIANCE DYKENDOCMPLY

## (undated) DEVICE — SUCTION MANIFOLD NEPTUNE 2 SYS 4 PORT 0702-020-000

## (undated) DEVICE — SOL WATER 1500ML

## (undated) DEVICE — ENDO SNARE POLYPECTOMY CRESENT 20MM LOOP SD-221U-25

## (undated) DEVICE — Device

## (undated) DEVICE — SYR ENDO MARKER SPOT GI 5ML GIS-45

## (undated) DEVICE — TUBING SUCTION 10'X3/16" N510

## (undated) DEVICE — ENDO TRAP POLYP E-TRAP 00711099

## (undated) DEVICE — ESU GROUND PAD ADULT W/CORD E7507

## (undated) DEVICE — ENDO SNARE POLYPECTOMY OVAL 25MM LOOP SD-240U-25

## (undated) DEVICE — HEMOCLIP QUICKCLIP PRO OLYMPUS 230CM HX-202UR.B

## (undated) DEVICE — ENDO NDL INJECTION DISP NM-200U-0423

## (undated) RX ORDER — ONDANSETRON 2 MG/ML
INJECTION INTRAMUSCULAR; INTRAVENOUS
Status: DISPENSED
Start: 2022-06-22

## (undated) RX ORDER — CLOPIDOGREL BISULFATE 75 MG/1
TABLET ORAL
Status: DISPENSED
Start: 2022-06-22

## (undated) RX ORDER — HEPARIN SODIUM (PORCINE) LOCK FLUSH IV SOLN 100 UNIT/ML 100 UNIT/ML
SOLUTION INTRAVENOUS
Status: DISPENSED
Start: 2021-04-19

## (undated) RX ORDER — PROPOFOL 10 MG/ML
INJECTION, EMULSION INTRAVENOUS
Status: DISPENSED
Start: 2024-11-22

## (undated) RX ORDER — HEPARIN SODIUM (PORCINE) LOCK FLUSH IV SOLN 100 UNIT/ML 100 UNIT/ML
SOLUTION INTRAVENOUS
Status: DISPENSED
Start: 2018-08-13

## (undated) RX ORDER — LIDOCAINE HYDROCHLORIDE 20 MG/ML
SOLUTION OROPHARYNGEAL
Status: DISPENSED
Start: 2022-06-22

## (undated) RX ORDER — CYANOCOBALAMIN 1000 UG/ML
INJECTION, SOLUTION INTRAMUSCULAR; SUBCUTANEOUS
Status: DISPENSED
Start: 2025-03-14

## (undated) RX ORDER — HEPARIN SODIUM (PORCINE) LOCK FLUSH IV SOLN 100 UNIT/ML 100 UNIT/ML
SOLUTION INTRAVENOUS
Status: DISPENSED
Start: 2019-04-09

## (undated) RX ORDER — HEPARIN SODIUM (PORCINE) LOCK FLUSH IV SOLN 100 UNIT/ML 100 UNIT/ML
SOLUTION INTRAVENOUS
Status: DISPENSED
Start: 2020-04-23

## (undated) RX ORDER — HEPARIN SODIUM (PORCINE) LOCK FLUSH IV SOLN 100 UNIT/ML 100 UNIT/ML
SOLUTION INTRAVENOUS
Status: DISPENSED
Start: 2020-10-21

## (undated) RX ORDER — ASPIRIN 325 MG
TABLET ORAL
Status: DISPENSED
Start: 2022-06-22

## (undated) RX ORDER — PROPOFOL 10 MG/ML
INJECTION, EMULSION INTRAVENOUS
Status: DISPENSED
Start: 2020-06-01

## (undated) RX ORDER — FENTANYL CITRATE-0.9 % NACL/PF 10 MCG/ML
PLASTIC BAG, INJECTION (ML) INTRAVENOUS
Status: DISPENSED
Start: 2024-11-22

## (undated) RX ORDER — HEPARIN SODIUM (PORCINE) LOCK FLUSH IV SOLN 100 UNIT/ML 100 UNIT/ML
SOLUTION INTRAVENOUS
Status: DISPENSED
Start: 2018-12-10

## (undated) RX ORDER — PROPOFOL 10 MG/ML
INJECTION, EMULSION INTRAVENOUS
Status: DISPENSED
Start: 2021-07-19

## (undated) RX ORDER — HEPARIN SODIUM (PORCINE) LOCK FLUSH IV SOLN 100 UNIT/ML 100 UNIT/ML
SOLUTION INTRAVENOUS
Status: DISPENSED
Start: 2019-10-07

## (undated) RX ORDER — LIDOCAINE HYDROCHLORIDE 20 MG/ML
INJECTION, SOLUTION EPIDURAL; INFILTRATION; INTRACAUDAL; PERINEURAL
Status: DISPENSED
Start: 2020-06-01

## (undated) RX ORDER — CYANOCOBALAMIN 1000 UG/ML
INJECTION, SOLUTION INTRAMUSCULAR; SUBCUTANEOUS
Status: DISPENSED
Start: 2018-09-06

## (undated) RX ORDER — LIDOCAINE HYDROCHLORIDE 20 MG/ML
INJECTION, SOLUTION EPIDURAL; INFILTRATION; INTRACAUDAL; PERINEURAL
Status: DISPENSED
Start: 2021-07-19

## (undated) RX ORDER — MAGNESIUM HYDROXIDE/ALUMINUM HYDROXICE/SIMETHICONE 120; 1200; 1200 MG/30ML; MG/30ML; MG/30ML
SUSPENSION ORAL
Status: DISPENSED
Start: 2022-06-22

## (undated) RX ORDER — ATORVASTATIN CALCIUM 40 MG/1
TABLET, FILM COATED ORAL
Status: DISPENSED
Start: 2022-06-22

## (undated) RX ORDER — HEPARIN SODIUM (PORCINE) LOCK FLUSH IV SOLN 100 UNIT/ML 100 UNIT/ML
SOLUTION INTRAVENOUS
Status: DISPENSED
Start: 2020-06-01

## (undated) RX ORDER — HEPARIN SODIUM (PORCINE) LOCK FLUSH IV SOLN 100 UNIT/ML 100 UNIT/ML
SOLUTION INTRAVENOUS
Status: DISPENSED
Start: 2021-07-19